# Patient Record
Sex: FEMALE | Race: WHITE | NOT HISPANIC OR LATINO | Employment: FULL TIME | URBAN - METROPOLITAN AREA
[De-identification: names, ages, dates, MRNs, and addresses within clinical notes are randomized per-mention and may not be internally consistent; named-entity substitution may affect disease eponyms.]

---

## 2017-04-25 ENCOUNTER — ALLSCRIPTS OFFICE VISIT (OUTPATIENT)
Dept: OTHER | Facility: OTHER | Age: 52
End: 2017-04-25

## 2017-04-25 DIAGNOSIS — D64.9 ANEMIA: ICD-10-CM

## 2017-04-25 DIAGNOSIS — E53.8 DEFICIENCY OF OTHER SPECIFIED B GROUP VITAMINS: ICD-10-CM

## 2017-04-25 DIAGNOSIS — E55.9 VITAMIN D DEFICIENCY: ICD-10-CM

## 2017-06-20 ENCOUNTER — LAB CONVERSION - ENCOUNTER (OUTPATIENT)
Dept: OTHER | Facility: OTHER | Age: 52
End: 2017-06-20

## 2017-06-20 LAB
25(OH)D3 SERPL-MCNC: 74 NG/ML (ref 30–100)
BASOPHILS # BLD AUTO: 0.2 %
BASOPHILS # BLD AUTO: 15 CELLS/UL (ref 0–200)
DEPRECATED RDW RBC AUTO: 13.7 % (ref 11–15)
EOSINOPHIL # BLD AUTO: 1.4 %
EOSINOPHIL # BLD AUTO: 102 CELLS/UL (ref 15–500)
HCT VFR BLD AUTO: 36.9 % (ref 35–45)
HGB BLD-MCNC: 12.2 G/DL (ref 11.7–15.5)
LYMPHOCYTES # BLD AUTO: 2117 CELLS/UL (ref 850–3900)
LYMPHOCYTES # BLD AUTO: 29 %
MCH RBC QN AUTO: 32.2 PG (ref 27–33)
MCHC RBC AUTO-ENTMCNC: 33.1 G/DL (ref 32–36)
MCV RBC AUTO: 97.3 FL (ref 80–100)
MONOCYTES # BLD AUTO: 321 CELLS/UL (ref 200–950)
MONOCYTES (HISTORICAL): 4.4 %
NEUTROPHILS # BLD AUTO: 4745 CELLS/UL (ref 1500–7800)
NEUTROPHILS # BLD AUTO: 65 %
PLATELET # BLD AUTO: 228 THOUSAND/UL (ref 140–400)
PMV BLD AUTO: 9.7 FL (ref 7.5–12.5)
RBC # BLD AUTO: 3.8 MILLION/UL (ref 3.8–5.1)
TSH SERPL DL<=0.05 MIU/L-ACNC: 2.83 MIU/L
VIT B12 SERPL-MCNC: 387 PG/ML (ref 200–1100)
WBC # BLD AUTO: 7.3 THOUSAND/UL (ref 3.8–10.8)

## 2017-10-23 ENCOUNTER — ALLSCRIPTS OFFICE VISIT (OUTPATIENT)
Dept: OTHER | Facility: OTHER | Age: 52
End: 2017-10-23

## 2017-10-24 NOTE — PROGRESS NOTES
Assessment  1  Birth control counseling (V25 09) (Z30 09)   2  Encounter for gynecological examination without abnormal finding (V72 31) (Z01 419)    Plan  Encounter for screening mammogram for malignant neoplasm of breast    · * MAMMO SCREENING BILATERAL W CAD; Status:Hold For - Scheduling,Retrospective  By Protocol Authorization; Requested for:[id number];    Perform:[facility]; [phone]; Last Updated Terri Macias; 10/23/2017 8:00:10 AM;Ordered;For:Encounter for screening mammogram for malignant neoplasm of breast; Ordered By:Danuta León;  PMH: Encounter for birth control pills maintenance    · Alyacen 7/7/7 0 5/0 75/1-35 MG-MCG Oral Tablet; TAKE 1 TABLET DAILY AS  DIRECTED   Rx By: Nurys Tarango; Dispense: 84 Days ; #:1 X 28 Tablet Disp Pack (3 Disp Packs); Refill: 3;For: PMH: Encounter for birth control pills maintenance; PORTIA = N; Sent To: Miguel Collado    Discussion/Summary  healthy adult female cervical cancer screening is current next cervical cancer screening is due 2018 Breast cancer screening: mammogram has been ordered  Colorectal cancer screening: colorectal cancer screening is current  Annual exam performedrx givenrisks of OCP, pt wants to continuesent via EMR1 year  The patient has the current Goals: RTO 1 year  The patent has the current Barriers: None  Patient is able to Self-Care  Self Referrals: Yes      Chief Complaint  Pt is here for yearly exam       History of Present Illness  HPI: 46 y/o female here for annual GYN exam  Pt denies any changes in medical, surgical, or family histories  Menses regular, pt on OCP for contraception, no problems, (+) mood swings before menses which are worsening  (+) sexually active with , denies any dryness or pain with intercourse  last pap 2015 WNL  last mammo 2016 WNL  last colonoscopy 2 yrs ago per pt WNL  GYN HM, Adult Female [facility]:   General Health:   Lifestyle:   She does not use tobacco -- She consumes alcohol -- She denies drug use  Reproductive health: the patient is perimenopausal--   she reports no menstrual problems  Menstrual history: LMP: the last menstrual period was 9/25/2017 -- she uses contraception  For contraception, she uses oral contraception pills  -- she is sexually active  -- she denies prior pregnancies  Screening: Cervical cancer screening includes a pap smear performed 10/12/2015, negative-- and-- human papilloma virus screening performed 10/12/2015, negative  Breast cancer screening includes a mammogram performed 12/5/2016, benign  Colorectal cancer screening includes a colonoscopy performed within the past ten years  Review of Systems    Constitutional: no fever-- and-- no chills  Cardiovascular: no chest pain  Respiratory: no shortness of breath  Breasts: no breast pain-- and-- no breast lump  Gastrointestinal: no abdominal pain  Genitourinary: incontinence-- (mild stress incontinence), but-- no dysuria,-- no pelvic pain,-- no vaginal discharge,-- no dysmenorrhea-- and-- no unexplained vaginal bleeding  Neurological: no headache  Active Problems  1  Anemia, unspecified type (285 9) (D64 9)   2  Benign paroxysmal vertigo, unspecified laterality (386 11) (H81 10)   3  Birth control counseling (V25 09) (Z30 09)   4  Encounter for gynecological examination without abnormal finding (V72 31) (Z01 419)   5  Encounter for screening mammogram for malignant neoplasm of breast (V76 12)   (Z12 31)   6  Hair loss (704 00) (L65 9)   7  Menopausal disorder (627 9) (N95 9)   8  Mild vitamin D deficiency (268 9) (E55 9)   9  Seasonal allergies (477 9) (J30 2)   10   Vitamin B12 deficiency (266 2) (E53 8)    Past Medical History   · History of Acute maxillary sinusitis (461 0) (J01 00)   · History of Acute maxillary sinusitis (461 0) (J01 00)   · History of Acute serous otitis media (381 01) (H65 00)   · History of Age At First Period 17 Years Old (Menarche)   · History of acute sinusitis (V12 69) (Z87 09)   · History of allergic rhinitis (V12 69) (Z87 09)   · History of gastroesophageal reflux (GERD) (V12 79) (Z87 19)    The active problems and past medical history were reviewed and updated today  Surgical History   · History of Denial Of Any Significant Medical History   · History of Denial Of Any Significant Medical History    The surgical history was reviewed and updated today  Family History  Mother    · No pertinent family history  Father    · No pertinent family history  Family History    · Family history of Arthritis (V17 7)   · Family history of Hiatal Hernia   · Family history of Skin Cancer (V16 8)   · Family history of Thyroid Disorder (V18 19)   · Family history of Varicose Veins Of Lower Extremities    The family history was reviewed and updated today  Social History   · Being A Social Drinker   · Birth Control Method - Oral Contraceptives   · Daily Coffee Consumption (1  Cups/Day)   · Exercising Regularly   · Never A Smoker   · Never Used Drugs  The social history was reviewed and updated today  The social history was reviewed and is unchanged  Current Meds   1  Alyacen 7/7/7 0 5/0 75/1-35 MG-MCG Oral Tablet; TAKE 1 TABLET DAILY AS DIRECTED; Therapy: 47ZGS3158 to ([facility])  Requested for: 99SRS8961; Last   Rx:88Cel6957 Ordered   2  Fluticasone Propionate 50 MCG/ACT Nasal Suspension; USE 2 SPRAYS IN EACH   NOSTRIL ONCE DAILY; Therapy: 08EKP8422 to (Evaluate:37Lso1551)  Requested for: 60Ajd1836; Last   Rx:76Wlm1940 Ordered   3  Meclizine HCl - 25 MG Oral Tablet; TAKE 1 TABLET 3 TIMES DAILY AS NEEDED; Therapy: 50SOD4641 to (Evaluate:37Xvm1129)  Requested for: 72Rfa0435; Last   Rx:48Xlx5960 Ordered   4  Multi-Vitamin Daily Oral Tablet Recorded   5  Pantoprazole Sodium 40 MG Oral Tablet Delayed Release; TAKE 1 TABLET DAILY; Therapy: 83TXN8375 to ([facility])  Requested for: 08Fko6105 Recorded   6   Saline Nasal Spray 0 65 % Nasal Solution; Inhale 1-2 sprays as needed; Therapy: 04Rca5594 to (Last Rx:46Dhv4218)  Requested for: 21Jiv3506 Ordered   7  Vitamin B-12 500 MCG Oral Tablet; TAKE 1 TABLET DAILY; Therapy: 99GZN1275 to [phone])  Requested for: 42Uqy2810; Last   Rx:93Tun3832 Ordered   8  Vitamin C CAPS; 1000 mg BID; Therapy: (Dave Polanco) to Recorded   9  Vitamin D 1000 UNIT Oral Tablet Recorded    Allergies  1  No Known Drug Allergies    Vitals   Recorded: 29LBD0058 19:28HY   Systolic 388, LUE, Sitting   Diastolic 82, LUE, Sitting   Height 5 ft 7 in   Weight 169 lb    BMI Calculated 26 47   BSA Calculated 1 88   LMP 61Czv3042     Physical Exam    Constitutional   General appearance: No acute distress, well appearing and well nourished  Neck   Thyroid: Normal, no thyromegaly  Pulmonary   Respiratory effort: No increased work of breathing or signs of respiratory distress  Auscultation of lungs: Clear to auscultation  Cardiovascular   Auscultation of heart: Normal rate and rhythm, normal S1 and S2, no murmurs  Genitourinary   External genitalia: Normal and no lesions appreciated  Vagina: Normal, no lesions or dryness appreciated  Urethra: Normal     Urethral meatus: Normal     Bladder: Normal, soft, non-tender and no prolapse or masses appreciated  Cervix: Normal, no palpable masses  Uterus: Normal, non-tender, not enlarged, and no palpable masses  Adnexa/parametria: Normal, non-tender and no fullness or masses appreciated  Chest   Breasts: Normal and no dimpling or skin changes noted  Abdomen   Abdomen: Normal, non-tender, and no organomegaly noted      Psychiatric   Orientation to person, place, and time: Normal     Mood and affect: Normal        Future Appointments    Date/Time Provider Specialty Site   10/25/2017 04:45 PM [facility]y Boas, MD [facility]     Signatures   Electronically signed by : Capri Garza [facility]; Oct 23 2017  8:34AM EST                       (Author)    Electronically signed by : SHAUNNA Valdovinos ; Oct 23 2017  5:40PM EST

## 2017-10-25 ENCOUNTER — ALLSCRIPTS OFFICE VISIT (OUTPATIENT)
Dept: OTHER | Facility: OTHER | Age: 52
End: 2017-10-25

## 2017-10-26 NOTE — PROGRESS NOTES
Assessment  1  Vitamin B12 deficiency (266 2) (E53 8)   2  Influenza vaccine needed (V04 81) (Z23)   3  Screening for depression (V79 0) (Z13 89)   4  Mild vitamin D deficiency (268 9) (E55 9)    Plan  Influenza vaccine needed    · Fluzone Quadrivalent 0 5 ML Intramuscular Suspension Prefilled Syringe  PMH: Depression screen    · *VB-Depression Screening ; every 1 year; Last 60Crm9427; Next 79XXH8662; Status:Active  PMH: History of screening mammography    · * MAMMO SCREENING BILATERAL W CAD; Status:Active; Requested for:52Jfb6045;   Screening for depression    · PHQ-9 Adult Depression Screening ; every 1 year; Last 25Oct2017; Next 16GCI4481; Status:Active    Discussion/Summary  Counseling Documentation With Imm: The patient was counseled regarding diagnostic results,-- prognosis,-- impressions  Chief Complaint  Chief Complaint Free Text Note Form: Pt is here for F/U for Vitamin D deficiency  lab work 06/19/17      History of Present Illness  Anemia:   Valentin Hsu presents with complaints of mild anemia (Anemia is resolved CBC noted)   Associated symptoms include no abdominal swelling,-- no melena,-- no hematuria,-- no rectal bleeding,-- no hemoptysis,-- no diarrhea,-- no dyspnea,-- no tendency for easy bleeding,-- no tendency for easy bruising,-- no edema,-- no fatigue,-- no feelings of weakness,-- no fever,-- no lower back pain,-- no mental status change,-- no palpitations,-- no recent weight loss,-- no night sweats,-- no tingling-- and-- no hematemesis  Fatigue: The patient is being seen for follow-up from a previous urgent care visit for fatigue  The patient presents with complaints of gradual onset of moderate fatigue  Symptoms are improved by rest, adequate sleep, regular exercise and allergy medication, but not by antidepressants and asthma medication  Symptoms are unchanged   Risk Factors: no polypharmacy, no obesity and no poor conditioning Pertinent Medical History: anemia, but not cardiovascular disease, chronic respiratory disease, chronic GI disease, chronic liver disease, chronic neuropsychiatric disorder, malignancy, obstructive sleep apnea and seasonal affective disorder (better off and on, the fatigue is often on but better than before)  The patient is currently experiencing symptoms  Vitamin D Deficiency: The patient is being seen for follow-up of vitamin D deficiency  Disease type: vitamin D deficiency  (Vitamin-D deficiency is resolved ) Recent laboratory results: date, 25-hydroxyvitamin D ng/mL  Review of Systems  Complete-Female:   Constitutional: feeling tired  Eyes: No complaints of eye pain, no red eyes, no eyesight problems, no discharge, no dry eyes, no itching of eyes  ENT: no complaints of earache, no loss of hearing, no nose bleeds, no nasal discharge, no sore throat, no hoarseness  Cardiovascular: No complaints of slow heart rate, no fast heart rate, no chest pain, no palpitations, no leg claudication, no lower extremity edema  Respiratory: No complaints of shortness of breath, no wheezing, no cough, no SOB on exertion, no orthopnea, no PND  Gastrointestinal: No complaints of abdominal pain, no constipation, no nausea or vomiting, no diarrhea, no bloody stools  Genitourinary: No complaints of dysuria, no incontinence, no pelvic pain, no dysmenorrhea, no vaginal discharge or bleeding  Musculoskeletal: No complaints of arthralgias, no myalgias, no joint swelling or stiffness, no limb pain or swelling  Integumentary: Hair loss, indentation in leg, but-- No complaints of skin rash or lesions, no itching, no skin wounds, no breast pain or lump  Neurological: dizziness  Psychiatric: sleep disturbances (poor sleep )  Endocrine: No complaints of proptosis, no hot flashes, no muscle weakness, no deepening of the voice, no feelings of weakness     Hematologic/Lymphatic: No complaints of swollen glands, no swollen glands in the neck, does not bleed easily, does not bruise easily  Other Symptoms: Vertigo MRI was done, its 1-2 x a year  Active Problems  1  Anemia, unspecified type (285 9) (D64 9)   2  Benign paroxysmal vertigo, unspecified laterality (386 11) (H81 10)   3  Birth control counseling (V25 09) (Z30 09)   4  Hair loss (704 00) (L65 9)   5  Menopausal disorder (627 9) (N95 9)   6  Mild vitamin D deficiency (268 9) (E55 9)   7  Seasonal allergies (477 9) (J30 2)   8  Vitamin B12 deficiency (266 2) (E53 8)    Past Medical History  1  History of Acute maxillary sinusitis (461 0) (J01 00)   2  History of Acute maxillary sinusitis (461 0) (J01 00)   3  History of Acute serous otitis media (381 01) (H65 00)   4  History of Age At First Period 17 Years Old (Menarche)   11  History of acute sinusitis (V12 69) (Z87 09)   6  History of allergic rhinitis (V12 69) (Z87 09)   7  History of gastroesophageal reflux (GERD) (V12 79) (Z87 19)    Surgical History  1  History of Denial Of Any Significant Medical History   2  History of Denial Of Any Significant Medical History    Family History  Mother    1  No pertinent family history  Father    2  No pertinent family history  Family History    3  Family history of Arthritis (V17 7)   4  Family history of Hiatal Hernia   5  Family history of Skin Cancer (V16 8)   6  Family history of Thyroid Disorder (V18 19)   7  Family history of Varicose Veins Of Lower Extremities    Social History   · Being A Social Drinker   · Birth Control Method - Oral Contraceptives   · Daily Coffee Consumption (1  Cups/Day)   · Exercising Regularly   · Never A Smoker   · Never Used Drugs    Current Meds   1  Alyacen 7/7/7 0 5/0 75/1-35 MG-MCG Oral Tablet; TAKE 1 TABLET DAILY AS DIRECTED; Therapy: 11JWL7183 to (Evaluate:53Snt1126)  Requested for: 23Oct2017; Last ZK:10WXW5985   Ordered   2  Caltrate 600+D TABS; Take 1 tablet daily; Therapy: (Recorded:25Oct2017) to Recorded   3   Fluticasone Propionate 50 MCG/ACT Nasal Suspension; USE 2 SPRAYS IN [facility] NOSTRIL ONCE   DAILY; Therapy: 88GPD5575 to (Evaluate:49Ivz8615)  Requested for: 25Apr2017; Last Rx:25Apr2017   Ordered   4  Meclizine HCl - 25 MG Oral Tablet; TAKE 1 TABLET 3 TIMES DAILY AS NEEDED; Therapy: 93XQN8027 to (Evaluate:04Jun2017)  Requested for: 25Apr2017; Last Rx:25Apr2017   Ordered   5  Multi-Vitamin Daily Oral Tablet; TAKE 1 TABLET DAILY; Therapy: (VUJGNPJD:66CMD9099) to Recorded   6  Pantoprazole Sodium 40 MG Oral Tablet Delayed Release; TAKE 1 TABLET DAILY; Therapy: 61GFQ2096 to (Demaris Erin)  Requested for: 36AYU9617 Recorded   7  Saline Nasal Spray 0 65 % Nasal Solution; Inhale 1-2 sprays as needed; Therapy: 08Nmu6848 to (Last Rx:46Tmz4262)  Requested for: 08Wqg5214 Ordered   8  Vitamin B-12 500 MCG Oral Tablet; TAKE 1 TABLET DAILY; Therapy: 33ZOI1563 to (03 17 74 30 53)  Requested for: 25Apr2017; Last Rx:25Apr2017   Ordered   9  Vitamin C CAPS; 1000 mg BID; Therapy: (Recorded:14Ofd7411) to Recorded  Medication List Reviewed: The medication list was reviewed and updated today  Allergies  1  No Known Drug Allergies  2  Seasonal    Vitals  Vital Signs    Recorded: 38CZZ8553 04:27PM   Temperature 99 2 F, Tympanic   Heart Rate 72   Systolic 232, LUE, Standing   Diastolic 82, LUE, Standing   BP CUFF SIZE Large   Height 5 ft 7 in   Weight 169 lb 6 4 oz   BMI Calculated 26 53   BSA Calculated 1 88   O2 Saturation 95, RA     Physical Exam    Constitutional   General appearance: No acute distress, well appearing and well nourished  Head and Face   Head and face: Normal     Eyes   Conjunctiva and lids: No swelling, erythema or discharge  Ears, Nose, Mouth, and Throat   External inspection of ears and nose: Normal     Neck   Thyroid: Normal, no thyromegaly  Pulmonary   Percussion of chest: Normal     Palpation of chest: Normal     Auscultation of lungs: Clear to auscultation      Cardiovascular   Auscultation of heart: Normal rate and rhythm, normal S1 and S2, no murmurs  Carotid pulses: 2+ bilaterally  Abdominal aorta: Normal     Femoral pulses: 2+ bilaterally  Pedal pulses: 2+ bilaterally  Peripheral vascular exam: Normal     Examination of extremities for edema and/or varicosities: Normal     Abdomen   Liver and spleen: No hepatomegaly or splenomegaly  Examination for hernias: No hernia appreciated  Lymphatic   Palpation of lymph nodes in neck: No lymphadenopathy  Palpation of lymph nodes in axillae: No lymphadenopathy  Palpation of lymph nodes in groin: No lymphadenopathy  Musculoskeletal   Gait and station: Normal     Digits and nails: Normal without clubbing or cyanosis  Joints, bones, and muscles: Normal     Range of motion: Normal     Stability: Normal     Muscle strength/tone: Normal     Neurologic   Cranial nerves: Cranial nerves II-XII intact  Cortical function: Normal mental status  Reflexes: 2+ and symmetric  Sensation: No sensory loss  Coordination: Normal finger to nose and heel to shin  Psychiatric   Judgment and insight: Normal     Orientation to person, place, and time: Normal     Recent and remote memory: Intact  Mood and affect: Normal        Results/Data  PHQ-9 Adult Depression Screening 01GQM9637 04:47PM User, [facility]     Test Name Result Flag Reference   PHQ-9 Adult Depression Score 8     Over the last two weeks, how often have you been bothered by any of the following problems?   Little interest or pleasure in doing things: Several days - 1  Feeling down, depressed, or hopeless: Several days - 1  Trouble falling or staying asleep, or sleeping too much: Nearly every day - 3  Feeling tired or having little energy: More than half the days - 2  Poor appetite or over eating: Not at all - 0  Feeling bad about yourself - or that you are a failure or have let yourself or your family down: Not at all - 0  Trouble concentrating on things, such as reading the newspaper or watching television: Several days - 1  Moving or speaking so slowly that other people could have noticed  Or the opposite -  being so fidgety or restless that you have been moving around a lot more than usual: Not at all - 0  Thoughts that you would be better off dead, or of hurting yourself in some way: Not at all - 0   PHQ-9 Adult Depression Screening Negative     PHQ-9 Difficulty Level Somewhat difficult     PHQ-9 Severity Mild Depression         Health Management  History of Depression screen   *VB-Depression Screening; every 1 year; Last 95Znp2710; Next Due: 17DYB0337; Active  Need for prophylactic vaccination and inoculation against influenza   Fluzone Intramuscular Injectable; every 1 year; Last Approx WGR4203; Next Due: 60XVW6181;   Overdue    Future Appointments    Date/Time Provider Specialty Site   10/29/2018 08:00 AM Felicity Luong [facility] Obstetrics/Gynecology [facility] AND SURGICAL [facility]     Signatures   Electronically signed by : Aleksander Gar MD; Oct 25 2017  5:03PM EST                       (Author)

## 2017-12-05 DIAGNOSIS — Z12.31 ENCOUNTER FOR SCREENING MAMMOGRAM FOR MALIGNANT NEOPLASM OF BREAST: ICD-10-CM

## 2017-12-18 ENCOUNTER — ALLSCRIPTS OFFICE VISIT (OUTPATIENT)
Dept: OTHER | Facility: OTHER | Age: 52
End: 2017-12-18

## 2017-12-19 NOTE — PROGRESS NOTES
Assessment  1  Tracheobronchitis (490) (J40)    Plan  Tracheobronchitis    · Azithromycin 250 MG Oral Tablet; Take 2 tablets today, then 1 tablet daily for 4 days   · Promethazine-Codeine 6 25-10 MG/5ML Oral Syrup; TAKE 1 TSP Every 8 hours    Discussion/Summary  The patient was counseled regarding diagnostic results,-- prognosis,-- impressions  Chief Complaint  Patient is here today with complaints of a cough since 12/6/17  The cough is not constant but it is worse at night  No shortness of breath  No fever documented  She is treating the symptoms with an OTC cough and cold formula with a decongestant  History of Present Illness  Cold Symptoms:   Harvey Cedeno presents with complaints of gradual onset of moderate cold symptoms starting about 6 days ago  Her symptoms are reportedly caused by no known event  Symptoms are worsening  Risk Factors: no smoking Pertinent Medical History: no asthma (Start with cold symptoms now in chest )  Associated symptoms include nasal congestion,-- post nasal drainage,-- hoarseness,-- headache,-- plugged ear(s)-- and-- ear pain, but-- no sore throat,-- no dry cough,-- no facial pressure,-- no facial pain,-- no nausea,-- no vomiting,-- no diarrhea,-- no fever-- and-- no chills  The patient presents with complaints of intermittent episodes of moderate no productive cough, described as hacking  Symptoms are worsening  Has been taking generic antihistamine and Tylenol cold and congestion with minimal sx relief  Review of Systems   Constitutional: fever-- and-- feeling poorly, but-- as noted in HPI   ENT: earache-- and-- nasal discharge, but-- as noted in HPI  Cardiovascular: no complaints of slow or fast heart rate, no chest pain, no palpitations, no leg claudication or lower extremity edema  Gastrointestinal: no complaints of abdominal pain, no constipation, no nausea or diarrhea, no vomiting, no bloody stools    Integumentary: no complaints of skin rash or lesion, no itching or dry skin, no skin wounds  Active Problems  1  Anemia, unspecified type (285 9) (D64 9)   2  Benign paroxysmal vertigo, unspecified laterality (386 11) (H81 10)   3  Birth control counseling (V25 09) (Z30 09)   4  Hair loss (704 00) (L65 9)   5  Influenza vaccine needed (V04 81) (Z23)   6  Menopausal disorder (627 9) (N95 9)   7  Mild vitamin D deficiency (268 9) (E55 9)   8  Screening for depression (V79 0) (Z13 89)   9  Seasonal allergies (477 9) (J30 2)   10  Vitamin B12 deficiency (266 2) (E53 8)    Social History   · Being A Social Drinker   · Birth Control Method - Oral Contraceptives   · Daily Coffee Consumption (1  Cups/Day)   · Exercising Regularly   · Never A Smoker   · Never Used Drugs    Current Meds   1  Caltrate 600+D TABS; Take 1 tablet daily; Therapy: (Recorded:25Oct2017) to Recorded   2  Fluticasone Propionate 50 MCG/ACT Nasal Suspension; USE 2 SPRAYS IN EACH NOSTRIL ONCE DAILY; Therapy: 24XBH5823 to (Evaluate:95Hfe3210)  Requested for: 25Apr2017; Last Rx:25Apr2017 Ordered   3  Meclizine HCl - 25 MG Oral Tablet; TAKE 1 TABLET 3 TIMES DAILY AS NEEDED; Therapy: 21KMV8013 to (Evaluate:04Jun2017)  Requested for: 25Apr2017; Last Rx:25Apr2017 Ordered   4  Multi-Vitamin Daily Oral Tablet; TAKE 1 TABLET DAILY; Therapy: (WDYHADOD:39KWB6832) to Recorded   5  Pantoprazole Sodium 40 MG Oral Tablet Delayed Release; TAKE 1 TABLET DAILY; Therapy: 78TZI7336 to (Amanda Perry)  Requested for: 32Qhd0290 Recorded   6  Saline Nasal Spray 0 65 % Nasal Solution; Inhale 1-2 sprays as needed; Therapy: 25Lmh2144 to (Last Rx:34Hmx9196)  Requested for: 35Akr1811 Ordered   7  Vitamin B-12 500 MCG Oral Tablet; TAKE 1 TABLET DAILY; Therapy: 45VFB7647 to (77 [id number] 135)  Requested for: 25Apr2017; Last Rx:25Apr2017 Ordered   8  Vitamin C CAPS; 1000 mg BID; Therapy: (Recorded:81Ytn3156) to Recorded    Allergies  1  No Known Drug Allergies  2   Seasonal    Vitals   Recorded: 57AYM0343 03:42PM   Temperature 97 9 F, Oral   Heart Rate 72, L Radial   Pulse Quality Regular, L Radial   Respiration 16   Systolic 349, LUE, Sitting   Diastolic 90, LUE, Sitting   Height 5 ft 7 in   Weight 172 lb 6 4 oz   BMI Calculated 27   BSA Calculated 1 9   O2 Saturation 98, RA     Physical Exam   Constitutional  General appearance: Abnormal   well developed,-- well nourished-- and-- acutely exhausted  Eyes  Conjunctiva and lids: No swelling, erythema or discharge  Pupils and irises: Equal, round and reactive to light  Ears, Nose, Mouth, and Throat  External inspection of ears and nose: Normal    Otoscopic examination: Abnormal   The right tympanic membrane was normal  The left tympanic membrane was bulging, but-- was not red  Nasal mucosa, septum, and turbinates: Abnormal   There was a mucoid discharge from both nares  The bilateral nasal mucosa was edematous  -- moderate frontal and maxillary tenderness  Oropharynx: Abnormal   The posterior pharynx was not erythematous-- and-- did not have an exudate  Oral mucosa was dry  The tongue was dry  Pulmonary  Respiratory effort: No increased work of breathing or signs of respiratory distress  Auscultation of lungs: Clear to auscultation  Cardiovascular  Palpation of heart: Normal PMI, no thrills  Auscultation of heart: Abnormal   The heart rate was tachycardic  The rhythm was regular  Heart sounds: normal S1-- and-- normal S2  Lymphatic  Palpation of lymph nodes in neck: Abnormal   bilateral anterior cervical node enlargement  Musculoskeletal  Gait and station: Normal    Skin  Skin and subcutaneous tissue: Normal without rashes or lesions  Neurologic  Cranial nerves: Cranial nerves 2-12 intact           Future Appointments    Date/Time Provider Specialty Site   10/29/2018 08:00 AM [facility] Obstetrics/Gynecology [facility]     Signatures   Electronically signed by : Soham Irwin MD; Dec 18 2017  4:23PM EST                       (Author)

## 2018-01-11 NOTE — PROGRESS NOTES
Assessment    1  Encounter for preventive health examination (V70 0) (Z00 00)   2  Benign paroxysmal vertigo, unspecified laterality (386 11) (H81 10)   3  Fatigue (780 79) (R53 83)   4  Hair loss (704 00) (L65 9)    Plan  Benign paroxysmal vertigo, unspecified laterality    · Meclizine HCl - 25 MG Oral Tablet; TAKE 1 TABLET 3 TIMES DAILY AS NEEDED  Benign paroxysmal vertigo, unspecified laterality, Health Maintenance    · (1) CBC/PLT/DIFF; Status:Active; Requested for:21Apr2016;    · (1) COMPREHENSIVE METABOLIC PANEL; Status:Active; Requested for:21Apr2016;    · (1) TSH; Status:Active; Requested for:21Apr2016;   Depression screen    · *VB-Depression Screening; Status:Complete - Retrospective By Protocol Authorization;    Done: 21Apr2016 03:43PM   · *VB-Depression Screening ; every 1 year; Last 21Apr2016; Next 21Apr2017;  Status:Active  Fatigue    · (1) LYME ANTIBODY PROFILE W/REFLEX TO WESTERN BLOT; Status:Active; Requested for:21Apr2016;    · (1) VITAMIN B12; Status:Active; Requested for:21Apr2016;   Fatigue, Hair loss    · (1) TSH; Status:Active; Requested for:21Apr2016;    · (1) VITAMIN D 25-HYDROXY; Status:Active; Requested for:21Apr2016; Health Maintenance    · (1) HEP C ANTIBODY; Status:Active; Requested for:21Apr2016;    · (1) LIPID PANEL, FASTING; Status:Active; Requested for:21Apr2016;   Need for prophylactic vaccination and inoculation against influenza    · Fluzone Intramuscular Injectable ; every 1 year; Last Approx UXP9884; Next 15LLZ8379;  Status:Active  Need for vaccination for DTP    · Adacel 5-2-15 5 LF-MCG/0 5 Intramuscular Suspension    Discussion/Summary  health maintenance visit Currently, she eats a healthy diet  the risks and benefits of cervical cancer screening were discussed Breast cancer screening: the risks and benefits of breast cancer screening were discussed, monthly self breast exam was advised and mammogram is current   Colorectal cancer screening: the risks and benefits of colorectal cancer screening were discussed and colorectal cancer screening is current  The risks and benefits of immunizations were discussed and immunizations are up to date  Chief Complaint  Pt is here for a yearly physical      History of Present Illness  HM, Adult Female: The patient is being seen for a health maintenance evaluation  Social History: Household members include spouse, 0 daughter(s) and 0 son(s)  She is   Work status: working full time and occupation: Software   The patient has never smoked cigarettes  She reports occasional alcohol use  She has never used illicit drugs  General Health: The patient's health since the last visit is described as fair  She complains of vision problems  She has hearing loss  Lifestyle:  She consumes a diverse and healthy diet  She has weight concerns  She does not use tobacco  She denies alcohol use  She denies drug use  Reproductive health: the patient is perimenopausal   she reports normal menses  she uses contraception  she is sexually active  she denies prior pregnancies  Screening: Cervical cancer screening includes a pap smear performed last year  Breast cancer screening includes a mammogram performed last year  Colorectal cancer screening includes a colonoscopy performed within the past ten years  Metabolic screening includes lipid profile performed within the past five years, glucose screening performed last year, thyroid function test performed last year and no previous DEXA  Review of Systems    Constitutional: feeling tired  Eyes: No complaints of eye pain, no red eyes, no eyesight problems, no discharge, no dry eyes, no itching of eyes  ENT: no complaints of earache, no loss of hearing, no nose bleeds, no nasal discharge, no sore throat, no hoarseness  Cardiovascular: No complaints of slow heart rate, no fast heart rate, no chest pain, no palpitations, no leg claudication, no lower extremity edema     Respiratory: No complaints of shortness of breath, no wheezing, no cough, no SOB on exertion, no orthopnea, no PND  Gastrointestinal: No complaints of abdominal pain, no constipation, no nausea or vomiting, no diarrhea, no bloody stools  Genitourinary: No complaints of dysuria, no incontinence, no pelvic pain, no dysmenorrhea, no vaginal discharge or bleeding  Musculoskeletal: No complaints of arthralgias, no myalgias, no joint swelling or stiffness, no limb pain or swelling  Integumentary: Hair loss, indentation in leg, but No complaints of skin rash or lesions, no itching, no skin wounds, no breast pain or lump  Neurological: dizziness  Psychiatric: sleep disturbances  Endocrine: No complaints of proptosis, no hot flashes, no muscle weakness, no deepening of the voice, no feelings of weakness  Hematologic/Lymphatic: No complaints of swollen glands, no swollen glands in the neck, does not bleed easily, does not bruise easily  Other Symptoms: Vertigo MRI was done, its 1-2 x a year  Over the past 2 weeks, how often have you been bothered by the following problems? 1 ) Little interest or pleasure in doing things? Several days  2 ) Feeling down, depressed or hopeless? Several days  3 ) Trouble falling asleep or sleeping too much? Several days  4 ) Feeling tired or having little energy? Nearly every day  5 ) Poor appetite or overeating? Nearly every day  6 ) Feeling bad about yourself, or that you are a failure, or have let yourself or your family down? Half the days or more  7 ) Trouble concentrating on things, such as reading a newspaper or watching television? Half the days or more  8 ) Moving or speaking so slowly that other people could have noticed, or the opposite, moving or speaking faster than usual? Several days  severity of depression is moderate   How difficult have these problems made it for you to do your work, take care of things at home, or get along with people? Somewhat difficult  Score 14      Active Problems    1  Benign paroxysmal vertigo, unspecified laterality (386 11) (H81 10)   2  Birth control counseling (V25 09) (Z30 9)   3  Contraceptives (V25 02)   4  Menopausal disorder (627 9) (N95 9)    Past Medical History    · History of Acute maxillary sinusitis (461 0) (J01 00)   · History of Acute serous otitis media (381 01) (H65 00)   · History of Age At First Period 17 Years Old (Menarche)   · History of acute sinusitis (V12 69) (Z87 09)   · History of allergic rhinitis (V12 69) (Z87 09)   · History of gastroesophageal reflux (GERD) (V12 79) (Z87 19)    Surgical History    · Contraceptives (V25 02)   · History of Denial Of Any Significant Medical History   · History of Denial Of Any Significant Medical History    Family History  Mother    · No pertinent family history  Father    · No pertinent family history  Family History    · Family history of Arthritis (V17 7)   · Family history of Hiatal Hernia   · Family history of Skin Cancer (V16 8)   · Family history of Thyroid Disorder (V18 19)   · Family history of Varicose Veins Of Lower Extremities    Social History    · Being A Social Drinker   · Birth Control Method - Oral Contraceptives   · Daily Coffee Consumption (1  Cups/Day)   · Exercising Regularly   · Never A Smoker   · Never Used Drugs    Current Meds   1  Alyacen 7/7/7 0 5/0 75/1-35 MG-MCG Oral Tablet; TAKE 1 TABLET DAILY; Therapy: 47LIV0427 to (David Hogan)  Requested for: 12Oct2015; Last   Rx:12Oct2015 Ordered   2  Fluticasone Propionate 50 MCG/ACT Nasal Suspension; USE 2 SPRAYS IN EACH   NOSTRIL ONCE DAILY; Therapy: 19HIJ2315 to (Margarita Montano)  Requested for: 40VFG3240; Last   Rx:28Jan2015 Ordered   3  Meclizine HCl - 25 MG Oral Tablet; TAKE 1 TABLET 3 TIMES DAILY AS NEEDED; Therapy: 68HJI5691 to ([facility])  Requested for: 24Jun2015; Last   AH:02ZFV8020 Ordered   4   Pantoprazole Sodium 40 MG Oral Tablet Delayed Release; TAKE 1 TABLET DAILY; Therapy: 39UUM2606 to (Laly Boudreaux)  Requested for: 65Lqu8580 Recorded   5  ZyrTEC Allergy 10 MG Oral Tablet; TAKE 1 TABLET DAILY AS NEEDED; Therapy: 21Apr2016 to Recorded    Allergies    1  No Known Drug Allergies    Vitals   Recorded: 21Apr2016 03:54PM   Temperature 99 9 F   Heart Rate 99   Systolic 866   Diastolic 92   Height 5 ft 7 in   Weight 179 lb 2 oz   BMI Calculated 28 06   BSA Calculated 1 93     Physical Exam    Constitutional   General appearance: No acute distress, well appearing and well nourished  Head and Face   Head and face: Normal     Eyes   Conjunctiva and lids: No swelling, erythema or discharge  Ears, Nose, Mouth, and Throat   External inspection of ears and nose: Normal     Neck   Thyroid: Normal, no thyromegaly  Pulmonary   Percussion of chest: Normal     Palpation of chest: Normal     Auscultation of lungs: Clear to auscultation  Cardiovascular   Auscultation of heart: Normal rate and rhythm, normal S1 and S2, no murmurs  Carotid pulses: 2+ bilaterally  Abdominal aorta: Normal     Femoral pulses: 2+ bilaterally  Pedal pulses: 2+ bilaterally  Peripheral vascular exam: Normal     Examination of extremities for edema and/or varicosities: Normal     Abdomen   Liver and spleen: No hepatomegaly or splenomegaly  Examination for hernias: No hernia appreciated  Lymphatic   Palpation of lymph nodes in neck: No lymphadenopathy  Palpation of lymph nodes in axillae: No lymphadenopathy  Palpation of lymph nodes in groin: No lymphadenopathy  Musculoskeletal   Gait and station: Normal     Digits and nails: Normal without clubbing or cyanosis  Joints, bones, and muscles: Normal     Range of motion: Normal     Stability: Normal     Muscle strength/tone: Normal     Neurologic   Cranial nerves: Cranial nerves II-XII intact  Cortical function: Normal mental status  Reflexes: 2+ and symmetric  Sensation: No sensory loss      Coordination: Normal finger to nose and heel to shin  Psychiatric   Judgment and insight: Normal     Orientation to person, place, and time: Normal     Recent and remote memory: Intact  Mood and affect: Normal        Results/Data  *VB-Depression Screening 21Apr2016 03:43PM Kai Gain     Test Name Result Flag Reference   Depression Scale Result      Depression Screen - Positive Findings       Health Management  Depression screen   *VB-Depression Screening; every 1 year; Last 21Apr2016; Next Due: 21Apr2017; Active  Need for prophylactic vaccination and inoculation against influenza   Fluzone Intramuscular Injectable; every 1 year; Last Approx [id number]; Next Due:  45MUZ6846;  Active    Future Appointments    Date/Time Provider Specialty Site   10/24/2016 08:20 AM [facility] Obstetrics/Gynecology [facility]     Signatures   Electronically signed by : Yfn Britt MD; Apr 21 2016  4:29PM EST                       (Author)

## 2018-01-11 NOTE — MISCELLANEOUS
Message  Return to work or school:   Natanael Christian is under my professional care  She was seen in my office on 04/21/2016   She is able to return to work on  04/25/2016       please excuse the PT on 04/21/20016 and 04/22/2016        Signatures   Electronically signed by : Konstantin Mendez MD; Apr 21 2016  5:55PM EST

## 2018-01-12 VITALS
WEIGHT: 169.4 LBS | HEART RATE: 72 BPM | BODY MASS INDEX: 26.59 KG/M2 | SYSTOLIC BLOOD PRESSURE: 120 MMHG | TEMPERATURE: 99.2 F | DIASTOLIC BLOOD PRESSURE: 82 MMHG | OXYGEN SATURATION: 95 % | HEIGHT: 67 IN

## 2018-01-13 VITALS
SYSTOLIC BLOOD PRESSURE: 120 MMHG | DIASTOLIC BLOOD PRESSURE: 82 MMHG | BODY MASS INDEX: 26.53 KG/M2 | HEIGHT: 67 IN | WEIGHT: 169 LBS

## 2018-01-13 VITALS
OXYGEN SATURATION: 95 % | BODY MASS INDEX: 25.87 KG/M2 | HEART RATE: 67 BPM | TEMPERATURE: 99.9 F | WEIGHT: 164.8 LBS | DIASTOLIC BLOOD PRESSURE: 70 MMHG | SYSTOLIC BLOOD PRESSURE: 118 MMHG | HEIGHT: 67 IN

## 2018-01-15 NOTE — RESULT NOTES
Message   Recorded as Task   Date: 01/20/2016 08:56 AM, Created By: Celina Schulz   Task Name: Verify Patient Results   Assigned To: Blanca Wallace   Regarding Patient: Gianfranco Ambrocio, Status: Active   CommentPriscille Gil - 20 Jan 2016 8:56 AM        Blanca Wallace - 20 Jan 2016 1:41 PM     TASK EDITED  Demario Chicas stated one hormone level indicates she is eager going through menopause or is postmenopausal the other 2 do not  She will try stopping the birth control pill and see if she gets a period          Signatures   Electronically signed by : Nacho Squires CNM; Jan 20 2016  1:41PM EST                       (Author)

## 2018-01-23 VITALS
SYSTOLIC BLOOD PRESSURE: 140 MMHG | HEIGHT: 67 IN | TEMPERATURE: 97.9 F | WEIGHT: 172.4 LBS | OXYGEN SATURATION: 98 % | RESPIRATION RATE: 16 BRPM | HEART RATE: 72 BPM | BODY MASS INDEX: 27.06 KG/M2 | DIASTOLIC BLOOD PRESSURE: 90 MMHG

## 2018-06-05 ENCOUNTER — TELEPHONE (OUTPATIENT)
Dept: INTERNAL MEDICINE CLINIC | Age: 53
End: 2018-06-05

## 2018-06-05 DIAGNOSIS — D64.9 ANEMIA, UNSPECIFIED TYPE: Primary | ICD-10-CM

## 2018-06-05 DIAGNOSIS — E53.8 VITAMIN B12 DEFICIENCY: ICD-10-CM

## 2018-06-05 DIAGNOSIS — E78.5 HYPERLIPIDEMIA, UNSPECIFIED HYPERLIPIDEMIA TYPE: ICD-10-CM

## 2018-06-05 DIAGNOSIS — E55.9 MILD VITAMIN D DEFICIENCY: ICD-10-CM

## 2018-06-14 LAB
25(OH)D3 SERPL-MCNC: 61 NG/ML (ref 30–100)
ALBUMIN SERPL-MCNC: 3.8 G/DL (ref 3.6–5.1)
ALBUMIN/GLOB SERPL: 1.2 (CALC) (ref 1–2.5)
ALP SERPL-CCNC: 92 U/L (ref 33–130)
ALT SERPL-CCNC: 10 U/L (ref 6–29)
AST SERPL-CCNC: 14 U/L (ref 10–35)
BASOPHILS # BLD AUTO: 29 CELLS/UL (ref 0–200)
BASOPHILS NFR BLD AUTO: 0.5 %
BILIRUB SERPL-MCNC: 0.4 MG/DL (ref 0.2–1.2)
BUN SERPL-MCNC: 16 MG/DL (ref 7–25)
BUN/CREAT SERPL: NORMAL (CALC) (ref 6–22)
CALCIUM SERPL-MCNC: 9.1 MG/DL (ref 8.6–10.4)
CHLORIDE SERPL-SCNC: 105 MMOL/L (ref 98–110)
CHOLEST SERPL-MCNC: 157 MG/DL
CHOLEST/HDLC SERPL: 3.3 (CALC)
CO2 SERPL-SCNC: 28 MMOL/L (ref 20–31)
CREAT SERPL-MCNC: 0.9 MG/DL (ref 0.5–1.05)
EOSINOPHIL # BLD AUTO: 68 CELLS/UL (ref 15–500)
EOSINOPHIL NFR BLD AUTO: 1.2 %
ERYTHROCYTE [DISTWIDTH] IN BLOOD BY AUTOMATED COUNT: 11.8 % (ref 11–15)
GLOBULIN SER CALC-MCNC: 3.3 G/DL (CALC) (ref 1.9–3.7)
GLUCOSE SERPL-MCNC: 91 MG/DL (ref 65–99)
HCT VFR BLD AUTO: 36.2 % (ref 35–45)
HDLC SERPL-MCNC: 48 MG/DL
HGB BLD-MCNC: 12.3 G/DL (ref 11.7–15.5)
LDLC SERPL CALC-MCNC: 96 MG/DL (CALC)
LYMPHOCYTES # BLD AUTO: 1072 CELLS/UL (ref 850–3900)
LYMPHOCYTES NFR BLD AUTO: 18.8 %
MCH RBC QN AUTO: 33 PG (ref 27–33)
MCHC RBC AUTO-ENTMCNC: 34 G/DL (ref 32–36)
MCV RBC AUTO: 97.1 FL (ref 80–100)
MONOCYTES # BLD AUTO: 410 CELLS/UL (ref 200–950)
MONOCYTES NFR BLD AUTO: 7.2 %
NEUTROPHILS # BLD AUTO: 4121 CELLS/UL (ref 1500–7800)
NEUTROPHILS NFR BLD AUTO: 72.3 %
NONHDLC SERPL-MCNC: 109 MG/DL (CALC)
PLATELET # BLD AUTO: 345 THOUSAND/UL (ref 140–400)
PMV BLD REES-ECKER: 10.8 FL (ref 7.5–12.5)
POTASSIUM SERPL-SCNC: 4.7 MMOL/L (ref 3.5–5.3)
PROT SERPL-MCNC: 7.1 G/DL (ref 6.1–8.1)
RBC # BLD AUTO: 3.73 MILLION/UL (ref 3.8–5.1)
SL AMB EGFR AFRICAN AMERICAN: 86 ML/MIN/1.73M2
SL AMB EGFR NON AFRICAN AMERICAN: 74 ML/MIN/1.73M2
SODIUM SERPL-SCNC: 140 MMOL/L (ref 135–146)
TRIGL SERPL-MCNC: 52 MG/DL
VIT B12 SERPL-MCNC: 595 PG/ML (ref 200–1100)
WBC # BLD AUTO: 5.7 THOUSAND/UL (ref 3.8–10.8)

## 2018-06-28 DIAGNOSIS — H81.10 BENIGN PAROXYSMAL POSITIONAL VERTIGO, UNSPECIFIED LATERALITY: Primary | ICD-10-CM

## 2018-06-29 ENCOUNTER — OFFICE VISIT (OUTPATIENT)
Dept: INTERNAL MEDICINE CLINIC | Age: 53
End: 2018-06-29
Payer: COMMERCIAL

## 2018-06-29 VITALS
DIASTOLIC BLOOD PRESSURE: 72 MMHG | OXYGEN SATURATION: 100 % | WEIGHT: 171 LBS | HEART RATE: 62 BPM | SYSTOLIC BLOOD PRESSURE: 122 MMHG | TEMPERATURE: 96.6 F | HEIGHT: 67 IN | BODY MASS INDEX: 26.84 KG/M2

## 2018-06-29 DIAGNOSIS — E53.8 VITAMIN B12 DEFICIENCY: ICD-10-CM

## 2018-06-29 DIAGNOSIS — M54.5 LOW BACK PAIN, UNSPECIFIED BACK PAIN LATERALITY, UNSPECIFIED CHRONICITY, WITH SCIATICA PRESENCE UNSPECIFIED: Primary | ICD-10-CM

## 2018-06-29 PROBLEM — M54.50 LOW BACK PAIN: Status: ACTIVE | Noted: 2018-06-29

## 2018-06-29 PROCEDURE — 99213 OFFICE O/P EST LOW 20 MIN: CPT | Performed by: NURSE PRACTITIONER

## 2018-06-29 PROCEDURE — 3008F BODY MASS INDEX DOCD: CPT | Performed by: NURSE PRACTITIONER

## 2018-06-29 RX ORDER — METHOCARBAMOL 500 MG/1
500 TABLET, FILM COATED ORAL
Qty: 30 TABLET | Refills: 0 | Status: SHIPPED | OUTPATIENT
Start: 2018-06-29 | End: 2018-10-29

## 2018-06-29 RX ORDER — MECLIZINE HYDROCHLORIDE 25 MG/1
25 TABLET ORAL 3 TIMES DAILY PRN
Qty: 30 TABLET | Refills: 0 | Status: SHIPPED | OUTPATIENT
Start: 2018-06-29 | End: 2021-01-07 | Stop reason: SDUPTHER

## 2018-06-29 RX ORDER — MELOXICAM 15 MG/1
15 TABLET ORAL DAILY
Qty: 30 TABLET | Refills: 0 | Status: SHIPPED | OUTPATIENT
Start: 2018-06-29 | End: 2018-07-18 | Stop reason: ALTCHOICE

## 2018-06-29 NOTE — PROGRESS NOTES
Assessment/Plan:    Low back pain    Feel that patient's low back pain is more muscle related, will give patient Robaxin 500 milligrams to take daily at bedtime advised patient not to take this medication during the day as it may make her  Sleepy  Patient is to take Mobic once daily, with food to help with inflammation  Patient is take this medication with food  Patient is not to take this medication with any other NSAIDs  Will also give patient referral to physical therapy  Advised gentle stretches for her low back, and she may use ice or heat for pain as needed  Patient has a follow-up in July  Diagnoses and all orders for this visit:    Low back pain, unspecified back pain laterality, unspecified chronicity, with sciatica presence unspecified  -     meloxicam (MOBIC) 15 mg tablet; Take 1 tablet (15 mg total) by mouth daily  -     Ambulatory referral to Physical Therapy; Future  -     methocarbamol (ROBAXIN) 500 mg tablet; Take 1 tablet (500 mg total) by mouth daily at bedtime    Vitamin B12 deficiency          Subjective:      Patient ID: Dat Sanchez is a 47 y o  female  Patient presents to office today with low back pain, that she states started about 3 weeks ago  She has been using a heating pad and ice with mild relief, she has also been using ibuprofen with mild relief  She denies any injury to her back, she does state that she has a very sedentary job at work and she has a lot during the day  Back Pain   This is a new problem  The current episode started 1 to 4 weeks ago  The problem occurs daily  The problem has been waxing and waning since onset  The pain is present in the lumbar spine  The quality of the pain is described as aching  The pain radiates to the left thigh and right thigh  The pain is at a severity of 7/10 (at its worse)  The pain is mild  The pain is worse during the day  The symptoms are aggravated by bending   Pertinent negatives include no abdominal pain, bladder incontinence, bowel incontinence, chest pain, dysuria, fever, headaches, leg pain, numbness, pelvic pain, perianal numbness, tingling or weakness  She has tried heat and ice (ibruprofen) for the symptoms  The treatment provided mild relief  The following portions of the patient's history were reviewed and updated as appropriate: allergies, current medications, past family history, past medical history, past social history, past surgical history and problem list     Review of Systems   Constitutional: Negative for activity change, appetite change, chills, diaphoresis and fever  HENT: Negative for congestion, ear discharge, ear pain, postnasal drip, rhinorrhea, sinus pain, sinus pressure and sore throat  Eyes: Negative for pain, discharge, itching and visual disturbance  Respiratory: Negative for cough, chest tightness, shortness of breath and wheezing  Cardiovascular: Negative for chest pain, palpitations and leg swelling  Gastrointestinal: Negative for abdominal pain, bowel incontinence, constipation, diarrhea, nausea and vomiting  Endocrine: Negative for polydipsia, polyphagia and polyuria  Genitourinary: Negative for bladder incontinence, difficulty urinating, dysuria, pelvic pain and urgency  Musculoskeletal: Positive for back pain  Negative for arthralgias, neck pain and neck stiffness  Skin: Negative for rash and wound  Neurological: Negative for dizziness, tingling, weakness, numbness and headaches           Past Medical History:   Diagnosis Date    GERD (gastroesophageal reflux disease)          Current Outpatient Prescriptions:     Ascorbic Acid (VITAMIN C) 500 MG CAPS, Take by mouth, Disp: , Rfl:     Calcium Carb-Cholecalciferol (CALTRATE 600+D) 600-800 MG-UNIT TABS, Take 1 tablet by mouth daily, Disp: , Rfl:     cyanocobalamin (VITAMIN B-12) 500 mcg tablet, Take 1 tablet by mouth daily, Disp: , Rfl:     fluticasone (FLONASE) 50 mcg/act nasal spray, 2 sprays into each nostril daily, Disp: , Rfl:     meclizine (ANTIVERT) 25 mg tablet, Take 1 tablet (25 mg total) by mouth 3 (three) times a day as needed for dizziness, Disp: 30 tablet, Rfl: 0    Multiple Vitamin (MULTI-VITAMIN) tablet, Take 1 tablet by mouth daily, Disp: , Rfl:     meloxicam (MOBIC) 15 mg tablet, Take 1 tablet (15 mg total) by mouth daily, Disp: 30 tablet, Rfl: 0    methocarbamol (ROBAXIN) 500 mg tablet, Take 1 tablet (500 mg total) by mouth daily at bedtime, Disp: 30 tablet, Rfl: 0    Allergies   Allergen Reactions    Lactose Other (See Comments)     intolerance    Other      SEASONAL ALLERGIES       Social History   Past Surgical History:   Procedure Laterality Date    NO PAST SURGERIES       Family History   Problem Relation Age of Onset    Arthritis Mother     Skin cancer Mother     Skin cancer Father     Arthritis Maternal Grandmother     Hiatal hernia Maternal Grandfather     Arthritis Family     Hiatal hernia Family     Skin cancer Family     Varicose Veins Family         Of lower extremities       Objective:  /72 (BP Location: Left arm, Patient Position: Sitting, Cuff Size: Standard)   Pulse 62   Temp (!) 96 6 °F (35 9 °C) (Oral)   Ht 5' 7 25" (1 708 m)   Wt 77 6 kg (171 lb)   SpO2 100%   BMI 26 58 kg/m²     Recent Results (from the past 1344 hour(s))   Lipid panel    Collection Time: 06/13/18  9:23 AM   Result Value Ref Range    Total Cholesterol 157 <200 mg/dL    HDL 48 (L) >50 mg/dL    Triglycerides 52 <150 mg/dL    LDL Direct 96 mg/dL (calc)    Chol HDLC Ratio 3 3 <5 0 (calc)    Non-HDL Cholesterol 109 <130 mg/dL (calc)   Comprehensive metabolic panel    Collection Time: 06/13/18  9:23 AM   Result Value Ref Range    SL AMB GLUCOSE 91 65 - 99 mg/dL    BUN 16 7 - 25 mg/dL    Creatinine, Serum 0 90 0 50 - 1 05 mg/dL    eGFR Non  74 > OR = 60 mL/min/1 73m2    SL AMB EGFR  86 > OR = 60 mL/min/1 73m2    SL AMB BUN/CREATININE RATIO NOT APPLICABLE 6 - 22 (calc)    SL AMB SODIUM 140 135 - 146 mmol/L    SL AMB POTASSIUM 4 7 3 5 - 5 3 mmol/L    SL AMB CHLORIDE 105 98 - 110 mmol/L    SL AMB CARBON DIOXIDE 28 20 - 31 mmol/L    SL AMB CALCIUM 9 1 8 6 - 10 4 mg/dL    SL AMB PROTEIN, TOTAL 7 1 6 1 - 8 1 g/dL    Serum Albumin 3 8 3 6 - 5 1 g/dL    SL AMB GLOBULIN 3 3 1 9 - 3 7 g/dL (calc)    SL AMB ALBUMIN/GLOBULIN RATIO 1 2 1 0 - 2 5 (calc)    SL AMB BILIRUBIN, TOTAL 0 4 0 2 - 1 2 mg/dL    SL AMB ALKALINE PHOSPHATASE 92 33 - 130 U/L    SL AMB AST 14 10 - 35 U/L    SL AMB ALT 10 6 - 29 U/L   CBC and differential    Collection Time: 06/13/18  9:23 AM   Result Value Ref Range    SL AMB LAB WHITE BLOOD CELL COUNT 5 7 3 8 - 10 8 Thousand/uL    SL AMB LAB RED BLOOD CELLS 3 73 (L) 3 80 - 5 10 Million/uL    Hemoglobin 12 3 11 7 - 15 5 g/dL    Hematocrit 36 2 35 0 - 45 0 %    MCV 97 1 80 0 - 100 0 fL    MCH 33 0 27 0 - 33 0 pg    MCHC 34 0 32 0 - 36 0 g/dL    RDW 11 8 11 0 - 15 0 %    Platelet Count 430 596 - 400 Thousand/uL    SL AMB MPV 10 8 7 5 - 12 5 fL    Neutrophils (Absolute) 4,121 1,500 - 7,800 cells/uL    Lymphocytes (Absolute) 1,072 850 - 3,900 cells/uL    Monocytes (Absolute) 410 200 - 950 cells/uL    Eosinophils (Absolute) 68 15 - 500 cells/uL    Basophils (Absolute) 29 0 - 200 cells/uL    Neutrophils 72 3 %    Lymphocytes 18 8 %    Monocytes 7 2 %    Eosinophils 1 2 %    Basophils 0 5 %   Vitamin B12    Collection Time: 06/13/18  9:23 AM   Result Value Ref Range    Vitamin B-12 595 200 - 1,100 pg/mL   Vitamin D 25 hydroxy    Collection Time: 06/13/18  9:23 AM   Result Value Ref Range    Vitamin D, 25-Hydroxy, Serum 61 30 - 100 ng/mL            Physical Exam   Constitutional: She is oriented to person, place, and time  She appears well-developed and well-nourished  No distress  HENT:   Head: Normocephalic and atraumatic     Right Ear: External ear normal    Left Ear: External ear normal    Nose: Nose normal    Mouth/Throat: Oropharynx is clear and moist  No oropharyngeal exudate  Eyes: Conjunctivae and EOM are normal  Pupils are equal, round, and reactive to light  Right eye exhibits no discharge  Left eye exhibits no discharge  Neck: Normal range of motion  Neck supple  No thyromegaly present  Cardiovascular: Normal rate, regular rhythm, normal heart sounds and intact distal pulses  Exam reveals no gallop and no friction rub  No murmur heard  Pulmonary/Chest: Effort normal and breath sounds normal  No stridor  No respiratory distress  She has no wheezes  She has no rales  Abdominal: Soft  Bowel sounds are normal  She exhibits no distension  There is no tenderness  Musculoskeletal:        Lumbar back: She exhibits tenderness, pain and spasm  She exhibits normal range of motion, no bony tenderness, no swelling, no edema and normal pulse  Back:    Lymphadenopathy:     She has no cervical adenopathy  Neurological: She is alert and oriented to person, place, and time  Skin: Skin is warm and dry  No rash noted  She is not diaphoretic  No erythema  Psychiatric: She has a normal mood and affect   Her behavior is normal  Judgment and thought content normal

## 2018-06-29 NOTE — ASSESSMENT & PLAN NOTE
Feel that patient's low back pain is more muscle related, will give patient Robaxin 500 milligrams to take daily at bedtime advised patient not to take this medication during the day as it may make her  Sleepy  Patient is to take Mobic once daily, with food to help with inflammation  Patient is take this medication with food  Patient is not to take this medication with any other NSAIDs  Will also give patient referral to physical therapy  Advised gentle stretches for her low back, and she may use ice or heat for pain as needed  Patient has a follow-up in July

## 2018-07-18 ENCOUNTER — OFFICE VISIT (OUTPATIENT)
Dept: INTERNAL MEDICINE CLINIC | Age: 53
End: 2018-07-18
Payer: COMMERCIAL

## 2018-07-18 VITALS
DIASTOLIC BLOOD PRESSURE: 88 MMHG | WEIGHT: 171 LBS | BODY MASS INDEX: 25.91 KG/M2 | HEIGHT: 68 IN | HEART RATE: 73 BPM | TEMPERATURE: 99.4 F | OXYGEN SATURATION: 98 % | SYSTOLIC BLOOD PRESSURE: 138 MMHG

## 2018-07-18 DIAGNOSIS — Z12.31 ENCOUNTER FOR SCREENING MAMMOGRAM FOR MALIGNANT NEOPLASM OF BREAST: ICD-10-CM

## 2018-07-18 DIAGNOSIS — M25.562 ACUTE PAIN OF LEFT KNEE: ICD-10-CM

## 2018-07-18 DIAGNOSIS — E55.9 MILD VITAMIN D DEFICIENCY: Primary | ICD-10-CM

## 2018-07-18 DIAGNOSIS — M54.5 LOW BACK PAIN, UNSPECIFIED BACK PAIN LATERALITY, UNSPECIFIED CHRONICITY, WITH SCIATICA PRESENCE UNSPECIFIED: ICD-10-CM

## 2018-07-18 DIAGNOSIS — Z12.11 SCREENING FOR COLON CANCER: ICD-10-CM

## 2018-07-18 PROCEDURE — 99214 OFFICE O/P EST MOD 30 MIN: CPT | Performed by: INTERNAL MEDICINE

## 2018-07-18 NOTE — PROGRESS NOTES
Assessment/Plan:    No problem-specific Assessment & Plan notes found for this encounter  Diagnoses and all orders for this visit:    Mild vitamin D deficiency   her vitamin-D deficiency is resolved vitamin B12 levels are better  Screening for colon cancer  -     Ambulatory referral to Gastroenterology; Future    Encounter for screening mammogram for malignant neoplasm of breast  -     Mammo screening bilateral w cad; Future    Low back pain, unspecified back pain laterality, unspecified chronicity, with sciatica presence unspecified   lower back pain is better than before        Subjective:   Patient is complaining of the knee pain in the left knee when  When she go up stairs,  But coming downstairs it does not hurt more she was also recently seen in the office for the lower back pain, she denying any fall or any injury to the knee she had a family history of osteoarthritis  Patient ID: Ashley Vasquez is a 47 y o  female  HPI   as given above her today's important complains  He is left knee pain  The following portions of the patient's history were reviewed and updated as appropriate: allergies, current medications, past family history, past medical history, past social history, past surgical history and problem list     Review of Systems   Constitutional: Negative for activity change, appetite change, chills, diaphoresis and fever  HENT: Negative for congestion, ear discharge, ear pain, postnasal drip, rhinorrhea, sinus pain, sinus pressure and sore throat  Eyes: Negative for pain, discharge, itching and visual disturbance  Respiratory: Negative for cough, chest tightness, shortness of breath and wheezing  Cardiovascular: Negative for chest pain, palpitations and leg swelling  Gastrointestinal: Negative for abdominal pain, constipation, diarrhea, nausea and vomiting  Endocrine: Negative for polydipsia, polyphagia and polyuria     Genitourinary: Negative for difficulty urinating, dysuria, pelvic pain and urgency  Musculoskeletal: Positive for back pain  Negative for arthralgias, neck pain and neck stiffness  Left knee pain is given a   Skin: Negative for rash and wound  Neurological: Negative for dizziness, weakness, numbness and headaches          Complaining of a symptoms of vertigo off and on associated with the decrease hearing in both ear and also some ringing in the ear she had a workup done before with the MRI of the brain to rule out plastic neuroma and MRI was negative this was for 5 years ago symptoms are not any was as compared to before         Past Medical History:   Diagnosis Date    GERD (gastroesophageal reflux disease)          Current Outpatient Prescriptions:     Ascorbic Acid (VITAMIN C) 500 MG CAPS, Take by mouth, Disp: , Rfl:     Calcium Carb-Cholecalciferol (CALTRATE 600+D) 600-800 MG-UNIT TABS, Take 1 tablet by mouth daily, Disp: , Rfl:     cyanocobalamin (VITAMIN B-12) 500 mcg tablet, Take 1 tablet by mouth daily, Disp: , Rfl:     fluticasone (FLONASE) 50 mcg/act nasal spray, 2 sprays into each nostril daily, Disp: , Rfl:     meclizine (ANTIVERT) 25 mg tablet, Take 1 tablet (25 mg total) by mouth 3 (three) times a day as needed for dizziness, Disp: 30 tablet, Rfl: 0    methocarbamol (ROBAXIN) 500 mg tablet, Take 1 tablet (500 mg total) by mouth daily at bedtime, Disp: 30 tablet, Rfl: 0    Multiple Vitamin (MULTI-VITAMIN) tablet, Take 1 tablet by mouth daily, Disp: , Rfl:     Allergies   Allergen Reactions    Lactose Other (See Comments)     intolerance    Other      SEASONAL ALLERGIES       Social History   Past Surgical History:   Procedure Laterality Date    NO PAST SURGERIES       Family History   Problem Relation Age of Onset    Arthritis Mother     Skin cancer Mother     Skin cancer Father     Arthritis Maternal Grandmother     Hiatal hernia Maternal Grandfather     Arthritis Family     Hiatal hernia Family     Skin cancer Family     Varicose Veins Family         Of lower extremities       Objective:  /88 (BP Location: Left arm, Patient Position: Sitting, Cuff Size: Standard)   Pulse 73   Temp 99 4 °F (37 4 °C) (Tympanic)   Ht 5' 7 99" (1 727 m)   Wt 77 6 kg (171 lb)   SpO2 98%   BMI 26 01 kg/m²        Physical Exam   Constitutional: She is oriented to person, place, and time  She appears well-developed and well-nourished  No distress  HENT:   Head: Normocephalic and atraumatic  Right Ear: External ear normal    Left Ear: External ear normal    Nose: Nose normal    Mouth/Throat: Oropharynx is clear and moist  No oropharyngeal exudate  Eyes: Conjunctivae and EOM are normal  Pupils are equal, round, and reactive to light  Right eye exhibits no discharge  Left eye exhibits no discharge  Neck: Normal range of motion  Neck supple  No thyromegaly present  Cardiovascular: Normal rate, regular rhythm, normal heart sounds and intact distal pulses  Exam reveals no gallop and no friction rub  No murmur heard  Pulmonary/Chest: Effort normal and breath sounds normal  No stridor  No respiratory distress  She has no wheezes  She has no rales  Abdominal: Soft  Bowel sounds are normal  She exhibits no distension  There is no tenderness  Musculoskeletal:        Lumbar back: She exhibits tenderness, pain and spasm  She exhibits normal range of motion, no bony tenderness, no swelling, no edema and normal pulse  Back:    Examination of the left knee shows slightly tenderness on the supra patellar area flexion and extension were normal extreme flexion was painful both the inversion and eversion of the knee and rotation of the knee were not much painful slight crepitations were felt   Lymphadenopathy:     She has no cervical adenopathy  Neurological: She is alert and oriented to person, place, and time  Skin: Skin is warm and dry  No rash noted  She is not diaphoretic  No erythema     Psychiatric: She has a normal mood and affect  Her behavior is normal  Judgment and thought content normal    Nursing note and vitals reviewed

## 2018-07-26 ENCOUNTER — APPOINTMENT (OUTPATIENT)
Dept: RADIOLOGY | Age: 53
End: 2018-07-26
Payer: COMMERCIAL

## 2018-07-26 DIAGNOSIS — M25.562 ACUTE PAIN OF LEFT KNEE: ICD-10-CM

## 2018-07-26 PROCEDURE — 73562 X-RAY EXAM OF KNEE 3: CPT

## 2018-10-29 ENCOUNTER — ANNUAL EXAM (OUTPATIENT)
Dept: OBGYN CLINIC | Facility: MEDICAL CENTER | Age: 53
End: 2018-10-29
Payer: COMMERCIAL

## 2018-10-29 VITALS — DIASTOLIC BLOOD PRESSURE: 80 MMHG | WEIGHT: 183.8 LBS | BODY MASS INDEX: 27.95 KG/M2 | SYSTOLIC BLOOD PRESSURE: 140 MMHG

## 2018-10-29 DIAGNOSIS — Z01.419 ENCOUNTER FOR GYNECOLOGICAL EXAMINATION (GENERAL) (ROUTINE) WITHOUT ABNORMAL FINDINGS: Primary | ICD-10-CM

## 2018-10-29 DIAGNOSIS — Z12.39 SCREENING FOR MALIGNANT NEOPLASM OF BREAST: ICD-10-CM

## 2018-10-29 PROCEDURE — 99396 PREV VISIT EST AGE 40-64: CPT | Performed by: PHYSICIAN ASSISTANT

## 2018-10-29 NOTE — PROGRESS NOTES
Assessment/Plan  Problem List Items Addressed This Visit     Encounter for gynecological examination (general) (routine) without abnormal findings - Primary     Annual exam performed  mammo rx given  RTO 1 year           Other Visit Diagnoses     Screening for malignant neoplasm of breast        Relevant Orders    Mammo screening bilateral w cad        Subjective   Jose Palmer is a 47 y o  female who presents for annual GYN exam  She denies any changes in Medical, Surgical or Family histories  Periods are irregular, menses every 1-4 months  Dysmenorrhea:none  She denies intermenstrual bleeding, spotting, or discharge  She reports that she is sexually active with 1 partner and she denies any pain or dryness with intercourse  Last Pap smear: 10/2015  Regular self breast exam: yes  Last mammogram: 2016  Family history of uterine or ovarian cancer: no  Family history of breast cancer: no  Family history of colon cancer: no    Menstrual History:  OB History      Para Term  AB Living    0 0 0 0 0 0    SAB TAB Ectopic Multiple Live Births    0 0 0 0 0         No LMP recorded  Patient is postmenopausal   Period Pattern: (!) Irregular  Menstrual Flow: Light    The following portions of the patient's history were reviewed and updated as appropriate: allergies, current medications, past family history, past medical history, past social history, past surgical history and problem list     Review of Systems  Review of Systems   Constitutional: Negative for chills and fever  Respiratory: Negative for shortness of breath  Cardiovascular: Negative for chest pain  Gastrointestinal: Negative for abdominal pain  Genitourinary: Negative for dysuria, pelvic pain, vaginal bleeding, vaginal discharge and vaginal pain  Negative for breast pain or lumps  (+) mild stress urinary incontinence  Neurological: Negative for headaches        Objective   /80 (BP Location: Left arm)   Wt 83 4 kg (183 lb 12 8 oz)   BMI 27 95 kg/m²     Physical Exam   Constitutional: She is oriented to person, place, and time  She appears well-developed and well-nourished  Neck: No thyromegaly present  Cardiovascular: Normal rate and regular rhythm  Pulmonary/Chest: Effort normal and breath sounds normal  Right breast exhibits no mass, no nipple discharge, no skin change and no tenderness  Left breast exhibits no mass, no nipple discharge, no skin change and no tenderness  Abdominal: Soft  There is no tenderness  There is no rebound and no guarding  Genitourinary: There is no rash or lesion on the right labia  There is no rash or lesion on the left labia  Uterus is not enlarged and not tender  Cervix exhibits no motion tenderness and no discharge  Right adnexum displays no mass and no tenderness  Left adnexum displays no mass and no tenderness  No bleeding in the vagina  No vaginal discharge found  Neurological: She is alert and oriented to person, place, and time  Psychiatric: She has a normal mood and affect  Nursing note and vitals reviewed

## 2018-11-29 ENCOUNTER — HOSPITAL ENCOUNTER (OUTPATIENT)
Dept: RADIOLOGY | Age: 53
Discharge: HOME | End: 2018-11-29
Payer: COMMERCIAL

## 2018-11-29 VITALS — WEIGHT: 180 LBS | BODY MASS INDEX: 28.25 KG/M2 | HEIGHT: 67 IN

## 2018-11-29 DIAGNOSIS — Z12.39 SCREENING FOR MALIGNANT NEOPLASM OF BREAST: ICD-10-CM

## 2018-11-29 PROCEDURE — 77067 SCR MAMMO BI INCL CAD: CPT

## 2019-01-04 ENCOUNTER — TELEPHONE (OUTPATIENT)
Dept: OBGYN CLINIC | Facility: MEDICAL CENTER | Age: 54
End: 2019-01-04

## 2019-01-04 DIAGNOSIS — N89.8 VAGINAL IRRITATION: Primary | ICD-10-CM

## 2019-01-04 RX ORDER — FLUCONAZOLE 150 MG/1
TABLET ORAL
Qty: 2 TABLET | Refills: 0 | OUTPATIENT
Start: 2019-01-04 | End: 2019-01-07

## 2019-01-04 NOTE — TELEPHONE ENCOUNTER
Spoke with pt - symptoms started Tuesday  She has external itching and slight irritation when wiping  No odor or discharge  No OTC product used  Stated she gets this every once in awhile  She would like an RX  Per Danuta's protocol - Rx for Diflucan sent to pt pharmacy  Rx in [facility] - please sign  Thanks!      Leata Home out of office

## 2019-01-24 ENCOUNTER — OFFICE VISIT (OUTPATIENT)
Dept: INTERNAL MEDICINE CLINIC | Age: 54
End: 2019-01-24
Payer: COMMERCIAL

## 2019-01-24 VITALS
DIASTOLIC BLOOD PRESSURE: 82 MMHG | TEMPERATURE: 99 F | SYSTOLIC BLOOD PRESSURE: 128 MMHG | HEART RATE: 83 BPM | HEIGHT: 69 IN | WEIGHT: 180.2 LBS | BODY MASS INDEX: 26.69 KG/M2 | OXYGEN SATURATION: 100 %

## 2019-01-24 DIAGNOSIS — Z00.00 WELL ADULT EXAM: Primary | ICD-10-CM

## 2019-01-24 PROBLEM — M54.50 LOW BACK PAIN: Status: RESOLVED | Noted: 2018-06-29 | Resolved: 2019-01-24

## 2019-01-24 PROCEDURE — 99396 PREV VISIT EST AGE 40-64: CPT | Performed by: INTERNAL MEDICINE

## 2019-03-04 DIAGNOSIS — Z12.11 SCREENING FOR MALIGNANT NEOPLASM OF COLON: Primary | ICD-10-CM

## 2019-10-28 ENCOUNTER — OFFICE VISIT (OUTPATIENT)
Dept: INTERNAL MEDICINE CLINIC | Facility: CLINIC | Age: 54
End: 2019-10-28
Payer: COMMERCIAL

## 2019-10-28 VITALS
HEIGHT: 68 IN | BODY MASS INDEX: 27.28 KG/M2 | OXYGEN SATURATION: 98 % | HEART RATE: 73 BPM | SYSTOLIC BLOOD PRESSURE: 138 MMHG | WEIGHT: 180 LBS | DIASTOLIC BLOOD PRESSURE: 84 MMHG | TEMPERATURE: 98.4 F

## 2019-10-28 DIAGNOSIS — J06.9 URI, ACUTE: Primary | ICD-10-CM

## 2019-10-28 PROCEDURE — 3008F BODY MASS INDEX DOCD: CPT | Performed by: INTERNAL MEDICINE

## 2019-10-28 PROCEDURE — 99213 OFFICE O/P EST LOW 20 MIN: CPT | Performed by: INTERNAL MEDICINE

## 2019-10-28 RX ORDER — BENZONATATE 100 MG/1
100 CAPSULE ORAL 3 TIMES DAILY PRN
Qty: 20 CAPSULE | Refills: 0 | Status: SHIPPED | OUTPATIENT
Start: 2019-10-28 | End: 2020-06-30 | Stop reason: ALTCHOICE

## 2019-10-28 RX ORDER — AZITHROMYCIN 250 MG/1
TABLET, FILM COATED ORAL
Qty: 6 TABLET | Refills: 0 | Status: SHIPPED | OUTPATIENT
Start: 2019-10-28 | End: 2019-11-01

## 2019-10-28 RX ORDER — FLUTICASONE PROPIONATE 50 MCG
2 SPRAY, SUSPENSION (ML) NASAL DAILY
Qty: 18.2 ML | Refills: 2 | Status: SHIPPED | OUTPATIENT
Start: 2019-10-28 | End: 2020-06-30

## 2019-10-28 NOTE — PROGRESS NOTES
Assessment/Plan:    URI, acute  Will defer on antibiotics at this time however will provided a script for azithromycin to take if symptoms not improved by the weekend  Will prescribe benzonatate for cough and Flonase for postnasal drip  She is to contact office for worsening symptoms  Diagnoses and all orders for this visit:    URI, acute  -     fluticasone (FLONASE) 50 mcg/act nasal spray; 2 sprays into each nostril daily  -     benzonatate (TESSALON PERLES) 100 mg capsule; Take 1 capsule (100 mg total) by mouth 3 (three) times a day as needed for cough  -     azithromycin (ZITHROMAX) 250 mg tablet; Take 2 tablets today then 1 tablet daily x 4 days          BMI Counseling: Body mass index is 27 37 kg/m²  The BMI is above normal  Nutrition recommendations include decreasing portion sizes, encouraging healthy choices of fruits and vegetables, decreasing fast food intake, consuming healthier snacks, limiting drinks that contain sugar, moderation in carbohydrate intake, increasing intake of lean protein, reducing intake of saturated and trans fat and reducing intake of cholesterol  Exercise recommendations include moderate physical activity 150 minutes/week and exercising 3-5 times per week  No pharmacotherapy was ordered  Patient referred to PCP due to patient being overweight  Subjective:      Patient ID: Jeancarlos Tierney is a 49 y o  female  79-year-old female is seen today with acute symptoms of URI since 11 days  URI    This is a new problem  The current episode started 1 to 4 weeks ago  The problem has been unchanged  There has been no fever  Associated symptoms include congestion, coughing, headaches and rhinorrhea  Pertinent negatives include no abdominal pain, chest pain, diarrhea, dysuria, ear pain, joint pain, joint swelling, nausea, neck pain, plugged ear sensation, rash, sinus pain, sneezing, sore throat, swollen glands, vomiting or wheezing   She has tried decongestant for the symptoms  The treatment provided mild relief  The following portions of the patient's history were reviewed and updated as appropriate: allergies, current medications, past family history, past medical history, past social history, past surgical history and problem list     Review of Systems   Constitutional: Negative for activity change, appetite change, chills, diaphoresis, fatigue and fever  HENT: Positive for congestion, postnasal drip and rhinorrhea  Negative for ear pain, sinus pressure, sinus pain, sneezing and sore throat  Eyes: Negative for visual disturbance  Respiratory: Positive for cough  Negative for apnea, choking, chest tightness, shortness of breath and wheezing  Cardiovascular: Negative for chest pain, palpitations and leg swelling  Gastrointestinal: Negative for abdominal distention, abdominal pain, anal bleeding, blood in stool, constipation, diarrhea, nausea and vomiting  Endocrine: Negative for cold intolerance and heat intolerance  Genitourinary: Negative for difficulty urinating, dysuria and hematuria  Musculoskeletal: Negative  Negative for joint pain and neck pain  Skin: Negative  Negative for rash  Neurological: Positive for headaches  Negative for dizziness, weakness, light-headedness and numbness  Hematological: Negative for adenopathy  Psychiatric/Behavioral: Negative for agitation, sleep disturbance and suicidal ideas  All other systems reviewed and are negative          Past Medical History:   Diagnosis Date    GERD (gastroesophageal reflux disease)          Current Outpatient Medications:     Ascorbic Acid (VITAMIN C) 500 MG CAPS, Take by mouth, Disp: , Rfl:     Calcium Carb-Cholecalciferol (CALTRATE 600+D) 600-800 MG-UNIT TABS, Take 1 tablet by mouth daily, Disp: , Rfl:     cyanocobalamin (VITAMIN B-12) 500 mcg tablet, Take 1 tablet by mouth daily, Disp: , Rfl:     fluticasone (FLONASE) 50 mcg/act nasal spray, 2 sprays into each nostril daily, Disp: 18 2 mL, Rfl: 2    meclizine (ANTIVERT) 25 mg tablet, Take 1 tablet (25 mg total) by mouth 3 (three) times a day as needed for dizziness, Disp: 30 tablet, Rfl: 0    Multiple Vitamin (MULTI-VITAMIN) tablet, Take 1 tablet by mouth daily, Disp: , Rfl:     azithromycin (ZITHROMAX) 250 mg tablet, Take 2 tablets today then 1 tablet daily x 4 days, Disp: 6 tablet, Rfl: 0    benzonatate (TESSALON PERLES) 100 mg capsule, Take 1 capsule (100 mg total) by mouth 3 (three) times a day as needed for cough, Disp: 20 capsule, Rfl: 0    Allergies   Allergen Reactions    Lactose Other (See Comments)     intolerance    Other      SEASONAL ALLERGIES       Social History   Past Surgical History:   Procedure Laterality Date    BREAST BIOPSY Left 2006    core     Family History   Problem Relation Age of Onset    Arthritis Mother     Skin cancer Mother     Skin cancer Father     Arthritis Maternal Grandmother     Hiatal hernia Maternal Grandfather     Arthritis Family     Hiatal hernia Family     Skin cancer Family     Varicose Veins Family         Of lower extremities       Objective:  /84 (BP Location: Left arm, Patient Position: Sitting, Cuff Size: Adult)   Pulse 73   Temp 98 4 °F (36 9 °C) (Oral)   Ht 5' 8" (1 727 m)   Wt 81 6 kg (180 lb)   SpO2 98%   BMI 27 37 kg/m²     No results found for this or any previous visit (from the past 1344 hour(s))  Physical Exam   Constitutional: She is oriented to person, place, and time  She appears well-developed and well-nourished  No distress  HENT:   Head: Normocephalic and atraumatic  Eyes: Pupils are equal, round, and reactive to light  Conjunctivae and EOM are normal  Right eye exhibits no discharge  Left eye exhibits no discharge  Neck: Normal range of motion  Neck supple  No JVD present  No thyromegaly present  Cardiovascular: Normal rate, regular rhythm, normal heart sounds and intact distal pulses  Exam reveals no gallop and no friction rub  No murmur heard  Pulmonary/Chest: Effort normal and breath sounds normal  No respiratory distress  She has no wheezes  She has no rales  She exhibits no tenderness  Abdominal: Soft  She exhibits no distension  There is no tenderness  Musculoskeletal: Normal range of motion  She exhibits no edema, tenderness or deformity  Lymphadenopathy:     She has no cervical adenopathy  Neurological: She is alert and oriented to person, place, and time  No cranial nerve deficit  Coordination normal    Skin: Skin is warm and dry  No rash noted  She is not diaphoretic  No erythema  No pallor  Psychiatric: She has a normal mood and affect  Her behavior is normal  Judgment and thought content normal    Nursing note and vitals reviewed

## 2019-10-28 NOTE — ASSESSMENT & PLAN NOTE
Will defer on antibiotics at this time however will provided a script for azithromycin to take if symptoms not improved by the weekend  Will prescribe benzonatate for cough and Flonase for postnasal drip  She is to contact office for worsening symptoms

## 2019-11-18 ENCOUNTER — ANNUAL EXAM (OUTPATIENT)
Dept: OBGYN CLINIC | Facility: MEDICAL CENTER | Age: 54
End: 2019-11-18
Payer: COMMERCIAL

## 2019-11-18 VITALS
WEIGHT: 182.6 LBS | SYSTOLIC BLOOD PRESSURE: 124 MMHG | DIASTOLIC BLOOD PRESSURE: 88 MMHG | HEIGHT: 67 IN | BODY MASS INDEX: 28.66 KG/M2

## 2019-11-18 DIAGNOSIS — Z01.419 ENCNTR FOR GYN EXAM (GENERAL) (ROUTINE) W/O ABN FINDINGS: ICD-10-CM

## 2019-11-18 DIAGNOSIS — Z12.31 ENCOUNTER FOR SCREENING MAMMOGRAM FOR MALIGNANT NEOPLASM OF BREAST: ICD-10-CM

## 2019-11-18 PROBLEM — Z00.00 WELL ADULT EXAM: Status: RESOLVED | Noted: 2018-10-29 | Resolved: 2019-11-18

## 2019-11-18 PROBLEM — J06.9 URI, ACUTE: Status: RESOLVED | Noted: 2019-10-28 | Resolved: 2019-11-18

## 2019-11-18 PROCEDURE — G0145 SCR C/V CYTO,THINLAYER,RESCR: HCPCS | Performed by: PHYSICIAN ASSISTANT

## 2019-11-18 PROCEDURE — 99396 PREV VISIT EST AGE 40-64: CPT | Performed by: PHYSICIAN ASSISTANT

## 2019-11-18 PROCEDURE — 87624 HPV HI-RISK TYP POOLED RSLT: CPT | Performed by: PHYSICIAN ASSISTANT

## 2019-11-18 RX ORDER — PANTOPRAZOLE SODIUM 40 MG/1
TABLET, DELAYED RELEASE ORAL
COMMUNITY
End: 2020-06-30 | Stop reason: ALTCHOICE

## 2019-11-18 RX ORDER — FEXOFENADINE HCL AND PSEUDOEPHEDRINE HCI 60; 120 MG/1; MG/1
TABLET, EXTENDED RELEASE ORAL AS NEEDED
COMMUNITY

## 2019-11-18 NOTE — PROGRESS NOTES
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  Sexual activity: She is sexually active with her  without pain, bleeding or dryness  We discussed the option to use coconut oil if she needs it for lubrication  Contraception: She uses nothing for contraception  Last Pap 10/12/15 neg/neg  Last Mammo 11/29/18 neg    We reviewed [facility] guidelines for Pap testing today       Family hx of breast cancer: no  Family hx of ovarian cancer:no  Family hx of colon cancer: no    Current Outpatient Medications:     Ascorbic Acid (VITAMIN C) 500 MG CAPS, Take by mouth, Disp: , Rfl:     benzonatate (TESSALON PERLES) 100 mg capsule, Take 1 capsule (100 mg total) by mouth 3 (three) times a day as needed for cough, Disp: 20 capsule, Rfl: 0    Calcium Carb-Cholecalciferol (CALTRATE 600+D) 600-800 MG-UNIT TABS, Take 1 tablet by mouth daily, Disp: , Rfl:     cyanocobalamin (VITAMIN B-12) 500 mcg tablet, Take 1 tablet by mouth daily, Disp: , Rfl:     fluticasone (FLONASE) 50 mcg/act nasal spray, 2 sprays into each nostril daily, Disp: 18 2 mL, Rfl: 2    meclizine (ANTIVERT) 25 mg tablet, Take 1 tablet (25 mg total) by mouth 3 (three) times a day as needed for dizziness, Disp: 30 tablet, Rfl: 0    Multiple Vitamin (MULTI-VITAMIN) tablet, Take 1 tablet by mouth daily, Disp: , Rfl:   Social History     Socioeconomic History    Marital status: /Civil Union     Spouse name: Not on file    Number of children: Not on file    Years of education: Not on file    Highest education level: Not on file   Occupational History    Not on file   Social Needs    Financial resource strain: Not on file    Food insecurity:     Worry: Not on file     Inability: Not on file    Transportation needs:     Medical: Not on file     Non-medical: Not on file   Tobacco Use    Smoking status: Never Smoker    Smokeless tobacco: Never Used   Substance and Sexual Activity    Alcohol use: Yes     Frequency: Monthly or less     Drinks per session: 1 or 2     Binge frequency: Never     Comment: Social drinker    Drug use: No    Sexual activity: Yes     Partners: Male     Birth control/protection: None     Comment: with    Lifestyle    Physical activity:     Days per week: Not on file     Minutes per session: Not on file    Stress: Not on file   Relationships    Social connections:     Talks on phone: Not on file     Gets together: Not on file     Attends Druze service: Not on file     Active member of club or organization: Not on file     Attends meetings of clubs or organizations: Not on file     Relationship status: Not on file    Intimate partner violence:     Fear of current or ex partner: Not on file     Emotionally abused: Not on file     Physically abused: Not on file     Forced sexual activity: Not on file   Other Topics Concern    Not on file   Social History Narrative    Daily coffee consumption (1 cups/day)    Exercising regularly     Family History   Problem Relation Age of Onset    Arthritis Mother     Skin cancer Mother     Skin cancer Father     Arthritis Maternal Grandmother     Hiatal hernia Maternal Grandfather     Arthritis Family     Hiatal hernia Family     Skin cancer Family     Varicose Veins Family         Of lower extremities    Lung cancer Brother       Past Medical History:   Diagnosis Date    GERD (gastroesophageal reflux disease)         Review of Systems   Respiratory: Negative  Cardiovascular: Negative  Gastrointestinal: Negative for constipation and diarrhea     Genitourinary: Negative for difficulty urinating, pelvic pain, vaginal bleeding, vaginal discharge, itching or odor  O:  Blood pressure 124/88, height 5' 7" (1 702 m), weight 82 8 kg (182 lb 9 6 oz), last menstrual period 02/22/2019  Patient appears well and is not in distress  Neck is supple without masses  Breasts are symmetrical without mass, tenderness, nipple discharge, skin changes or adenopathy  Abdomen is soft and nontender without masses  External genitals are normal without lesions or rashes  Urethral meatus and urethra are normal  Bladder is normal to palpation  Vagina is normal without discharge or bleeding  Cervix is normal without discharge or lesion  Uterus is normal, mobile, nontender without palpable mass  Adnexa are normal, nontender, without palpable mass  A:  Yearly exam      P:   Pap and HPV today    Mammo slip provided     RTO one year for yearly exam or sooner as needed

## 2019-11-19 LAB
HPV HR 12 DNA CVX QL NAA+PROBE: NEGATIVE
HPV16 DNA CVX QL NAA+PROBE: NEGATIVE
HPV18 DNA CVX QL NAA+PROBE: NEGATIVE

## 2019-11-21 LAB
LAB AP GYN PRIMARY INTERPRETATION: NORMAL
Lab: NORMAL

## 2020-04-10 ENCOUNTER — TELEPHONE (OUTPATIENT)
Dept: INTERNAL MEDICINE CLINIC | Age: 55
End: 2020-04-10

## 2020-06-30 ENCOUNTER — OFFICE VISIT (OUTPATIENT)
Dept: INTERNAL MEDICINE CLINIC | Age: 55
End: 2020-06-30
Payer: COMMERCIAL

## 2020-06-30 VITALS
BODY MASS INDEX: 28.75 KG/M2 | DIASTOLIC BLOOD PRESSURE: 88 MMHG | OXYGEN SATURATION: 100 % | WEIGHT: 183.2 LBS | SYSTOLIC BLOOD PRESSURE: 132 MMHG | TEMPERATURE: 98.1 F | HEIGHT: 67 IN | HEART RATE: 74 BPM

## 2020-06-30 DIAGNOSIS — L72.3 INFECTED SEBACEOUS CYST: Primary | ICD-10-CM

## 2020-06-30 DIAGNOSIS — L08.9 INFECTED SEBACEOUS CYST: Primary | ICD-10-CM

## 2020-06-30 DIAGNOSIS — J30.2 SEASONAL ALLERGIC RHINITIS, UNSPECIFIED TRIGGER: ICD-10-CM

## 2020-06-30 PROCEDURE — 99213 OFFICE O/P EST LOW 20 MIN: CPT | Performed by: INTERNAL MEDICINE

## 2020-06-30 RX ORDER — FLUTICASONE PROPIONATE 50 MCG
1 SPRAY, SUSPENSION (ML) NASAL DAILY
Qty: 1 BOTTLE | Refills: 3 | Status: SHIPPED | OUTPATIENT
Start: 2020-06-30 | End: 2021-07-06 | Stop reason: SDUPTHER

## 2020-06-30 RX ORDER — FLUTICASONE PROPIONATE 50 MCG
2 SPRAY, SUSPENSION (ML) NASAL DAILY
Qty: 18.2 ML | Refills: 2 | Status: CANCELLED | OUTPATIENT
Start: 2020-06-30

## 2020-06-30 RX ORDER — CEPHALEXIN 500 MG/1
500 CAPSULE ORAL EVERY 8 HOURS SCHEDULED
Qty: 21 CAPSULE | Refills: 0 | Status: SHIPPED | OUTPATIENT
Start: 2020-06-30 | End: 2020-07-07

## 2020-06-30 RX ORDER — CEPHALEXIN 500 MG/1
500 CAPSULE ORAL EVERY 8 HOURS SCHEDULED
Qty: 21 CAPSULE | Refills: 0 | Status: SHIPPED | OUTPATIENT
Start: 2020-06-30 | End: 2020-06-30

## 2020-06-30 RX ORDER — OMEPRAZOLE 20 MG/1
CAPSULE, DELAYED RELEASE ORAL
COMMUNITY
Start: 2020-06-26 | End: 2020-11-29

## 2020-07-15 ENCOUNTER — CONSULT (OUTPATIENT)
Dept: SURGERY | Facility: CLINIC | Age: 55
End: 2020-07-15
Payer: COMMERCIAL

## 2020-07-15 VITALS
TEMPERATURE: 99.3 F | SYSTOLIC BLOOD PRESSURE: 142 MMHG | DIASTOLIC BLOOD PRESSURE: 88 MMHG | WEIGHT: 180 LBS | HEIGHT: 67 IN | BODY MASS INDEX: 28.25 KG/M2 | HEART RATE: 81 BPM

## 2020-07-15 DIAGNOSIS — L08.9 INFECTED SEBACEOUS CYST: Primary | ICD-10-CM

## 2020-07-15 DIAGNOSIS — L72.3 INFECTED SEBACEOUS CYST: Primary | ICD-10-CM

## 2020-07-15 PROCEDURE — 10060 I&D ABSCESS SIMPLE/SINGLE: CPT | Performed by: SURGERY

## 2020-07-15 NOTE — PROGRESS NOTES
Office Visit - General Surgery  Jose Arriaza MRN: [id number]  Encounter: [id number]    Assessment and Plan    Problem List Items Addressed This Visit        Musculoskeletal and Integument    Infected sebaceous cyst - Primary     On the right anterior neck was an infected sebaceous cyst   This was drained at this time  I told her once the inflammation resolves, then we could excise it  I have asked her to come back in a month's time  Chief Complaint:  Jose Arriaza is a 49 y o  female who presents for Abscess (Consult abscess on right side of neck)    Subjective  43-year-old female who has a cyst on her right neck for some time  Was more inflamed before    Bothers her and she would like to have it removed    Past Medical History  Past Medical History:   Diagnosis Date    GERD (gastroesophageal reflux disease)        Past Surgical History  Past Surgical History:   Procedure Laterality Date    BREAST BIOPSY Left 2006    core       Family History  Family History   Problem Relation Age of Onset    Arthritis Mother     Skin cancer Mother     Skin cancer Father     Arthritis Maternal Grandmother     Hiatal hernia Maternal Grandfather     Arthritis Family     Hiatal hernia Family     Skin cancer Family     Varicose Veins Family         Of lower extremities    Lung cancer Brother        Social History  Social History     Socioeconomic History    Marital status: /Civil Union     Spouse name: None    Number of children: None    Years of education: None    Highest education level: None   Occupational History    None   Social Needs    Financial resource strain: None    Food insecurity:     Worry: None     Inability: None    Transportation needs:     Medical: None     Non-medical: None   Tobacco Use    Smoking status: Never Smoker    Smokeless tobacco: Never Used   Substance and Sexual Activity    Alcohol use: Yes     Frequency: Monthly or less     Drinks per session: 1 or 2 Binge frequency: Never     Comment: Social drinker    Drug use: No    Sexual activity: Yes     Partners: Male     Birth control/protection: None     Comment: with    Lifestyle    Physical activity:     Days per week: None     Minutes per session: None    Stress: None   Relationships    Social connections:     Talks on phone: None     Gets together: None     Attends Congregation service: None     Active member of club or organization: None     Attends meetings of clubs or organizations: None     Relationship status: None    Intimate partner violence:     Fear of current or ex partner: None     Emotionally abused: None     Physically abused: None     Forced sexual activity: None   Other Topics Concern    None   Social History Narrative    Daily coffee consumption (1 cups/day)    Exercising regularly        Medications  Current Outpatient Medications on File Prior to Visit   Medication Sig Dispense Refill    Ascorbic Acid (VITAMIN C) 500 MG CAPS Take by mouth daily       Calcium Carb-Cholecalciferol (CALTRATE 600+D) 600-800 MG-UNIT TABS Take 1 tablet by mouth daily      cyanocobalamin (VITAMIN B-12) 500 mcg tablet Take 1 tablet by mouth daily      fexofenadine-pseudoephedrine (ALLEGRA-D)  MG per tablet       fluticasone (FLONASE) 50 mcg/act nasal spray 1 spray into each nostril daily 1 Bottle 3    meclizine (ANTIVERT) 25 mg tablet Take 1 tablet (25 mg total) by mouth 3 (three) times a day as needed for dizziness 30 tablet 0    Multiple Vitamin (MULTI-VITAMIN) tablet Take 1 tablet by mouth daily      Multiple Vitamins-Minerals (ONE DAILY FOR WOMEN PO)       omeprazole (PriLOSEC) 20 mg delayed release capsule        No current facility-administered medications on file prior to visit          Allergies  Allergies   Allergen Reactions    Lactose Other (See Comments)     intolerance    Other      SEASONAL ALLERGIES       Review of Systems    Objective  Vitals:    07/15/20 0806   BP: 142/88 Pulse: 81   Temp: 99 3 °F (37 4 °C)       Physical Exam   Neck:  Right anterior lateral neck is a superficial raised area about 4-5 mm across with thick material present in it  There is some slight surrounding erythema    Procedures  Here in the right neck was prepped with alcohol  Local anesthesia 1% lidocaine with epi was infiltrated a total of 0 5 mL  An 11 blade knife was used incision made  Cheesy sebaceous type material was removed  Hemostasis was assured a Band-Aid dressing applied    Tolerated procedure well

## 2020-07-15 NOTE — ASSESSMENT & PLAN NOTE
On the right anterior neck was an infected sebaceous cyst   This was drained at this time  I told her once the inflammation resolves, then we could excise it  I have asked her to come back in a month's time

## 2020-08-19 ENCOUNTER — PROCEDURE VISIT (OUTPATIENT)
Dept: SURGERY | Facility: CLINIC | Age: 55
End: 2020-08-19
Payer: COMMERCIAL

## 2020-08-19 VITALS — HEIGHT: 67 IN | HEART RATE: 84 BPM | WEIGHT: 181.8 LBS | TEMPERATURE: 97.4 F | BODY MASS INDEX: 28.53 KG/M2

## 2020-08-19 DIAGNOSIS — L08.9 INFECTED SEBACEOUS CYST: Primary | ICD-10-CM

## 2020-08-19 DIAGNOSIS — L72.3 INFECTED SEBACEOUS CYST: Primary | ICD-10-CM

## 2020-08-19 PROCEDURE — 88304 TISSUE EXAM BY PATHOLOGIST: CPT | Performed by: PATHOLOGY

## 2020-08-19 PROCEDURE — 11421 EXC H-F-NK-SP B9+MARG 0.6-1: CPT | Performed by: SURGERY

## 2020-08-19 NOTE — ASSESSMENT & PLAN NOTE
Sebaceous cyst of the right neck was excised in the office without difficulty  Will give her call with the path report  Instructions on care and activity given

## 2020-08-19 NOTE — PROGRESS NOTES
Office Visit - General Surgery  Dayday Davis MRN: [id number]  Encounter: [id number]    Assessment and Plan    Problem List Items Addressed This Visit        Musculoskeletal and Integument    Infected sebaceous cyst - Primary     Sebaceous cyst of the right neck was excised in the office without difficulty  Will give her call with the path report  Instructions on care and activity given  Relevant Orders    Tissue Exam          Chief Complaint:  Dayday Davis is a 49 y o  female who presents for Abscess (Neck cyst excision)    Subjective  60-year-old female cyst of the right neck  Here now for excision      Past Medical History  Past Medical History:   Diagnosis Date    GERD (gastroesophageal reflux disease)        Past Surgical History  Past Surgical History:   Procedure Laterality Date    BREAST BIOPSY Left 2006    core       Family History  Family History   Problem Relation Age of Onset    Arthritis Mother     Skin cancer Mother     Skin cancer Father     Arthritis Maternal Grandmother     Hiatal hernia Maternal Grandfather     Arthritis Family     Hiatal hernia Family     Skin cancer Family     Varicose Veins Family         Of lower extremities    Lung cancer Brother        Social History  Social History     Socioeconomic History    Marital status: /Civil Union     Spouse name: None    Number of children: None    Years of education: None    Highest education level: None   Occupational History    None   Social Needs    Financial resource strain: None    Food insecurity     Worry: None     Inability: None    Transportation needs     Medical: None     Non-medical: None   Tobacco Use    Smoking status: Never Smoker    Smokeless tobacco: Never Used   Substance and Sexual Activity    Alcohol use: Yes     Frequency: Monthly or less     Drinks per session: 1 or 2     Binge frequency: Never     Comment: Social drinker    Drug use: No    Sexual activity: Yes     Partners: Male Birth control/protection: None     Comment: with    Lifestyle    Physical activity     Days per week: None     Minutes per session: None    Stress: None   Relationships    Social connections     Talks on phone: None     Gets together: None     Attends Anabaptism service: None     Active member of club or organization: None     Attends meetings of clubs or organizations: None     Relationship status: None    Intimate partner violence     Fear of current or ex partner: None     Emotionally abused: None     Physically abused: None     Forced sexual activity: None   Other Topics Concern    None   Social History Narrative    Daily coffee consumption (1 cups/day)    Exercising regularly        Medications  Current Outpatient Medications on File Prior to Visit   Medication Sig Dispense Refill    Ascorbic Acid (VITAMIN C) 500 MG CAPS Take by mouth daily       Calcium Carb-Cholecalciferol (CALTRATE 600+D) 600-800 MG-UNIT TABS Take 1 tablet by mouth daily      cyanocobalamin (VITAMIN B-12) 500 mcg tablet Take 1 tablet by mouth daily      fexofenadine-pseudoephedrine (ALLEGRA-D)  MG per tablet       fluticasone (FLONASE) 50 mcg/act nasal spray 1 spray into each nostril daily 1 Bottle 3    meclizine (ANTIVERT) 25 mg tablet Take 1 tablet (25 mg total) by mouth 3 (three) times a day as needed for dizziness 30 tablet 0    Multiple Vitamin (MULTI-VITAMIN) tablet Take 1 tablet by mouth daily      Multiple Vitamins-Minerals (ONE DAILY FOR WOMEN PO)       omeprazole (PriLOSEC) 20 mg delayed release capsule        No current facility-administered medications on file prior to visit          Allergies  Allergies   Allergen Reactions    Lactose Other (See Comments)     intolerance    Other      SEASONAL ALLERGIES       Review of Systems    Objective  Vitals:    08/19/20 0807   Pulse: 84   Temp: (!) 97 4 °F (36 3 °C)       Physical Exam   Right neck small raised area about 0 2 x 0 2 cm    Procedures  After permission, the areas prepped and draped in usual fashion  Time-out taken  Local anesthesia 1% lidocaine with epinephrine was infiltrated into the skin and subcutaneous tissue  A total of for ml was used  Knife was used and elliptical incision made around the lesion in question caring it into the subcutaneous tissue  The size of the ellipse was 1 x 0 5  The subcutaneous tissue was then approximated with 4 0 Monocryl  The skin closed with subcuticular 4 O Monocryl  Dressing applied    Patient tolerated procedure well

## 2020-08-27 ENCOUNTER — TELEPHONE (OUTPATIENT)
Dept: SURGERY | Facility: CLINIC | Age: 55
End: 2020-08-27

## 2020-08-27 NOTE — TELEPHONE ENCOUNTER
----- Message from Mariza Slaughter MD sent at 8/27/2020  9:06 AM EDT -----  Let her know this was a benign cyst

## 2020-11-17 ENCOUNTER — IMMUNIZATIONS (OUTPATIENT)
Dept: INTERNAL MEDICINE CLINIC | Facility: CLINIC | Age: 55
End: 2020-11-17

## 2020-11-17 DIAGNOSIS — Z23 NEED FOR INFLUENZA VACCINATION: Primary | ICD-10-CM

## 2020-11-29 ENCOUNTER — HOSPITAL ENCOUNTER (INPATIENT)
Facility: HOSPITAL | Age: 55
LOS: 6 days | Discharge: HOME | DRG: 417 | End: 2020-12-07
Attending: EMERGENCY MEDICINE | Admitting: FAMILY MEDICINE
Payer: COMMERCIAL

## 2020-11-29 ENCOUNTER — APPOINTMENT (EMERGENCY)
Dept: RADIOLOGY | Facility: HOSPITAL | Age: 55
DRG: 417 | End: 2020-11-29
Payer: COMMERCIAL

## 2020-11-29 ENCOUNTER — APPOINTMENT (EMERGENCY)
Dept: CT IMAGING | Facility: HOSPITAL | Age: 55
DRG: 417 | End: 2020-11-29
Payer: COMMERCIAL

## 2020-11-29 DIAGNOSIS — I10 ESSENTIAL HYPERTENSION: ICD-10-CM

## 2020-11-29 DIAGNOSIS — R10.13 EPIGASTRIC PAIN: ICD-10-CM

## 2020-11-29 DIAGNOSIS — K80.51 CHOLEDOCHOLITHIASIS WITH OBSTRUCTION: Primary | ICD-10-CM

## 2020-11-29 DIAGNOSIS — K80.70 CHOLELITHIASIS WITH CHOLEDOCHOLITHIASIS: ICD-10-CM

## 2020-11-29 PROBLEM — R74.01 TRANSAMINITIS: Status: ACTIVE | Noted: 2020-11-29

## 2020-11-29 LAB
ALBUMIN SERPL BCP-MCNC: 3.7 G/DL (ref 3.5–5)
ALP SERPL-CCNC: 151 U/L (ref 46–116)
ALT SERPL W P-5'-P-CCNC: 122 U/L (ref 12–78)
ANION GAP SERPL CALCULATED.3IONS-SCNC: 10 MMOL/L (ref 4–13)
AST SERPL W P-5'-P-CCNC: 138 U/L (ref 5–45)
ATRIAL RATE: 67 BPM
BASOPHILS # BLD AUTO: 0.02 THOUSANDS/ΜL (ref 0–0.1)
BASOPHILS NFR BLD AUTO: 0 % (ref 0–1)
BILIRUB SERPL-MCNC: 1.1 MG/DL (ref 0.2–1)
BUN SERPL-MCNC: 11 MG/DL (ref 5–25)
CALCIUM SERPL-MCNC: 9.1 MG/DL (ref 8.3–10.1)
CHLORIDE SERPL-SCNC: 103 MMOL/L (ref 100–108)
CO2 SERPL-SCNC: 28 MMOL/L (ref 21–32)
CREAT SERPL-MCNC: 0.94 MG/DL (ref 0.6–1.3)
EOSINOPHIL # BLD AUTO: 0.03 THOUSAND/ΜL (ref 0–0.61)
EOSINOPHIL NFR BLD AUTO: 0 % (ref 0–6)
ERYTHROCYTE [DISTWIDTH] IN BLOOD BY AUTOMATED COUNT: 11.9 % (ref 11.6–15.1)
FLUAV RNA RESP QL NAA+PROBE: NEGATIVE
FLUBV RNA RESP QL NAA+PROBE: NEGATIVE
GFR SERPL CREATININE-BSD FRML MDRD: 69 ML/MIN/1.73SQ M
GLUCOSE SERPL-MCNC: 113 MG/DL (ref 65–140)
HCT VFR BLD AUTO: 37.7 % (ref 34.8–46.1)
HGB BLD-MCNC: 12.6 G/DL (ref 11.5–15.4)
IMM GRANULOCYTES # BLD AUTO: 0.03 THOUSAND/UL (ref 0–0.2)
IMM GRANULOCYTES NFR BLD AUTO: 0 % (ref 0–2)
LIPASE SERPL-CCNC: 137 U/L (ref 73–393)
LYMPHOCYTES # BLD AUTO: 0.78 THOUSANDS/ΜL (ref 0.6–4.47)
LYMPHOCYTES NFR BLD AUTO: 8 % (ref 14–44)
MCH RBC QN AUTO: 31.8 PG (ref 26.8–34.3)
MCHC RBC AUTO-ENTMCNC: 33.4 G/DL (ref 31.4–37.4)
MCV RBC AUTO: 95 FL (ref 82–98)
MONOCYTES # BLD AUTO: 0.53 THOUSAND/ΜL (ref 0.17–1.22)
MONOCYTES NFR BLD AUTO: 5 % (ref 4–12)
NEUTROPHILS # BLD AUTO: 8.4 THOUSANDS/ΜL (ref 1.85–7.62)
NEUTS SEG NFR BLD AUTO: 87 % (ref 43–75)
NRBC BLD AUTO-RTO: 0 /100 WBCS
P AXIS: 69 DEGREES
PLATELET # BLD AUTO: 274 THOUSANDS/UL (ref 149–390)
PMV BLD AUTO: 10.8 FL (ref 8.9–12.7)
POTASSIUM SERPL-SCNC: 3.5 MMOL/L (ref 3.5–5.3)
PR INTERVAL: 160 MS
PROT SERPL-MCNC: 8.1 G/DL (ref 6.4–8.2)
QRS AXIS: 84 DEGREES
QRSD INTERVAL: 64 MS
QT INTERVAL: 406 MS
QTC INTERVAL: 429 MS
RBC # BLD AUTO: 3.96 MILLION/UL (ref 3.81–5.12)
RSV RNA RESP QL NAA+PROBE: NEGATIVE
SARS-COV-2 RNA RESP QL NAA+PROBE: NEGATIVE
SODIUM SERPL-SCNC: 141 MMOL/L (ref 136–145)
T WAVE AXIS: 59 DEGREES
TROPONIN I SERPL-MCNC: <0.02 NG/ML
VENTRICULAR RATE: 67 BPM
WBC # BLD AUTO: 9.79 THOUSAND/UL (ref 4.31–10.16)

## 2020-11-29 PROCEDURE — 99285 EMERGENCY DEPT VISIT HI MDM: CPT

## 2020-11-29 PROCEDURE — 93005 ELECTROCARDIOGRAM TRACING: CPT

## 2020-11-29 PROCEDURE — 99219 PR INITIAL OBSERVATION CARE/DAY 50 MINUTES: CPT | Performed by: PHYSICIAN ASSISTANT

## 2020-11-29 PROCEDURE — 84484 ASSAY OF TROPONIN QUANT: CPT | Performed by: EMERGENCY MEDICINE

## 2020-11-29 PROCEDURE — 74177 CT ABD & PELVIS W/CONTRAST: CPT

## 2020-11-29 PROCEDURE — 80053 COMPREHEN METABOLIC PANEL: CPT | Performed by: EMERGENCY MEDICINE

## 2020-11-29 PROCEDURE — 71045 X-RAY EXAM CHEST 1 VIEW: CPT

## 2020-11-29 PROCEDURE — 36415 COLL VENOUS BLD VENIPUNCTURE: CPT | Performed by: EMERGENCY MEDICINE

## 2020-11-29 PROCEDURE — 96374 THER/PROPH/DIAG INJ IV PUSH: CPT

## 2020-11-29 PROCEDURE — G1004 CDSM NDSC: HCPCS

## 2020-11-29 PROCEDURE — 93010 ELECTROCARDIOGRAM REPORT: CPT | Performed by: INTERNAL MEDICINE

## 2020-11-29 PROCEDURE — 85025 COMPLETE CBC W/AUTO DIFF WBC: CPT | Performed by: EMERGENCY MEDICINE

## 2020-11-29 PROCEDURE — 0241U HB NFCT DS VIR RESP RNA 4 TRGT: CPT | Performed by: PHYSICIAN ASSISTANT

## 2020-11-29 PROCEDURE — 83690 ASSAY OF LIPASE: CPT | Performed by: EMERGENCY MEDICINE

## 2020-11-29 RX ORDER — PANTOPRAZOLE SODIUM 20 MG/1
20 TABLET, DELAYED RELEASE ORAL DAILY
Status: DISCONTINUED | OUTPATIENT
Start: 2020-11-30 | End: 2020-12-01

## 2020-11-29 RX ORDER — MORPHINE SULFATE 15 MG/1
15 TABLET ORAL EVERY 6 HOURS PRN
Status: DISCONTINUED | OUTPATIENT
Start: 2020-11-30 | End: 2020-12-01

## 2020-11-29 RX ORDER — MORPHINE SULFATE 15 MG/1
30 TABLET ORAL EVERY 6 HOURS PRN
Status: DISCONTINUED | OUTPATIENT
Start: 2020-11-30 | End: 2020-12-01

## 2020-11-29 RX ORDER — ONDANSETRON 2 MG/ML
4 INJECTION INTRAMUSCULAR; INTRAVENOUS EVERY 6 HOURS PRN
Status: DISCONTINUED | OUTPATIENT
Start: 2020-11-29 | End: 2020-12-07 | Stop reason: HOSPADM

## 2020-11-29 RX ORDER — MORPHINE SULFATE 4 MG/ML
4 INJECTION, SOLUTION INTRAMUSCULAR; INTRAVENOUS ONCE
Status: COMPLETED | OUTPATIENT
Start: 2020-11-29 | End: 2020-11-29

## 2020-11-29 RX ORDER — PANTOPRAZOLE SODIUM 20 MG/1
20 TABLET, DELAYED RELEASE ORAL DAILY
COMMUNITY
End: 2020-12-07 | Stop reason: HOSPADM

## 2020-11-29 RX ORDER — B-COMPLEX WITH VITAMIN C
1 TABLET ORAL
Status: DISCONTINUED | OUTPATIENT
Start: 2020-11-30 | End: 2020-12-07 | Stop reason: HOSPADM

## 2020-11-29 RX ORDER — MECLIZINE HYDROCHLORIDE 25 MG/1
25 TABLET ORAL 3 TIMES DAILY PRN
Status: DISCONTINUED | OUTPATIENT
Start: 2020-11-29 | End: 2020-12-07 | Stop reason: HOSPADM

## 2020-11-29 RX ORDER — SODIUM CHLORIDE, SODIUM LACTATE, POTASSIUM CHLORIDE, CALCIUM CHLORIDE 600; 310; 30; 20 MG/100ML; MG/100ML; MG/100ML; MG/100ML
50 INJECTION, SOLUTION INTRAVENOUS CONTINUOUS
Status: DISCONTINUED | OUTPATIENT
Start: 2020-11-30 | End: 2020-11-30

## 2020-11-29 RX ORDER — DICYCLOMINE HYDROCHLORIDE 10 MG/1
10 CAPSULE ORAL 3 TIMES DAILY PRN
Status: DISCONTINUED | OUTPATIENT
Start: 2020-11-29 | End: 2020-12-02

## 2020-11-29 RX ORDER — FLUTICASONE PROPIONATE 50 MCG
1 SPRAY, SUSPENSION (ML) NASAL DAILY
Status: DISCONTINUED | OUTPATIENT
Start: 2020-11-30 | End: 2020-12-07 | Stop reason: HOSPADM

## 2020-11-29 RX ADMIN — MORPHINE SULFATE 4 MG: 4 INJECTION INTRAVENOUS at 22:03

## 2020-11-29 RX ADMIN — IOHEXOL 100 ML: 350 INJECTION, SOLUTION INTRAVENOUS at 19:30

## 2020-11-30 ENCOUNTER — ANESTHESIA (OUTPATIENT)
Dept: PERIOP | Facility: HOSPITAL | Age: 55
DRG: 417 | End: 2020-11-30
Payer: COMMERCIAL

## 2020-11-30 ENCOUNTER — APPOINTMENT (OUTPATIENT)
Dept: RADIOLOGY | Facility: HOSPITAL | Age: 55
DRG: 417 | End: 2020-11-30
Payer: COMMERCIAL

## 2020-11-30 ENCOUNTER — ANESTHESIA EVENT (OUTPATIENT)
Dept: PERIOP | Facility: HOSPITAL | Age: 55
DRG: 417 | End: 2020-11-30
Payer: COMMERCIAL

## 2020-11-30 ENCOUNTER — APPOINTMENT (OUTPATIENT)
Dept: PERIOP | Facility: HOSPITAL | Age: 55
DRG: 417 | End: 2020-11-30
Payer: COMMERCIAL

## 2020-11-30 VITALS — HEART RATE: 92 BPM

## 2020-11-30 PROCEDURE — 0FC98ZZ EXTIRPATION OF MATTER FROM COMMON BILE DUCT, VIA NATURAL OR ARTIFICIAL OPENING ENDOSCOPIC: ICD-10-PCS | Performed by: INTERNAL MEDICINE

## 2020-11-30 PROCEDURE — 74328 X-RAY BILE DUCT ENDOSCOPY: CPT

## 2020-11-30 PROCEDURE — 99244 OFF/OP CNSLTJ NEW/EST MOD 40: CPT | Performed by: INTERNAL MEDICINE

## 2020-11-30 PROCEDURE — 43264 ERCP REMOVE DUCT CALCULI: CPT | Performed by: INTERNAL MEDICINE

## 2020-11-30 PROCEDURE — 99285 EMERGENCY DEPT VISIT HI MDM: CPT | Performed by: EMERGENCY MEDICINE

## 2020-11-30 PROCEDURE — 0F798DZ DILATION OF COMMON BILE DUCT WITH INTRALUMINAL DEVICE, VIA NATURAL OR ARTIFICIAL OPENING ENDOSCOPIC: ICD-10-PCS | Performed by: INTERNAL MEDICINE

## 2020-11-30 PROCEDURE — C2617 STENT, NON-COR, TEM W/O DEL: HCPCS

## 2020-11-30 PROCEDURE — 43274 ERCP DUCT STENT PLACEMENT: CPT | Performed by: INTERNAL MEDICINE

## 2020-11-30 PROCEDURE — C1769 GUIDE WIRE: HCPCS

## 2020-11-30 PROCEDURE — C1726 CATH, BAL DIL, NON-VASCULAR: HCPCS

## 2020-11-30 PROCEDURE — 43277 ERCP EA DUCT/AMPULLA DILATE: CPT | Performed by: INTERNAL MEDICINE

## 2020-11-30 PROCEDURE — 99225 PR SBSQ OBSERVATION CARE/DAY 25 MINUTES: CPT | Performed by: STUDENT IN AN ORGANIZED HEALTH CARE EDUCATION/TRAINING PROGRAM

## 2020-11-30 PROCEDURE — 99220 PR INITIAL OBSERVATION CARE/DAY 70 MINUTES: CPT | Performed by: SURGERY

## 2020-11-30 RX ORDER — ONDANSETRON 2 MG/ML
INJECTION INTRAMUSCULAR; INTRAVENOUS AS NEEDED
Status: DISCONTINUED | OUTPATIENT
Start: 2020-11-30 | End: 2020-11-30

## 2020-11-30 RX ORDER — FENTANYL CITRATE/PF 50 MCG/ML
50 SYRINGE (ML) INJECTION
Status: DISCONTINUED | OUTPATIENT
Start: 2020-11-30 | End: 2020-11-30 | Stop reason: HOSPADM

## 2020-11-30 RX ORDER — PROPOFOL 10 MG/ML
INJECTION, EMULSION INTRAVENOUS AS NEEDED
Status: DISCONTINUED | OUTPATIENT
Start: 2020-11-30 | End: 2020-11-30

## 2020-11-30 RX ORDER — SODIUM CHLORIDE, SODIUM LACTATE, POTASSIUM CHLORIDE, CALCIUM CHLORIDE 600; 310; 30; 20 MG/100ML; MG/100ML; MG/100ML; MG/100ML
150 INJECTION, SOLUTION INTRAVENOUS CONTINUOUS
Status: DISCONTINUED | OUTPATIENT
Start: 2020-11-30 | End: 2020-12-01

## 2020-11-30 RX ORDER — FENTANYL CITRATE 50 UG/ML
INJECTION, SOLUTION INTRAMUSCULAR; INTRAVENOUS AS NEEDED
Status: DISCONTINUED | OUTPATIENT
Start: 2020-11-30 | End: 2020-11-30

## 2020-11-30 RX ORDER — HYDROMORPHONE HCL/PF 1 MG/ML
0.5 SYRINGE (ML) INJECTION ONCE AS NEEDED
Status: COMPLETED | OUTPATIENT
Start: 2020-11-30 | End: 2020-11-30

## 2020-11-30 RX ORDER — DEXAMETHASONE SODIUM PHOSPHATE 4 MG/ML
INJECTION, SOLUTION INTRA-ARTICULAR; INTRALESIONAL; INTRAMUSCULAR; INTRAVENOUS; SOFT TISSUE AS NEEDED
Status: DISCONTINUED | OUTPATIENT
Start: 2020-11-30 | End: 2020-11-30

## 2020-11-30 RX ORDER — ONDANSETRON 2 MG/ML
4 INJECTION INTRAMUSCULAR; INTRAVENOUS ONCE AS NEEDED
Status: COMPLETED | OUTPATIENT
Start: 2020-11-30 | End: 2020-11-30

## 2020-11-30 RX ORDER — METOCLOPRAMIDE HYDROCHLORIDE 5 MG/ML
10 INJECTION INTRAMUSCULAR; INTRAVENOUS ONCE AS NEEDED
Status: COMPLETED | OUTPATIENT
Start: 2020-11-30 | End: 2020-11-30

## 2020-11-30 RX ORDER — CEFAZOLIN SODIUM 2 G/50ML
2000 SOLUTION INTRAVENOUS
Status: ACTIVE | OUTPATIENT
Start: 2020-12-01 | End: 2020-12-02

## 2020-11-30 RX ORDER — SUCCINYLCHOLINE/SOD CL,ISO/PF 100 MG/5ML
SYRINGE (ML) INTRAVENOUS AS NEEDED
Status: DISCONTINUED | OUTPATIENT
Start: 2020-11-30 | End: 2020-11-30

## 2020-11-30 RX ADMIN — INDOMETHACIN 100 MG: 50 SUPPOSITORY RECTAL at 13:05

## 2020-11-30 RX ADMIN — METOCLOPRAMIDE HYDROCHLORIDE 10 MG: 5 INJECTION INTRAMUSCULAR; INTRAVENOUS at 14:38

## 2020-11-30 RX ADMIN — FENTANYL CITRATE 25 MCG: 50 INJECTION, SOLUTION INTRAMUSCULAR; INTRAVENOUS at 14:04

## 2020-11-30 RX ADMIN — ONDANSETRON 4 MG: 2 INJECTION INTRAMUSCULAR; INTRAVENOUS at 13:05

## 2020-11-30 RX ADMIN — PROPOFOL 150 MG: 10 INJECTION, EMULSION INTRAVENOUS at 13:03

## 2020-11-30 RX ADMIN — PANTOPRAZOLE SODIUM 20 MG: 20 TABLET, DELAYED RELEASE ORAL at 09:02

## 2020-11-30 RX ADMIN — ONDANSETRON 4 MG: 2 INJECTION INTRAMUSCULAR; INTRAVENOUS at 14:27

## 2020-11-30 RX ADMIN — FENTANYL CITRATE 50 MCG: 50 INJECTION, SOLUTION INTRAMUSCULAR; INTRAVENOUS at 13:02

## 2020-11-30 RX ADMIN — Medication 100 MG: at 13:03

## 2020-11-30 RX ADMIN — LIDOCAINE HYDROCHLORIDE 100 MG: 20 INJECTION, SOLUTION INTRAVENOUS at 13:02

## 2020-11-30 RX ADMIN — SODIUM CHLORIDE, SODIUM LACTATE, POTASSIUM CHLORIDE, AND CALCIUM CHLORIDE 50 ML/HR: .6; .31; .03; .02 INJECTION, SOLUTION INTRAVENOUS at 16:19

## 2020-11-30 RX ADMIN — DEXAMETHASONE SODIUM PHOSPHATE 4 MG: 4 INJECTION, SOLUTION INTRAMUSCULAR; INTRAVENOUS at 13:05

## 2020-11-30 RX ADMIN — HYDROMORPHONE HYDROCHLORIDE 0.5 MG: 1 INJECTION, SOLUTION INTRAMUSCULAR; INTRAVENOUS; SUBCUTANEOUS at 20:57

## 2020-11-30 RX ADMIN — MORPHINE SULFATE 15 MG: 15 TABLET ORAL at 16:01

## 2020-11-30 RX ADMIN — SODIUM CHLORIDE, SODIUM LACTATE, POTASSIUM CHLORIDE, AND CALCIUM CHLORIDE 50 ML/HR: .6; .31; .03; .02 INJECTION, SOLUTION INTRAVENOUS at 00:21

## 2020-11-30 RX ADMIN — IOHEXOL 50 ML: 240 INJECTION, SOLUTION INTRATHECAL; INTRAVASCULAR; INTRAVENOUS; ORAL at 14:12

## 2020-11-30 RX ADMIN — FENTANYL CITRATE 25 MCG: 50 INJECTION, SOLUTION INTRAMUSCULAR; INTRAVENOUS at 14:15

## 2020-12-01 ENCOUNTER — TELEPHONE (OUTPATIENT)
Dept: GASTROENTEROLOGY | Facility: CLINIC | Age: 55
End: 2020-12-01

## 2020-12-01 PROBLEM — R03.0 ELEVATED BP WITHOUT DIAGNOSIS OF HYPERTENSION: Status: ACTIVE | Noted: 2020-12-01

## 2020-12-01 LAB
ALBUMIN SERPL BCP-MCNC: 3.4 G/DL (ref 3.5–5)
ALP SERPL-CCNC: 253 U/L (ref 46–116)
ALT SERPL W P-5'-P-CCNC: 391 U/L (ref 12–78)
ANION GAP SERPL CALCULATED.3IONS-SCNC: 13 MMOL/L (ref 4–13)
AST SERPL W P-5'-P-CCNC: 219 U/L (ref 5–45)
BASOPHILS # BLD AUTO: 0.01 THOUSANDS/ΜL (ref 0–0.1)
BASOPHILS NFR BLD AUTO: 0 % (ref 0–1)
BILIRUB SERPL-MCNC: 0.9 MG/DL (ref 0.2–1)
BUN SERPL-MCNC: 13 MG/DL (ref 5–25)
CALCIUM ALBUM COR SERPL-MCNC: 9.2 MG/DL (ref 8.3–10.1)
CALCIUM SERPL-MCNC: 8.7 MG/DL (ref 8.3–10.1)
CHLORIDE SERPL-SCNC: 104 MMOL/L (ref 100–108)
CO2 SERPL-SCNC: 26 MMOL/L (ref 21–32)
CREAT SERPL-MCNC: 1.02 MG/DL (ref 0.6–1.3)
EOSINOPHIL # BLD AUTO: 0 THOUSAND/ΜL (ref 0–0.61)
EOSINOPHIL NFR BLD AUTO: 0 % (ref 0–6)
ERYTHROCYTE [DISTWIDTH] IN BLOOD BY AUTOMATED COUNT: 12.2 % (ref 11.6–15.1)
GFR SERPL CREATININE-BSD FRML MDRD: 63 ML/MIN/1.73SQ M
GLUCOSE P FAST SERPL-MCNC: 140 MG/DL (ref 65–99)
GLUCOSE SERPL-MCNC: 140 MG/DL (ref 65–140)
HCT VFR BLD AUTO: 40.1 % (ref 34.8–46.1)
HGB BLD-MCNC: 13.1 G/DL (ref 11.5–15.4)
IMM GRANULOCYTES # BLD AUTO: 0.12 THOUSAND/UL (ref 0–0.2)
IMM GRANULOCYTES NFR BLD AUTO: 1 % (ref 0–2)
LIPASE SERPL-CCNC: 5671 U/L (ref 73–393)
LYMPHOCYTES # BLD AUTO: 0.49 THOUSANDS/ΜL (ref 0.6–4.47)
LYMPHOCYTES NFR BLD AUTO: 3 % (ref 14–44)
MAGNESIUM SERPL-MCNC: 1.9 MG/DL (ref 1.6–2.6)
MCH RBC QN AUTO: 31.7 PG (ref 26.8–34.3)
MCHC RBC AUTO-ENTMCNC: 32.7 G/DL (ref 31.4–37.4)
MCV RBC AUTO: 97 FL (ref 82–98)
MONOCYTES # BLD AUTO: 1.41 THOUSAND/ΜL (ref 0.17–1.22)
MONOCYTES NFR BLD AUTO: 8 % (ref 4–12)
NEUTROPHILS # BLD AUTO: 15.58 THOUSANDS/ΜL (ref 1.85–7.62)
NEUTS SEG NFR BLD AUTO: 88 % (ref 43–75)
NRBC BLD AUTO-RTO: 0 /100 WBCS
PLATELET # BLD AUTO: 280 THOUSANDS/UL (ref 149–390)
PMV BLD AUTO: 11 FL (ref 8.9–12.7)
POTASSIUM SERPL-SCNC: 3.6 MMOL/L (ref 3.5–5.3)
PROT SERPL-MCNC: 7.5 G/DL (ref 6.4–8.2)
RBC # BLD AUTO: 4.13 MILLION/UL (ref 3.81–5.12)
SODIUM SERPL-SCNC: 143 MMOL/L (ref 136–145)
WBC # BLD AUTO: 17.61 THOUSAND/UL (ref 4.31–10.16)

## 2020-12-01 PROCEDURE — 83690 ASSAY OF LIPASE: CPT | Performed by: PHYSICIAN ASSISTANT

## 2020-12-01 PROCEDURE — 99232 SBSQ HOSP IP/OBS MODERATE 35: CPT | Performed by: FAMILY MEDICINE

## 2020-12-01 PROCEDURE — 99233 SBSQ HOSP IP/OBS HIGH 50: CPT | Performed by: SURGERY

## 2020-12-01 PROCEDURE — 85025 COMPLETE CBC W/AUTO DIFF WBC: CPT | Performed by: INTERNAL MEDICINE

## 2020-12-01 PROCEDURE — 83735 ASSAY OF MAGNESIUM: CPT | Performed by: INTERNAL MEDICINE

## 2020-12-01 PROCEDURE — 99232 SBSQ HOSP IP/OBS MODERATE 35: CPT | Performed by: INTERNAL MEDICINE

## 2020-12-01 PROCEDURE — 80053 COMPREHEN METABOLIC PANEL: CPT | Performed by: INTERNAL MEDICINE

## 2020-12-01 PROCEDURE — C9113 INJ PANTOPRAZOLE SODIUM, VIA: HCPCS | Performed by: PHYSICIAN ASSISTANT

## 2020-12-01 RX ORDER — PANTOPRAZOLE SODIUM 40 MG/1
40 INJECTION, POWDER, FOR SOLUTION INTRAVENOUS EVERY 12 HOURS SCHEDULED
Status: DISCONTINUED | OUTPATIENT
Start: 2020-12-01 | End: 2020-12-07 | Stop reason: HOSPADM

## 2020-12-01 RX ORDER — SODIUM CHLORIDE, SODIUM LACTATE, POTASSIUM CHLORIDE, CALCIUM CHLORIDE 600; 310; 30; 20 MG/100ML; MG/100ML; MG/100ML; MG/100ML
100 INJECTION, SOLUTION INTRAVENOUS CONTINUOUS
Status: DISCONTINUED | OUTPATIENT
Start: 2020-12-01 | End: 2020-12-06

## 2020-12-01 RX ORDER — DEXTROSE AND SODIUM CHLORIDE 5; .45 G/100ML; G/100ML
250 INJECTION, SOLUTION INTRAVENOUS CONTINUOUS
Status: DISCONTINUED | OUTPATIENT
Start: 2020-12-01 | End: 2020-12-01

## 2020-12-01 RX ORDER — OXYCODONE HYDROCHLORIDE AND ACETAMINOPHEN 5; 325 MG/1; MG/1
1 TABLET ORAL EVERY 4 HOURS PRN
Status: DISCONTINUED | OUTPATIENT
Start: 2020-12-01 | End: 2020-12-01

## 2020-12-01 RX ORDER — PANTOPRAZOLE SODIUM 40 MG/1
40 INJECTION, POWDER, FOR SOLUTION INTRAVENOUS
Status: DISCONTINUED | OUTPATIENT
Start: 2020-12-01 | End: 2020-12-01

## 2020-12-01 RX ORDER — KETOROLAC TROMETHAMINE 30 MG/ML
15 INJECTION, SOLUTION INTRAMUSCULAR; INTRAVENOUS ONCE AS NEEDED
Status: COMPLETED | OUTPATIENT
Start: 2020-12-01 | End: 2020-12-01

## 2020-12-01 RX ORDER — HYDROMORPHONE HCL/PF 1 MG/ML
0.5 SYRINGE (ML) INJECTION EVERY 2 HOUR PRN
Status: DISCONTINUED | OUTPATIENT
Start: 2020-12-01 | End: 2020-12-02

## 2020-12-01 RX ADMIN — KETOROLAC TROMETHAMINE 15 MG: 30 INJECTION, SOLUTION INTRAMUSCULAR at 20:23

## 2020-12-01 RX ADMIN — HYDROMORPHONE HYDROCHLORIDE 0.5 MG: 1 INJECTION, SOLUTION INTRAMUSCULAR; INTRAVENOUS; SUBCUTANEOUS at 22:44

## 2020-12-01 RX ADMIN — PANTOPRAZOLE SODIUM 40 MG: 40 INJECTION, POWDER, FOR SOLUTION INTRAVENOUS at 20:08

## 2020-12-01 RX ADMIN — HYDROMORPHONE HYDROCHLORIDE 0.5 MG: 1 INJECTION, SOLUTION INTRAMUSCULAR; INTRAVENOUS; SUBCUTANEOUS at 15:44

## 2020-12-01 RX ADMIN — SODIUM CHLORIDE, SODIUM LACTATE, POTASSIUM CHLORIDE, AND CALCIUM CHLORIDE 1000 ML: .6; .31; .03; .02 INJECTION, SOLUTION INTRAVENOUS at 10:18

## 2020-12-01 RX ADMIN — OYSTER SHELL CALCIUM WITH VITAMIN D 1 TABLET: 500; 200 TABLET, FILM COATED ORAL at 10:13

## 2020-12-01 RX ADMIN — FLUTICASONE PROPIONATE 1 SPRAY: 50 SPRAY, METERED NASAL at 10:00

## 2020-12-01 RX ADMIN — HYDROMORPHONE HYDROCHLORIDE 0.5 MG: 1 INJECTION, SOLUTION INTRAMUSCULAR; INTRAVENOUS; SUBCUTANEOUS at 10:11

## 2020-12-01 RX ADMIN — DEXTROSE AND SODIUM CHLORIDE 125 ML/HR: 5; .45 INJECTION, SOLUTION INTRAVENOUS at 11:25

## 2020-12-01 RX ADMIN — SODIUM CHLORIDE, SODIUM LACTATE, POTASSIUM CHLORIDE, AND CALCIUM CHLORIDE 250 ML/HR: .6; .31; .03; .02 INJECTION, SOLUTION INTRAVENOUS at 20:31

## 2020-12-01 RX ADMIN — MORPHINE SULFATE 30 MG: 15 TABLET ORAL at 02:23

## 2020-12-01 RX ADMIN — ONDANSETRON 4 MG: 2 INJECTION INTRAMUSCULAR; INTRAVENOUS at 11:45

## 2020-12-01 RX ADMIN — OXYCODONE HYDROCHLORIDE AND ACETAMINOPHEN 1 TABLET: 5; 325 TABLET ORAL at 05:51

## 2020-12-01 RX ADMIN — PANTOPRAZOLE SODIUM 40 MG: 40 INJECTION, POWDER, FOR SOLUTION INTRAVENOUS at 10:12

## 2020-12-01 RX ADMIN — SODIUM CHLORIDE, SODIUM LACTATE, POTASSIUM CHLORIDE, AND CALCIUM CHLORIDE 250 ML/HR: .6; .31; .03; .02 INJECTION, SOLUTION INTRAVENOUS at 15:49

## 2020-12-02 ENCOUNTER — APPOINTMENT (INPATIENT)
Dept: CT IMAGING | Facility: HOSPITAL | Age: 55
DRG: 417 | End: 2020-12-02
Payer: COMMERCIAL

## 2020-12-02 PROBLEM — D72.829 LEUKOCYTOSIS: Status: ACTIVE | Noted: 2020-12-02

## 2020-12-02 PROBLEM — K85.90 POST-ERCP ACUTE PANCREATITIS: Status: ACTIVE | Noted: 2020-12-02

## 2020-12-02 PROBLEM — K91.89 POST-ERCP ACUTE PANCREATITIS: Status: ACTIVE | Noted: 2020-12-02

## 2020-12-02 LAB
ALBUMIN SERPL BCP-MCNC: 2.7 G/DL (ref 3.5–5)
ALP SERPL-CCNC: 158 U/L (ref 46–116)
ALT SERPL W P-5'-P-CCNC: 196 U/L (ref 12–78)
ANION GAP SERPL CALCULATED.3IONS-SCNC: 9 MMOL/L (ref 4–13)
AST SERPL W P-5'-P-CCNC: 57 U/L (ref 5–45)
BASOPHILS # BLD AUTO: 0.03 THOUSANDS/ΜL (ref 0–0.1)
BASOPHILS NFR BLD AUTO: 0 % (ref 0–1)
BILIRUB SERPL-MCNC: 0.8 MG/DL (ref 0.2–1)
BUN SERPL-MCNC: 11 MG/DL (ref 5–25)
CALCIUM ALBUM COR SERPL-MCNC: 9.2 MG/DL (ref 8.3–10.1)
CALCIUM SERPL-MCNC: 8.2 MG/DL (ref 8.3–10.1)
CHLORIDE SERPL-SCNC: 104 MMOL/L (ref 100–108)
CO2 SERPL-SCNC: 28 MMOL/L (ref 21–32)
CREAT SERPL-MCNC: 0.78 MG/DL (ref 0.6–1.3)
EOSINOPHIL # BLD AUTO: 0 THOUSAND/ΜL (ref 0–0.61)
EOSINOPHIL NFR BLD AUTO: 0 % (ref 0–6)
ERYTHROCYTE [DISTWIDTH] IN BLOOD BY AUTOMATED COUNT: 12.6 % (ref 11.6–15.1)
GFR SERPL CREATININE-BSD FRML MDRD: 86 ML/MIN/1.73SQ M
GLUCOSE SERPL-MCNC: 126 MG/DL (ref 65–140)
HCT VFR BLD AUTO: 36 % (ref 34.8–46.1)
HGB BLD-MCNC: 12 G/DL (ref 11.5–15.4)
IMM GRANULOCYTES # BLD AUTO: 0.21 THOUSAND/UL (ref 0–0.2)
IMM GRANULOCYTES NFR BLD AUTO: 1 % (ref 0–2)
LYMPHOCYTES # BLD AUTO: 0.56 THOUSANDS/ΜL (ref 0.6–4.47)
LYMPHOCYTES NFR BLD AUTO: 3 % (ref 14–44)
MAGNESIUM SERPL-MCNC: 1.6 MG/DL (ref 1.6–2.6)
MCH RBC QN AUTO: 32.3 PG (ref 26.8–34.3)
MCHC RBC AUTO-ENTMCNC: 33.3 G/DL (ref 31.4–37.4)
MCV RBC AUTO: 97 FL (ref 82–98)
MONOCYTES # BLD AUTO: 2.07 THOUSAND/ΜL (ref 0.17–1.22)
MONOCYTES NFR BLD AUTO: 9 % (ref 4–12)
NEUTROPHILS # BLD AUTO: 19.68 THOUSANDS/ΜL (ref 1.85–7.62)
NEUTS SEG NFR BLD AUTO: 87 % (ref 43–75)
NRBC BLD AUTO-RTO: 0 /100 WBCS
PLATELET # BLD AUTO: 231 THOUSANDS/UL (ref 149–390)
PMV BLD AUTO: 10.5 FL (ref 8.9–12.7)
POTASSIUM SERPL-SCNC: 3.5 MMOL/L (ref 3.5–5.3)
PROT SERPL-MCNC: 6.5 G/DL (ref 6.4–8.2)
RBC # BLD AUTO: 3.72 MILLION/UL (ref 3.81–5.12)
SODIUM SERPL-SCNC: 141 MMOL/L (ref 136–145)
WBC # BLD AUTO: 22.55 THOUSAND/UL (ref 4.31–10.16)

## 2020-12-02 PROCEDURE — 99233 SBSQ HOSP IP/OBS HIGH 50: CPT | Performed by: FAMILY MEDICINE

## 2020-12-02 PROCEDURE — C9113 INJ PANTOPRAZOLE SODIUM, VIA: HCPCS | Performed by: PHYSICIAN ASSISTANT

## 2020-12-02 PROCEDURE — 87040 BLOOD CULTURE FOR BACTERIA: CPT | Performed by: FAMILY MEDICINE

## 2020-12-02 PROCEDURE — 99233 SBSQ HOSP IP/OBS HIGH 50: CPT | Performed by: INTERNAL MEDICINE

## 2020-12-02 PROCEDURE — 83735 ASSAY OF MAGNESIUM: CPT | Performed by: INTERNAL MEDICINE

## 2020-12-02 PROCEDURE — 74177 CT ABD & PELVIS W/CONTRAST: CPT

## 2020-12-02 PROCEDURE — 80053 COMPREHEN METABOLIC PANEL: CPT | Performed by: INTERNAL MEDICINE

## 2020-12-02 PROCEDURE — G1004 CDSM NDSC: HCPCS

## 2020-12-02 PROCEDURE — 85025 COMPLETE CBC W/AUTO DIFF WBC: CPT | Performed by: INTERNAL MEDICINE

## 2020-12-02 PROCEDURE — 99233 SBSQ HOSP IP/OBS HIGH 50: CPT | Performed by: SURGERY

## 2020-12-02 RX ORDER — LIDOCAINE 50 MG/G
2 PATCH TOPICAL DAILY
Status: DISCONTINUED | OUTPATIENT
Start: 2020-12-02 | End: 2020-12-07 | Stop reason: HOSPADM

## 2020-12-02 RX ORDER — HYDROMORPHONE HCL/PF 1 MG/ML
1 SYRINGE (ML) INJECTION EVERY 2 HOUR PRN
Status: DISCONTINUED | OUTPATIENT
Start: 2020-12-02 | End: 2020-12-06

## 2020-12-02 RX ORDER — ACETAMINOPHEN 325 MG/1
650 TABLET ORAL EVERY 6 HOURS PRN
Status: DISCONTINUED | OUTPATIENT
Start: 2020-12-02 | End: 2020-12-07 | Stop reason: HOSPADM

## 2020-12-02 RX ORDER — OXYCODONE HYDROCHLORIDE 10 MG/1
10 TABLET ORAL EVERY 4 HOURS PRN
Status: DISCONTINUED | OUTPATIENT
Start: 2020-12-02 | End: 2020-12-07 | Stop reason: HOSPADM

## 2020-12-02 RX ORDER — HYDRALAZINE HYDROCHLORIDE 20 MG/ML
10 INJECTION INTRAMUSCULAR; INTRAVENOUS ONCE
Status: COMPLETED | OUTPATIENT
Start: 2020-12-02 | End: 2020-12-02

## 2020-12-02 RX ADMIN — HYDROMORPHONE HYDROCHLORIDE 1 MG: 1 INJECTION, SOLUTION INTRAMUSCULAR; INTRAVENOUS; SUBCUTANEOUS at 22:12

## 2020-12-02 RX ADMIN — SODIUM CHLORIDE, SODIUM LACTATE, POTASSIUM CHLORIDE, AND CALCIUM CHLORIDE 250 ML/HR: .6; .31; .03; .02 INJECTION, SOLUTION INTRAVENOUS at 15:17

## 2020-12-02 RX ADMIN — HYDROMORPHONE HYDROCHLORIDE 0.5 MG: 1 INJECTION, SOLUTION INTRAMUSCULAR; INTRAVENOUS; SUBCUTANEOUS at 04:12

## 2020-12-02 RX ADMIN — METRONIDAZOLE 500 MG: 500 INJECTION, SOLUTION INTRAVENOUS at 17:22

## 2020-12-02 RX ADMIN — PANTOPRAZOLE SODIUM 40 MG: 40 INJECTION, POWDER, FOR SOLUTION INTRAVENOUS at 20:17

## 2020-12-02 RX ADMIN — SODIUM CHLORIDE, SODIUM LACTATE, POTASSIUM CHLORIDE, AND CALCIUM CHLORIDE 250 ML/HR: .6; .31; .03; .02 INJECTION, SOLUTION INTRAVENOUS at 00:13

## 2020-12-02 RX ADMIN — HYDRALAZINE HYDROCHLORIDE 10 MG: 20 INJECTION INTRAMUSCULAR; INTRAVENOUS at 15:59

## 2020-12-02 RX ADMIN — HYDROMORPHONE HYDROCHLORIDE 0.5 MG: 1 INJECTION, SOLUTION INTRAMUSCULAR; INTRAVENOUS; SUBCUTANEOUS at 13:54

## 2020-12-02 RX ADMIN — ENOXAPARIN SODIUM 40 MG: 40 INJECTION SUBCUTANEOUS at 08:02

## 2020-12-02 RX ADMIN — HYDROMORPHONE HYDROCHLORIDE 1 MG: 1 INJECTION, SOLUTION INTRAMUSCULAR; INTRAVENOUS; SUBCUTANEOUS at 18:52

## 2020-12-02 RX ADMIN — METRONIDAZOLE 500 MG: 500 INJECTION, SOLUTION INTRAVENOUS at 10:12

## 2020-12-02 RX ADMIN — HYDROMORPHONE HYDROCHLORIDE 0.5 MG: 1 INJECTION, SOLUTION INTRAMUSCULAR; INTRAVENOUS; SUBCUTANEOUS at 01:56

## 2020-12-02 RX ADMIN — HYDROMORPHONE HYDROCHLORIDE 0.5 MG: 1 INJECTION, SOLUTION INTRAMUSCULAR; INTRAVENOUS; SUBCUTANEOUS at 11:49

## 2020-12-02 RX ADMIN — FLUTICASONE PROPIONATE 1 SPRAY: 50 SPRAY, METERED NASAL at 08:05

## 2020-12-02 RX ADMIN — OXYCODONE HYDROCHLORIDE 10 MG: 10 TABLET ORAL at 15:17

## 2020-12-02 RX ADMIN — SODIUM CHLORIDE, SODIUM LACTATE, POTASSIUM CHLORIDE, AND CALCIUM CHLORIDE 250 ML/HR: .6; .31; .03; .02 INJECTION, SOLUTION INTRAVENOUS at 08:00

## 2020-12-02 RX ADMIN — OXYCODONE HYDROCHLORIDE 10 MG: 10 TABLET ORAL at 20:17

## 2020-12-02 RX ADMIN — OXYCODONE HYDROCHLORIDE 10 MG: 10 TABLET ORAL at 10:23

## 2020-12-02 RX ADMIN — LIDOCAINE 5% 2 PATCH: 700 PATCH TOPICAL at 09:52

## 2020-12-02 RX ADMIN — CEFTRIAXONE SODIUM 1000 MG: 10 INJECTION, POWDER, FOR SOLUTION INTRAVENOUS at 11:49

## 2020-12-02 RX ADMIN — SODIUM CHLORIDE, SODIUM LACTATE, POTASSIUM CHLORIDE, AND CALCIUM CHLORIDE 250 ML/HR: .6; .31; .03; .02 INJECTION, SOLUTION INTRAVENOUS at 20:17

## 2020-12-02 RX ADMIN — SODIUM CHLORIDE, SODIUM LACTATE, POTASSIUM CHLORIDE, AND CALCIUM CHLORIDE 250 ML/HR: .6; .31; .03; .02 INJECTION, SOLUTION INTRAVENOUS at 04:12

## 2020-12-02 RX ADMIN — HYDROMORPHONE HYDROCHLORIDE 1 MG: 1 INJECTION, SOLUTION INTRAMUSCULAR; INTRAVENOUS; SUBCUTANEOUS at 15:58

## 2020-12-02 RX ADMIN — HYDROMORPHONE HYDROCHLORIDE 0.5 MG: 1 INJECTION, SOLUTION INTRAMUSCULAR; INTRAVENOUS; SUBCUTANEOUS at 07:38

## 2020-12-02 RX ADMIN — IOHEXOL 100 ML: 350 INJECTION, SOLUTION INTRAVENOUS at 13:51

## 2020-12-02 RX ADMIN — PANTOPRAZOLE SODIUM 40 MG: 40 INJECTION, POWDER, FOR SOLUTION INTRAVENOUS at 08:02

## 2020-12-03 LAB
ALBUMIN SERPL BCP-MCNC: 2.4 G/DL (ref 3.5–5)
ALP SERPL-CCNC: 139 U/L (ref 46–116)
ALT SERPL W P-5'-P-CCNC: 129 U/L (ref 12–78)
ANION GAP SERPL CALCULATED.3IONS-SCNC: 6 MMOL/L (ref 4–13)
AST SERPL W P-5'-P-CCNC: 41 U/L (ref 5–45)
BASOPHILS # BLD AUTO: 0.01 THOUSANDS/ΜL (ref 0–0.1)
BASOPHILS NFR BLD AUTO: 0 % (ref 0–1)
BILIRUB SERPL-MCNC: 0.6 MG/DL (ref 0.2–1)
BUN SERPL-MCNC: 7 MG/DL (ref 5–25)
CALCIUM ALBUM COR SERPL-MCNC: 9.4 MG/DL (ref 8.3–10.1)
CALCIUM SERPL-MCNC: 8.1 MG/DL (ref 8.3–10.1)
CHLORIDE SERPL-SCNC: 99 MMOL/L (ref 100–108)
CO2 SERPL-SCNC: 30 MMOL/L (ref 21–32)
CREAT SERPL-MCNC: 0.73 MG/DL (ref 0.6–1.3)
EOSINOPHIL # BLD AUTO: 0 THOUSAND/ΜL (ref 0–0.61)
EOSINOPHIL NFR BLD AUTO: 0 % (ref 0–6)
ERYTHROCYTE [DISTWIDTH] IN BLOOD BY AUTOMATED COUNT: 12.4 % (ref 11.6–15.1)
GFR SERPL CREATININE-BSD FRML MDRD: 94 ML/MIN/1.73SQ M
GLUCOSE SERPL-MCNC: 116 MG/DL (ref 65–140)
HCT VFR BLD AUTO: 35 % (ref 34.8–46.1)
HGB BLD-MCNC: 11.3 G/DL (ref 11.5–15.4)
IMM GRANULOCYTES # BLD AUTO: 0.12 THOUSAND/UL (ref 0–0.2)
IMM GRANULOCYTES NFR BLD AUTO: 1 % (ref 0–2)
LIPASE SERPL-CCNC: 154 U/L (ref 73–393)
LYMPHOCYTES # BLD AUTO: 0.61 THOUSANDS/ΜL (ref 0.6–4.47)
LYMPHOCYTES NFR BLD AUTO: 3 % (ref 14–44)
MAGNESIUM SERPL-MCNC: 1.6 MG/DL (ref 1.6–2.6)
MCH RBC QN AUTO: 31.1 PG (ref 26.8–34.3)
MCHC RBC AUTO-ENTMCNC: 32.3 G/DL (ref 31.4–37.4)
MCV RBC AUTO: 96 FL (ref 82–98)
MONOCYTES # BLD AUTO: 2.03 THOUSAND/ΜL (ref 0.17–1.22)
MONOCYTES NFR BLD AUTO: 10 % (ref 4–12)
NEUTROPHILS # BLD AUTO: 18.55 THOUSANDS/ΜL (ref 1.85–7.62)
NEUTS SEG NFR BLD AUTO: 86 % (ref 43–75)
NRBC BLD AUTO-RTO: 0 /100 WBCS
PLATELET # BLD AUTO: 246 THOUSANDS/UL (ref 149–390)
PMV BLD AUTO: 10.2 FL (ref 8.9–12.7)
POTASSIUM SERPL-SCNC: 3.2 MMOL/L (ref 3.5–5.3)
PROT SERPL-MCNC: 6.4 G/DL (ref 6.4–8.2)
RBC # BLD AUTO: 3.63 MILLION/UL (ref 3.81–5.12)
SODIUM SERPL-SCNC: 135 MMOL/L (ref 136–145)
WBC # BLD AUTO: 21.32 THOUSAND/UL (ref 4.31–10.16)

## 2020-12-03 PROCEDURE — 99233 SBSQ HOSP IP/OBS HIGH 50: CPT | Performed by: FAMILY MEDICINE

## 2020-12-03 PROCEDURE — 99232 SBSQ HOSP IP/OBS MODERATE 35: CPT | Performed by: INTERNAL MEDICINE

## 2020-12-03 PROCEDURE — 80053 COMPREHEN METABOLIC PANEL: CPT | Performed by: INTERNAL MEDICINE

## 2020-12-03 PROCEDURE — C9113 INJ PANTOPRAZOLE SODIUM, VIA: HCPCS | Performed by: PHYSICIAN ASSISTANT

## 2020-12-03 PROCEDURE — 83735 ASSAY OF MAGNESIUM: CPT | Performed by: INTERNAL MEDICINE

## 2020-12-03 PROCEDURE — 83690 ASSAY OF LIPASE: CPT | Performed by: PHYSICIAN ASSISTANT

## 2020-12-03 PROCEDURE — 99233 SBSQ HOSP IP/OBS HIGH 50: CPT | Performed by: SURGERY

## 2020-12-03 PROCEDURE — 85025 COMPLETE CBC W/AUTO DIFF WBC: CPT | Performed by: INTERNAL MEDICINE

## 2020-12-03 RX ORDER — MAGNESIUM SULFATE HEPTAHYDRATE 40 MG/ML
2 INJECTION, SOLUTION INTRAVENOUS ONCE
Status: COMPLETED | OUTPATIENT
Start: 2020-12-03 | End: 2020-12-03

## 2020-12-03 RX ORDER — POTASSIUM CHLORIDE 20 MEQ/1
40 TABLET, EXTENDED RELEASE ORAL ONCE
Status: COMPLETED | OUTPATIENT
Start: 2020-12-03 | End: 2020-12-03

## 2020-12-03 RX ORDER — AMLODIPINE BESYLATE 5 MG/1
5 TABLET ORAL DAILY
Status: DISCONTINUED | OUTPATIENT
Start: 2020-12-03 | End: 2020-12-05

## 2020-12-03 RX ORDER — KETOROLAC TROMETHAMINE 30 MG/ML
15 INJECTION, SOLUTION INTRAMUSCULAR; INTRAVENOUS EVERY 6 HOURS SCHEDULED
Status: COMPLETED | OUTPATIENT
Start: 2020-12-03 | End: 2020-12-05

## 2020-12-03 RX ADMIN — KETOROLAC TROMETHAMINE 15 MG: 30 INJECTION, SOLUTION INTRAMUSCULAR at 17:46

## 2020-12-03 RX ADMIN — FLUTICASONE PROPIONATE 1 SPRAY: 50 SPRAY, METERED NASAL at 09:28

## 2020-12-03 RX ADMIN — KETOROLAC TROMETHAMINE 15 MG: 30 INJECTION, SOLUTION INTRAMUSCULAR at 12:13

## 2020-12-03 RX ADMIN — METRONIDAZOLE 500 MG: 500 INJECTION, SOLUTION INTRAVENOUS at 09:26

## 2020-12-03 RX ADMIN — METRONIDAZOLE 500 MG: 500 INJECTION, SOLUTION INTRAVENOUS at 17:46

## 2020-12-03 RX ADMIN — ONDANSETRON 4 MG: 2 INJECTION INTRAMUSCULAR; INTRAVENOUS at 01:45

## 2020-12-03 RX ADMIN — ENOXAPARIN SODIUM 40 MG: 40 INJECTION SUBCUTANEOUS at 09:26

## 2020-12-03 RX ADMIN — HYDROMORPHONE HYDROCHLORIDE 1 MG: 1 INJECTION, SOLUTION INTRAMUSCULAR; INTRAVENOUS; SUBCUTANEOUS at 10:37

## 2020-12-03 RX ADMIN — METRONIDAZOLE 500 MG: 500 INJECTION, SOLUTION INTRAVENOUS at 00:41

## 2020-12-03 RX ADMIN — PANTOPRAZOLE SODIUM 40 MG: 40 INJECTION, POWDER, FOR SOLUTION INTRAVENOUS at 20:45

## 2020-12-03 RX ADMIN — OXYCODONE HYDROCHLORIDE 10 MG: 10 TABLET ORAL at 20:44

## 2020-12-03 RX ADMIN — SODIUM CHLORIDE, SODIUM LACTATE, POTASSIUM CHLORIDE, AND CALCIUM CHLORIDE 250 ML/HR: .6; .31; .03; .02 INJECTION, SOLUTION INTRAVENOUS at 00:17

## 2020-12-03 RX ADMIN — AMLODIPINE BESYLATE 5 MG: 5 TABLET ORAL at 13:09

## 2020-12-03 RX ADMIN — HYDROMORPHONE HYDROCHLORIDE 1 MG: 1 INJECTION, SOLUTION INTRAMUSCULAR; INTRAVENOUS; SUBCUTANEOUS at 02:55

## 2020-12-03 RX ADMIN — POTASSIUM CHLORIDE 40 MEQ: 1500 TABLET, EXTENDED RELEASE ORAL at 09:26

## 2020-12-03 RX ADMIN — MAGNESIUM SULFATE HEPTAHYDRATE 2 G: 40 INJECTION, SOLUTION INTRAVENOUS at 13:10

## 2020-12-03 RX ADMIN — SODIUM CHLORIDE, SODIUM LACTATE, POTASSIUM CHLORIDE, AND CALCIUM CHLORIDE 250 ML/HR: .6; .31; .03; .02 INJECTION, SOLUTION INTRAVENOUS at 04:53

## 2020-12-03 RX ADMIN — KETOROLAC TROMETHAMINE 15 MG: 30 INJECTION, SOLUTION INTRAMUSCULAR at 23:41

## 2020-12-03 RX ADMIN — OXYCODONE HYDROCHLORIDE 10 MG: 10 TABLET ORAL at 01:45

## 2020-12-03 RX ADMIN — OXYCODONE HYDROCHLORIDE 10 MG: 10 TABLET ORAL at 06:02

## 2020-12-03 RX ADMIN — OYSTER SHELL CALCIUM WITH VITAMIN D 1 TABLET: 500; 200 TABLET, FILM COATED ORAL at 09:27

## 2020-12-03 RX ADMIN — CEFTRIAXONE SODIUM 1000 MG: 10 INJECTION, POWDER, FOR SOLUTION INTRAVENOUS at 12:13

## 2020-12-03 RX ADMIN — PANTOPRAZOLE SODIUM 40 MG: 40 INJECTION, POWDER, FOR SOLUTION INTRAVENOUS at 09:28

## 2020-12-04 ENCOUNTER — ANESTHESIA (INPATIENT)
Dept: PERIOP | Facility: HOSPITAL | Age: 55
DRG: 417 | End: 2020-12-04
Payer: COMMERCIAL

## 2020-12-04 ENCOUNTER — APPOINTMENT (INPATIENT)
Dept: RADIOLOGY | Facility: HOSPITAL | Age: 55
DRG: 417 | End: 2020-12-04
Payer: COMMERCIAL

## 2020-12-04 ENCOUNTER — ANESTHESIA EVENT (INPATIENT)
Dept: PERIOP | Facility: HOSPITAL | Age: 55
DRG: 417 | End: 2020-12-04
Payer: COMMERCIAL

## 2020-12-04 VITALS — HEART RATE: 111 BPM

## 2020-12-04 PROBLEM — K80.50 CHOLEDOCHOLITHIASIS: Chronic | Status: ACTIVE | Noted: 2020-12-04

## 2020-12-04 PROBLEM — E87.6 HYPOKALEMIA: Status: ACTIVE | Noted: 2020-12-04

## 2020-12-04 LAB
ALBUMIN SERPL BCP-MCNC: 2.2 G/DL (ref 3.5–5)
ALP SERPL-CCNC: 126 U/L (ref 46–116)
ALT SERPL W P-5'-P-CCNC: 91 U/L (ref 12–78)
ANION GAP SERPL CALCULATED.3IONS-SCNC: 10 MMOL/L (ref 4–13)
AST SERPL W P-5'-P-CCNC: 21 U/L (ref 5–45)
BILIRUB SERPL-MCNC: 0.4 MG/DL (ref 0.2–1)
BUN SERPL-MCNC: 7 MG/DL (ref 5–25)
CALCIUM ALBUM COR SERPL-MCNC: 9.6 MG/DL (ref 8.3–10.1)
CALCIUM SERPL-MCNC: 8.2 MG/DL (ref 8.3–10.1)
CHLORIDE SERPL-SCNC: 99 MMOL/L (ref 100–108)
CO2 SERPL-SCNC: 28 MMOL/L (ref 21–32)
CREAT SERPL-MCNC: 0.67 MG/DL (ref 0.6–1.3)
ERYTHROCYTE [DISTWIDTH] IN BLOOD BY AUTOMATED COUNT: 12.3 % (ref 11.6–15.1)
GFR SERPL CREATININE-BSD FRML MDRD: 100 ML/MIN/1.73SQ M
GLUCOSE SERPL-MCNC: 100 MG/DL (ref 65–140)
HCT VFR BLD AUTO: 35.5 % (ref 34.8–46.1)
HGB BLD-MCNC: 11.7 G/DL (ref 11.5–15.4)
MCH RBC QN AUTO: 31.7 PG (ref 26.8–34.3)
MCHC RBC AUTO-ENTMCNC: 33 G/DL (ref 31.4–37.4)
MCV RBC AUTO: 96 FL (ref 82–98)
PLATELET # BLD AUTO: 262 THOUSANDS/UL (ref 149–390)
PMV BLD AUTO: 10.4 FL (ref 8.9–12.7)
POTASSIUM SERPL-SCNC: 3.1 MMOL/L (ref 3.5–5.3)
PROCALCITONIN SERPL-MCNC: 0.2 NG/ML
PROT SERPL-MCNC: 6.4 G/DL (ref 6.4–8.2)
RBC # BLD AUTO: 3.69 MILLION/UL (ref 3.81–5.12)
SODIUM SERPL-SCNC: 137 MMOL/L (ref 136–145)
WBC # BLD AUTO: 17.5 THOUSAND/UL (ref 4.31–10.16)

## 2020-12-04 PROCEDURE — C1729 CATH, DRAINAGE: HCPCS | Performed by: SURGERY

## 2020-12-04 PROCEDURE — 88304 TISSUE EXAM BY PATHOLOGIST: CPT | Performed by: PATHOLOGY

## 2020-12-04 PROCEDURE — 0FT44ZZ RESECTION OF GALLBLADDER, PERCUTANEOUS ENDOSCOPIC APPROACH: ICD-10-PCS | Performed by: SURGERY

## 2020-12-04 PROCEDURE — C9113 INJ PANTOPRAZOLE SODIUM, VIA: HCPCS | Performed by: PHYSICIAN ASSISTANT

## 2020-12-04 PROCEDURE — 71045 X-RAY EXAM CHEST 1 VIEW: CPT

## 2020-12-04 PROCEDURE — 84145 PROCALCITONIN (PCT): CPT | Performed by: FAMILY MEDICINE

## 2020-12-04 PROCEDURE — 85027 COMPLETE CBC AUTOMATED: CPT | Performed by: FAMILY MEDICINE

## 2020-12-04 PROCEDURE — 47562 LAPAROSCOPIC CHOLECYSTECTOMY: CPT | Performed by: PHYSICIAN ASSISTANT

## 2020-12-04 PROCEDURE — 99233 SBSQ HOSP IP/OBS HIGH 50: CPT | Performed by: FAMILY MEDICINE

## 2020-12-04 PROCEDURE — C9113 INJ PANTOPRAZOLE SODIUM, VIA: HCPCS | Performed by: FAMILY MEDICINE

## 2020-12-04 PROCEDURE — 80053 COMPREHEN METABOLIC PANEL: CPT | Performed by: FAMILY MEDICINE

## 2020-12-04 PROCEDURE — 47562 LAPAROSCOPIC CHOLECYSTECTOMY: CPT | Performed by: SURGERY

## 2020-12-04 RX ORDER — SUCCINYLCHOLINE/SOD CL,ISO/PF 100 MG/5ML
SYRINGE (ML) INTRAVENOUS AS NEEDED
Status: DISCONTINUED | OUTPATIENT
Start: 2020-12-04 | End: 2020-12-04

## 2020-12-04 RX ORDER — KETOROLAC TROMETHAMINE 30 MG/ML
INJECTION, SOLUTION INTRAMUSCULAR; INTRAVENOUS AS NEEDED
Status: DISCONTINUED | OUTPATIENT
Start: 2020-12-04 | End: 2020-12-04

## 2020-12-04 RX ORDER — ONDANSETRON 2 MG/ML
INJECTION INTRAMUSCULAR; INTRAVENOUS AS NEEDED
Status: DISCONTINUED | OUTPATIENT
Start: 2020-12-04 | End: 2020-12-04

## 2020-12-04 RX ORDER — POTASSIUM CHLORIDE 29.8 MG/ML
40 INJECTION INTRAVENOUS ONCE
Status: DISCONTINUED | OUTPATIENT
Start: 2020-12-04 | End: 2020-12-04 | Stop reason: SDUPTHER

## 2020-12-04 RX ORDER — ALBUTEROL SULFATE 2.5 MG/3ML
2.5 SOLUTION RESPIRATORY (INHALATION) ONCE AS NEEDED
Status: DISCONTINUED | OUTPATIENT
Start: 2020-12-04 | End: 2020-12-04 | Stop reason: HOSPADM

## 2020-12-04 RX ORDER — ROCURONIUM BROMIDE 10 MG/ML
INJECTION, SOLUTION INTRAVENOUS AS NEEDED
Status: DISCONTINUED | OUTPATIENT
Start: 2020-12-04 | End: 2020-12-04

## 2020-12-04 RX ORDER — BUPIVACAINE HYDROCHLORIDE 2.5 MG/ML
INJECTION, SOLUTION EPIDURAL; INFILTRATION; INTRACAUDAL AS NEEDED
Status: DISCONTINUED | OUTPATIENT
Start: 2020-12-04 | End: 2020-12-04 | Stop reason: HOSPADM

## 2020-12-04 RX ORDER — METOCLOPRAMIDE HYDROCHLORIDE 5 MG/ML
10 INJECTION INTRAMUSCULAR; INTRAVENOUS ONCE AS NEEDED
Status: DISCONTINUED | OUTPATIENT
Start: 2020-12-04 | End: 2020-12-04 | Stop reason: HOSPADM

## 2020-12-04 RX ORDER — DEXAMETHASONE SODIUM PHOSPHATE 10 MG/ML
INJECTION, SOLUTION INTRAMUSCULAR; INTRAVENOUS AS NEEDED
Status: DISCONTINUED | OUTPATIENT
Start: 2020-12-04 | End: 2020-12-04

## 2020-12-04 RX ORDER — POTASSIUM CHLORIDE 14.9 MG/ML
20 INJECTION INTRAVENOUS EVERY 4 HOURS
Status: DISCONTINUED | OUTPATIENT
Start: 2020-12-04 | End: 2020-12-04

## 2020-12-04 RX ORDER — NEOSTIGMINE METHYLSULFATE 1 MG/ML
INJECTION INTRAVENOUS AS NEEDED
Status: DISCONTINUED | OUTPATIENT
Start: 2020-12-04 | End: 2020-12-04

## 2020-12-04 RX ORDER — DEXMEDETOMIDINE HYDROCHLORIDE 100 UG/ML
INJECTION, SOLUTION INTRAVENOUS AS NEEDED
Status: DISCONTINUED | OUTPATIENT
Start: 2020-12-04 | End: 2020-12-04

## 2020-12-04 RX ORDER — MAGNESIUM HYDROXIDE 1200 MG/15ML
LIQUID ORAL AS NEEDED
Status: DISCONTINUED | OUTPATIENT
Start: 2020-12-04 | End: 2020-12-04 | Stop reason: HOSPADM

## 2020-12-04 RX ORDER — POTASSIUM CHLORIDE 14.9 MG/ML
20 INJECTION INTRAVENOUS
Status: COMPLETED | OUTPATIENT
Start: 2020-12-04 | End: 2020-12-04

## 2020-12-04 RX ORDER — POTASSIUM CHLORIDE 14.9 MG/ML
20 INJECTION INTRAVENOUS
Status: ACTIVE | OUTPATIENT
Start: 2020-12-04 | End: 2020-12-04

## 2020-12-04 RX ORDER — FENTANYL CITRATE 50 UG/ML
INJECTION, SOLUTION INTRAMUSCULAR; INTRAVENOUS AS NEEDED
Status: DISCONTINUED | OUTPATIENT
Start: 2020-12-04 | End: 2020-12-04

## 2020-12-04 RX ORDER — FENTANYL CITRATE/PF 50 MCG/ML
25 SYRINGE (ML) INJECTION
Status: DISCONTINUED | OUTPATIENT
Start: 2020-12-04 | End: 2020-12-04 | Stop reason: HOSPADM

## 2020-12-04 RX ORDER — PROPOFOL 10 MG/ML
INJECTION, EMULSION INTRAVENOUS AS NEEDED
Status: DISCONTINUED | OUTPATIENT
Start: 2020-12-04 | End: 2020-12-04

## 2020-12-04 RX ORDER — ONDANSETRON 2 MG/ML
4 INJECTION INTRAMUSCULAR; INTRAVENOUS ONCE AS NEEDED
Status: DISCONTINUED | OUTPATIENT
Start: 2020-12-04 | End: 2020-12-04 | Stop reason: HOSPADM

## 2020-12-04 RX ORDER — HYDROMORPHONE HCL 110MG/55ML
PATIENT CONTROLLED ANALGESIA SYRINGE INTRAVENOUS AS NEEDED
Status: DISCONTINUED | OUTPATIENT
Start: 2020-12-04 | End: 2020-12-04

## 2020-12-04 RX ORDER — LIDOCAINE HYDROCHLORIDE 10 MG/ML
INJECTION, SOLUTION EPIDURAL; INFILTRATION; INTRACAUDAL; PERINEURAL AS NEEDED
Status: DISCONTINUED | OUTPATIENT
Start: 2020-12-04 | End: 2020-12-04

## 2020-12-04 RX ORDER — GLYCOPYRROLATE 0.2 MG/ML
INJECTION INTRAMUSCULAR; INTRAVENOUS AS NEEDED
Status: DISCONTINUED | OUTPATIENT
Start: 2020-12-04 | End: 2020-12-04

## 2020-12-04 RX ORDER — MIDAZOLAM HYDROCHLORIDE 2 MG/2ML
INJECTION, SOLUTION INTRAMUSCULAR; INTRAVENOUS AS NEEDED
Status: DISCONTINUED | OUTPATIENT
Start: 2020-12-04 | End: 2020-12-04

## 2020-12-04 RX ORDER — HYDROMORPHONE HCL/PF 1 MG/ML
0.5 SYRINGE (ML) INJECTION
Status: DISCONTINUED | OUTPATIENT
Start: 2020-12-04 | End: 2020-12-04 | Stop reason: HOSPADM

## 2020-12-04 RX ADMIN — ROCURONIUM BROMIDE 20 MG: 10 SOLUTION INTRAVENOUS at 11:12

## 2020-12-04 RX ADMIN — KETOROLAC TROMETHAMINE 15 MG: 30 INJECTION, SOLUTION INTRAMUSCULAR at 14:53

## 2020-12-04 RX ADMIN — POTASSIUM CHLORIDE 20 MEQ: 14.9 INJECTION, SOLUTION INTRAVENOUS at 17:23

## 2020-12-04 RX ADMIN — KETOROLAC TROMETHAMINE 15 MG: 30 INJECTION, SOLUTION INTRAMUSCULAR at 11:45

## 2020-12-04 RX ADMIN — ONDANSETRON 4 MG: 2 INJECTION INTRAMUSCULAR; INTRAVENOUS at 11:44

## 2020-12-04 RX ADMIN — POTASSIUM CHLORIDE 20 MEQ: 14.9 INJECTION, SOLUTION INTRAVENOUS at 14:52

## 2020-12-04 RX ADMIN — Medication 100 MG: at 11:04

## 2020-12-04 RX ADMIN — PROPOFOL 180 MG: 10 INJECTION, EMULSION INTRAVENOUS at 11:04

## 2020-12-04 RX ADMIN — HYDROMORPHONE HYDROCHLORIDE 0.5 MG: 2 INJECTION, SOLUTION INTRAMUSCULAR; INTRAVENOUS; SUBCUTANEOUS at 11:29

## 2020-12-04 RX ADMIN — METRONIDAZOLE 500 MG: 500 INJECTION, SOLUTION INTRAVENOUS at 18:54

## 2020-12-04 RX ADMIN — OYSTER SHELL CALCIUM WITH VITAMIN D 1 TABLET: 500; 200 TABLET, FILM COATED ORAL at 08:29

## 2020-12-04 RX ADMIN — KETOROLAC TROMETHAMINE 15 MG: 30 INJECTION, SOLUTION INTRAMUSCULAR at 19:37

## 2020-12-04 RX ADMIN — DEXMEDETOMIDINE HCL 8 MCG: 100 INJECTION INTRAVENOUS at 11:24

## 2020-12-04 RX ADMIN — FENTANYL CITRATE 100 MCG: 50 INJECTION, SOLUTION INTRAMUSCULAR; INTRAVENOUS at 11:03

## 2020-12-04 RX ADMIN — PANTOPRAZOLE SODIUM 40 MG: 40 INJECTION, POWDER, FOR SOLUTION INTRAVENOUS at 22:16

## 2020-12-04 RX ADMIN — PANTOPRAZOLE SODIUM 40 MG: 40 INJECTION, POWDER, FOR SOLUTION INTRAVENOUS at 08:29

## 2020-12-04 RX ADMIN — HYDROMORPHONE HYDROCHLORIDE 1 MG: 1 INJECTION, SOLUTION INTRAMUSCULAR; INTRAVENOUS; SUBCUTANEOUS at 09:12

## 2020-12-04 RX ADMIN — LIDOCAINE HYDROCHLORIDE 50 MG: 10 INJECTION, SOLUTION EPIDURAL; INFILTRATION; INTRACAUDAL; PERINEURAL at 11:03

## 2020-12-04 RX ADMIN — DEXMEDETOMIDINE HCL 4 MCG: 100 INJECTION INTRAVENOUS at 11:32

## 2020-12-04 RX ADMIN — DEXAMETHASONE SODIUM PHOSPHATE 4 MG: 10 INJECTION, SOLUTION INTRAMUSCULAR; INTRAVENOUS at 11:06

## 2020-12-04 RX ADMIN — CEFTRIAXONE SODIUM 1000 MG: 10 INJECTION, POWDER, FOR SOLUTION INTRAVENOUS at 08:32

## 2020-12-04 RX ADMIN — KETOROLAC TROMETHAMINE 15 MG: 30 INJECTION, SOLUTION INTRAMUSCULAR at 05:34

## 2020-12-04 RX ADMIN — NEOSTIGMINE METHYLSULFATE 3 MG: 1 INJECTION, SOLUTION INTRAVENOUS at 11:48

## 2020-12-04 RX ADMIN — GLYCOPYRROLATE 0.4 MG: 0.2 INJECTION, SOLUTION INTRAMUSCULAR; INTRAVENOUS at 11:48

## 2020-12-04 RX ADMIN — FLUTICASONE PROPIONATE 1 SPRAY: 50 SPRAY, METERED NASAL at 13:07

## 2020-12-04 RX ADMIN — SODIUM CHLORIDE, SODIUM LACTATE, POTASSIUM CHLORIDE, AND CALCIUM CHLORIDE 100 ML/HR: .6; .31; .03; .02 INJECTION, SOLUTION INTRAVENOUS at 01:51

## 2020-12-04 RX ADMIN — METRONIDAZOLE 500 MG: 500 INJECTION, SOLUTION INTRAVENOUS at 01:52

## 2020-12-04 RX ADMIN — AMLODIPINE BESYLATE 5 MG: 5 TABLET ORAL at 08:29

## 2020-12-04 RX ADMIN — OXYCODONE HYDROCHLORIDE 10 MG: 10 TABLET ORAL at 03:52

## 2020-12-04 RX ADMIN — MIDAZOLAM HYDROCHLORIDE 2 MG: 1 INJECTION, SOLUTION INTRAMUSCULAR; INTRAVENOUS at 11:02

## 2020-12-04 RX ADMIN — SODIUM CHLORIDE, SODIUM LACTATE, POTASSIUM CHLORIDE, AND CALCIUM CHLORIDE 100 ML/HR: .6; .31; .03; .02 INJECTION, SOLUTION INTRAVENOUS at 19:40

## 2020-12-04 RX ADMIN — METRONIDAZOLE 500 MG: 500 INJECTION, SOLUTION INTRAVENOUS at 10:40

## 2020-12-05 PROBLEM — I10 ESSENTIAL HYPERTENSION: Status: ACTIVE | Noted: 2020-12-01

## 2020-12-05 LAB
ALBUMIN SERPL BCP-MCNC: 2.2 G/DL (ref 3.5–5)
ALP SERPL-CCNC: 132 U/L (ref 46–116)
ALT SERPL W P-5'-P-CCNC: 73 U/L (ref 12–78)
ANION GAP SERPL CALCULATED.3IONS-SCNC: 11 MMOL/L (ref 4–13)
AST SERPL W P-5'-P-CCNC: 24 U/L (ref 5–45)
BILIRUB SERPL-MCNC: 0.3 MG/DL (ref 0.2–1)
BUN SERPL-MCNC: 8 MG/DL (ref 5–25)
CALCIUM ALBUM COR SERPL-MCNC: 9.5 MG/DL (ref 8.3–10.1)
CALCIUM SERPL-MCNC: 8.1 MG/DL (ref 8.3–10.1)
CHLORIDE SERPL-SCNC: 101 MMOL/L (ref 100–108)
CO2 SERPL-SCNC: 28 MMOL/L (ref 21–32)
CREAT SERPL-MCNC: 0.68 MG/DL (ref 0.6–1.3)
ERYTHROCYTE [DISTWIDTH] IN BLOOD BY AUTOMATED COUNT: 12.4 % (ref 11.6–15.1)
GFR SERPL CREATININE-BSD FRML MDRD: 99 ML/MIN/1.73SQ M
GLUCOSE SERPL-MCNC: 124 MG/DL (ref 65–140)
HCT VFR BLD AUTO: 37.2 % (ref 34.8–46.1)
HGB BLD-MCNC: 12.1 G/DL (ref 11.5–15.4)
MAGNESIUM SERPL-MCNC: 2.5 MG/DL (ref 1.6–2.6)
MCH RBC QN AUTO: 31.4 PG (ref 26.8–34.3)
MCHC RBC AUTO-ENTMCNC: 32.5 G/DL (ref 31.4–37.4)
MCV RBC AUTO: 97 FL (ref 82–98)
PLATELET # BLD AUTO: 294 THOUSANDS/UL (ref 149–390)
PMV BLD AUTO: 10.3 FL (ref 8.9–12.7)
POTASSIUM SERPL-SCNC: 3 MMOL/L (ref 3.5–5.3)
PROCALCITONIN SERPL-MCNC: 0.27 NG/ML
PROT SERPL-MCNC: 6.7 G/DL (ref 6.4–8.2)
RBC # BLD AUTO: 3.85 MILLION/UL (ref 3.81–5.12)
SODIUM SERPL-SCNC: 140 MMOL/L (ref 136–145)
WBC # BLD AUTO: 15.68 THOUSAND/UL (ref 4.31–10.16)

## 2020-12-05 PROCEDURE — 84145 PROCALCITONIN (PCT): CPT | Performed by: FAMILY MEDICINE

## 2020-12-05 PROCEDURE — 99024 POSTOP FOLLOW-UP VISIT: CPT | Performed by: PHYSICIAN ASSISTANT

## 2020-12-05 PROCEDURE — 80053 COMPREHEN METABOLIC PANEL: CPT | Performed by: FAMILY MEDICINE

## 2020-12-05 PROCEDURE — 83735 ASSAY OF MAGNESIUM: CPT | Performed by: FAMILY MEDICINE

## 2020-12-05 PROCEDURE — 85027 COMPLETE CBC AUTOMATED: CPT | Performed by: FAMILY MEDICINE

## 2020-12-05 PROCEDURE — C9113 INJ PANTOPRAZOLE SODIUM, VIA: HCPCS | Performed by: FAMILY MEDICINE

## 2020-12-05 PROCEDURE — 99233 SBSQ HOSP IP/OBS HIGH 50: CPT | Performed by: FAMILY MEDICINE

## 2020-12-05 RX ORDER — POTASSIUM CHLORIDE 14.9 MG/ML
20 INJECTION INTRAVENOUS EVERY 4 HOURS
Status: COMPLETED | OUTPATIENT
Start: 2020-12-05 | End: 2020-12-05

## 2020-12-05 RX ORDER — AMLODIPINE BESYLATE 5 MG/1
5 TABLET ORAL ONCE
Status: COMPLETED | OUTPATIENT
Start: 2020-12-05 | End: 2020-12-05

## 2020-12-05 RX ORDER — AMLODIPINE BESYLATE 10 MG/1
10 TABLET ORAL DAILY
Status: DISCONTINUED | OUTPATIENT
Start: 2020-12-06 | End: 2020-12-07 | Stop reason: HOSPADM

## 2020-12-05 RX ADMIN — LIDOCAINE 5% 2 PATCH: 700 PATCH TOPICAL at 08:44

## 2020-12-05 RX ADMIN — OYSTER SHELL CALCIUM WITH VITAMIN D 1 TABLET: 500; 200 TABLET, FILM COATED ORAL at 08:44

## 2020-12-05 RX ADMIN — CEFTRIAXONE SODIUM 1000 MG: 10 INJECTION, POWDER, FOR SOLUTION INTRAVENOUS at 08:45

## 2020-12-05 RX ADMIN — SODIUM CHLORIDE, SODIUM LACTATE, POTASSIUM CHLORIDE, AND CALCIUM CHLORIDE 100 ML/HR: .6; .31; .03; .02 INJECTION, SOLUTION INTRAVENOUS at 01:35

## 2020-12-05 RX ADMIN — METRONIDAZOLE 500 MG: 500 INJECTION, SOLUTION INTRAVENOUS at 08:55

## 2020-12-05 RX ADMIN — AMLODIPINE BESYLATE 5 MG: 5 TABLET ORAL at 08:44

## 2020-12-05 RX ADMIN — OXYCODONE HYDROCHLORIDE 10 MG: 10 TABLET ORAL at 12:51

## 2020-12-05 RX ADMIN — FLUTICASONE PROPIONATE 1 SPRAY: 50 SPRAY, METERED NASAL at 08:51

## 2020-12-05 RX ADMIN — HYDROMORPHONE HYDROCHLORIDE 1 MG: 1 INJECTION, SOLUTION INTRAMUSCULAR; INTRAVENOUS; SUBCUTANEOUS at 22:06

## 2020-12-05 RX ADMIN — PANTOPRAZOLE SODIUM 40 MG: 40 INJECTION, POWDER, FOR SOLUTION INTRAVENOUS at 08:44

## 2020-12-05 RX ADMIN — SODIUM CHLORIDE, SODIUM LACTATE, POTASSIUM CHLORIDE, AND CALCIUM CHLORIDE 100 ML/HR: .6; .31; .03; .02 INJECTION, SOLUTION INTRAVENOUS at 19:12

## 2020-12-05 RX ADMIN — METRONIDAZOLE 500 MG: 500 INJECTION, SOLUTION INTRAVENOUS at 00:33

## 2020-12-05 RX ADMIN — ENOXAPARIN SODIUM 40 MG: 40 INJECTION SUBCUTANEOUS at 08:50

## 2020-12-05 RX ADMIN — POTASSIUM CHLORIDE 20 MEQ: 14.9 INJECTION, SOLUTION INTRAVENOUS at 13:31

## 2020-12-05 RX ADMIN — POTASSIUM CHLORIDE 20 MEQ: 14.9 INJECTION, SOLUTION INTRAVENOUS at 11:19

## 2020-12-05 RX ADMIN — KETOROLAC TROMETHAMINE 15 MG: 30 INJECTION, SOLUTION INTRAMUSCULAR at 05:38

## 2020-12-05 RX ADMIN — METRONIDAZOLE 500 MG: 500 INJECTION, SOLUTION INTRAVENOUS at 17:08

## 2020-12-05 RX ADMIN — AMLODIPINE BESYLATE 5 MG: 5 TABLET ORAL at 13:31

## 2020-12-05 RX ADMIN — PANTOPRAZOLE SODIUM 40 MG: 40 INJECTION, POWDER, FOR SOLUTION INTRAVENOUS at 21:59

## 2020-12-05 RX ADMIN — OXYCODONE HYDROCHLORIDE 10 MG: 10 TABLET ORAL at 18:43

## 2020-12-05 RX ADMIN — KETOROLAC TROMETHAMINE 15 MG: 30 INJECTION, SOLUTION INTRAMUSCULAR at 00:26

## 2020-12-06 ENCOUNTER — APPOINTMENT (INPATIENT)
Dept: RADIOLOGY | Facility: HOSPITAL | Age: 55
DRG: 417 | End: 2020-12-06
Payer: COMMERCIAL

## 2020-12-06 LAB
ALBUMIN SERPL BCP-MCNC: 2.2 G/DL (ref 3.5–5)
ALP SERPL-CCNC: 129 U/L (ref 46–116)
ALT SERPL W P-5'-P-CCNC: 56 U/L (ref 12–78)
ANION GAP SERPL CALCULATED.3IONS-SCNC: 10 MMOL/L (ref 4–13)
AST SERPL W P-5'-P-CCNC: 23 U/L (ref 5–45)
BILIRUB SERPL-MCNC: 0.4 MG/DL (ref 0.2–1)
BUN SERPL-MCNC: 8 MG/DL (ref 5–25)
CALCIUM ALBUM COR SERPL-MCNC: 9.3 MG/DL (ref 8.3–10.1)
CALCIUM SERPL-MCNC: 7.9 MG/DL (ref 8.3–10.1)
CHLORIDE SERPL-SCNC: 99 MMOL/L (ref 100–108)
CO2 SERPL-SCNC: 27 MMOL/L (ref 21–32)
CREAT SERPL-MCNC: 0.72 MG/DL (ref 0.6–1.3)
ERYTHROCYTE [DISTWIDTH] IN BLOOD BY AUTOMATED COUNT: 12.6 % (ref 11.6–15.1)
GFR SERPL CREATININE-BSD FRML MDRD: 95 ML/MIN/1.73SQ M
GLUCOSE SERPL-MCNC: 107 MG/DL (ref 65–140)
HCT VFR BLD AUTO: 36.7 % (ref 34.8–46.1)
HGB BLD-MCNC: 12.1 G/DL (ref 11.5–15.4)
MCH RBC QN AUTO: 31.4 PG (ref 26.8–34.3)
MCHC RBC AUTO-ENTMCNC: 33 G/DL (ref 31.4–37.4)
MCV RBC AUTO: 95 FL (ref 82–98)
PLATELET # BLD AUTO: 334 THOUSANDS/UL (ref 149–390)
PMV BLD AUTO: 10 FL (ref 8.9–12.7)
POTASSIUM SERPL-SCNC: 3 MMOL/L (ref 3.5–5.3)
PROT SERPL-MCNC: 6.5 G/DL (ref 6.4–8.2)
RBC # BLD AUTO: 3.85 MILLION/UL (ref 3.81–5.12)
SODIUM SERPL-SCNC: 136 MMOL/L (ref 136–145)
WBC # BLD AUTO: 17.23 THOUSAND/UL (ref 4.31–10.16)

## 2020-12-06 PROCEDURE — 99233 SBSQ HOSP IP/OBS HIGH 50: CPT | Performed by: FAMILY MEDICINE

## 2020-12-06 PROCEDURE — C9113 INJ PANTOPRAZOLE SODIUM, VIA: HCPCS | Performed by: FAMILY MEDICINE

## 2020-12-06 PROCEDURE — 85027 COMPLETE CBC AUTOMATED: CPT | Performed by: FAMILY MEDICINE

## 2020-12-06 PROCEDURE — 71045 X-RAY EXAM CHEST 1 VIEW: CPT

## 2020-12-06 PROCEDURE — 80053 COMPREHEN METABOLIC PANEL: CPT | Performed by: FAMILY MEDICINE

## 2020-12-06 PROCEDURE — 99024 POSTOP FOLLOW-UP VISIT: CPT | Performed by: SURGERY

## 2020-12-06 RX ORDER — LISINOPRIL 5 MG/1
5 TABLET ORAL DAILY
Status: DISCONTINUED | OUTPATIENT
Start: 2020-12-06 | End: 2020-12-07 | Stop reason: HOSPADM

## 2020-12-06 RX ORDER — OXYCODONE HYDROCHLORIDE 5 MG/1
5 TABLET ORAL EVERY 4 HOURS PRN
Status: DISCONTINUED | OUTPATIENT
Start: 2020-12-06 | End: 2020-12-07 | Stop reason: HOSPADM

## 2020-12-06 RX ORDER — SODIUM CHLORIDE AND POTASSIUM CHLORIDE .9; .15 G/100ML; G/100ML
100 SOLUTION INTRAVENOUS CONTINUOUS
Status: DISCONTINUED | OUTPATIENT
Start: 2020-12-06 | End: 2020-12-06

## 2020-12-06 RX ORDER — POTASSIUM CHLORIDE 20 MEQ/1
40 TABLET, EXTENDED RELEASE ORAL EVERY 4 HOURS
Status: COMPLETED | OUTPATIENT
Start: 2020-12-06 | End: 2020-12-06

## 2020-12-06 RX ADMIN — AMLODIPINE BESYLATE 10 MG: 10 TABLET ORAL at 08:11

## 2020-12-06 RX ADMIN — POTASSIUM CHLORIDE 40 MEQ: 1500 TABLET, EXTENDED RELEASE ORAL at 08:57

## 2020-12-06 RX ADMIN — LIDOCAINE 5% 2 PATCH: 700 PATCH TOPICAL at 08:14

## 2020-12-06 RX ADMIN — OYSTER SHELL CALCIUM WITH VITAMIN D 1 TABLET: 500; 200 TABLET, FILM COATED ORAL at 08:13

## 2020-12-06 RX ADMIN — OXYCODONE HYDROCHLORIDE 10 MG: 10 TABLET ORAL at 12:06

## 2020-12-06 RX ADMIN — CEFTRIAXONE SODIUM 1000 MG: 10 INJECTION, POWDER, FOR SOLUTION INTRAVENOUS at 08:52

## 2020-12-06 RX ADMIN — METRONIDAZOLE 500 MG: 500 INJECTION, SOLUTION INTRAVENOUS at 00:52

## 2020-12-06 RX ADMIN — POTASSIUM CHLORIDE 40 MEQ: 1500 TABLET, EXTENDED RELEASE ORAL at 13:27

## 2020-12-06 RX ADMIN — PANTOPRAZOLE SODIUM 40 MG: 40 INJECTION, POWDER, FOR SOLUTION INTRAVENOUS at 21:39

## 2020-12-06 RX ADMIN — FLUTICASONE PROPIONATE 1 SPRAY: 50 SPRAY, METERED NASAL at 08:13

## 2020-12-06 RX ADMIN — OXYCODONE HYDROCHLORIDE 10 MG: 10 TABLET ORAL at 04:04

## 2020-12-06 RX ADMIN — SODIUM CHLORIDE, SODIUM LACTATE, POTASSIUM CHLORIDE, AND CALCIUM CHLORIDE 100 ML/HR: .6; .31; .03; .02 INJECTION, SOLUTION INTRAVENOUS at 05:59

## 2020-12-06 RX ADMIN — ENOXAPARIN SODIUM 40 MG: 40 INJECTION SUBCUTANEOUS at 08:10

## 2020-12-06 RX ADMIN — PANTOPRAZOLE SODIUM 40 MG: 40 INJECTION, POWDER, FOR SOLUTION INTRAVENOUS at 08:13

## 2020-12-06 RX ADMIN — LISINOPRIL 5 MG: 5 TABLET ORAL at 08:57

## 2020-12-06 RX ADMIN — METRONIDAZOLE 500 MG: 500 INJECTION, SOLUTION INTRAVENOUS at 08:55

## 2020-12-07 VITALS
BODY MASS INDEX: 28.24 KG/M2 | HEART RATE: 103 BPM | DIASTOLIC BLOOD PRESSURE: 87 MMHG | SYSTOLIC BLOOD PRESSURE: 138 MMHG | WEIGHT: 179.9 LBS | HEIGHT: 67 IN | RESPIRATION RATE: 18 BRPM | TEMPERATURE: 99.1 F | OXYGEN SATURATION: 94 %

## 2020-12-07 LAB
ANION GAP SERPL CALCULATED.3IONS-SCNC: 7 MMOL/L (ref 4–13)
BACTERIA BLD CULT: NORMAL
BACTERIA BLD CULT: NORMAL
BUN SERPL-MCNC: 9 MG/DL (ref 5–25)
CALCIUM SERPL-MCNC: 8.4 MG/DL (ref 8.3–10.1)
CHLORIDE SERPL-SCNC: 101 MMOL/L (ref 100–108)
CO2 SERPL-SCNC: 26 MMOL/L (ref 21–32)
CREAT SERPL-MCNC: 0.76 MG/DL (ref 0.6–1.3)
ERYTHROCYTE [DISTWIDTH] IN BLOOD BY AUTOMATED COUNT: 12.7 % (ref 11.6–15.1)
GFR SERPL CREATININE-BSD FRML MDRD: 89 ML/MIN/1.73SQ M
GLUCOSE SERPL-MCNC: 113 MG/DL (ref 65–140)
HCT VFR BLD AUTO: 37 % (ref 34.8–46.1)
HGB BLD-MCNC: 12.3 G/DL (ref 11.5–15.4)
MCH RBC QN AUTO: 31.8 PG (ref 26.8–34.3)
MCHC RBC AUTO-ENTMCNC: 33.2 G/DL (ref 31.4–37.4)
MCV RBC AUTO: 96 FL (ref 82–98)
PLATELET # BLD AUTO: 315 THOUSANDS/UL (ref 149–390)
PMV BLD AUTO: 9.9 FL (ref 8.9–12.7)
POTASSIUM SERPL-SCNC: 3.4 MMOL/L (ref 3.5–5.3)
RBC # BLD AUTO: 3.87 MILLION/UL (ref 3.81–5.12)
SODIUM SERPL-SCNC: 134 MMOL/L (ref 136–145)
WBC # BLD AUTO: 15.63 THOUSAND/UL (ref 4.31–10.16)

## 2020-12-07 PROCEDURE — 80048 BASIC METABOLIC PNL TOTAL CA: CPT | Performed by: FAMILY MEDICINE

## 2020-12-07 PROCEDURE — C9113 INJ PANTOPRAZOLE SODIUM, VIA: HCPCS | Performed by: FAMILY MEDICINE

## 2020-12-07 PROCEDURE — 99239 HOSP IP/OBS DSCHRG MGMT >30: CPT | Performed by: FAMILY MEDICINE

## 2020-12-07 PROCEDURE — 99232 SBSQ HOSP IP/OBS MODERATE 35: CPT | Performed by: PHYSICIAN ASSISTANT

## 2020-12-07 PROCEDURE — NC001 PR NO CHARGE: Performed by: PHYSICIAN ASSISTANT

## 2020-12-07 PROCEDURE — 85027 COMPLETE CBC AUTOMATED: CPT | Performed by: FAMILY MEDICINE

## 2020-12-07 RX ORDER — AMLODIPINE BESYLATE 10 MG/1
10 TABLET ORAL DAILY
Qty: 30 TABLET | Refills: 0 | Status: SHIPPED | OUTPATIENT
Start: 2020-12-08 | End: 2021-01-11 | Stop reason: SDUPTHER

## 2020-12-07 RX ORDER — ACETAMINOPHEN 325 MG/1
650 TABLET ORAL EVERY 6 HOURS PRN
Qty: 30 TABLET | Refills: 0 | Status: SHIPPED | OUTPATIENT
Start: 2020-12-07 | End: 2020-12-17

## 2020-12-07 RX ORDER — POTASSIUM CHLORIDE 20 MEQ/1
40 TABLET, EXTENDED RELEASE ORAL ONCE
Status: COMPLETED | OUTPATIENT
Start: 2020-12-07 | End: 2020-12-07

## 2020-12-07 RX ORDER — LISINOPRIL 5 MG/1
5 TABLET ORAL DAILY
Qty: 30 TABLET | Refills: 0 | Status: SHIPPED | OUTPATIENT
Start: 2020-12-08 | End: 2020-12-23

## 2020-12-07 RX ADMIN — OYSTER SHELL CALCIUM WITH VITAMIN D 1 TABLET: 500; 200 TABLET, FILM COATED ORAL at 08:58

## 2020-12-07 RX ADMIN — AMLODIPINE BESYLATE 10 MG: 10 TABLET ORAL at 08:58

## 2020-12-07 RX ADMIN — LISINOPRIL 5 MG: 5 TABLET ORAL at 08:58

## 2020-12-07 RX ADMIN — FLUTICASONE PROPIONATE 1 SPRAY: 50 SPRAY, METERED NASAL at 08:58

## 2020-12-07 RX ADMIN — PANTOPRAZOLE SODIUM 40 MG: 40 INJECTION, POWDER, FOR SOLUTION INTRAVENOUS at 08:58

## 2020-12-07 RX ADMIN — POTASSIUM CHLORIDE 40 MEQ: 1500 TABLET, EXTENDED RELEASE ORAL at 08:58

## 2020-12-08 ENCOUNTER — TELEPHONE (OUTPATIENT)
Dept: GASTROENTEROLOGY | Facility: CLINIC | Age: 55
End: 2020-12-08

## 2020-12-08 ENCOUNTER — TRANSITIONAL CARE MANAGEMENT (OUTPATIENT)
Dept: INTERNAL MEDICINE CLINIC | Age: 55
End: 2020-12-08

## 2020-12-09 ENCOUNTER — OFFICE VISIT (OUTPATIENT)
Dept: INTERNAL MEDICINE CLINIC | Age: 55
End: 2020-12-09
Payer: COMMERCIAL

## 2020-12-09 VITALS
DIASTOLIC BLOOD PRESSURE: 80 MMHG | WEIGHT: 173 LBS | TEMPERATURE: 97.5 F | HEART RATE: 120 BPM | HEIGHT: 67 IN | OXYGEN SATURATION: 95 % | BODY MASS INDEX: 27.15 KG/M2 | SYSTOLIC BLOOD PRESSURE: 102 MMHG

## 2020-12-09 DIAGNOSIS — K80.70 CHOLELITHIASIS WITH CHOLEDOCHOLITHIASIS: ICD-10-CM

## 2020-12-09 DIAGNOSIS — R74.01 TRANSAMINITIS: ICD-10-CM

## 2020-12-09 DIAGNOSIS — K85.90 POST-ERCP ACUTE PANCREATITIS: ICD-10-CM

## 2020-12-09 DIAGNOSIS — K91.89 POST-ERCP ACUTE PANCREATITIS: ICD-10-CM

## 2020-12-09 DIAGNOSIS — Z90.49 STATUS POST CHOLECYSTECTOMY: ICD-10-CM

## 2020-12-09 DIAGNOSIS — K80.50 CHOLEDOCHOLITHIASIS: Chronic | ICD-10-CM

## 2020-12-09 DIAGNOSIS — Z12.31 SCREENING MAMMOGRAM, ENCOUNTER FOR: Primary | ICD-10-CM

## 2020-12-09 PROCEDURE — 99496 TRANSJ CARE MGMT HIGH F2F 7D: CPT | Performed by: INTERNAL MEDICINE

## 2020-12-10 ENCOUNTER — LAB (OUTPATIENT)
Dept: LAB | Facility: MEDICAL CENTER | Age: 55
End: 2020-12-10
Payer: COMMERCIAL

## 2020-12-10 ENCOUNTER — TELEPHONE (OUTPATIENT)
Dept: SURGERY | Facility: CLINIC | Age: 55
End: 2020-12-10

## 2020-12-10 DIAGNOSIS — K85.90 POST-ERCP ACUTE PANCREATITIS: ICD-10-CM

## 2020-12-10 DIAGNOSIS — R74.01 TRANSAMINITIS: ICD-10-CM

## 2020-12-10 DIAGNOSIS — K91.89 POST-ERCP ACUTE PANCREATITIS: ICD-10-CM

## 2020-12-10 LAB
ALBUMIN SERPL BCP-MCNC: 2.8 G/DL (ref 3.5–5)
ALP SERPL-CCNC: 118 U/L (ref 46–116)
ALT SERPL W P-5'-P-CCNC: 37 U/L (ref 12–78)
ANION GAP SERPL CALCULATED.3IONS-SCNC: 5 MMOL/L (ref 4–13)
AST SERPL W P-5'-P-CCNC: 32 U/L (ref 5–45)
BASOPHILS # BLD AUTO: 0.06 THOUSANDS/ΜL (ref 0–0.1)
BASOPHILS NFR BLD AUTO: 0 % (ref 0–1)
BILIRUB SERPL-MCNC: 0.44 MG/DL (ref 0.2–1)
BUN SERPL-MCNC: 9 MG/DL (ref 5–25)
CALCIUM ALBUM COR SERPL-MCNC: 10.5 MG/DL (ref 8.3–10.1)
CALCIUM SERPL-MCNC: 9.5 MG/DL (ref 8.3–10.1)
CHLORIDE SERPL-SCNC: 104 MMOL/L (ref 100–108)
CO2 SERPL-SCNC: 29 MMOL/L (ref 21–32)
CREAT SERPL-MCNC: 0.79 MG/DL (ref 0.6–1.3)
EOSINOPHIL # BLD AUTO: 0.08 THOUSAND/ΜL (ref 0–0.61)
EOSINOPHIL NFR BLD AUTO: 1 % (ref 0–6)
ERYTHROCYTE [DISTWIDTH] IN BLOOD BY AUTOMATED COUNT: 12.7 % (ref 11.6–15.1)
GFR SERPL CREATININE-BSD FRML MDRD: 85 ML/MIN/1.73SQ M
GLUCOSE P FAST SERPL-MCNC: 105 MG/DL (ref 65–99)
HCT VFR BLD AUTO: 40.6 % (ref 34.8–46.1)
HGB BLD-MCNC: 13 G/DL (ref 11.5–15.4)
IMM GRANULOCYTES # BLD AUTO: 0.35 THOUSAND/UL (ref 0–0.2)
IMM GRANULOCYTES NFR BLD AUTO: 2 % (ref 0–2)
LIPASE SERPL-CCNC: 2315 U/L (ref 73–393)
LYMPHOCYTES # BLD AUTO: 1.58 THOUSANDS/ΜL (ref 0.6–4.47)
LYMPHOCYTES NFR BLD AUTO: 9 % (ref 14–44)
MCH RBC QN AUTO: 31.4 PG (ref 26.8–34.3)
MCHC RBC AUTO-ENTMCNC: 32 G/DL (ref 31.4–37.4)
MCV RBC AUTO: 98 FL (ref 82–98)
MONOCYTES # BLD AUTO: 1.21 THOUSAND/ΜL (ref 0.17–1.22)
MONOCYTES NFR BLD AUTO: 7 % (ref 4–12)
NEUTROPHILS # BLD AUTO: 13.72 THOUSANDS/ΜL (ref 1.85–7.62)
NEUTS SEG NFR BLD AUTO: 81 % (ref 43–75)
NRBC BLD AUTO-RTO: 0 /100 WBCS
PLATELET # BLD AUTO: 413 THOUSANDS/UL (ref 149–390)
PMV BLD AUTO: 10.4 FL (ref 8.9–12.7)
POTASSIUM SERPL-SCNC: 4.5 MMOL/L (ref 3.5–5.3)
PROT SERPL-MCNC: 7.8 G/DL (ref 6.4–8.2)
RBC # BLD AUTO: 4.14 MILLION/UL (ref 3.81–5.12)
SODIUM SERPL-SCNC: 138 MMOL/L (ref 136–145)
WBC # BLD AUTO: 17 THOUSAND/UL (ref 4.31–10.16)

## 2020-12-10 PROCEDURE — 85025 COMPLETE CBC W/AUTO DIFF WBC: CPT

## 2020-12-10 PROCEDURE — 80053 COMPREHEN METABOLIC PANEL: CPT

## 2020-12-10 PROCEDURE — 36415 COLL VENOUS BLD VENIPUNCTURE: CPT

## 2020-12-10 PROCEDURE — 83690 ASSAY OF LIPASE: CPT

## 2020-12-11 ENCOUNTER — TELEPHONE (OUTPATIENT)
Dept: INTERNAL MEDICINE CLINIC | Age: 55
End: 2020-12-11

## 2020-12-16 ENCOUNTER — APPOINTMENT (OUTPATIENT)
Dept: LAB | Facility: MEDICAL CENTER | Age: 55
End: 2020-12-16
Payer: COMMERCIAL

## 2020-12-16 ENCOUNTER — OFFICE VISIT (OUTPATIENT)
Dept: INTERNAL MEDICINE CLINIC | Facility: CLINIC | Age: 55
End: 2020-12-16
Payer: COMMERCIAL

## 2020-12-16 VITALS
SYSTOLIC BLOOD PRESSURE: 98 MMHG | DIASTOLIC BLOOD PRESSURE: 78 MMHG | HEART RATE: 110 BPM | OXYGEN SATURATION: 98 % | TEMPERATURE: 99 F

## 2020-12-16 DIAGNOSIS — K85.10 ACUTE BILIARY PANCREATITIS WITHOUT INFECTION OR NECROSIS: ICD-10-CM

## 2020-12-16 DIAGNOSIS — I95.2 HYPOTENSION DUE TO DRUGS: ICD-10-CM

## 2020-12-16 DIAGNOSIS — K85.10 ACUTE BILIARY PANCREATITIS WITHOUT INFECTION OR NECROSIS: Primary | ICD-10-CM

## 2020-12-16 LAB
ALBUMIN SERPL BCP-MCNC: 3.1 G/DL (ref 3.5–5)
ALP SERPL-CCNC: 114 U/L (ref 46–116)
ALT SERPL W P-5'-P-CCNC: 58 U/L (ref 12–78)
AST SERPL W P-5'-P-CCNC: 49 U/L (ref 5–45)
BASOPHILS # BLD AUTO: 0.04 THOUSANDS/ΜL (ref 0–0.1)
BASOPHILS NFR BLD AUTO: 0 % (ref 0–1)
BILIRUB DIRECT SERPL-MCNC: 0.14 MG/DL (ref 0–0.2)
BILIRUB SERPL-MCNC: 0.4 MG/DL (ref 0.2–1)
EOSINOPHIL # BLD AUTO: 0.13 THOUSAND/ΜL (ref 0–0.61)
EOSINOPHIL NFR BLD AUTO: 1 % (ref 0–6)
ERYTHROCYTE [DISTWIDTH] IN BLOOD BY AUTOMATED COUNT: 12.2 % (ref 11.6–15.1)
HCT VFR BLD AUTO: 40.2 % (ref 34.8–46.1)
HGB BLD-MCNC: 12.4 G/DL (ref 11.5–15.4)
IMM GRANULOCYTES # BLD AUTO: 0.06 THOUSAND/UL (ref 0–0.2)
IMM GRANULOCYTES NFR BLD AUTO: 1 % (ref 0–2)
LIPASE SERPL-CCNC: 553 U/L (ref 73–393)
LYMPHOCYTES # BLD AUTO: 1.75 THOUSANDS/ΜL (ref 0.6–4.47)
LYMPHOCYTES NFR BLD AUTO: 15 % (ref 14–44)
MCH RBC QN AUTO: 30.8 PG (ref 26.8–34.3)
MCHC RBC AUTO-ENTMCNC: 30.8 G/DL (ref 31.4–37.4)
MCV RBC AUTO: 100 FL (ref 82–98)
MONOCYTES # BLD AUTO: 0.78 THOUSAND/ΜL (ref 0.17–1.22)
MONOCYTES NFR BLD AUTO: 7 % (ref 4–12)
NEUTROPHILS # BLD AUTO: 8.98 THOUSANDS/ΜL (ref 1.85–7.62)
NEUTS SEG NFR BLD AUTO: 76 % (ref 43–75)
NRBC BLD AUTO-RTO: 0 /100 WBCS
PLATELET # BLD AUTO: 601 THOUSANDS/UL (ref 149–390)
PMV BLD AUTO: 10.6 FL (ref 8.9–12.7)
PROT SERPL-MCNC: 8.1 G/DL (ref 6.4–8.2)
RBC # BLD AUTO: 4.03 MILLION/UL (ref 3.81–5.12)
WBC # BLD AUTO: 11.74 THOUSAND/UL (ref 4.31–10.16)

## 2020-12-16 PROCEDURE — 99213 OFFICE O/P EST LOW 20 MIN: CPT | Performed by: INTERNAL MEDICINE

## 2020-12-16 PROCEDURE — 36415 COLL VENOUS BLD VENIPUNCTURE: CPT

## 2020-12-16 PROCEDURE — 3078F DIAST BP <80 MM HG: CPT | Performed by: INTERNAL MEDICINE

## 2020-12-16 PROCEDURE — 1036F TOBACCO NON-USER: CPT | Performed by: INTERNAL MEDICINE

## 2020-12-16 PROCEDURE — 85025 COMPLETE CBC W/AUTO DIFF WBC: CPT

## 2020-12-16 PROCEDURE — 80076 HEPATIC FUNCTION PANEL: CPT

## 2020-12-16 PROCEDURE — 83690 ASSAY OF LIPASE: CPT

## 2020-12-16 PROCEDURE — 3074F SYST BP LT 130 MM HG: CPT | Performed by: INTERNAL MEDICINE

## 2020-12-18 ENCOUNTER — OFFICE VISIT (OUTPATIENT)
Dept: SURGERY | Facility: CLINIC | Age: 55
End: 2020-12-18

## 2020-12-18 VITALS
WEIGHT: 170 LBS | BODY MASS INDEX: 26.68 KG/M2 | HEIGHT: 67 IN | DIASTOLIC BLOOD PRESSURE: 74 MMHG | SYSTOLIC BLOOD PRESSURE: 118 MMHG | HEART RATE: 100 BPM | TEMPERATURE: 97 F

## 2020-12-18 DIAGNOSIS — K85.10 ACUTE BILIARY PANCREATITIS, UNSPECIFIED COMPLICATION STATUS: Primary | ICD-10-CM

## 2020-12-18 PROCEDURE — 3008F BODY MASS INDEX DOCD: CPT | Performed by: INTERNAL MEDICINE

## 2020-12-18 PROCEDURE — 99024 POSTOP FOLLOW-UP VISIT: CPT | Performed by: SURGERY

## 2020-12-23 ENCOUNTER — TELEPHONE (OUTPATIENT)
Dept: SURGERY | Facility: CLINIC | Age: 55
End: 2020-12-23

## 2020-12-23 ENCOUNTER — HOSPITAL ENCOUNTER (INPATIENT)
Facility: HOSPITAL | Age: 55
LOS: 7 days | Discharge: HOME | DRG: 440 | End: 2020-12-30
Attending: EMERGENCY MEDICINE | Admitting: FAMILY MEDICINE
Payer: COMMERCIAL

## 2020-12-23 ENCOUNTER — HOSPITAL ENCOUNTER (OUTPATIENT)
Dept: RADIOLOGY | Facility: MEDICAL CENTER | Age: 55
Discharge: HOME | End: 2020-12-23
Payer: COMMERCIAL

## 2020-12-23 ENCOUNTER — TELEPHONE (OUTPATIENT)
Dept: GASTROENTEROLOGY | Facility: CLINIC | Age: 55
End: 2020-12-23

## 2020-12-23 DIAGNOSIS — K86.3 PANCREATIC PSEUDOCYST: Primary | ICD-10-CM

## 2020-12-23 DIAGNOSIS — K85.10 ACUTE BILIARY PANCREATITIS, UNSPECIFIED COMPLICATION STATUS: ICD-10-CM

## 2020-12-23 PROBLEM — K80.70 CHOLELITHIASIS WITH CHOLEDOCHOLITHIASIS: Status: RESOLVED | Noted: 2020-11-29 | Resolved: 2020-12-23

## 2020-12-23 LAB
ALBUMIN SERPL BCP-MCNC: 3.3 G/DL (ref 3.5–5)
ALP SERPL-CCNC: 94 U/L (ref 46–116)
ALT SERPL W P-5'-P-CCNC: 42 U/L (ref 12–78)
ANION GAP SERPL CALCULATED.3IONS-SCNC: 6 MMOL/L (ref 4–13)
AST SERPL W P-5'-P-CCNC: 30 U/L (ref 5–45)
BASOPHILS # BLD AUTO: 0.03 THOUSANDS/ΜL (ref 0–0.1)
BASOPHILS NFR BLD AUTO: 0 % (ref 0–1)
BILIRUB SERPL-MCNC: 0.3 MG/DL (ref 0.2–1)
BUN SERPL-MCNC: 10 MG/DL (ref 5–25)
CALCIUM ALBUM COR SERPL-MCNC: 10 MG/DL (ref 8.3–10.1)
CALCIUM SERPL-MCNC: 9.4 MG/DL (ref 8.3–10.1)
CHLORIDE SERPL-SCNC: 105 MMOL/L (ref 100–108)
CO2 SERPL-SCNC: 30 MMOL/L (ref 21–32)
CREAT SERPL-MCNC: 0.96 MG/DL (ref 0.6–1.3)
EOSINOPHIL # BLD AUTO: 0.1 THOUSAND/ΜL (ref 0–0.61)
EOSINOPHIL NFR BLD AUTO: 1 % (ref 0–6)
ERYTHROCYTE [DISTWIDTH] IN BLOOD BY AUTOMATED COUNT: 12 % (ref 11.6–15.1)
GFR SERPL CREATININE-BSD FRML MDRD: 67 ML/MIN/1.73SQ M
GLUCOSE SERPL-MCNC: 116 MG/DL (ref 65–140)
HCT VFR BLD AUTO: 36.1 % (ref 34.8–46.1)
HGB BLD-MCNC: 11.5 G/DL (ref 11.5–15.4)
IMM GRANULOCYTES # BLD AUTO: 0.01 THOUSAND/UL (ref 0–0.2)
IMM GRANULOCYTES NFR BLD AUTO: 0 % (ref 0–2)
LIPASE SERPL-CCNC: 341 U/L (ref 73–393)
LYMPHOCYTES # BLD AUTO: 1.65 THOUSANDS/ΜL (ref 0.6–4.47)
LYMPHOCYTES NFR BLD AUTO: 22 % (ref 14–44)
MCH RBC QN AUTO: 30.4 PG (ref 26.8–34.3)
MCHC RBC AUTO-ENTMCNC: 31.9 G/DL (ref 31.4–37.4)
MCV RBC AUTO: 96 FL (ref 82–98)
MONOCYTES # BLD AUTO: 0.66 THOUSAND/ΜL (ref 0.17–1.22)
MONOCYTES NFR BLD AUTO: 9 % (ref 4–12)
NEUTROPHILS # BLD AUTO: 5.23 THOUSANDS/ΜL (ref 1.85–7.62)
NEUTS SEG NFR BLD AUTO: 68 % (ref 43–75)
NRBC BLD AUTO-RTO: 0 /100 WBCS
PLATELET # BLD AUTO: 482 THOUSANDS/UL (ref 149–390)
PMV BLD AUTO: 9.9 FL (ref 8.9–12.7)
POTASSIUM SERPL-SCNC: 4.2 MMOL/L (ref 3.5–5.3)
PROT SERPL-MCNC: 7.9 G/DL (ref 6.4–8.2)
RBC # BLD AUTO: 3.78 MILLION/UL (ref 3.81–5.12)
SODIUM SERPL-SCNC: 141 MMOL/L (ref 136–145)
WBC # BLD AUTO: 7.68 THOUSAND/UL (ref 4.31–10.16)

## 2020-12-23 PROCEDURE — 74177 CT ABD & PELVIS W/CONTRAST: CPT

## 2020-12-23 PROCEDURE — 99284 EMERGENCY DEPT VISIT MOD MDM: CPT

## 2020-12-23 PROCEDURE — 36415 COLL VENOUS BLD VENIPUNCTURE: CPT | Performed by: PHYSICIAN ASSISTANT

## 2020-12-23 PROCEDURE — 83690 ASSAY OF LIPASE: CPT | Performed by: PHYSICIAN ASSISTANT

## 2020-12-23 PROCEDURE — 99223 1ST HOSP IP/OBS HIGH 75: CPT | Performed by: FAMILY MEDICINE

## 2020-12-23 PROCEDURE — 99254 IP/OBS CNSLTJ NEW/EST MOD 60: CPT | Performed by: PHYSICIAN ASSISTANT

## 2020-12-23 PROCEDURE — 85025 COMPLETE CBC W/AUTO DIFF WBC: CPT | Performed by: PHYSICIAN ASSISTANT

## 2020-12-23 PROCEDURE — G1004 CDSM NDSC: HCPCS

## 2020-12-23 PROCEDURE — 80053 COMPREHEN METABOLIC PANEL: CPT | Performed by: PHYSICIAN ASSISTANT

## 2020-12-23 PROCEDURE — 99284 EMERGENCY DEPT VISIT MOD MDM: CPT | Performed by: PHYSICIAN ASSISTANT

## 2020-12-23 PROCEDURE — 96360 HYDRATION IV INFUSION INIT: CPT

## 2020-12-23 RX ORDER — ACETAMINOPHEN 325 MG/1
650 TABLET ORAL EVERY 6 HOURS PRN
Status: DISCONTINUED | OUTPATIENT
Start: 2020-12-23 | End: 2020-12-30 | Stop reason: HOSPADM

## 2020-12-23 RX ORDER — DEXTROSE AND SODIUM CHLORIDE 5; .45 G/100ML; G/100ML
100 INJECTION, SOLUTION INTRAVENOUS CONTINUOUS
Status: DISCONTINUED | OUTPATIENT
Start: 2020-12-23 | End: 2020-12-30 | Stop reason: HOSPADM

## 2020-12-23 RX ORDER — AMLODIPINE BESYLATE 10 MG/1
10 TABLET ORAL DAILY
Status: DISCONTINUED | OUTPATIENT
Start: 2020-12-24 | End: 2020-12-30 | Stop reason: HOSPADM

## 2020-12-23 RX ORDER — PANTOPRAZOLE SODIUM 40 MG/1
40 INJECTION, POWDER, FOR SOLUTION INTRAVENOUS
Status: DISCONTINUED | OUTPATIENT
Start: 2020-12-24 | End: 2020-12-30 | Stop reason: HOSPADM

## 2020-12-23 RX ORDER — ONDANSETRON 2 MG/ML
4 INJECTION INTRAMUSCULAR; INTRAVENOUS EVERY 4 HOURS PRN
Status: DISCONTINUED | OUTPATIENT
Start: 2020-12-23 | End: 2020-12-30 | Stop reason: HOSPADM

## 2020-12-23 RX ORDER — IBUPROFEN 400 MG/1
400 TABLET ORAL EVERY 6 HOURS PRN
Status: DISCONTINUED | OUTPATIENT
Start: 2020-12-23 | End: 2020-12-30 | Stop reason: HOSPADM

## 2020-12-23 RX ORDER — SODIUM CHLORIDE, SODIUM LACTATE, POTASSIUM CHLORIDE, CALCIUM CHLORIDE 600; 310; 30; 20 MG/100ML; MG/100ML; MG/100ML; MG/100ML
125 INJECTION, SOLUTION INTRAVENOUS CONTINUOUS
Status: DISCONTINUED | OUTPATIENT
Start: 2020-12-23 | End: 2020-12-24

## 2020-12-23 RX ORDER — OXYCODONE HYDROCHLORIDE 5 MG/1
5 TABLET ORAL EVERY 6 HOURS PRN
Status: DISCONTINUED | OUTPATIENT
Start: 2020-12-23 | End: 2020-12-30 | Stop reason: HOSPADM

## 2020-12-23 RX ADMIN — IOHEXOL 100 ML: 350 INJECTION, SOLUTION INTRAVENOUS at 10:44

## 2020-12-23 RX ADMIN — ACETAMINOPHEN 650 MG: 325 TABLET, FILM COATED ORAL at 22:27

## 2020-12-23 RX ADMIN — SODIUM CHLORIDE, SODIUM LACTATE, POTASSIUM CHLORIDE, AND CALCIUM CHLORIDE 125 ML/HR: .6; .31; .03; .02 INJECTION, SOLUTION INTRAVENOUS at 14:45

## 2020-12-23 RX ADMIN — DEXTROSE AND SODIUM CHLORIDE 100 ML/HR: 5; .45 INJECTION, SOLUTION INTRAVENOUS at 18:08

## 2020-12-23 NOTE — PLAN OF CARE
Problem: Potential for Falls  Goal: Patient will remain free of falls  Description: INTERVENTIONS:  - Assess patient frequently for physical needs  -  Identify cognitive and physical deficits and behaviors that affect risk of falls    -  [place] fall precautions as indicated by assessment   - Educate patient/family on patient safety including physical limitations  - Instruct patient to call for assistance with activity based on assessment  - Modify environment to reduce risk of injury  - Consider OT/PT consult to assist with strengthening/mobility  Outcome: Progressing     Problem: PAIN - ADULT  Goal: Verbalizes/displays adequate comfort level or baseline comfort level  Description: Interventions:  - Encourage patient to monitor pain and request assistance  - Assess pain using appropriate pain scale  - Administer analgesics based on type and severity of pain and evaluate response  - Implement non-pharmacological measures as appropriate and evaluate response  - Consider cultural and social influences on pain and pain management  - Notify physician/advanced practitioner if interventions unsuccessful or patient reports new pain  Outcome: Progressing     Problem: INFECTION - ADULT  Goal: Absence or prevention of progression during hospitalization  Description: INTERVENTIONS:  - Assess and monitor for signs and symptoms of infection  - Monitor lab/diagnostic results  - Monitor all insertion sites, i e  indwelling lines, tubes, and drains  - Monitor endotracheal if appropriate and nasal secretions for changes in amount and color  - [place]lin appropriate cooling/warming therapies per order  - Administer medications as ordered  - Instruct and encourage patient and family to use good hand hygiene technique  - Identify and instruct in appropriate isolation precautions for identified infection/condition  Outcome: Progressing  Goal: Absence of fever/infection during neutropenic period  Description: INTERVENTIONS:  - Monitor WBC    Outcome: Progressing     Problem: SAFETY ADULT  Goal: Patient will remain free of falls  Description: INTERVENTIONS:  - Assess patient frequently for physical needs  -  Identify cognitive and physical deficits and behaviors that affect risk of falls    -  [place] fall precautions as indicated by assessment   - Educate patient/family on patient safety including physical limitations  - Instruct patient to call for assistance with activity based on assessment  - Modify environment to reduce risk of injury  - Consider OT/PT consult to assist with strengthening/mobility  Outcome: Progressing  Goal: Maintain or return to baseline ADL function  Description: INTERVENTIONS:  -  Assess patient's ability to carry out ADLs; assess patient's baseline for ADL function and identify physical deficits which impact ability to perform ADLs (bathing, care of mouth/teeth, toileting, grooming, dressing, etc )  - Assess/evaluate cause of self-care deficits   - Assess range of motion  - Assess patient's mobility; develop plan if impaired  - Assess patient's need for assistive devices and provide as appropriate  - Encourage maximum independence but intervene and supervise when necessary  - Involve family in performance of ADLs  - Assess for home care needs following discharge   - Consider OT consult to assist with ADL evaluation and planning for discharge  - Provide patient education as appropriate  Outcome: Progressing  Goal: Maintain or return mobility status to optimal level  Description: INTERVENTIONS:  - Assess patient's baseline mobility status (ambulation, transfers, stairs, etc )    - Identify cognitive and physical deficits and behaviors that affect mobility  - Identify mobility aids required to assist with transfers and/or ambulation (gait belt, sit-to-stand, lift, walker, cane, etc )  - [place] fall precautions as indicated by assessment  - Record patient progress and toleration of activity level on Mobility SBAR; progress patient to next Phase/Stage  - Instruct patient to call for assistance with activity based on assessment  - Consider rehabilitation consult to assist with strengthening/weightbearing, etc   Outcome: Progressing     Problem: DISCHARGE PLANNING  Goal: Discharge to home or other facility with appropriate resources  Description: INTERVENTIONS:  - Identify barriers to discharge w/patient and caregiver  - Arrange for needed discharge resources and transportation as appropriate  - Identify discharge learning needs (meds, wound care, etc )  - Arrange for interpretive services to assist at discharge as needed  - Refer to Case Management Department for coordinating discharge planning if the patient needs post-hospital services based on physician/advanced practitioner order or complex needs related to functional status, cognitive ability, or social support system  Outcome: Progressing     Problem: Knowledge Deficit  Goal: Patient/family/caregiver demonstrates understanding of disease process, treatment plan, medications, and discharge instructions  Description: Complete learning assessment and assess knowledge base    Interventions:  - Provide teaching at level of understanding  - Provide teaching via preferred learning methods  Outcome: Progressing

## 2020-12-23 NOTE — TELEPHONE ENCOUNTER
Pt has stent placed nov 30th she was going to have an appt with tyra Monday however we needed to RS pt is just concerned about the stent and possibly needing to have it out can we please call

## 2020-12-23 NOTE — ASSESSMENT & PLAN NOTE
· Lap collette on 12/4/2020  · She is also s/p ERCP during last admission and developed post ERCP pancreatitis  · Today her lipase is 341  · LFTS are stable

## 2020-12-23 NOTE — ASSESSMENT & PLAN NOTE
· Found on CT scan today  · Surgery has been consulted: they are planning to start TPN for which pt will need a PICC line  She has been consented at this time  Final plan is pending approval by the surgical attending, regarding TPN  Surgery would also like a GI consult which has been ordered    · Check prealbumin  · S/p recent post ERCP pancreatitis  · Has nausea and some epigastric pain and tenderness  · No vomiting, no fever  · Keep NPO  · Justo@[employer] cc/hr for IV hydration due to NPO status  · Pain control as ordered  · zofran IV for nausea and vomiting

## 2020-12-23 NOTE — ED PROVIDER NOTES
History  Chief Complaint   Patient presents with    Abnormal Lab     Pt states had a CT done this morning that showed fluid around her pancreas  Pt sent by Dr Tj Ma to be admitted      49yo female with a history of GERD presenting for evaluation of an abnormal outpatient test  Patient recently was admitted last month for choledocholithiasis and underwent a cholecystectomy and ERCP with stent placement  Postoperative course was complicated by post-ERCP pancreatitis  She was eventually discharged on 12/7/20  Patient went for a repeat CT abdomen earlier today which revealed a large peripancreatic fluid collection with minimal diffuse wall thickening concerning for pseudocyst  Patient was called by her surgeon and advised to present to the hospital for admission  Patient does admit to ongoing RUQ and epigastric discomfort but has been tolerating PO intake without much issue  She denies any nausea or vomiting  No fevers or chills  History provided by:  Patient and medical records   used: No    Abdominal Pain  Pain location:  Epigastric and RUQ  Pain quality: sharp    Pain radiates to:  Does not radiate  Pain severity:  Moderate  Onset quality:  Gradual  Timing:  Constant  Progression:  Unchanged  Chronicity:  New  Relieved by:  Nothing  Worsened by:  Eating  Ineffective treatments:  None tried  Associated symptoms: diarrhea    Associated symptoms: no belching, no chest pain, no chills, no constipation, no cough, no dysuria, no fatigue, no fever, no hematuria, no nausea, no shortness of breath and no vomiting        Prior to Admission Medications   Prescriptions Last Dose Informant Patient Reported? Taking?    Ascorbic Acid (VITAMIN C) 500 MG CAPS  Self Yes No   Sig: Take by mouth daily    Calcium Carb-Cholecalciferol (CALTRATE 600+D) 600-800 MG-UNIT TABS  Self Yes No   Sig: Take 1 tablet by mouth daily   Multiple Vitamins-Minerals (ONE DAILY FOR WOMEN PO)  Self Yes No   amLODIPine (NORVASC) 10 mg tablet  Self No No   Sig: Take 1 tablet (10 mg total) by mouth daily   cyanocobalamin (VITAMIN B-12) 500 mcg tablet  Self Yes No   Sig: Take 1 tablet by mouth daily   fexofenadine-pseudoephedrine (ALLEGRA-D)  MG per tablet  Self Yes No   fluticasone (FLONASE) 50 mcg/act nasal spray  Self No No   Si spray into each nostril daily   lisinopril (ZESTRIL) 5 mg tablet  Self No No   Sig: Take 1 tablet (5 mg total) by mouth daily   meclizine (ANTIVERT) 25 mg tablet  Self No No   Sig: Take 1 tablet (25 mg total) by mouth 3 (three) times a day as needed for dizziness      Facility-Administered Medications: None       Past Medical History:   Diagnosis Date    GERD (gastroesophageal reflux disease)        Past Surgical History:   Procedure Laterality Date    BREAST BIOPSY Left     core    CHOLECYSTECTOMY LAPAROSCOPIC N/A 2020    Procedure: CHOLECYSTECTOMY LAPAROSCOPIC, possible open;  Surgeon: Elyse Shipley MD;  Location[facility];  Service: General       Family History   Problem Relation Age of Onset    Arthritis Mother     Skin cancer Mother     Skin cancer Father     Arthritis Maternal Grandmother     Hiatal hernia Maternal Grandfather     Arthritis Family     Hiatal hernia Family     Skin cancer Family     Varicose Veins Family         Of lower extremities    Lung cancer Brother      I have reviewed and agree with the history as documented  E-Cigarette/Vaping    E-Cigarette Use Never User      E-Cigarette/Vaping Substances     Social History     Tobacco Use    Smoking status: Never Smoker    Smokeless tobacco: Never Used   Substance Use Topics    Alcohol use: Yes     Frequency: 2-4 times a month     Drinks per session: 1 or 2     Binge frequency: Never     Comment: Social drinker    Drug use: No       Review of Systems   Constitutional: Negative for chills, fatigue and fever  HENT: Negative for drooling and voice change  Eyes: Negative for discharge and redness  Respiratory: Negative for cough, shortness of breath and stridor  Cardiovascular: Negative for chest pain and leg swelling  Gastrointestinal: Positive for abdominal pain and diarrhea  Negative for constipation, nausea and vomiting  Genitourinary: Negative for dysuria and hematuria  Musculoskeletal: Negative for neck pain and neck stiffness  Skin: Negative for color change and rash  Neurological: Negative for seizures and syncope  Psychiatric/Behavioral: Negative for confusion  The patient is not nervous/anxious  All other systems reviewed and are negative  Physical Exam  Physical Exam  Vitals signs and nursing note reviewed  Constitutional:       General: She is not in acute distress  Appearance: She is well-developed  She is not diaphoretic  Comments: Non-toxic appearing   HENT:      Head: Normocephalic and atraumatic  Right Ear: External ear normal       Left Ear: External ear normal       Nose: Nose normal    Eyes:      General: No scleral icterus  Right eye: No discharge  Left eye: No discharge  Conjunctiva/sclera: Conjunctivae normal    Neck:      Musculoskeletal: Normal range of motion and neck supple  Cardiovascular:      Rate and Rhythm: Normal rate and regular rhythm  Heart sounds: Normal heart sounds  No murmur  Pulmonary:      Effort: Pulmonary effort is normal  No respiratory distress  Breath sounds: Normal breath sounds  No stridor  No wheezing or rales  Abdominal:      General: Bowel sounds are normal  There is no distension  Palpations: Abdomen is soft  Tenderness: There is abdominal tenderness in the right upper quadrant and epigastric area  There is no guarding  Comments: Surgical incision sites c/d/i  No rebound or guarding  Musculoskeletal: Normal range of motion  General: No deformity  Lymphadenopathy:      Cervical: No cervical adenopathy  Skin:     General: Skin is warm and dry  Neurological:      Mental Status: She is alert  She is not disoriented  GCS: GCS eye subscore is 4  GCS verbal subscore is 5  GCS motor subscore is 6     Psychiatric:         Behavior: Behavior normal          Vital Signs  ED Triage Vitals   Temperature Pulse Respirations Blood Pressure SpO2   12/23/20 1436 12/23/20 1431 12/23/20 1431 12/23/20 1431 12/23/20 1431   98 3 °F (36 8 °C) 105 18 135/79 99 %      Temp src Heart Rate Source Patient Position - Orthostatic VS BP Location FiO2 (%)   -- 12/23/20 1431 -- 12/23/20 1431 --    Monitor  Right arm       Pain Score       --                  Vitals:    12/23/20 1431   BP: 135/79   Pulse: 105         Visual Acuity      ED Medications  Medications   lactated ringers infusion (125 mL/hr Intravenous New Bag 12/23/20 1445)       Diagnostic Studies  Results Reviewed     Procedure Component Value Units Date/Time    Lipase [[id number]]  (Normal) Collected: 12/23/20 1444    Lab Status: Final result Specimen: Blood from Arm, Right Updated: 12/23/20 1508     Lipase 341 u/L     Comprehensive metabolic panel [[id number]]  (Abnormal) Collected: 12/23/20 1444    Lab Status: Final result Specimen: Blood from Arm, Right Updated: 12/23/20 1508     Sodium 141 mmol/L      Potassium 4 2 mmol/L      Chloride 105 mmol/L      CO2 30 mmol/L      ANION GAP 6 mmol/L      BUN 10 mg/dL      Creatinine 0 96 mg/dL      Glucose 116 mg/dL      Calcium 9 4 mg/dL      Corrected Calcium 10 0 mg/dL      AST 30 U/L      ALT 42 U/L      Alkaline Phosphatase 94 U/L      Total Protein 7 9 g/dL      Albumin 3 3 g/dL      Total Bilirubin 0 30 mg/dL      eGFR 67 ml/min/1 73sq m     Narrative:      Shahrzad guidelines for Chronic Kidney Disease (CKD):     Stage 1 with normal or high GFR (GFR > 90 mL/min/1 73 square meters)    Stage 2 Mild CKD (GFR = 60-89 mL/min/1 73 square meters)    Stage 3A Moderate CKD (GFR = 45-59 mL/min/1 73 square meters)    Stage 3B Moderate CKD (GFR = 30-44 mL/min/1 73 square meters)    Stage 4 Severe CKD (GFR = 15-29 mL/min/1 73 square meters)    Stage 5 End Stage CKD (GFR <15 mL/min/1 73 square meters)  Note: GFR calculation is accurate only with a steady state creatinine    CBC and differential [[id number]]  (Abnormal) Collected: 12/23/20 1444    Lab Status: Final result Specimen: Blood from Arm, Right Updated: 12/23/20 1451     WBC 7 68 Thousand/uL      RBC 3 78 Million/uL      Hemoglobin 11 5 g/dL      Hematocrit 36 1 %      MCV 96 fL      MCH 30 4 pg      MCHC 31 9 g/dL      RDW 12 0 %      MPV 9 9 fL      Platelets 690 Thousands/uL      nRBC 0 /100 WBCs      Neutrophils Relative 68 %      Immat GRANS % 0 %      Lymphocytes Relative 22 %      Monocytes Relative 9 %      Eosinophils Relative 1 %      Basophils Relative 0 %      Neutrophils Absolute 5 23 Thousands/µL      Immature Grans Absolute 0 01 Thousand/uL      Lymphocytes Absolute 1 65 Thousands/µL      Monocytes Absolute 0 66 Thousand/µL      Eosinophils Absolute 0 10 Thousand/µL      Basophils Absolute 0 03 Thousands/µL                  No orders to display              Procedures  Procedures         ED Course                     MDM  Number of Diagnoses or Management Options  Pancreatic pseudocyst: new and requires workup  Diagnosis management comments: 80-year-old female presenting for an abnormal CT today  Patient was recently admitted for choledocholithiasis and underwent ERCP and subsequently developed post-ERCP pancreatitis  She went for a CT today and was found to have a peripancreatic fluid collection concerning for a pseudocyst  Patient was sent here for admission  She is afebrile and hemodynamically stable  No signs of peritonitis on abdominal exam     Initial ED plan: Check abdominal labs and start lactated ringer's infusion  Will admit to [facility] per general surgery recommendations          Amount and/or Complexity of Data Reviewed  Clinical lab tests: ordered and reviewed  Tests in the radiology section of CPT®: reviewed  Review and summarize past medical records: yes  Discuss the patient with other providers: yes    Risk of Complications, Morbidity, and/or Mortality  Presenting problems: moderate  Diagnostic procedures: moderate  Management options: moderate        Disposition  Final diagnoses:   Pancreatic pseudocyst     Time reflects when diagnosis was documented in both MDM as applicable and the Disposition within this note     Time User Action Codes Description Comment    12/23/2020  2:52 PM [address] [place] [address] [K86 3] Pancreatic pseudocyst       ED Disposition     ED Disposition Condition Date/Time Comment    Admit Stable Wed Dec 23, 2020  3:19 PM Case was discussed with Dr Clay Wolfe and the patient's admission status was agreed to be Admission Status: inpatient status to the service of Dr Clay Wolfe   Follow-up Information    None         Patient's Medications   Discharge Prescriptions    No medications on file     No discharge procedures on file      PDMP Review     None          ED Provider  Electronically Signed by           Trisha Ford PA-C  12/23/20 1559

## 2020-12-23 NOTE — H&P
H&P- Silvia Petty 1965, 48 y o  female MRN: [id number]    Unit/Bed#: ED 25 Encounter: [id number]    Primary Care Provider: Lorraine Purdy MD   Date and time admitted to hospital: 12/23/2020  2:27 PM        * Pseudocyst of pancreas  Assessment & Plan  · Found on CT scan today  · Surgery has been consulted: they are planning to start TPN for which pt will need a PICC line  She has been consented at this time  Final plan is pending approval by the surgical attending, regarding TPN  Surgery would also like a GI consult which has been ordered  · Check prealbumin  · S/p recent post ERCP pancreatitis  · Has nausea and some epigastric pain and tenderness  · No vomiting, no fever  · Keep NPO  · Brock@[employer] cc/hr for IV hydration due to NPO status  · Pain control as ordered  · zofran IV for nausea and vomiting      Cholelithiasis with choledocholithiasisresolved as of 12/23/2020  Assessment & Plan  · Recent cholecystectomy     Status post cholecystectomy  Assessment & Plan  · Lap collette on 12/4/2020  · She is also s/p ERCP during last admission and developed post ERCP pancreatitis  · Today her lipase is 341  · LFTS are stable    Essential hypertension  Assessment & Plan  · /79 (BP Location: Right arm)   Pulse 105   Temp 98 3 °F (36 8 °C)   Resp 18   LMP 02/22/2019   SpO2 99%   · Continue home meds: amlodipine   · Lisinopril has been discontinued as OP    VTE Prophylaxis: Enoxaparin (Lovenox)  / sequential compression device   Code Status: full code  POLST: There is no POLST form on file for this patient (pre-hospital)  Discussion with family: discussed with  at bedside  All questions answered in detail    Anticipated Length of Stay:  Patient will be admitted on an Inpatient basis with an anticipated length of stay of  > 2 midnights     Justification for Hospital Stay: Pt with recent post ERCP pancreatitis and cholecystectomy presents with abd pain and found to have pancreatic psueodcyst, she will need further surgical an GI eval and treatment    Total Time for Visit, including Counseling / Coordination of Care: 45 minutes  Greater than 50% of this total time spent on direct patient counseling and coordination of care  Chief Complaint:   abd pain and nausea    History of Present Illness:    Chadwick Dixon is a 48 y o  female who presents with ongoing nausea and abd pain since the discharge s/p lap collette and post ERCP pancreatitis, had a follow up CT scan done today and is found to have a pseudocyst  Pain is 5/10, epigastric, worsens on eating, associated nausea and no vomiting, mild weight loss endorsed, loose stool noted x 2/day  No fever, CP,SOB, palpitations, headaches, blurry vision, sinus pressure or tenderness, no joint swelling or pain or tenderness, no dysuria, no CVA tenderness, no chills or tremors, no LOC or syncope, no jaundice, no falls    Review of Systems:    Review of Systems   As per HPI, all other systems reviewed and are negative    Past Medical and Surgical History:     Past Medical History:   Diagnosis Date    GERD (gastroesophageal reflux disease)        Past Surgical History:   Procedure Laterality Date    BREAST BIOPSY Left 2006    core    CHOLECYSTECTOMY LAPAROSCOPIC N/A 12/4/2020    Procedure: CHOLECYSTECTOMY LAPAROSCOPIC, possible open;  Surgeon: Obie Su MD;  Location: [facility];  Service: General       Meds/Allergies:    Prior to Admission medications    Medication Sig Start Date End Date Taking?  Authorizing Provider   amLODIPine (NORVASC) 10 mg tablet Take 1 tablet (10 mg total) by mouth daily 12/8/20 1/7/21  Krunal Cary MD   Ascorbic Acid (VITAMIN C) 500 MG CAPS Take by mouth daily     Historical Provider, MD   Calcium Carb-Cholecalciferol (CALTRATE 600+D) 600-800 MG-UNIT TABS Take 1 tablet by mouth daily    Historical Provider, MD   cyanocobalamin (VITAMIN B-12) 500 mcg tablet Take 1 tablet by mouth daily 6/13/16   Historical Provider, MD fexofenadine-pseudoephedrine (ALLEGRA-D)  MG per tablet     Historical Provider, MD   fluticasone (FLONASE) 50 mcg/act nasal spray 1 spray into each nostril daily 6/30/20   Elke Gustafson MD   meclizine (ANTIVERT) 25 mg tablet Take 1 tablet (25 mg total) by mouth 3 (three) times a day as needed for dizziness 6/29/18   Elke Gustafson MD   Multiple Vitamins-Minerals (ONE DAILY FOR WOMEN PO)     Historical Provider, MD   lisinopril (ZESTRIL) 5 mg tablet Take 1 tablet (5 mg total) by mouth daily 12/8/20 12/23/20  Cheryl Packer MD     I have reviewed home medications with patient personally  Allergies:    Allergies   Allergen Reactions    Lactose Other (See Comments)     intolerance    Other      SEASONAL ALLERGIES       Social History:     Marital Status: /Civil Union     Substance Use History:   Social History     Substance and Sexual Activity   Alcohol Use Yes    Frequency: 2-4 times a month    Drinks per session: 1 or 2    Binge frequency: Never    Comment: Social drinker     Social History     Tobacco Use   Smoking Status Never Smoker   Smokeless Tobacco Never Used     Social History     Substance and Sexual Activity   Drug Use No       Family History:    cancer    Physical Exam:     Vitals:   Blood Pressure: 135/79 (12/23/20 1431)  Pulse: 105 (12/23/20 1431)  Temperature: 98 3 °F (36 8 °C) (12/23/20 1436)  Respirations: 18 (12/23/20 1431)  SpO2: 99 % (12/23/20 1431)    Physical Exam    General- Awake, alert and oriented x 3, looks comfortable  HEENT- Normocephalic, atraumatic, oral mucosa- moist  Neck- Supple, No carotid bruit, no JVD  CVS- Normal S1/ S2, Regular rate and rhythm, No murmur, No edema  Respiratory system- B/L clear breath sounds, no wheezing  Abdomen- epigastric tenderness, BS(+) 4 quads  Genitourinary- No suprapubic tenderness, No CVA tenderness  Skin- No new bruise or rash  Musculoskeletal- No gross deformity  Psych- No acute psychosis  CNS- CN II- XII grossly intact, No acute focal neurologic deficit noted    Additional Data:     Lab Results: I have personally reviewed pertinent reports  Results from last 7 days   Lab Units 12/23/20  1444   WBC Thousand/uL 7 68   HEMOGLOBIN g/dL 11 5   HEMATOCRIT % 36 1   PLATELETS Thousands/uL 482*   NEUTROS PCT % 68   LYMPHS PCT % 22   MONOS PCT % 9   EOS PCT % 1     Results from last 7 days   Lab Units 12/23/20  1444   SODIUM mmol/L 141   POTASSIUM mmol/L 4 2   CHLORIDE mmol/L 105   CO2 mmol/L 30   BUN mg/dL 10   CREATININE mg/dL 0 96   ANION GAP mmol/L 6   CALCIUM mg/dL 9 4   ALBUMIN g/dL 3 3*   TOTAL BILIRUBIN mg/dL 0 30   ALK PHOS U/L 94   ALT U/L 42   AST U/L 30   GLUCOSE RANDOM mg/dL 116                       Imaging: I have personally reviewed pertinent reports  No orders to display       EKG, Pathology, and Other Studies Reviewed on Admission:   · EKG: no EKG avl for today to review    [facility] Reviewed: Yes     ** Please Note: This note has been constructed using a voice recognition system   **

## 2020-12-23 NOTE — TELEPHONE ENCOUNTER
FYI: Spoke with patient  Reassured patient to follow Dr Schneider's instructions and go to ED  Significant findings on CT scan

## 2020-12-23 NOTE — ASSESSMENT & PLAN NOTE
· /79 (BP Location: Right arm)   Pulse 105   Temp 98 3 °F (36 8 °C)   Resp 18   LMP 02/22/2019   SpO2 99%   · Continue home meds: amlodipine   · Lisinopril has been discontinued as OP

## 2020-12-24 ENCOUNTER — APPOINTMENT (INPATIENT)
Dept: INTERVENTIONAL RADIOLOGY/VASCULAR | Facility: HOSPITAL | Age: 55
DRG: 440 | End: 2020-12-24
Attending: RADIOLOGY
Payer: COMMERCIAL

## 2020-12-24 LAB
ALBUMIN SERPL BCP-MCNC: 2.7 G/DL (ref 3.5–5)
ALP SERPL-CCNC: 82 U/L (ref 46–116)
ALT SERPL W P-5'-P-CCNC: 31 U/L (ref 12–78)
ANION GAP SERPL CALCULATED.3IONS-SCNC: 10 MMOL/L (ref 4–13)
AST SERPL W P-5'-P-CCNC: 23 U/L (ref 5–45)
BASOPHILS # BLD AUTO: 0.03 THOUSANDS/ΜL (ref 0–0.1)
BASOPHILS NFR BLD AUTO: 1 % (ref 0–1)
BILIRUB SERPL-MCNC: 0.3 MG/DL (ref 0.2–1)
BUN SERPL-MCNC: 5 MG/DL (ref 5–25)
CALCIUM ALBUM COR SERPL-MCNC: 9.5 MG/DL (ref 8.3–10.1)
CALCIUM SERPL-MCNC: 8.5 MG/DL (ref 8.3–10.1)
CHLORIDE SERPL-SCNC: 105 MMOL/L (ref 100–108)
CO2 SERPL-SCNC: 28 MMOL/L (ref 21–32)
CREAT SERPL-MCNC: 0.76 MG/DL (ref 0.6–1.3)
EOSINOPHIL # BLD AUTO: 0.21 THOUSAND/ΜL (ref 0–0.61)
EOSINOPHIL NFR BLD AUTO: 4 % (ref 0–6)
ERYTHROCYTE [DISTWIDTH] IN BLOOD BY AUTOMATED COUNT: 12.1 % (ref 11.6–15.1)
GFR SERPL CREATININE-BSD FRML MDRD: 89 ML/MIN/1.73SQ M
GLUCOSE SERPL-MCNC: 118 MG/DL (ref 65–140)
HCT VFR BLD AUTO: 33.4 % (ref 34.8–46.1)
HGB BLD-MCNC: 10.9 G/DL (ref 11.5–15.4)
IMM GRANULOCYTES # BLD AUTO: 0.02 THOUSAND/UL (ref 0–0.2)
IMM GRANULOCYTES NFR BLD AUTO: 0 % (ref 0–2)
LYMPHOCYTES # BLD AUTO: 0.88 THOUSANDS/ΜL (ref 0.6–4.47)
LYMPHOCYTES NFR BLD AUTO: 16 % (ref 14–44)
MAGNESIUM SERPL-MCNC: 1.6 MG/DL (ref 1.6–2.6)
MCH RBC QN AUTO: 31 PG (ref 26.8–34.3)
MCHC RBC AUTO-ENTMCNC: 32.6 G/DL (ref 31.4–37.4)
MCV RBC AUTO: 95 FL (ref 82–98)
MONOCYTES # BLD AUTO: 0.51 THOUSAND/ΜL (ref 0.17–1.22)
MONOCYTES NFR BLD AUTO: 9 % (ref 4–12)
NEUTROPHILS # BLD AUTO: 3.82 THOUSANDS/ΜL (ref 1.85–7.62)
NEUTS SEG NFR BLD AUTO: 70 % (ref 43–75)
NRBC BLD AUTO-RTO: 0 /100 WBCS
PHOSPHATE SERPL-MCNC: 4.8 MG/DL (ref 2.7–4.5)
PLATELET # BLD AUTO: 388 THOUSANDS/UL (ref 149–390)
PMV BLD AUTO: 10.1 FL (ref 8.9–12.7)
POTASSIUM SERPL-SCNC: 3.6 MMOL/L (ref 3.5–5.3)
PREALB SERPL-MCNC: 23.6 MG/DL (ref 18–40)
PROT SERPL-MCNC: 7 G/DL (ref 6.4–8.2)
RBC # BLD AUTO: 3.52 MILLION/UL (ref 3.81–5.12)
SODIUM SERPL-SCNC: 143 MMOL/L (ref 136–145)
TRIGL SERPL-MCNC: 129 MG/DL
WBC # BLD AUTO: 5.47 THOUSAND/UL (ref 4.31–10.16)

## 2020-12-24 PROCEDURE — C1751 CATH, INF, PER/CENT/MIDLINE: HCPCS

## 2020-12-24 PROCEDURE — NC001 PR NO CHARGE: Performed by: RADIOLOGY

## 2020-12-24 PROCEDURE — 99232 SBSQ HOSP IP/OBS MODERATE 35: CPT | Performed by: SURGERY

## 2020-12-24 PROCEDURE — 36573 INSJ PICC RS&I 5 YR+: CPT | Performed by: RADIOLOGY

## 2020-12-24 PROCEDURE — 3E0436Z INTRODUCTION OF NUTRITIONAL SUBSTANCE INTO CENTRAL VEIN, PERCUTANEOUS APPROACH: ICD-10-PCS | Performed by: STUDENT IN AN ORGANIZED HEALTH CARE EDUCATION/TRAINING PROGRAM

## 2020-12-24 PROCEDURE — 84100 ASSAY OF PHOSPHORUS: CPT | Performed by: PHYSICIAN ASSISTANT

## 2020-12-24 PROCEDURE — 99232 SBSQ HOSP IP/OBS MODERATE 35: CPT | Performed by: PHYSICIAN ASSISTANT

## 2020-12-24 PROCEDURE — 80053 COMPREHEN METABOLIC PANEL: CPT | Performed by: FAMILY MEDICINE

## 2020-12-24 PROCEDURE — 36569 INSJ PICC 5 YR+ W/O IMAGING: CPT

## 2020-12-24 PROCEDURE — C1769 GUIDE WIRE: HCPCS

## 2020-12-24 PROCEDURE — 84134 ASSAY OF PREALBUMIN: CPT | Performed by: PHYSICIAN ASSISTANT

## 2020-12-24 PROCEDURE — 02HV33Z INSERTION OF INFUSION DEVICE INTO SUPERIOR VENA CAVA, PERCUTANEOUS APPROACH: ICD-10-PCS | Performed by: RADIOLOGY

## 2020-12-24 PROCEDURE — 85025 COMPLETE CBC W/AUTO DIFF WBC: CPT | Performed by: FAMILY MEDICINE

## 2020-12-24 PROCEDURE — 83735 ASSAY OF MAGNESIUM: CPT | Performed by: PHYSICIAN ASSISTANT

## 2020-12-24 PROCEDURE — 84478 ASSAY OF TRIGLYCERIDES: CPT | Performed by: PHYSICIAN ASSISTANT

## 2020-12-24 PROCEDURE — C9113 INJ PANTOPRAZOLE SODIUM, VIA: HCPCS | Performed by: PHYSICIAN ASSISTANT

## 2020-12-24 RX ORDER — MECLIZINE HYDROCHLORIDE 25 MG/1
25 TABLET ORAL EVERY 8 HOURS PRN
Status: DISCONTINUED | OUTPATIENT
Start: 2020-12-24 | End: 2020-12-26

## 2020-12-24 RX ORDER — LIDOCAINE WITH 8.4% SOD BICARB 0.9%(10ML)
SYRINGE (ML) INJECTION CODE/TRAUMA/SEDATION MEDICATION
Status: COMPLETED | OUTPATIENT
Start: 2020-12-24 | End: 2020-12-24

## 2020-12-24 RX ADMIN — ENOXAPARIN SODIUM 40 MG: 40 INJECTION SUBCUTANEOUS at 08:19

## 2020-12-24 RX ADMIN — Medication: at 21:50

## 2020-12-24 RX ADMIN — PANTOPRAZOLE SODIUM 40 MG: 40 INJECTION, POWDER, FOR SOLUTION INTRAVENOUS at 08:19

## 2020-12-24 RX ADMIN — AMLODIPINE BESYLATE 10 MG: 10 TABLET ORAL at 08:19

## 2020-12-24 RX ADMIN — Medication 3 ML: at 15:45

## 2020-12-24 RX ADMIN — DEXTROSE AND SODIUM CHLORIDE 100 ML/HR: 5; .45 INJECTION, SOLUTION INTRAVENOUS at 03:57

## 2020-12-24 NOTE — PROCEDURES
Insert PICC line    Date/Time: 12/24/2020 4:23 PM  Performed by: Eusebia Cochran MD  Authorized by: Eusebia Cochran MD     Patient location:  IR  Consent:     Consent obtained:  Written    Consent given by:  Patient    Risks discussed:  Bleeding and infection  Universal protocol:     Immediately prior to procedure, a time out was called: yes      Patient identity confirmed:  Provided demographic data  Pre-procedure details:     Hand hygiene: Hand hygiene performed prior to insertion      Sterile barrier technique: All elements of maximal sterile technique followed      Skin preparation:  2% chlorhexidine    Skin preparation agent: Skin preparation agent completely dried prior to procedure    Indications:     PICC line indications: total parenteral nutrition    Anesthesia (see MAR for exact dosages): Anesthesia method:  Local infiltration    Local anesthetic:  Lidocaine 1% w/o epi  Procedure details:     Location:  Brachial    Vessel type: vein      Laterality:  Right    Approach: percutaneous technique used      Patient position:  Flat    Procedural supplies:  Single lumen    Catheter size:  4 Fr    Ultrasound guidance: yes      Sterile ultrasound techniques: Sterile gel and sterile probe covers were used      Number of attempts:  2    Successful placement: yes      Total catheter length (cm):  42    Catheter out on skin (cm):  1  Post-procedure details:     Post-procedure:  Dressing applied    Assessment:  Blood return through all ports, free fluid flow and placement verified by x-ray    Post-procedure complications: none      Patient tolerance of procedure:   Tolerated well, no immediate complications

## 2020-12-24 NOTE — CASE MANAGEMENT
ADALBERTO discussed pt w/ [facility] and Carmen HURTADO  Will handoff to CM Naomi after lunch  Pt to get PICC line today to start TPN ;later this evening, w/ possible d/c over the weekend  Tab Crum says  Informed Carmen HURTADO of same

## 2020-12-24 NOTE — ASSESSMENT & PLAN NOTE
· Lap collette on 12/4/2020  · She is also s/p ERCP during last admission and developed post ERCP pancreatitis  · LFTs stable, lipase normal

## 2020-12-24 NOTE — CASE MANAGEMENT
CM informed ADALBERTO Vera of pt's need for Home TPN   As indicated by [facility] surgery team

## 2020-12-24 NOTE — ASSESSMENT & PLAN NOTE
· Found on CT scan 12/23; per surgery, conservative management with NPO and reassessment to eval for resolution  · Surgery consulted: planning on PICC/TPN   · GI consult pending  · S/p recent post ERCP pancreatitis  · Keep NPO, continue [email or web address] cc/hr for IV hydration due to NPO status  · Pain control as needed  · Zofran IV for nausea and vomiting

## 2020-12-24 NOTE — UTILIZATION REVIEW
Notification of Inpatient Admission/Inpatient Authorization Request   This is a Notification of Inpatient Admission for [address]  Be advised that this patient was admitted to our facility under Inpatient Status  Contact Dayana Santillan at [phone] for additional admission information  [facility] DEPT DEDICATED [place] [phone]  Patient Name:   Pao Zayas   YOB: 1965       [address]   [address]:   [address]   Tax ID: [id number]  NPI: [id number] Attending Provider/NPI:  Phone:  Address: Aubrey Spence [[id number]]  [phone]  Same as WILIAN/Kendra Mccall 1106 of Service Code: 24     Place of Service Name:  [address] [place] [address]   Start Date: 12/23/20 1519 Discharge Date & Time: No discharge date for patient encounter  Type of Admission: Inpatient Status Discharge Disposition   (if discharged): Home/Self Care   Patient Diagnoses: Pancreatic pseudocyst [K86 3]  Abdominal pain [R10 9]     Orders: Admission Orders (From admission, onward)     Ordered        12/23/20 1519  Inpatient Admission  Once                    Assigned Utilization Review Contact: Dayana Santillan  Utilization   Network Utilization Review Department  Phone: [phone]; Fax [phone]  Email: [email or web address]   ATTENTION PAYERS: Please call the assigned Utilization  directly with any questions or concerns ALL voicemails in the department are confidential  Send all requests for admission clinical reviews, approved or denied determinations and any other requests to dedicated fax number belonging to the campus where the patient is receiving treatment

## 2020-12-24 NOTE — PROGRESS NOTES
Progress Note -Surgery PA  Jeancarlos Tierney 48 y o  female MRN: [id number]  Unit/Bed#: MS Vidal-01 Encounter: [id number]      Assessment   47y F w developing pancreatic pseudocyst  - CT AP 12/23/20: serpiginous acute peripancreatic fluid collections with minimal diffuse wall thickening attributable to developing pseudocyst   No gas seen within fluid collections therefore low suspicion for abscess however not excluded  Fluid collection posterior and inferior to pancreas tracking along the proximals  Commas enteric vessels and extending into the inferior lesser sac and gallbladder fossa  This also communicates with a collection in the gallbladder fossa  Total measurement of the collection is 12 1 x 3 9 x 5 4 cm  - AVSS, no leukocytosis  - Albumin 2 7 from 3 3; Prealbumin 23 6  Plan   -- Continue diet NPO/IVF  -- Monitor labs and abdominal exams  -- Possible PICC for TPN  -- analgesia, prn    ______________________________________________________________________  Subjective:   Patient feels that her pain is about the same however on palpation she is less tender  She does feel bloated  She is passing flatus but had a bowel movement for 2 days  She states this is normal for her she denies any fever, chills, nausea or vomiting  She denies any chest pain, shortness of breath, palpitations  Objective:    Vitals:  /79   Pulse 89   Temp 98 1 °F (36 7 °C)   Resp 18   Ht 5' 7" (1 702 m)   Wt 86 kg (189 lb 9 5 oz)   LMP 02/22/2019   SpO2 97%   BMI 29 69 kg/m²     I/Os:  No intake/output data recorded      I/O this shift:  In: 1508 3 [I V :1508 3]  Out: 300 [Urine:300]    Invasive Devices     Peripheral Intravenous Line            Peripheral IV 12/23/20 Right Antecubital less than 1 day                Medications:  Current Facility-Administered Medications   Medication Dose Route Frequency    acetaminophen (TYLENOL) tablet 650 mg  650 mg Oral Q6H PRN    amLODIPine (NORVASC) tablet 10 mg  10 mg Oral Daily    dextrose 5 % and sodium chloride 0 45 % infusion  100 mL/hr Intravenous Continuous    enoxaparin (LOVENOX) subcutaneous injection 40 mg  40 mg Subcutaneous Daily    ibuprofen (MOTRIN) tablet 400 mg  400 mg Oral Q6H PRN    lactated ringers infusion  125 mL/hr Intravenous Continuous    ondansetron (ZOFRAN) injection 4 mg  4 mg Intravenous Q4H PRN    oxyCODONE (ROXICODONE) IR tablet 5 mg  5 mg Oral Q6H PRN    pantoprazole (PROTONIX) injection 40 mg  40 mg Intravenous Q24H [facility]                 Lab Results and Cultures:   CBC with diff:   Lab Results   Component Value Date    WBC 5 47 12/24/2020    HGB 10 9 (L) 12/24/2020    HCT 33 4 (L) 12/24/2020    MCV 95 12/24/2020     12/24/2020    MCH 31 0 12/24/2020    MCHC 32 6 12/24/2020    RDW 12 1 12/24/2020    MPV 10 1 12/24/2020    NRBC 0 12/24/2020       BMP/CMP:  Lab Results   Component Value Date     04/26/2016    K 3 6 12/24/2020    K 4 7 06/13/2018     12/24/2020     06/13/2018    CO2 28 12/24/2020    CO2 28 06/13/2018    BUN 5 12/24/2020    BUN 16 06/13/2018    CREATININE 0 76 12/24/2020    CREATININE 0 99 04/26/2016    CALCIUM 8 5 12/24/2020    CALCIUM 9 1 06/13/2018    AST 23 12/24/2020    AST 14 06/13/2018    ALT 31 12/24/2020    ALT 10 06/13/2018    ALKPHOS 82 12/24/2020    ALKPHOS 92 06/13/2018    PROT 6 7 04/26/2016    BILITOT 0 4 04/26/2016    EGFR 89 12/24/2020       Lipid Panel:   Lab Results   Component Value Date    CHOL 170 04/26/2016       Coags:   No results found for: PT, PTT, INR     Urinalysis: No results found for: Palma Safer, SPECGRAV, PHUR, LEUKOCYTESUR, NITRITE, PROTEINUA, GLUCOSEU, KETONESU, BILIRUBINUR, BLOODU     Urine Culture: No results found for: URINECX   Wound Culure: No results found for: WOUNDCULT  Blood Culture:   Lab Results   Component Value Date    BLOODCX No Growth After 5 Days  12/02/2020    BLOODCX No Growth After 5 Days   12/02/2020         Physical Exam:  General Appearance:    Alert and orientated x 3, cooperative, no distress, appears stated age   Lungs:     Clear to auscultation bilaterally, respirations unlabored, no wheezes    Heart:    Regular rate and rhythm, S1 and S2 normal, no murmur   Abdomen:    Normoactive BS, soft, mild epigastric tenderness improved from yesterday's exam, non rigid, no masses, no palpated organomegaly   Extremities:  Extremities normal, no calf tenderness, no cyanosis or edema   Pulses:   2+ and symmetric all extremities   Skin:   Skin color, texture, turgor normal, no rashes   Neurologic:   CNII-XII intact, normal strength, affect appropriate       Imaging:  Ct Abdomen Pelvis W Contrast    Result Date: 12/23/2020  Impression: Acute large communicating serpiginous peripancreatic fluid collection with minimal diffuse wall thickening  This may represent fluid loculation and developing pseudocyst  Although there is no gas within the collection, superinfected fluid collection is not excluded  See above discussion  No evidence of pancreatic necrosis  Mild residual albeit moderately improved acute pancreatitis  This study demonstrates a significant  finding and was documented as such in [facility] for liaison and referring practitioner notification   Workstation performed: [id number]       VTE Pharmacologic Prophylaxis: Enoxaparin (Lovenox)  VTE Mechanical Prophylaxis: sequential compression device    Arina Sands PA-C   12/24/2020

## 2020-12-24 NOTE — CONSULTS
Consult received re: TPN  Consult appreciated  Recommend start with 1L Standard TPN, and then in next 24-48 hours recommend increasing to goal of:  1000 ml 30% Dextrose, 1000 ml 10% Amino Acids, 250 ml 20% Lipids for: 1920 kcal (meet approximately 90% goal energy needs), 100 grams protein (meet approximately % goal protein needs), 50 grams fat, 300 grams Carbohydrate

## 2020-12-24 NOTE — PROGRESS NOTES
Confirmed with pharmacy, TPN to be delivered from  at approx 2100  Will notify nursing when it arrives

## 2020-12-24 NOTE — UTILIZATION REVIEW
Initial Clinical Review    Admission: Date/Time/Statement:   Admission Orders (From admission, onward)     Ordered        12/23/20 1519  Inpatient Admission  Once                   Orders Placed This Encounter   Procedures    Inpatient Admission     Standing Status:   Standing     Number of Occurrences:   1     Order Specific Question:   Admitting Physician     Answer:   Adria Liriano [[id number]]     Order Specific Question:   Level of Care     Answer:   Med Surg [16]     Order Specific Question:   Estimated length of stay     Answer:   More than 2 Midnights     Order Specific Question:   Certification     Answer:   I certify that inpatient services are medically necessary for this patient for a duration of greater than two midnights  See H&P and MD Progress Notes for additional information about the patient's course of treatment  ED Arrival Information     Expected Arrival Acuity Means of Arrival Escorted By Service Admission Type    - 12/23/2020 14:23 Urgent Walk-In Spouse General Medicine Urgent    Arrival Complaint    ABDOMINAL PAIN        Chief Complaint   Patient presents with    Abnormal Lab     Pt states had a CT done this morning that showed fluid around her pancreas  Pt sent by Dr Luis Manuel Mccullough to be admitted      Assessment/Plan: 49 yo female to ED from home w/ ongoing nausea and abd pain since DC following lap choley and post ERCP pancreatitis   CT revealed pseudocyst  Pain worse w/ eating   Mild weight loss and loose stools x2 days   Admitted IP staus plan for NPO , IVF , pain control , zofran for N/V   Surgery consult planning to start PICC line for TPN   Surgery would also like GI consult   Hx HTN cont meds  12/23 Surgery consult   Developing pseudocyst pancreas  Plan for conservative treatment w/ NPO , TPN , allow for rest of the pancreatic function   Cont to monitor for fever, inc wbc and inc abd pain    Check lipid profile and prealbumin , serial labs, abd exams , PPI IV and consult GI       12/23 Surgery Note   Some tenderness to palpation , feels bloated   + flatus   Cont diet NPO , IVF , monitor labs and abd exams , analgesia prn   ED Triage Vitals   Temperature Pulse Respirations Blood Pressure SpO2   12/23/20 1436 12/23/20 1431 12/23/20 1431 12/23/20 1431 12/23/20 1431   98 3 °F (36 8 °C) 105 18 135/79 99 %      Temp src Heart Rate Source Patient Position - Orthostatic VS BP Location FiO2 (%)   -- 12/23/20 1431 12/23/20 1654 12/23/20 1431 --    Monitor Sitting Right arm       Pain Score       12/23/20 1700       No Pain          Wt Readings from Last 1 Encounters:   12/23/20 86 kg (189 lb 9 5 oz)     Additional Vital Signs:  12/24/20 07:43:14  98 1 °F (36 7 °C)  89    128/79  95  97 %       12/23/20 23:05:53  98 4 °F (36 9 °C)  89  18  131/81  98  96 %       12/23/20 17:36:05  98 4 °F (36 9 °C)  96    142/84  103  98 %       12/23/20 1654    93  18  128/86  100  99 %  None (Room air)  Sitting   12/23/20 1436  98 3 °F (36 8 °C)                   Pertinent Labs/Diagnostic Test Results:   12/23 CT abd Acute large communicating serpiginous peripancreatic fluid collection with minimal diffuse wall thickening  This may represent fluid loculation and developing pseudocyst  Although there is no gas within the collection, superinfected fluid collection is   not excluded   See above discussion    No evidence of pancreatic necrosis    Mild residual albeit moderately improved acute pancreatitis    Results from last 7 days   Lab Units 12/24/20  0506 12/23/20  1444   WBC Thousand/uL 5 47 7 68   HEMOGLOBIN g/dL 10 9* 11 5   HEMATOCRIT % 33 4* 36 1   PLATELETS Thousands/uL 388 482*   NEUTROS ABS Thousands/µL 3 82 5 23     Results from last 7 days   Lab Units 12/24/20  0506 12/23/20  1444   SODIUM mmol/L 143 141   POTASSIUM mmol/L 3 6 4 2   CHLORIDE mmol/L 105 105   CO2 mmol/L 28 30   ANION GAP mmol/L 10 6   BUN mg/dL 5 10   CREATININE mg/dL 0 76 0 96   EGFR ml/min/1 73sq m 89 67   CALCIUM mg/dL 8 5 9 4 MAGNESIUM mg/dL 1 6  --    PHOSPHORUS mg/dL 4 8*  --      Results from last 7 days   Lab Units 12/24/20  0506 12/23/20  1444   AST U/L 23 30   ALT U/L 31 42   ALK PHOS U/L 82 94   TOTAL PROTEIN g/dL 7 0 7 9   ALBUMIN g/dL 2 7* 3 3*   TOTAL BILIRUBIN mg/dL 0 30 0 30     Results from last 7 days   Lab Units 12/24/20  0506 12/23/20  1444   GLUCOSE RANDOM mg/dL 118 116     Results from last 7 days   Lab Units 12/23/20  1444   LIPASE u/L 341     ED Treatment:   Medication Administration from 12/23/2020 1422 to 12/23/2020 1702       Date/Time Order Dose Route Action     12/23/2020 1445 lactated ringers infusion 125 mL/hr Intravenous New Bag        Past Medical History:   Diagnosis Date    GERD (gastroesophageal reflux disease)      Present on Admission:   (Resolved) Cholelithiasis with choledocholithiasis   Essential hypertension   Pseudocyst of pancreas      Admitting Diagnosis: Pancreatic pseudocyst [K86 3]  Abdominal pain [R10 9]  Age/Sex: 48 y o  female  Admission Orders:  Scheduled Medications:  amLODIPine, 10 mg, Oral, Daily  enoxaparin, 40 mg, Subcutaneous, Daily  pantoprazole, 40 mg, Intravenous, Q24H [address]      Continuous IV Infusions:  dextrose 5 % and sodium chloride 0 45 %, 100 mL/hr, Intravenous, Continuous  lactated ringers, 125 mL/hr, Intravenous, Continuous      PRN Meds:  acetaminophen, 650 mg, Oral, Q6H PRN  ibuprofen, 400 mg, Oral, Q6H PRN  ondansetron, 4 mg, Intravenous, Q4H PRN  oxyCODONE, 5 mg, Oral, Q6H PRN    I&O   Up and OOB   NPO     IP CONSULT TO ACUTE CARE SURGERY  IP CONSULT TO GASTROENTEROLOGY  IP CONSULT TO NUTRITION SERVICES    Network Utilization Review Department  ATTENTION: Please call with any questions or concerns to [phone] and carefully listen to the prompts so that you are directed to the right person   All voicemails are confidential   Tomas Rogers all requests for admission clinical reviews, approved or denied determinations and any other requests to dedicated fax number below belonging to the campus where the patient is receiving treatment   List of dedicated fax numbers for the Facilities:  [address] DENIALS (Administrative/Medical Necessity) [phone]   [address]  (Maternity/NICU/Pediatrics) [phone] [address] Yogi Weinstein 8278 (  Meng Mitchell "Isatu" 103) [address] [address] Gunner Almodovar 14[address] [place] [address] [phone]

## 2020-12-24 NOTE — PROGRESS NOTES
Progress Note - Jeancarlos Tierney 1965, 48 y o  female MRN: [id number]    Unit/Bed#: -01 Encounter: [id number]    Primary Care Provider: Vicky Graves MD   Date and time admitted to hospital: 12/23/2020  2:27 PM    * Pseudocyst of pancreas  Assessment & Plan  · Found on CT scan 12/23  · Surgery consulted: planning on PICC/TPN   · GI consult pending  · S/p recent post ERCP pancreatitis  · Keep NPO, continue [email or web address] cc/hr for IV hydration due to NPO status  · Pain control as needed  · Zofran IV for nausea and vomiting      Status post cholecystectomy  Assessment & Plan  · Lap collette on 12/4/2020  · She is also s/p ERCP during last admission and developed post ERCP pancreatitis  · LFTs stable, lipase normal    Essential hypertension  Assessment & Plan  · Continue home meds: amlodipine   · Lisinopril has been discontinued as OP        VTE Pharmacologic Prophylaxis:   Pharmacologic: Enoxaparin (Lovenox)  Mechanical VTE Prophylaxis in Place: Yes    Patient Centered Rounds: I have performed bedside rounds with nursing staff today  Discussions with Specialists or Other Care Team Provider: surgery, CM     Education and Discussions with Family / Patient: patient     Time Spent for Care: 20 minutes  More than 50% of total time spent on counseling and coordination of care as described above  Current Length of Stay: 1 day(s)    Current Patient Status: Inpatient   Certification Statement: The patient will continue to require additional inpatient hospital stay due to PICC line to start TPN    Discharge Plan: not medically stable for discharge     Code Status: Level 1 - Full Code      Subjective:   Patient seen, she just had a PICC line placed  She is doing okay  No nausea or vomiting  No fevers  No chest pain or shortness of breath  She understands that she will need to remain in the hospital through the weekend because of logistics to get TPN at home    She also understands that repeat imaging could show that she needs additional intervention, per her conversation with the gastroenterologist     Objective:     Vitals:   Temp (24hrs), Av 3 °F (36 8 °C), Min:98 1 °F (36 7 °C), Max:98 4 °F (36 9 °C)    Temp:  [98 1 °F (36 7 °C)-98 4 °F (36 9 °C)] 98 1 °F (36 7 °C)  HR:  [] 89  Resp:  [18] 18  BP: (128-142)/(79-86) 128/79  SpO2:  [96 %-99 %] 97 %  Body mass index is 29 69 kg/m²  Input and Output Summary (last 24 hours): Intake/Output Summary (Last 24 hours) at 2020 1101  Last data filed at 2020 0901  Gross per 24 hour   Intake 1508 33 ml   Output 300 ml   Net 1208 33 ml       Physical Exam:     Physical Exam  Vitals signs and nursing note reviewed  Constitutional:       Appearance: Normal appearance  She is not ill-appearing or toxic-appearing  Cardiovascular:      Rate and Rhythm: Normal rate and regular rhythm  Heart sounds: Normal heart sounds  Pulmonary:      Effort: Pulmonary effort is normal  No respiratory distress  Breath sounds: Normal breath sounds  No wheezing  Abdominal:      General: Bowel sounds are normal  There is no distension  Palpations: Abdomen is soft  Tenderness: There is no guarding  Skin:     General: Skin is warm and dry  Coloration: Skin is not pale  Neurological:      Mental Status: She is alert and oriented to person, place, and time           Additional Data:     Labs:    Results from last 7 days   Lab Units 20  0506   WBC Thousand/uL 5 47   HEMOGLOBIN g/dL 10 9*   HEMATOCRIT % 33 4*   PLATELETS Thousands/uL 388   NEUTROS PCT % 70   LYMPHS PCT % 16   MONOS PCT % 9   EOS PCT % 4     Results from last 7 days   Lab Units 20  0506   SODIUM mmol/L 143   POTASSIUM mmol/L 3 6   CHLORIDE mmol/L 105   CO2 mmol/L 28   BUN mg/dL 5   CREATININE mg/dL 0 76   ANION GAP mmol/L 10   CALCIUM mg/dL 8 5   ALBUMIN g/dL 2 7*   TOTAL BILIRUBIN mg/dL 0 30   ALK PHOS U/L 82   ALT U/L 31   AST U/L 23   GLUCOSE RANDOM mg/dL 118 * I Have Reviewed All Lab Data Listed Above  * Additional Pertinent Lab Tests Reviewed: Abundio 66 Admission Reviewed    Imaging:    Imaging Reports Reviewed Today Include: CT scan 12/23  Imaging Personally Reviewed by Myself Includes:      Recent Cultures (last 7 days):           Last 24 Hours Medication List:   Current Facility-Administered Medications   Medication Dose Route Frequency Provider Last Rate    acetaminophen  650 mg Oral Q6H PRN Celeste Ayala MD      amLODIPine  10 mg Oral Daily Celeste Ayala MD      dextrose 5 % and sodium chloride 0 45 %  100 mL/hr Intravenous Continuous Celeste Ayala  mL/hr (12/24/20 0357)    enoxaparin  40 mg Subcutaneous Daily Celeste Ayala MD      ibuprofen  400 mg Oral Q6H PRN Celeste Ayala MD      lactated ringers  125 mL/hr Intravenous Continuous Shahana Christianson PA-C 125 mL/hr (12/23/20 1445)    ondansetron  4 mg Intravenous Q4H PRN Celeste Ayala MD      oxyCODONE  5 mg Oral Q6H PRN Celeste Ayala MD      pantoprazole  40 mg Intravenous Q24H [address] [place]          Today, Patient Was Seen By: Terry Tabares PA-C    ** Please Note: Dictation voice to text software may have been used in the creation of this document   **

## 2020-12-25 LAB
ALBUMIN SERPL BCP-MCNC: 2.8 G/DL (ref 3.5–5)
ALP SERPL-CCNC: 82 U/L (ref 46–116)
ALT SERPL W P-5'-P-CCNC: 34 U/L (ref 12–78)
ANION GAP SERPL CALCULATED.3IONS-SCNC: 12 MMOL/L (ref 4–13)
AST SERPL W P-5'-P-CCNC: 24 U/L (ref 5–45)
BILIRUB SERPL-MCNC: 0.2 MG/DL (ref 0.2–1)
BUN SERPL-MCNC: 4 MG/DL (ref 5–25)
CALCIUM ALBUM COR SERPL-MCNC: 9.8 MG/DL (ref 8.3–10.1)
CALCIUM SERPL-MCNC: 8.8 MG/DL (ref 8.3–10.1)
CHLORIDE SERPL-SCNC: 106 MMOL/L (ref 100–108)
CO2 SERPL-SCNC: 27 MMOL/L (ref 21–32)
CREAT SERPL-MCNC: 0.69 MG/DL (ref 0.6–1.3)
GFR SERPL CREATININE-BSD FRML MDRD: 99 ML/MIN/1.73SQ M
GLUCOSE SERPL-MCNC: 124 MG/DL (ref 65–140)
MAGNESIUM SERPL-MCNC: 1.8 MG/DL (ref 1.6–2.6)
PHOSPHATE SERPL-MCNC: 4.5 MG/DL (ref 2.7–4.5)
POTASSIUM SERPL-SCNC: 3.6 MMOL/L (ref 3.5–5.3)
PROT SERPL-MCNC: 7.1 G/DL (ref 6.4–8.2)
SODIUM SERPL-SCNC: 145 MMOL/L (ref 136–145)
TRIGL SERPL-MCNC: 136 MG/DL

## 2020-12-25 PROCEDURE — 99255 IP/OBS CONSLTJ NEW/EST HI 80: CPT | Performed by: INTERNAL MEDICINE

## 2020-12-25 PROCEDURE — 84478 ASSAY OF TRIGLYCERIDES: CPT | Performed by: PHYSICIAN ASSISTANT

## 2020-12-25 PROCEDURE — 99232 SBSQ HOSP IP/OBS MODERATE 35: CPT | Performed by: SURGERY

## 2020-12-25 PROCEDURE — C9113 INJ PANTOPRAZOLE SODIUM, VIA: HCPCS | Performed by: PHYSICIAN ASSISTANT

## 2020-12-25 PROCEDURE — 84100 ASSAY OF PHOSPHORUS: CPT | Performed by: PHYSICIAN ASSISTANT

## 2020-12-25 PROCEDURE — 80053 COMPREHEN METABOLIC PANEL: CPT | Performed by: PHYSICIAN ASSISTANT

## 2020-12-25 PROCEDURE — 83735 ASSAY OF MAGNESIUM: CPT | Performed by: PHYSICIAN ASSISTANT

## 2020-12-25 RX ADMIN — MECLIZINE HYDROCHLORIDE 25 MG: 25 TABLET ORAL at 21:20

## 2020-12-25 RX ADMIN — DEXTROSE AND SODIUM CHLORIDE 100 ML/HR: 5; .45 INJECTION, SOLUTION INTRAVENOUS at 00:17

## 2020-12-25 RX ADMIN — MECLIZINE HYDROCHLORIDE 25 MG: 25 TABLET ORAL at 00:19

## 2020-12-25 RX ADMIN — ENOXAPARIN SODIUM 40 MG: 40 INJECTION SUBCUTANEOUS at 10:19

## 2020-12-25 RX ADMIN — PANTOPRAZOLE SODIUM 40 MG: 40 INJECTION, POWDER, FOR SOLUTION INTRAVENOUS at 10:20

## 2020-12-25 RX ADMIN — AMLODIPINE BESYLATE 10 MG: 10 TABLET ORAL at 10:19

## 2020-12-25 RX ADMIN — Medication: at 21:04

## 2020-12-25 RX ADMIN — MECLIZINE HYDROCHLORIDE 25 MG: 25 TABLET ORAL at 10:25

## 2020-12-25 RX ADMIN — DEXTROSE AND SODIUM CHLORIDE 100 ML/HR: 5; .45 INJECTION, SOLUTION INTRAVENOUS at 20:40

## 2020-12-25 RX ADMIN — DEXTROSE AND SODIUM CHLORIDE 100 ML/HR: 5; .45 INJECTION, SOLUTION INTRAVENOUS at 10:26

## 2020-12-25 NOTE — NURSING NOTE
Pt complaining of dizziness after TPN was hung  Vital Signs stable  SLIM Violetta Alcantar aware, continue to monitor

## 2020-12-25 NOTE — CASE MANAGEMENT
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 [employer],  She can drive  Patient has no  MH hx and no hx of SA   Patients PCP has a  and uses [facility] for pharmacy   Patient has not used HHC is past  Patient/ family is agreeable to referrals to [facility], [address]  at discharge  [place] of choice discusses  Patient does not have POA, designated HCR is   Patient's anticipated transportation home is: family  Anticipated discharge plan: Matti Saini for possible TPN needs  CM department will follow thru dc  CM reviewed discharge planning process including the following: identifying caregivers at home, preference for d/c planning needs, availability of treatment team to discuss questions or concerns patient and/or family may have regarding diagnosis, plan of care, old or new medications and discharge planning   CM will continue to follow for care coordination and update assessment as appropriate      CM department will follow thru dc

## 2020-12-25 NOTE — CONSULTS
Consultation - [facility]  Marcial Garcia 48 y o  female MRN: [id number]  Unit/Bed#: -01 Encounter: [id number]        Consults    Reason for Consult / Principal Problem:  Pancreatic pseudocyst    HPI:  This 41-year-old female for history of essential hypertension was initially admitted to [facility] with the complaint of abdominal pain and nausea on November 30th, 2020  She was found to have cholelithiasis, choledocholithiasis, and elevated liver enzymes  She underwent endoscopic retrograde cholangiopancreatography with sphincterotomy and stone removal under member 35th  Multiple pigmented stones were removed from a dilated common bile duct following sphincterotomy and balloon dilation of the ampulla Vater  One large stone could not be removed and a 7 Western Aimee by 7 cm stent was placed  The patient developed post ERCP pancreatitis  Following improvement the patient underwent laparoscopic cholecystectomy on December 4, 2020  The the    Patient continued to experience nausea as well as abdominal pain since discharge from the hospital   The abdominal pain is provoked by eating  Patient described the pain as a 3-4/10 and is not associated with vomiting  She has had some weight loss  She denies fevers or chills but she admitted to 2 episodes of loose stool per day  She denied darkening of the urine or evidence of jaundice  She was seen in the outpatient setting by Dr Rosalina Patel and based on the symptoms, a CT scan the abdomen and pelvis was ordered  CT scan demonstrated a serpiginous communicating peripancreatic fluid collection with minimal diffuse wall thickening attributed to a developing pseudocyst   There is no gas within the fluid collection  The fluid was predominantly posterior and inferior to the pancreas tracking inferiorly along the proximal superior mesenteric vessels and extending into the inferior lesser sac and gallbladder fossa    It appears as if the gallbladder fossa communicates the peripancreatic fluid collection  REVIEW OF SYSTEMS:    CONSTITUTIONAL: Denies any fever, chills, or rigors  Good appetite, and no recent weight loss  HEENT: No earache or tinnitus  Denies hearing loss or visual disturbances  CARDIOVASCULAR: No chest pain or palpitations  RESPIRATORY: Denies any cough, hemoptysis, shortness of breath or dyspnea on exertion  GASTROINTESTINAL: As noted in the History of Present Illness  GENITOURINARY: No problems with urination  Denies any hematuria or dysuria  NEUROLOGIC: No dizziness or vertigo, denies headaches  MUSCULOSKELETAL: Denies any muscle or joint pain  SKIN: Denies skin rashes or itching  ENDOCRINE: Denies excessive thirst  Denies intolerance to heat or cold  PSYCHOSOCIAL: Denies depression or anxiety  Denies any recent memory loss         Historical Information   Past Medical History:   Diagnosis Date    GERD (gastroesophageal reflux disease)      Past Surgical History:   Procedure Laterality Date    BREAST BIOPSY Left 2006    core    CHOLECYSTECTOMY LAPAROSCOPIC N/A 12/4/2020    Procedure: CHOLECYSTECTOMY LAPAROSCOPIC, possible open;  Surgeon: Martita Nicholas MD;  Location: [facility];  Service: General    IR PICC LINE PLACEMENT DOUBLE LUMEN  12/24/2020     Social History   Social History     Substance and Sexual Activity   Alcohol Use Yes    Frequency: 2-4 times a month    Drinks per session: 1 or 2    Binge frequency: Never    Comment: Social drinker     Social History     Substance and Sexual Activity   Drug Use No     Social History     Tobacco Use   Smoking Status Never Smoker   Smokeless Tobacco Never Used     Family History   Problem Relation Age of Onset    Arthritis Mother     Skin cancer Mother     Skin cancer Father     Arthritis Maternal Grandmother     Hiatal hernia Maternal Grandfather     Arthritis Family     Hiatal hernia Family     Skin cancer Family     Varicose Veins Family         Of lower extremities  Lung cancer Brother        Meds/Allergies     Medications Prior to Admission   Medication    amLODIPine (NORVASC) 10 mg tablet    Ascorbic Acid (VITAMIN C) 500 MG CAPS    Calcium Carb-Cholecalciferol (CALTRATE 600+D) 600-800 MG-UNIT TABS    cyanocobalamin (VITAMIN B-12) 500 mcg tablet    fexofenadine-pseudoephedrine (ALLEGRA-D)  MG per tablet    fluticasone (FLONASE) 50 mcg/act nasal spray    meclizine (ANTIVERT) 25 mg tablet    Multiple Vitamins-Minerals (ONE DAILY FOR WOMEN PO)     Current Facility-Administered Medications   Medication Dose Route Frequency    acetaminophen (TYLENOL) tablet 650 mg  650 mg Oral Q6H PRN    Adult 3-in-1 TPN (standard base / standard electrolytes)   Intravenous Continuous TPN    amLODIPine (NORVASC) tablet 10 mg  10 mg Oral Daily    dextrose 5 % and sodium chloride 0 45 % infusion  100 mL/hr Intravenous Continuous    enoxaparin (LOVENOX) subcutaneous injection 40 mg  40 mg Subcutaneous Daily    ibuprofen (MOTRIN) tablet 400 mg  400 mg Oral Q6H PRN    meclizine (ANTIVERT) tablet 25 mg  25 mg Oral Q8H PRN    ondansetron (ZOFRAN) injection 4 mg  4 mg Intravenous Q4H PRN    oxyCODONE (ROXICODONE) IR tablet 5 mg  5 mg Oral Q6H PRN    pantoprazole (PROTONIX) injection 40 mg  40 mg Intravenous Q24H RICHY       Allergies   Allergen Reactions    Lactose Other (See Comments)     intolerance    Other      SEASONAL ALLERGIES           Objective     Blood pressure 122/78, pulse 87, temperature 98 4 °F (36 9 °C), resp  rate 18, height 5' 7" (1 702 m), weight 86 kg (189 lb 9 5 oz), last menstrual period 02/22/2019, SpO2 95 %        Intake/Output Summary (Last 24 hours) at 12/25/2020 0727  Last data filed at 12/24/2020 0901  Gross per 24 hour   Intake 1508 33 ml   Output 300 ml   Net 1208 33 ml         PHYSICAL EXAM:      General Appearance:   Alert, cooperative, no distress, appears stated age    [de-identified]:   Normocephalic, atraumatic, anicteric, PERRLA   Neck:  Supple, symmetrical, trachea midline, no adenopathy;    thyroid: no enlargement/tenderness/nodules; no carotid  bruit or JVD    Lungs:   Clear to auscultation bilaterally; no rales, rhonchi or wheezing; respirations unlabored    Heart[de-identified]   S1 and S2 normal; regular rate and rhythm; no murmur, rub, or gallop  Abdomen:   Soft, tender in the epigastrium and right upper quadrant, non-distended; normal bowel sounds; no masses, no organomegaly    Genitalia:   Deferred    Rectal:   Deferred    Extremities:  No cyanosis, clubbing or edema    Pulses:  2+ and symmetric all extremities    Skin:  Skin color, texture, turgor normal, no rashes or lesions    Lymph nodes:  No palpable cervical, axillary or inguinal lymphadenopathy        Lab Results:   Results from last 7 days   Lab Units 12/24/20  0506   WBC Thousand/uL 5 47   HEMOGLOBIN g/dL 10 9*   HEMATOCRIT % 33 4*   PLATELETS Thousands/uL 388   NEUTROS PCT % 70   LYMPHS PCT % 16   MONOS PCT % 9   EOS PCT % 4     Results from last 7 days   Lab Units 12/25/20  0545   POTASSIUM mmol/L 3 6   CHLORIDE mmol/L 106   CO2 mmol/L 27   BUN mg/dL 4*   CREATININE mg/dL 0 69   CALCIUM mg/dL 8 8   ALK PHOS U/L 82   ALT U/L 34   AST U/L 24         Results from last 7 days   Lab Units 12/23/20  1444   LIPASE u/L 341       Imaging Studies: I have personally reviewed pertinent imaging studies  Ct Abdomen Pelvis W Contrast    Result Date: 12/23/2020  Impression: Acute large communicating serpiginous peripancreatic fluid collection with minimal diffuse wall thickening  This may represent fluid loculation and developing pseudocyst  Although there is no gas within the collection, superinfected fluid collection is not excluded  See above discussion  No evidence of pancreatic necrosis  Mild residual albeit moderately improved acute pancreatitis  This study demonstrates a significant  finding and was documented as such in [facility] for liaison and referring practitioner notification   Workstation performed: [id number]     Ir Picc Line Placement Double Lumen    Result Date: 12/24/2020  Impression: Impression: Successful placement of a 42 cm right upper extremity single lumen Power PICC  Workstation performed: [id number]       ASSESSMENT and PLAN:      1  Pancreatic pseudocyst  - post ERCP pancreatitis 1st began on December 1, 2020  - ERCP demonstrated choledocholithiasis with small sphincterotomy and to balloon dilations, 8 mm and 10 mm, removal of multiple pigmented stones but a single large stone could not be removed, so a 7 Western Aimee, 7 cm stent was placed  - CT scan of the abdomen and pelvis performed on December 2nd demonstrated findings consistent with acute pancreatitis  There is peripancreatic fluid with areas of mild rim thickening in the central mesentery which may be due to early loculation  There was evidence of interval decompression of the common bile duct and placement of a biliary stent along with pneumobilia  - CT scan of the abdomen and pelvis performed December 23rd shows a large communicating serpiginous peripancreatic fluid collection with minimal diffuse wall thickening  This may represent fluid loculation and developing pseudocyst   The collection is predominantly posterior and inferior to the pancreas tracking inferiorly along with proximal superior mesenteric vessels extending into the inferior lesser sac and gallbladder fossa with collection in the gallbladder fossa is seem to communicate with the peripancreatic collection  There was no gas within the collection  There is no evidence of pancreatic necrosis  There was moderately improved acute pancreatitis    - patient is afebrile and vitals are stable the  - lipase levels normal at 341  - white blood cell count is normal at 5 47  - liver enzymes on 12/23 are normal  - currently the patient is NPO and will be receiving intravenous nutrition  - I have discussed this case with Dr Dede Banks MD, interventionalist in [place]  for consideration of either endoscopic retrograde cholangiopancreatography or eventual endoscopic ultrasound-guided transgastric drainage (EUS guided drainage is not indicated at this time)   - will perform MRI/MRCP to look for possible disruption of the pancreatic duct with the pancreatic duct leak     If a pancreatic duct leak is identified or suspected, then ERCP can be performed with endoscopic placement of a pancreatic stent to bridge the site of duct disruption and help heal the leak

## 2020-12-25 NOTE — PROGRESS NOTES
[facility] Progress Note  Patient: Jeancarlos Tierney 48 y o  female   MRN: [id number]  PCP: Vicky Graves MD  Unit/Bed#: -Emmie Encounter: [id number]  Date Of Visit: 20    Assessment:    Principal Problem:    Pseudocyst of pancreas  Active Problems:    Essential hypertension    Status post cholecystectomy      Plan:  Pancreatic pseudocyst  Continue NPO/IV fluids/TPN  Surgery and GI following  Serial exams    Hypertension  Blood pressure is stable    VTE Pharmacologic Prophylaxis:   Pharmacologic: Heparin  Mechanical VTE Prophylaxis in Place: Yes    Patient Centered Rounds: I have performed bedside rounds with nursing staff today  Discussions with Specialists or Other Care Team Provider:     Education and Discussions with Family / Patient:     Time Spent for Care: 30 minutes  More than 50% of total time spent on counseling and coordination of care as described above  Current Length of Stay: 2 day(s)    Current Patient Status: Inpatient   Certification Statement: The patient will continue to require additional inpatient hospital stay due to Above medical problems    Discharge Plan:     Code Status: Level 1 - Full Code      Subjective:   Patient seen and examined  Tolerating TPN well  No complaints  Objective:     Vitals:   Temp (24hrs), Av 1 °F (37 3 °C), Min:98 4 °F (36 9 °C), Max:99 9 °F (37 7 °C)    Temp:  [98 4 °F (36 9 °C)-99 9 °F (37 7 °C)] 99 4 °F (37 4 °C)  HR:  [86-94] 93  Resp:  [16-18] 18  BP: (121-130)/(78-80) 121/79  SpO2:  [94 %-97 %] 97 %  Body mass index is 29 69 kg/m²  Input and Output Summary (last 24 hours):        Intake/Output Summary (Last 24 hours) at 2020 1653  Last data filed at 2020 1225  Gross per 24 hour   Intake 1000 ml   Output    Net 1000 ml       Physical Exam:     Alert and oriented x3  Mucous membranes are moist  Lungs are clear to auscultation  Heart sounds are regular S1-S2  Abdomen soft nontender  Extremities no edema    Additional Data:     Labs:    Results from last 7 days   Lab Units 20  0506   WBC Thousand/uL 5 47   HEMOGLOBIN g/dL 10 9*   HEMATOCRIT % 33 4*   PLATELETS Thousands/uL 388   NEUTROS PCT % 70   LYMPHS PCT % 16   MONOS PCT % 9   EOS PCT % 4     Results from last 7 days   Lab Units 20  0545   POTASSIUM mmol/L 3 6   CHLORIDE mmol/L 106   CO2 mmol/L 27   BUN mg/dL 4*   CREATININE mg/dL 0 69   CALCIUM mg/dL 8 8   ALK PHOS U/L 82   ALT U/L 34   AST U/L 24           * I Have Reviewed All Lab Data Listed Above  * Additional Pertinent Lab Tests Reviewed: [facility] Admission Reviewed    Imaging:    Imaging Reports Reviewed Today Include:   Imaging Personally Reviewed by Myself Includes:     Recent Cultures (last 7 days):           Last 24 Hours Medication List:   Current Facility-Administered Medications   Medication Dose Route Frequency Provider Last Rate    acetaminophen  650 mg Oral Q6H PRN Lavella Meals, MD      Adult TPN (STANDARD BASE/STANDARD ELECTROLYTE)   Intravenous Continuous TPN Donita Griggs PA-C 48 7 mL/hr at 20 2150    Adult TPN (STANDARD BASE/STANDARD ELECTROLYTE)   Intravenous Continuous TPN Bedelia Simmonds, MD      amLODIPine  10 mg Oral Daily Lavella Meals, MD      dextrose 5 % and sodium chloride 0 45 %  100 mL/hr Intravenous Continuous Lavella Meals,  mL/hr (20 1026)    enoxaparin  40 mg Subcutaneous Daily Lavella Meals, MD      ibuprofen  400 mg Oral Q6H PRN Lavella Meals, MD      meclizine  25 mg Oral Q8H PRN Dwayne Pat PA-C      ondansetron  4 mg Intravenous Q4H PRN Lavella Meals, MD      oxyCODONE  5 mg Oral Q6H PRN Lavella Meals, MD      pantoprazole  40 mg Intravenous Q24H [address], PA-C          Today, Patient Was Seen By: Fabiola Monsalve MD    ** Please Note: Dragon 360 Dictation voice to text software may have been used in the creation of this document   **

## 2020-12-25 NOTE — PLAN OF CARE
Problem: Potential for Falls  Goal: Patient will remain free of falls  Description: INTERVENTIONS:  - Assess patient frequently for physical needs  -  Identify cognitive and physical deficits and behaviors that affect risk of falls    -  [place] fall precautions as indicated by assessment   - Educate patient/family on patient safety including physical limitations  - Instruct patient to call for assistance with activity based on assessment  - Modify environment to reduce risk of injury    Problem: PAIN - ADULT  Goal: Verbalizes/displays adequate comfort level or baseline comfort level  Description: Interventions:  - Encourage patient to monitor pain and request assistance  - Assess pain using appropriate pain scale  - Administer analgesics based on type and severity of pain and evaluate response  - Implement non-pharmacological measures as appropriate and evaluate response  - Consider cultural and social influences on pain and pain management  - Notify physician/advanced practitioner if interventions unsuccessful or patient reports new pain  Outcome: Progressing     Problem: INFECTION - ADULT  Goal: Absence or prevention of progression during hospitalization  Description: INTERVENTIONS:  - Assess and monitor for signs and symptoms of infection  - Monitor lab/diagnostic results  - Monitor all insertion sites, i e  indwelling lines, tubes, and drains  - Monitor endotracheal if appropriate and nasal secretions for changes in amount and color  - [place] appropriate cooling/warming therapies per order  - Administer medications as ordered  - Instruct and encourage patient and family to use good hand hygiene technique  - Identify and instruct in appropriate isolation precautions for identified infection/condition  Outcome: Progressing   Outcome: Progressing

## 2020-12-25 NOTE — PROGRESS NOTES
Progress Note -Surgery PA  Chadwick Dixon 48 y o  female MRN: [id number]  Unit/Bed#: -01 Encounter: [id number]      Assessment   47y F w developing pancreatic pseudocyst vs pancreatic duct leak  - CT AP 12/23/20: serpiginous acute peripancreatic fluid collections with minimal diffuse wall thickening attributable to developing pseudocyst   No gas seen within fluid collections therefore low suspicion for abscess however not excluded  Fluid collection posterior and inferior to pancreas tracking along the proximals  Commas enteric vessels and extending into the inferior lesser sac and gallbladder fossa  This also communicates with a collection in the gallbladder fossa  Total measurement of the collection is 12 1 x 3 9 x 5 4 cm  - AVSS, no leukocytosis  - Albumin 2 7 from 3 3; Prealbumin 23 6  Plan   -- Continue diet NPO/IVF  -- Monitor labs and abdominal exams  -- continue PICC for TPN  -- analgesia, prn      Discussed her case at length with Dr Kareen Stewart and he has discussed with Dr Tanisha Argueta  Possibility of PD leak contributing  She already has a CBD stent from ERCP on 11/30 so cystic duct leak unlikely  Plan for MRCP tomorrow to better characterize this fluid  It is not organized enough for drainage  Called and updated her  by phone this morning    ______________________________________________________________________  Subjective:   Patient feels that her pain is about the same and she is overall relatively comfortable except her vertigo has acutely worsened  She did get meclizine last night and this morning  Objective:    Vitals:  /80   Pulse 86   Temp 98 4 °F (36 9 °C)   Resp 18   Ht 5' 7" (1 702 m)   Wt 86 kg (189 lb 9 5 oz)   LMP 02/22/2019   SpO2 97%   BMI 29 69 kg/m²     I/Os:  I/O last 3 completed shifts:   In: 1508 3 [I V :1508 3]  Out: 300 [Urine:300]    I/O this shift:  In: 1000 [I V :1000]  Out: -     Invasive Devices     Peripherally Inserted Central Catheter Line PICC Line 12/24/20 Right Brachial less than 1 day          Peripheral Intravenous Line            Peripheral IV 12/23/20 Right Antecubital 1 day                Medications:  Current Facility-Administered Medications   Medication Dose Route Frequency    acetaminophen (TYLENOL) tablet 650 mg  650 mg Oral Q6H PRN    Adult 3-in-1 TPN (standard base / standard electrolytes)   Intravenous Continuous TPN    amLODIPine (NORVASC) tablet 10 mg  10 mg Oral Daily    dextrose 5 % and sodium chloride 0 45 % infusion  100 mL/hr Intravenous Continuous    enoxaparin (LOVENOX) subcutaneous injection 40 mg  40 mg Subcutaneous Daily    ibuprofen (MOTRIN) tablet 400 mg  400 mg Oral Q6H PRN    meclizine (ANTIVERT) tablet 25 mg  25 mg Oral Q8H PRN    ondansetron (ZOFRAN) injection 4 mg  4 mg Intravenous Q4H PRN    oxyCODONE (ROXICODONE) IR tablet 5 mg  5 mg Oral Q6H PRN    pantoprazole (PROTONIX) injection 40 mg  40 mg Intravenous Q24H [address]                 Lab Results and Cultures:   CBC with diff:   Lab Results   Component Value Date    WBC 5 47 12/24/2020    HGB 10 9 (L) 12/24/2020    HCT 33 4 (L) 12/24/2020    MCV 95 12/24/2020     12/24/2020    MCH 31 0 12/24/2020    MCHC 32 6 12/24/2020    RDW 12 1 12/24/2020    MPV 10 1 12/24/2020    NRBC 0 12/24/2020       BMP/CMP:  Lab Results   Component Value Date     04/26/2016    K 3 6 12/25/2020    K 4 7 06/13/2018     12/25/2020     06/13/2018    CO2 27 12/25/2020    CO2 28 06/13/2018    BUN 4 (L) 12/25/2020    BUN 16 06/13/2018    CREATININE 0 69 12/25/2020    CREATININE 0 99 04/26/2016    CALCIUM 8 8 12/25/2020    CALCIUM 9 1 06/13/2018    AST 24 12/25/2020    AST 14 06/13/2018    ALT 34 12/25/2020    ALT 10 06/13/2018    ALKPHOS 82 12/25/2020    ALKPHOS 92 06/13/2018    PROT 6 7 04/26/2016    BILITOT 0 4 04/26/2016    EGFR 99 12/25/2020       Lipid Panel:   Lab Results   Component Value Date    CHOL 170 04/26/2016       Coags:   No results found for: PT, PTT, INR     Urinalysis: No results found for: Ranjan Roses, SPECGRAV, PHUR, LEUKOCYTESUR, NITRITE, PROTEINUA, GLUCOSEU, KETONESU, BILIRUBINUR, BLOODU     Urine Culture: No results found for: URINECX   Wound Culure: No results found for: WOUNDCULT  Blood Culture:   Lab Results   Component Value Date    BLOODCX No Growth After 5 Days  12/02/2020    BLOODCX No Growth After 5 Days  12/02/2020         Physical Exam:  General Appearance:    Alert and orientated x 3, cooperative, no distress, appears stated age   Lungs:     respirations unlabored    Heart:    Regular rate and rhythm, S1 and S2 normal, no murmur   Abdomen:     Soft and flat   Extremities:  Extremities normal, no calf tenderness, no cyanosis or edema   Pulses:   2+ and symmetric all extremities   Skin:   Skin color, texture, turgor normal, no rashes   Neurologic:   CNII-XII intact, normal strength, affect appropriate       Imaging:  Ct Abdomen Pelvis W Contrast    Result Date: 12/23/2020  Impression: Acute large communicating serpiginous peripancreatic fluid collection with minimal diffuse wall thickening  This may represent fluid loculation and developing pseudocyst  Although there is no gas within the collection, superinfected fluid collection is not excluded  See above discussion  No evidence of pancreatic necrosis  Mild residual albeit moderately improved acute pancreatitis  This study demonstrates a significant  finding and was documented as such in [facility] for liaison and referring practitioner notification   Workstation performed: [id number]       VTE Pharmacologic Prophylaxis: Enoxaparin (Lovenox)  VTE Mechanical Prophylaxis: sequential compression device    Willi Dutton MD   12/25/2020

## 2020-12-25 NOTE — NURSING NOTE
Pt still complaining of dizziness, pt walked to bathroom without difficulty, vital signs stable, pt has a history of vertigo  SLIM aware Leeta Odor aware, new order for meclizine    Will continue to monitor

## 2020-12-25 NOTE — PLAN OF CARE
Problem: Potential for Falls  Goal: Patient will remain free of falls  Description: INTERVENTIONS:  - Assess patient frequently for physical needs  -  Identify cognitive and physical deficits and behaviors that affect risk of falls    -  [place] fall precautions as indicated by assessment   - Educate patient/family on patient safety including physical limitations  - Instruct patient to call for assistance with activity based on assessment  - Modify environment to reduce risk of injury  - Consider OT/PT consult to assist with strengthening/mobility  Outcome: Progressing

## 2020-12-25 NOTE — CASE MANAGEMENT
Responding to consult for [facility] AT [facility], Home TPN  Getting PICC line kaila  Discussed with Marion Grigsby  Likely Monday discharge  Home TPN orders Pending  Form for Home TPN orders given to Juan Carlos Or  Await home TPN orders

## 2020-12-26 ENCOUNTER — APPOINTMENT (INPATIENT)
Dept: MRI IMAGING | Facility: HOSPITAL | Age: 55
DRG: 440 | End: 2020-12-26
Payer: COMMERCIAL

## 2020-12-26 PROCEDURE — 99232 SBSQ HOSP IP/OBS MODERATE 35: CPT | Performed by: PHYSICIAN ASSISTANT

## 2020-12-26 PROCEDURE — C9113 INJ PANTOPRAZOLE SODIUM, VIA: HCPCS | Performed by: PHYSICIAN ASSISTANT

## 2020-12-26 PROCEDURE — G1004 CDSM NDSC: HCPCS

## 2020-12-26 PROCEDURE — 99232 SBSQ HOSP IP/OBS MODERATE 35: CPT | Performed by: INTERNAL MEDICINE

## 2020-12-26 PROCEDURE — 74183 MRI ABD W/O CNTR FLWD CNTR: CPT

## 2020-12-26 PROCEDURE — A9585 GADOBUTROL INJECTION: HCPCS | Performed by: INTERNAL MEDICINE

## 2020-12-26 PROCEDURE — 99233 SBSQ HOSP IP/OBS HIGH 50: CPT | Performed by: INTERNAL MEDICINE

## 2020-12-26 RX ORDER — MECLIZINE HYDROCHLORIDE 25 MG/1
25 TABLET ORAL EVERY 8 HOURS PRN
Status: DISCONTINUED | OUTPATIENT
Start: 2020-12-26 | End: 2020-12-30 | Stop reason: HOSPADM

## 2020-12-26 RX ADMIN — Medication: at 21:23

## 2020-12-26 RX ADMIN — PANTOPRAZOLE SODIUM 40 MG: 40 INJECTION, POWDER, FOR SOLUTION INTRAVENOUS at 08:58

## 2020-12-26 RX ADMIN — GADOBUTROL 8 ML: 604.72 INJECTION INTRAVENOUS at 08:13

## 2020-12-26 RX ADMIN — MECLIZINE HYDROCHLORIDE 25 MG: 25 TABLET ORAL at 22:23

## 2020-12-26 RX ADMIN — DEXTROSE AND SODIUM CHLORIDE 100 ML/HR: 5; .45 INJECTION, SOLUTION INTRAVENOUS at 06:24

## 2020-12-26 RX ADMIN — ENOXAPARIN SODIUM 40 MG: 40 INJECTION SUBCUTANEOUS at 08:59

## 2020-12-26 RX ADMIN — AMLODIPINE BESYLATE 10 MG: 10 TABLET ORAL at 08:59

## 2020-12-26 RX ADMIN — DEXTROSE AND SODIUM CHLORIDE 100 ML/HR: 5; .45 INJECTION, SOLUTION INTRAVENOUS at 18:20

## 2020-12-26 NOTE — PROGRESS NOTES
Progress Note - Aditya Crookping 1965, 48 y o  female MRN: [id number]    Unit/Bed#: -01 Encounter: [id number]    Primary Care Provider: Emily Payne MD   Date and time admitted to hospital: 12/23/2020  2:27 PM        * Pseudocyst of pancreas  Assessment & Plan  Post ERCP pancreatitis began on December 1, 2020  ERCP at the time demonstrated choledocholithiasis, with small sphincterotomy status post 7 cm stent placement    CT of the abdomen pelvis on admission 12/23 demonstrated large communicating serpiginous peripancreatic fluid collection with minimal diffuse thickening  This may represent fluid loculation a developing pseudocyst   The collection is predominantly posterior inferior to the pancreas tracking inferiorly along the proximal superior mesenteric vessels extending into the inferior lesser sac and gallbladder fossa with collection in the gallbladder E seem to communicate with the peripancreatic collection  There was moderately improved acute pancreatitis  MRI/MRCP 12/26/2020 shows intact main pancreatic duct without communication to the acute peripancreatic fluid collection/of the pseudocyst   There is a rim enhancing fluid collection in the gallbladder fossa status post cholecystectomy  Given its increased size from 12/23 May represent a biloma with persistent biliary leak  There is evidence of choledocholithiasis with mild common bile duct dilatation  Discussed with gastroenterology, recommendations greatly appreciated  Patient is afebrile, has normal white count and hemodynamically stable  No evidence of abscess  Recommending to hold off the ERCP at this time  HIDA scan ordered to rule out possible cystic duct leak age    Continue IV TPN      Essential hypertension  Assessment & Plan  · Continue home meds: amlodipine   · Lisinopril has been discontinued as OP      VTE Pharmacologic Prophylaxis:   Pharmacologic: Heparin  Mechanical VTE Prophylaxis in Place: Yes    Patient Centered Rounds: I have performed bedside rounds with nursing staff today  Discussions with Specialists or Other Care Team Provider gastroenterology    Education and Discussions with Family / Patient:  Patient    Time Spent for Care: 30 minutes  More than 50% of total time spent on counseling and coordination of care as described above  Current Length of Stay: 3 day(s)    Current Patient Status: Inpatient   Certification Statement: The patient will continue to require additional inpatient hospital stay due to Above medical problems    Discharge Plan:  Follow up on HIDA scan result    Code Status: Level 1 - Full Code      Subjective:   Patient seen and examined this morning  She feels better  Denies any complaints  No nausea vomiting abdominal pain  Tolerating TPN with no issues  Objective:     Vitals:   Temp (24hrs), Av 6 °F (37 °C), Min:97 9 °F (36 6 °C), Max:99 1 °F (37 3 °C)    Temp:  [97 9 °F (36 6 °C)-99 1 °F (37 3 °C)] 99 1 °F (37 3 °C)  HR:  [87-93] 89  Resp:  [16-18] 18  BP: (117-120)/(78-79) 117/78  SpO2:  [96 %-99 %] 99 %  Body mass index is 29 69 kg/m²  Input and Output Summary (last 24 hours): Intake/Output Summary (Last 24 hours) at 2020 1554  Last data filed at 2020 0734  Gross per 24 hour   Intake  33 ml   Output    Net  33 ml       Physical Exam:     Physical Exam  Vitals signs and nursing note reviewed  Constitutional:       Appearance: Normal appearance  HENT:      Head: Normocephalic and atraumatic  Neck:      Musculoskeletal: Normal range of motion and neck supple  Cardiovascular:      Rate and Rhythm: Normal rate and regular rhythm  Pulmonary:      Effort: Pulmonary effort is normal  No respiratory distress  Breath sounds: Normal breath sounds  Abdominal:      General: Abdomen is flat  Tenderness: There is abdominal tenderness  Musculoskeletal: Normal range of motion  Skin:     General: Skin is warm     Neurological: General: No focal deficit present  Mental Status: She is alert and oriented to person, place, and time  Additional Data:     Labs:    Results from last 7 days   Lab Units 12/24/20  0506   WBC Thousand/uL 5 47   HEMOGLOBIN g/dL 10 9*   HEMATOCRIT % 33 4*   PLATELETS Thousands/uL 388   NEUTROS PCT % 70   LYMPHS PCT % 16   MONOS PCT % 9   EOS PCT % 4     Results from last 7 days   Lab Units 12/25/20  0545   SODIUM mmol/L 145   POTASSIUM mmol/L 3 6   CHLORIDE mmol/L 106   CO2 mmol/L 27   BUN mg/dL 4*   CREATININE mg/dL 0 69   ANION GAP mmol/L 12   CALCIUM mg/dL 8 8   ALBUMIN g/dL 2 8*   TOTAL BILIRUBIN mg/dL 0 20   ALK PHOS U/L 82   ALT U/L 34   AST U/L 24   GLUCOSE RANDOM mg/dL 124       * I Have Reviewed All Lab Data Listed Above  * Additional Pertinent Lab Tests Reviewed:  [facility] Reviewed    Imaging:    Imaging Reports Reviewed Today Include:  MRCP, CT of the abdomen  Imaging Personally Reviewed by Myself Includes:      Recent Cultures (last 7 days):           Last 24 Hours Medication List:   Current Facility-Administered Medications   Medication Dose Route Frequency Provider Last Rate    acetaminophen  650 mg Oral Q6H PRN Owen Vázquez MD      Adult TPN (CUSTOM BASE/STANDARD ELECTROLYTE)   Intravenous Continuous TPN Ofelia Griggs PA-C      Adult TPN (STANDARD BASE/STANDARD ELECTROLYTE)   Intravenous Continuous TPN Ehsan Duncan MD 48 7 mL/hr at 12/25/20 2104    amLODIPine  10 mg Oral Daily Owen Vázquez MD      dextrose 5 % and sodium chloride 0 45 %  100 mL/hr Intravenous Continuous Owen Vázquez  mL/hr (12/26/20 1973)    enoxaparin  40 mg Subcutaneous Daily Owen Vázquez MD      ibuprofen  400 mg Oral Q6H PRN Owen Vázquez MD      meclizine  25 mg Oral Q8H PRN Cuca Flor MD      ondansetron  4 mg Intravenous Q4H PRN Owen Vázquez MD      oxyCODONE  5 mg Oral Q6H PRN Owen Vázquez MD      pantoprazole  40 mg Intravenous Q24H [address] [facility] MALINA Mckay Today, Patient Was Seen By: Roseann Sanchez MD    ** Please Note: Dictation voice to text software may have been used in the creation of this document   **

## 2020-12-26 NOTE — PLAN OF CARE
Problem: Potential for Falls  Goal: Patient will remain free of falls  Description: INTERVENTIONS:  - Assess patient frequently for physical needs  -  Identify cognitive and physical deficits and behaviors that affect risk of falls    -  [place] fall precautions as indicated by assessment   - Educate patient/family on patient safety including physical limitations  - Instruct patient to call for assistance with activity based on assessment  - Modify environment to reduce risk of injury  - Consider OT/PT consult to assist with strengthening/mobility  Outcome: Progressing     Problem: PAIN - ADULT  Goal: Verbalizes/displays adequate comfort level or baseline comfort level  Description: Interventions:  - Encourage patient to monitor pain and request assistance  - Assess pain using appropriate pain scale  - Administer analgesics based on type and severity of pain and evaluate response  - Implement non-pharmacological measures as appropriate and evaluate response  - Consider cultural and social influences on pain and pain management  - Notify physician/advanced practitioner if interventions unsuccessful or patient reports new pain  Outcome: Progressing     Problem: INFECTION - ADULT  Goal: Absence or prevention of progression during hospitalization  Description: INTERVENTIONS:  - Assess and monitor for signs and symptoms of infection  - Monitor lab/diagnostic results  - Monitor all insertion sites, i e  indwelling lines, tubes, and drains  - Monitor endotracheal if appropriate and nasal secretions for changes in amount and color  - [place] appropriate cooling/warming therapies per order  - Administer medications as ordered  - Instruct and encourage patient and family to use good hand hygiene technique  - Identify and instruct in appropriate isolation precautions for identified infection/condition  Outcome: Progressing  Goal: Absence of fever/infection during neutropenic period  Description: INTERVENTIONS:  - Monitor WBC    Outcome: Progressing     Problem: SAFETY ADULT  Goal: Patient will remain free of falls  Description: INTERVENTIONS:  - Assess patient frequently for physical needs  -  Identify cognitive and physical deficits and behaviors that affect risk of falls    -  [place] fall precautions as indicated by assessment   - Educate patient/family on patient safety including physical limitations  - Instruct patient to call for assistance with activity based on assessment  - Modify environment to reduce risk of injury  - Consider OT/PT consult to assist with strengthening/mobility  Outcome: Progressing  Goal: Maintain or return to baseline ADL function  Description: INTERVENTIONS:  -  Assess patient's ability to carry out ADLs; assess patient's baseline for ADL function and identify physical deficits which impact ability to perform ADLs (bathing, care of mouth/teeth, toileting, grooming, dressing, etc )  - Assess/evaluate cause of self-care deficits   - Assess range of motion  - Assess patient's mobility; develop plan if impaired  - Assess patient's need for assistive devices and provide as appropriate  - Encourage maximum independence but intervene and supervise when necessary  - Involve family in performance of ADLs  - Assess for home care needs following discharge   - Consider OT consult to assist with ADL evaluation and planning for discharge  - Provide patient education as appropriate  Outcome: Progressing  Goal: Maintain or return mobility status to optimal level  Description: INTERVENTIONS:  - Assess patient's baseline mobility status (ambulation, transfers, stairs, etc )    - Identify cognitive and physical deficits and behaviors that affect mobility  - Identify mobility aids required to assist with transfers and/or ambulation (gait belt, sit-to-stand, lift, walker, cane, etc )  - [place] fall precautions as indicated by assessment  - Record patient progress and toleration of activity level on Mobility SBAR; progress patient to next Phase/Stage  - Instruct patient to call for assistance with activity based on assessment  - Consider rehabilitation consult to assist with strengthening/weightbearing, etc   Outcome: Progressing     Problem: DISCHARGE PLANNING  Goal: Discharge to home or other facility with appropriate resources  Description: INTERVENTIONS:  - Identify barriers to discharge w/patient and caregiver  - Arrange for needed discharge resources and transportation as appropriate  - Identify discharge learning needs (meds, wound care, etc )  - Arrange for interpretive services to assist at discharge as needed  - Refer to Case Management Department for coordinating discharge planning if the patient needs post-hospital services based on physician/advanced practitioner order or complex needs related to functional status, cognitive ability, or social support system  Outcome: Progressing     Problem: Knowledge Deficit  Goal: Patient/family/caregiver demonstrates understanding of disease process, treatment plan, medications, and discharge instructions  Description: Complete learning assessment and assess knowledge base  Interventions:  - Provide teaching at level of understanding  - Provide teaching via preferred learning methods  Outcome: Progressing     Problem: Nutrition/Hydration-ADULT  Goal: Nutrient/Hydration intake appropriate for improving, restoring or maintaining nutritional needs  Description: Monitor and assess patient's nutrition/hydration status for malnutrition  Collaborate with interdisciplinary team and initiate plan and interventions as ordered  Monitor patient's weight and dietary intake as ordered or per policy  Utilize nutrition screening tool and intervene as necessary  Determine patient's food preferences and provide high-protein, high-caloric foods as appropriate       INTERVENTIONS:  - Monitor oral intake, urinary output, labs, and treatment plans  - Assess nutrition and hydration status and recommend course of action  - Evaluate amount of meals eaten  - Assist patient with eating if necessary   - Allow adequate time for meals  - Recommend/ encourage appropriate diets, oral nutritional supplements, and vitamin/mineral supplements  - Order, calculate, and assess calorie counts as needed  - Recommend, monitor, and adjust tube feedings and TPN/PPN based on assessed needs  - Assess need for intravenous fluids  - Provide specific nutrition/hydration education as appropriate  - Include patient/family/caregiver in decisions related to nutrition  Outcome: Progressing

## 2020-12-26 NOTE — PLAN OF CARE
Problem: PAIN - ADULT  Goal: Verbalizes/displays adequate comfort level or baseline comfort level  Description: Interventions:  - Encourage patient to monitor pain and request assistance  - Assess pain using appropriate pain scale  - Administer analgesics based on type and severity of pain and evaluate response  - Implement non-pharmacological measures as appropriate and evaluate response  - Consider cultural and social influences on pain and pain management  - Notify physician/advanced practitioner if interventions unsuccessful or patient reports new pain  Outcome: Progressing     Problem: INFECTION - ADULT  Goal: Absence or prevention of progression during hospitalization  Description: INTERVENTIONS:  - Assess and monitor for signs and symptoms of infection  - Monitor lab/diagnostic results  - Monitor all insertion sites, i e  indwelling lines, tubes, and drains  - Monitor endotracheal if appropriate and nasal secretions for changes in amount and color  - [place] appropriate cooling/warming therapies per order  - Administer medications as ordered  - Instruct and encourage patient and family to use good hand hygiene technique  - Identify and instruct in appropriate isolation precautions for identified infection/condition  Outcome: Progressing  Goal: Absence of fever/infection during neutropenic period  Description: INTERVENTIONS:  - Monitor WBC    Outcome: Progressing     Problem: SAFETY ADULT  Goal: Patient will remain free of falls  Description: INTERVENTIONS:  - Assess patient frequently for physical needs  -  Identify cognitive and physical deficits and behaviors that affect risk of falls    -  [place] fall precautions as indicated by assessment   - Educate patient/family on patient safety including physical limitations  - Instruct patient to call for assistance with activity based on assessment  - Modify environment to reduce risk of injury  - Consider OT/PT consult to assist with strengthening/mobility  Outcome: Completed  Goal: Maintain or return to baseline ADL function  Description: INTERVENTIONS:  -  Assess patient's ability to carry out ADLs; assess patient's baseline for ADL function and identify physical deficits which impact ability to perform ADLs (bathing, care of mouth/teeth, toileting, grooming, dressing, etc )  - Assess/evaluate cause of self-care deficits   - Assess range of motion  - Assess patient's mobility; develop plan if impaired  - Assess patient's need for assistive devices and provide as appropriate  - Encourage maximum independence but intervene and supervise when necessary  - Involve family in performance of ADLs  - Assess for home care needs following discharge   - Consider OT consult to assist with ADL evaluation and planning for discharge  - Provide patient education as appropriate  Outcome: Progressing  Goal: Maintain or return mobility status to optimal level  Description: INTERVENTIONS:  - Assess patient's baseline mobility status (ambulation, transfers, stairs, etc )    - Identify cognitive and physical deficits and behaviors that affect mobility  - Identify mobility aids required to assist with transfers and/or ambulation (gait belt, sit-to-stand, lift, walker, cane, etc )  - [place] fall precautions as indicated by assessment  - Record patient progress and toleration of activity level on Mobility SBAR; progress patient to next Phase/Stage  - Instruct patient to call for assistance with activity based on assessment  - Consider rehabilitation consult to assist with strengthening/weightbearing, etc   Outcome: Progressing     Problem: DISCHARGE PLANNING  Goal: Discharge to home or other facility with appropriate resources  Description: INTERVENTIONS:  - Identify barriers to discharge w/patient and caregiver  - Arrange for needed discharge resources and transportation as appropriate  - Identify discharge learning needs (meds, wound care, etc )  - Arrange for interpretive services to assist at discharge as needed  - Refer to Case Management Department for coordinating discharge planning if the patient needs post-hospital services based on physician/advanced practitioner order or complex needs related to functional status, cognitive ability, or social support system  Outcome: Progressing     Problem: Knowledge Deficit  Goal: Patient/family/caregiver demonstrates understanding of disease process, treatment plan, medications, and discharge instructions  Description: Complete learning assessment and assess knowledge base  Interventions:  - Provide teaching at level of understanding  - Provide teaching via preferred learning methods  Outcome: Progressing     Problem: Nutrition/Hydration-ADULT  Goal: Nutrient/Hydration intake appropriate for improving, restoring or maintaining nutritional needs  Description: Monitor and assess patient's nutrition/hydration status for malnutrition  Collaborate with interdisciplinary team and initiate plan and interventions as ordered  Monitor patient's weight and dietary intake as ordered or per policy  Utilize nutrition screening tool and intervene as necessary  Determine patient's food preferences and provide high-protein, high-caloric foods as appropriate       INTERVENTIONS:  - Monitor oral intake, urinary output, labs, and treatment plans  - Assess nutrition and hydration status and recommend course of action  - Evaluate amount of meals eaten  - Assist patient with eating if necessary   - Allow adequate time for meals  - Recommend/ encourage appropriate diets, oral nutritional supplements, and vitamin/mineral supplements  - Order, calculate, and assess calorie counts as needed  - Recommend, monitor, and adjust tube feedings and TPN/PPN based on assessed needs  - Assess need for intravenous fluids  - Provide specific nutrition/hydration education as appropriate  - Include patient/family/caregiver in decisions related to nutrition  Outcome: Progressing

## 2020-12-26 NOTE — PROGRESS NOTES
Progress Note -Surgery PA  Karli Mandel 48 y o  female MRN: [id number]  Unit/Bed#: -01 Encounter: [id number]      Assessment   47y F w developing pancreatic pseudocyst vs pancreatic duct leak  - CT AP 12/23/20: serpiginous acute peripancreatic fluid collections with minimal diffuse wall thickening attributable to developing pseudocyst   No gas seen within fluid collections therefore low suspicion for abscess however not excluded   Fluid collection posterior and inferior to pancreas tracking along the proximals   [place] enteric vessels and extending into the inferior lesser sac and gallbladder fossa   This also communicates with a collection in the gallbladder fossa   Total measurement of the collection is 12 1 x 3 9 x 5 4 cm  - AVSS    Plan   -- Continue diet NPO and IVF with PICC/TPN for supplemental Nutrition  -- MRCP performed this morning  Awaiting results  Possible intervention by GI with ERCP for stent placement if MRI shows pancreatic duct leak  -- Analgesia, prn  -- Monitor labs and abdominal exams    ______________________________________________________________________  Subjective:   Patient states she is feeling better since she was admitted and does not have any pain  She denies any fevers or chills  Objective:    Vitals:  /78   Pulse 93   Temp 98 2 °F (36 8 °C)   Resp 16   Ht 5' 7" (1 702 m)   Wt 86 kg (189 lb 9 5 oz)   LMP 02/22/2019   SpO2 96%   BMI 29 69 kg/m²     I/Os:  I/O last 3 completed shifts: In: 2953 3 [I V :2953 3]  Out: -     No intake/output data recorded      Invasive Devices     Peripherally Inserted Central Catheter Line            PICC Line 12/24/20 Right Brachial 1 day          Peripheral Intravenous Line            Peripheral IV 12/23/20 Right Antecubital 2 days                Medications:  Current Facility-Administered Medications   Medication Dose Route Frequency    acetaminophen (TYLENOL) tablet 650 mg  650 mg Oral Q6H PRN    Adult 3-in-1 TPN (standard base / standard electrolytes)   Intravenous Continuous TPN    amLODIPine (NORVASC) tablet 10 mg  10 mg Oral Daily    dextrose 5 % and sodium chloride 0 45 % infusion  100 mL/hr Intravenous Continuous    enoxaparin (LOVENOX) subcutaneous injection 40 mg  40 mg Subcutaneous Daily    ibuprofen (MOTRIN) tablet 400 mg  400 mg Oral Q6H PRN    meclizine (ANTIVERT) tablet 25 mg  25 mg Oral Q8H PRN    ondansetron (ZOFRAN) injection 4 mg  4 mg Intravenous Q4H PRN    oxyCODONE (ROXICODONE) IR tablet 5 mg  5 mg Oral Q6H PRN    pantoprazole (PROTONIX) injection 40 mg  40 mg Intravenous Q24H [address]                 Lab Results and Cultures:   CBC with diff:   Lab Results   Component Value Date    WBC 5 47 12/24/2020    HGB 10 9 (L) 12/24/2020    HCT 33 4 (L) 12/24/2020    MCV 95 12/24/2020     12/24/2020    MCH 31 0 12/24/2020    MCHC 32 6 12/24/2020    RDW 12 1 12/24/2020    MPV 10 1 12/24/2020    NRBC 0 12/24/2020       BMP/CMP:  Lab Results   Component Value Date     04/26/2016    K 3 6 12/25/2020    K 4 7 06/13/2018     12/25/2020     06/13/2018    CO2 27 12/25/2020    CO2 28 06/13/2018    BUN 4 (L) 12/25/2020    BUN 16 06/13/2018    CREATININE 0 69 12/25/2020    CREATININE 0 99 04/26/2016    CALCIUM 8 8 12/25/2020    CALCIUM 9 1 06/13/2018    AST 24 12/25/2020    AST 14 06/13/2018    ALT 34 12/25/2020    ALT 10 06/13/2018    ALKPHOS 82 12/25/2020    ALKPHOS 92 06/13/2018    PROT 6 7 04/26/2016    BILITOT 0 4 04/26/2016    EGFR 99 12/25/2020       Lipid Panel:   Lab Results   Component Value Date    CHOL 170 04/26/2016       Urine Culture: No results found for: URINECX   Wound Culure: No results found for: WOUNDCULT  Blood Culture:   Lab Results   Component Value Date    BLOODCX No Growth After 5 Days  12/02/2020    BLOODCX No Growth After 5 Days   12/02/2020         Physical Exam:  General Appearance:    Alert and orientated x 3, cooperative, no distress, appears stated age   Lungs:     Clear to auscultation bilaterally, respirations unlabored, no wheezes    Heart:    Regular rate and rhythm, S1 and S2 normal   Abdomen:    Normoactive BS, soft, mild epigastric tenderness, non rigid, no masses, no palpated organomegaly   Extremities:  Extremities normal, no calf tenderness, no cyanosis or edema   Pulses:   2+ and symmetric all extremities   Skin:   Skin color, texture, turgor normal, no rashes, no jaundice   Neurologic:   CNII-XII intact, normal strength, affect appropriate       Imaging:  Ct Abdomen Pelvis W Contrast    Result Date: 12/23/2020  Impression: Acute large communicating serpiginous peripancreatic fluid collection with minimal diffuse wall thickening  This may represent fluid loculation and developing pseudocyst  Although there is no gas within the collection, superinfected fluid collection is not excluded  See above discussion  No evidence of pancreatic necrosis  Mild residual albeit moderately improved acute pancreatitis  This study demonstrates a significant  finding and was documented as such in [facility] for liaison and referring practitioner notification  Workstation performed: [id number]     Ir Picc Line Placement Double Lumen    Result Date: 12/24/2020  Impression: Impression: Successful placement of a 42 cm right upper extremity single lumen Power PICC   Workstation performed: [id number]       VTE Pharmacologic Prophylaxis: Enoxaparin (Lovenox)  VTE Mechanical Prophylaxis: sequential compression device    Vicky Maurer PA-C   12/26/2020

## 2020-12-26 NOTE — PROGRESS NOTES
Progress Note - Katy Shah 48 y o  female MRN: [id number]    Unit/Bed#: -01 Encounter: [id number]    Subjective:     Patient states she is feeling better today  She denies any fevers or chills  She states that her abdominal pain has improved considerably  She is currently receiving parenteral nutrition  She denies any nausea or vomiting  The MRI/MRCP was performed today  I also had an opportunity to speak with 2 separate radiologists regarding the MRI MRCP findings  Objective:     Vitals: Blood pressure 119/78, pulse 93, temperature 98 2 °F (36 8 °C), resp  rate 16, height 5' 7" (1 702 m), weight 86 kg (189 lb 9 5 oz), last menstrual period 02/22/2019, SpO2 96 %  ,Body mass index is 29 69 kg/m²        Intake/Output Summary (Last 24 hours) at 12/26/2020 1242  Last data filed at 12/26/2020 0873  Gross per 24 hour   Intake 1953 33 ml   Output    Net 1953 33 ml       Physical Exam:     General Appearance: Alert, appears stated age and cooperative  HEENT:  Normocephalic, atraumatic, nonicteric, PERRLA  Neck:  Supple, symmetrical, trachea midline  Lungs: Clear to auscultation bilaterally, no rales or rhonchi, no labored breathing/accessory muscle use  Heart: Regular rate and rhythm, S1, S2 normal, no murmur, click, rub or gallop  Abdomen: Soft, non-tender, non-distended; bowel sounds normal; no masses or no organomegaly  Extremities: No cyanosis, edema  Lymphatic:  No cervical, inguinal, axillary adenopathy  Skin:  No jaundice, lesions, pallor  Pulses:  2+ and equal bilaterally, no bruits    Invasive Devices     Peripherally Inserted Central Catheter Line            PICC Line 12/24/20 Right Brachial 1 day          Peripheral Intravenous Line            Peripheral IV 12/23/20 Right Antecubital 2 days                Lab Results:  Results from last 7 days   Lab Units 12/24/20  0506   WBC Thousand/uL 5 47   HEMOGLOBIN g/dL 10 9*   HEMATOCRIT % 33 4*   PLATELETS Thousands/uL 388   NEUTROS PCT % 70   LYMPHS PCT % 16   MONOS PCT % 9   EOS PCT % 4     Results from last 7 days   Lab Units 12/25/20  0545   POTASSIUM mmol/L 3 6   CHLORIDE mmol/L 106   CO2 mmol/L 27   BUN mg/dL 4*   CREATININE mg/dL 0 69   CALCIUM mg/dL 8 8   ALK PHOS U/L 82   ALT U/L 34   AST U/L 24         Results from last 7 days   Lab Units 12/23/20  1444   LIPASE u/L 341       Imaging Studies: I have personally reviewed pertinent imaging studies  Mri Abdomen W Wo Contrast    Addendum Date: 12/26/2020    ADDENDUM: Additional finding as follows: Review of the previous CT again demonstrates that there is likely communication between the peripancreatic collection and the cholecystectomy bed collection best seen on coronal images 70 through 71  It is uncertain whether this is primarily a fluid collection related to pancreatitis extending into the gallbladder fossa versus biloma  I personally discussed this study with Tyrell Washington on 12/26/2020 at 10:55 AM      Result Date: 12/26/2020  Impression: 1  Intact main pancreatic duct without communication to the acute peripancreatic fluid collection/early pseudocyst  2   Rim-enhancing fluid collection in the gallbladder fossa status post cholecystectomy  Given slight increase in size from 12/23/2020, this may represent a biloma with persistent biliary leak  Abscess not excluded  3   Choledocholithiasis with mild common bile duct dilatation  The study was marked in [facility] for immediate notification  Workstation performed: [id number]     Ct Abdomen Pelvis W Contrast    Result Date: 12/23/2020  Impression: Acute large communicating serpiginous peripancreatic fluid collection with minimal diffuse wall thickening  This may represent fluid loculation and developing pseudocyst  Although there is no gas within the collection, superinfected fluid collection is not excluded  See above discussion  No evidence of pancreatic necrosis  Mild residual albeit moderately improved acute pancreatitis   This study demonstrates a significant  finding and was documented as such in [facility] for liaison and referring practitioner notification  Workstation performed: [id number]     Ir Picc Line Placement Double Lumen    Result Date: 12/24/2020  Impression: Impression: Successful placement of a 42 cm right upper extremity single lumen Power PICC  Workstation performed: [id number]       Assessment and Plan:     1  Pancreatic pseudocyst  - post ERCP pancreatitis 1st began on December 1, 2020  - ERCP demonstrated choledocholithiasis with small sphincterotomy and to balloon dilations, 8 mm and 10 mm, removal of multiple pigmented stones but a single large stone could not be removed, so a 7 [employer] Aimee, 7 cm stent was placed  - CT scan of the abdomen and pelvis performed on December 2nd demonstrated findings consistent with acute pancreatitis  There is peripancreatic fluid with areas of mild rim thickening in the central mesentery which may be due to early loculation  There was evidence of interval decompression of the c and a trying to resolve things because ommon bile duct and placement of a biliary stent along with pneumobilia  - CT scan of the abdomen and pelvis performed December 23rd shows a large communicating serpiginous peripancreatic fluid collection with minimal diffuse wall thickening  This may represent fluid loculation and developing pseudocyst   The collection is predominantly posterior and inferior to the pancreas tracking inferiorly along with proximal superior mesenteric vessels extending into the inferior lesser sac and gallbladder fossa with collection in the gallbladder fossa is seem to communicate with the peripancreatic collection  There was no gas within the collection  There is no evidence of pancreatic necrosis  There was moderately improved acute pancreatitis    -  today's MRI/MRCP shows an intact main pancreatic duct without communication to the acute peripancreatic fluid collection/early pseudocyst   There is a rim enhancing fluid collection in the gallbladder fossa status post cholecystectomy  Given its increase in size from 12/23, this may represent a biloma with persistent biliary leak  There is evidence of choledocholithiasis with mild common bile duct dilation     - As the patient is afebrile with normal white cell count there is no reason to suspect an abscess  - an urgent ERCP does not appear to be indicated  Stenting the pancreatic duct will be of no value  - I have ordered a hepatobiliary scan to rule out possible cystic duct leakage    It seems unlikely even with cystic duct leakage that there would be a biloma collection since the patient has a 7 Western Aimee stent in the common bile duct    - continue with current IV parenteral nutrition

## 2020-12-26 NOTE — ASSESSMENT & PLAN NOTE
Post ERCP pancreatitis began on December 1, 2020  ERCP at the time demonstrated choledocholithiasis, with small sphincterotomy status post 7 cm stent placement    CT of the abdomen pelvis on admission 12/23 demonstrated large communicating serpiginous peripancreatic fluid collection with minimal diffuse thickening  This may represent fluid loculation a developing pseudocyst   The collection is predominantly posterior inferior to the pancreas tracking inferiorly along the proximal superior mesenteric vessels extending into the inferior lesser sac and gallbladder fossa with collection in the gallbladder E seem to communicate with the peripancreatic collection  There was moderately improved acute pancreatitis  MRI/MRCP 12/26/2020 shows intact main pancreatic duct without communication to the acute peripancreatic fluid collection/of the pseudocyst   There is a rim enhancing fluid collection in the gallbladder fossa status post cholecystectomy  Given its increased size from 12/23 May represent a biloma with persistent biliary leak  There is evidence of choledocholithiasis with mild common bile duct dilatation  Discussed with gastroenterology, recommendations greatly appreciated  Patient is afebrile, has normal white count and hemodynamically stable  No evidence of abscess  Recommending to hold off the ERCP at this time  HIDA scan ordered to rule out possible cystic duct leak age    Continue IV TPN

## 2020-12-27 LAB
ALBUMIN SERPL BCP-MCNC: 2.7 G/DL (ref 3.5–5)
ALP SERPL-CCNC: 79 U/L (ref 46–116)
ALT SERPL W P-5'-P-CCNC: 40 U/L (ref 12–78)
ANION GAP SERPL CALCULATED.3IONS-SCNC: 8 MMOL/L (ref 4–13)
AST SERPL W P-5'-P-CCNC: 32 U/L (ref 5–45)
BILIRUB DIRECT SERPL-MCNC: 0.06 MG/DL (ref 0–0.2)
BILIRUB SERPL-MCNC: 0.1 MG/DL (ref 0.2–1)
BUN SERPL-MCNC: 9 MG/DL (ref 5–25)
CALCIUM SERPL-MCNC: 8.3 MG/DL (ref 8.3–10.1)
CHLORIDE SERPL-SCNC: 106 MMOL/L (ref 100–108)
CO2 SERPL-SCNC: 27 MMOL/L (ref 21–32)
CREAT SERPL-MCNC: 0.81 MG/DL (ref 0.6–1.3)
ERYTHROCYTE [DISTWIDTH] IN BLOOD BY AUTOMATED COUNT: 11.9 % (ref 11.6–15.1)
GFR SERPL CREATININE-BSD FRML MDRD: 83 ML/MIN/1.73SQ M
GLUCOSE SERPL-MCNC: 120 MG/DL (ref 65–140)
HAV IGM SER QL: NORMAL
HBV CORE IGM SER QL: NORMAL
HBV SURFACE AG SER QL: NORMAL
HCT VFR BLD AUTO: 32.9 % (ref 34.8–46.1)
HCV AB SER QL: NORMAL
HGB BLD-MCNC: 10.8 G/DL (ref 11.5–15.4)
MAGNESIUM SERPL-MCNC: 1.9 MG/DL (ref 1.6–2.6)
MCH RBC QN AUTO: 31.6 PG (ref 26.8–34.3)
MCHC RBC AUTO-ENTMCNC: 32.8 G/DL (ref 31.4–37.4)
MCV RBC AUTO: 96 FL (ref 82–98)
PHOSPHATE SERPL-MCNC: 4 MG/DL (ref 2.7–4.5)
PLATELET # BLD AUTO: 278 THOUSANDS/UL (ref 149–390)
PMV BLD AUTO: 10.2 FL (ref 8.9–12.7)
POTASSIUM SERPL-SCNC: 3.9 MMOL/L (ref 3.5–5.3)
PROT SERPL-MCNC: 6.8 G/DL (ref 6.4–8.2)
RBC # BLD AUTO: 3.42 MILLION/UL (ref 3.81–5.12)
SODIUM SERPL-SCNC: 141 MMOL/L (ref 136–145)
WBC # BLD AUTO: 6.02 THOUSAND/UL (ref 4.31–10.16)

## 2020-12-27 PROCEDURE — 85027 COMPLETE CBC AUTOMATED: CPT | Performed by: PHYSICIAN ASSISTANT

## 2020-12-27 PROCEDURE — C9113 INJ PANTOPRAZOLE SODIUM, VIA: HCPCS | Performed by: PHYSICIAN ASSISTANT

## 2020-12-27 PROCEDURE — 80048 BASIC METABOLIC PNL TOTAL CA: CPT | Performed by: PHYSICIAN ASSISTANT

## 2020-12-27 PROCEDURE — 84100 ASSAY OF PHOSPHORUS: CPT | Performed by: PHYSICIAN ASSISTANT

## 2020-12-27 PROCEDURE — 83735 ASSAY OF MAGNESIUM: CPT | Performed by: PHYSICIAN ASSISTANT

## 2020-12-27 PROCEDURE — 99232 SBSQ HOSP IP/OBS MODERATE 35: CPT | Performed by: INTERNAL MEDICINE

## 2020-12-27 PROCEDURE — 99233 SBSQ HOSP IP/OBS HIGH 50: CPT | Performed by: INTERNAL MEDICINE

## 2020-12-27 PROCEDURE — 80074 ACUTE HEPATITIS PANEL: CPT | Performed by: INTERNAL MEDICINE

## 2020-12-27 PROCEDURE — 80076 HEPATIC FUNCTION PANEL: CPT | Performed by: INTERNAL MEDICINE

## 2020-12-27 RX ADMIN — PANTOPRAZOLE SODIUM 40 MG: 40 INJECTION, POWDER, FOR SOLUTION INTRAVENOUS at 09:39

## 2020-12-27 RX ADMIN — AMLODIPINE BESYLATE 10 MG: 10 TABLET ORAL at 09:39

## 2020-12-27 RX ADMIN — DEXTROSE AND SODIUM CHLORIDE 100 ML/HR: 5; .45 INJECTION, SOLUTION INTRAVENOUS at 22:13

## 2020-12-27 RX ADMIN — DEXTROSE AND SODIUM CHLORIDE 100 ML/HR: 5; .45 INJECTION, SOLUTION INTRAVENOUS at 13:04

## 2020-12-27 RX ADMIN — DEXTROSE AND SODIUM CHLORIDE 100 ML/HR: 5; .45 INJECTION, SOLUTION INTRAVENOUS at 03:04

## 2020-12-27 RX ADMIN — ENOXAPARIN SODIUM 40 MG: 40 INJECTION SUBCUTANEOUS at 09:39

## 2020-12-27 RX ADMIN — MECLIZINE HYDROCHLORIDE 25 MG: 25 TABLET ORAL at 22:42

## 2020-12-27 RX ADMIN — Medication: at 22:27

## 2020-12-27 NOTE — PLAN OF CARE
Problem: PAIN - ADULT  Goal: Verbalizes/displays adequate comfort level or baseline comfort level  Description: Interventions:  - Encourage patient to monitor pain and request assistance  - Assess pain using appropriate pain scale  - Administer analgesics based on type and severity of pain and evaluate response  - Implement non-pharmacological measures as appropriate and evaluate response  - Consider cultural and social influences on pain and pain management  - Notify physician/advanced practitioner if interventions unsuccessful or patient reports new pain  Outcome: Progressing     Problem: INFECTION - ADULT  Goal: Absence or prevention of progression during hospitalization  Description: INTERVENTIONS:  - Assess and monitor for signs and symptoms of infection  - Monitor lab/diagnostic results  - Monitor all insertion sites, i e  indwelling lines, tubes, and drains  - Monitor endotracheal if appropriate and nasal secretions for changes in amount and color  - [place] appropriate cooling/warming therapies per order  - Administer medications as ordered  - Instruct and encourage patient and family to use good hand hygiene technique  - Identify and instruct in appropriate isolation precautions for identified infection/condition  Outcome: Progressing     Problem: Nutrition/Hydration-ADULT  Goal: Nutrient/Hydration intake appropriate for improving, restoring or maintaining nutritional needs  Description: Monitor and assess patient's nutrition/hydration status for malnutrition  Collaborate with interdisciplinary team and initiate plan and interventions as ordered  Monitor patient's weight and dietary intake as ordered or per policy  Utilize nutrition screening tool and intervene as necessary  Determine patient's food preferences and provide high-protein, high-caloric foods as appropriate       INTERVENTIONS:  - Monitor oral intake, urinary output, labs, and treatment plans  - Assess nutrition and hydration status and recommend course of action  - Evaluate amount of meals eaten  - Assist patient with eating if necessary   - Allow adequate time for meals  - Recommend/ encourage appropriate diets, oral nutritional supplements, and vitamin/mineral supplements  - Order, calculate, and assess calorie counts as needed  - Recommend, monitor, and adjust tube feedings and TPN/PPN based on assessed needs  - Assess need for intravenous fluids  - Provide specific nutrition/hydration education as appropriate  - Include patient/family/caregiver in decisions related to nutrition  Outcome: Progressing

## 2020-12-27 NOTE — ASSESSMENT & PLAN NOTE
· S/P recent CBD stent and ERCP with post ERCP pancreatitis  · CT of the abdomen pelvis on admission 12/23 concerning for peripancreatic fluid collection suspected pseudocyst  · Off antibiotics    Has remained hemodynamically stable, afebrile, without leukocytosis  · Follow-up HIDA scan (-) for leak  · GI and surgery following, okay to advance diet as tolerated  · Discontinue TPN  · Possible discharge in the next 24 hours if continues to do well and remained stable    Recent Labs     12/27/20  0510 12/28/20  0610 12/29/20  0622   WBC 6 02  --   --    ALT 40 44 49   AST 32 34 34   ALKPHOS 79 84 92

## 2020-12-27 NOTE — PLAN OF CARE
Problem: PAIN - ADULT  Goal: Verbalizes/displays adequate comfort level or baseline comfort level  Description: Interventions:  - Encourage patient to monitor pain and request assistance  - Assess pain using appropriate pain scale  - Administer analgesics based on type and severity of pain and evaluate response  - Implement non-pharmacological measures as appropriate and evaluate response  - Consider cultural and social influences on pain and pain management  - Notify physician/advanced practitioner if interventions unsuccessful or patient reports new pain  Outcome: Progressing     Problem: INFECTION - ADULT  Goal: Absence or prevention of progression during hospitalization  Description: INTERVENTIONS:  - Assess and monitor for signs and symptoms of infection  - Monitor lab/diagnostic results  - Monitor all insertion sites, i e  indwelling lines, tubes, and drains  - Monitor endotracheal if appropriate and nasal secretions for changes in amount and color  - [place] appropriate cooling/warming therapies per order  - Administer medications as ordered  - Instruct and encourage patient and family to use good hand hygiene technique  - Identify and instruct in appropriate isolation precautions for identified infection/condition  Outcome: Progressing  Goal: Absence of fever/infection during neutropenic period  Description: INTERVENTIONS:  - Monitor WBC    Outcome: Progressing     Problem: SAFETY ADULT  Goal: Maintain or return to baseline ADL function  Description: INTERVENTIONS:  -  Assess patient's ability to carry out ADLs; assess patient's baseline for ADL function and identify physical deficits which impact ability to perform ADLs (bathing, care of mouth/teeth, toileting, grooming, dressing, etc )  - Assess/evaluate cause of self-care deficits   - Assess range of motion  - Assess patient's mobility; develop plan if impaired  - Assess patient's need for assistive devices and provide as appropriate  - Encourage maximum independence but intervene and supervise when necessary  - Involve family in performance of ADLs  - Assess for home care needs following discharge   - Consider OT consult to assist with ADL evaluation and planning for discharge  - Provide patient education as appropriate  Outcome: Progressing  Goal: Maintain or return mobility status to optimal level  Description: INTERVENTIONS:  - Assess patient's baseline mobility status (ambulation, transfers, stairs, etc )    - Identify cognitive and physical deficits and behaviors that affect mobility  - Identify mobility aids required to assist with transfers and/or ambulation (gait belt, sit-to-stand, lift, walker, cane, etc )  - [place] fall precautions as indicated by assessment  - Record patient progress and toleration of activity level on Mobility SBAR; progress patient to next Phase/Stage  - Instruct patient to call for assistance with activity based on assessment  - Consider rehabilitation consult to assist with strengthening/weightbearing, etc   Outcome: Progressing     Problem: DISCHARGE PLANNING  Goal: Discharge to home or other facility with appropriate resources  Description: INTERVENTIONS:  - Identify barriers to discharge w/patient and caregiver  - Arrange for needed discharge resources and transportation as appropriate  - Identify discharge learning needs (meds, wound care, etc )  - Arrange for interpretive services to assist at discharge as needed  - Refer to Case Management Department for coordinating discharge planning if the patient needs post-hospital services based on physician/advanced practitioner order or complex needs related to functional status, cognitive ability, or social support system  Outcome: Progressing     Problem: Knowledge Deficit  Goal: Patient/family/caregiver demonstrates understanding of disease process, treatment plan, medications, and discharge instructions  Description: Complete learning assessment and assess knowledge base   Interventions:  - Provide teaching at level of understanding  - Provide teaching via preferred learning methods  Outcome: Progressing     Problem: Nutrition/Hydration-ADULT  Goal: Nutrient/Hydration intake appropriate for improving, restoring or maintaining nutritional needs  Description: Monitor and assess patient's nutrition/hydration status for malnutrition  Collaborate with interdisciplinary team and initiate plan and interventions as ordered  Monitor patient's weight and dietary intake as ordered or per policy  Utilize nutrition screening tool and intervene as necessary  Determine patient's food preferences and provide high-protein, high-caloric foods as appropriate       INTERVENTIONS:  - Monitor oral intake, urinary output, labs, and treatment plans  - Assess nutrition and hydration status and recommend course of action  - Evaluate amount of meals eaten  - Assist patient with eating if necessary   - Allow adequate time for meals  - Recommend/ encourage appropriate diets, oral nutritional supplements, and vitamin/mineral supplements  - Order, calculate, and assess calorie counts as needed  - Recommend, monitor, and adjust tube feedings and TPN/PPN based on assessed needs  - Assess need for intravenous fluids  - Provide specific nutrition/hydration education as appropriate  - Include patient/family/caregiver in decisions related to nutrition  Outcome: Progressing

## 2020-12-27 NOTE — PROGRESS NOTES
[facility]- Internal Medicine Progress Note      PATIENT INFORMATION      Patient: Vicki Ortiz 48 y o  female   MRN: [id number]  PCP: Martha Sears MD  Unit/Bed#: [facility] [place] Encounter: [id number]  Date Of Visit: 12/27/20  Current Length of Stay: 4 day(s)     ASSESSMENTS & PLAN        Principal Problem:    Pseudocyst of pancreas  Active Problems:    Status post cholecystectomy    Essential hypertension      * Pseudocyst of pancreas  Assessment & Plan  Post ERCP pancreatitis began on December 1, 2020  ERCP at the time demonstrated choledocholithiasis, with small sphincterotomy status post 7 cm stent placement    CT of the abdomen pelvis on admission 12/23 demonstrated large communicating serpiginous peripancreatic fluid collection with minimal diffuse thickening  This may represent fluid loculation a developing pseudocyst   The collection is predominantly posterior inferior to the pancreas tracking inferiorly along the proximal superior mesenteric vessels extending into the inferior lesser sac and gallbladder fossa with collection in the gallbladder E seem to communicate with the peripancreatic collection  There was moderately improved acute pancreatitis  MRI/MRCP 12/26/2020 shows intact main pancreatic duct without communication to the acute peripancreatic fluid collection/of the pseudocyst   There is a rim enhancing fluid collection in the gallbladder fossa status post cholecystectomy  Given its increased size from 12/23 May represent a biloma with persistent biliary leak  There is evidence of choledocholithiasis with mild common bile duct dilatation  Normal T bili-  Continue conservative management-no need for urgent ERCP  GI on board    Plan for HIDA scan- If bile duct leakage demonstrated plan for percutaneous cholecystostomy per GI  patient is not a candidate for endoscopic cystic gastrostomy at this point  Continue IV TPN      Status post cholecystectomy  Assessment & Plan  · Lap collette on 2020  · She is also s/p ERCP during last admission and developed post ERCP pancreatitis  · LFTs stable, lipase normal    Essential hypertension  Assessment & Plan  · Continue home meds: amlodipine   · Lisinopril has been discontinued as OP        VTE Pharmacologic Prophylaxis: Lovenox  VTE Mechanical Prophylaxis: SCD's    Disposition:  Ongoing management      SUBJECTIVE     Patient lying comfortable in the bed  Discussed with patient plan for HIDA scan  Patient had 3 loose bowel movement yesterday  Denied nausea vomiting , no abdominal pain  TPN, fluids are running      OBJECTIVE     Vitals:   Temp (24hrs), Av °F (37 2 °C), Min:98 8 °F (37 1 °C), Max:99 1 °F (37 3 °C)    Temp:  [98 8 °F (37 1 °C)-99 1 °F (37 3 °C)] 98 8 °F (37 1 °C)  HR:  [86-89] 89  Resp:  [16-18] 16  BP: (117)/(75-78) 117/75  SpO2:  [97 %-99 %] 97 %  Body mass index is 29 69 kg/m²  Input and Output Summary (last 24 hours): Intake/Output Summary (Last 24 hours) at 2020 1221  Last data filed at 2020 1820  Gross per 24 hour   Intake 999 ml   Output    Net 999 ml       Physical Exam:   GENERAL: NAD  HEENT:  NC/AT, PERRL, EOMI, MMM, no scleral icterus  CARDIAC:  RRR, +S1/S2, no S3/S4 heard, no m/g/r  PULMONARY:  CTA B/L, no wheezing/rales/rhonci, non-labored breathing  ABDOMEN:  Soft, NT/ND, +BS, no rebound/guarding/rigidity  Extremities:  2+ Pulses in DP/PT  No edema, cyanosis, or clubbing  NEUROLOGIC:  Alert/oriented x3   No motor or sensory deficits  SKIN:  No rashes or erythema    ADDITIONAL DATA     Labs & Recent Cultures:     Results from last 7 days   Lab Units 20  0510 20  0506   WBC Thousand/uL 6 02 5 47   HEMOGLOBIN g/dL 10 8* 10 9*   HEMATOCRIT % 32 9* 33 4*   PLATELETS Thousands/uL 278 388   NEUTROS PCT %  --  70   LYMPHS PCT %  --  16   MONOS PCT %  --  9   EOS PCT %  --  4     Results from last 7 days   Lab Units 20  0510   POTASSIUM mmol/L 3 9   CHLORIDE mmol/L 106   CO2 mmol/L 27 BUN mg/dL 9   CREATININE mg/dL 0 81   CALCIUM mg/dL 8 3   ALK PHOS U/L 79   ALT U/L 40   AST U/L 32                     Nutrition:  Diet NPO; Sips with meds  Adult 3-in-1 TPN (custom base / standard electrolytes)  Radiology Results:   MRI abdomen w wo contrast   Final Result by  (12/27 1221)   Addendum 1 of 1 by Zoey Jenkins MD (12/26 1112)   ADDENDUM:      Additional finding as follows:      Review of the previous CT again demonstrates that there is likely    communication between the peripancreatic collection and the    cholecystectomy bed collection best seen on coronal images 70 through 71  It is uncertain whether this is primarily a fluid    collection related to pancreatitis extending into the gallbladder fossa    versus biloma  I personally discussed this study with Vandana Zavala on 12/26/2020 at    10:55 AM                Final      IR PICC line placement double lumen   Final Result by Stephane Guzman MD (12/24 7258)   Impression:    Successful placement of a 42 cm right upper extremity single lumen Power PICC        Workstation performed: [id number]         NM inpatient order    (Results Pending)     Scheduled Medications:  amLODIPine, 10 mg, Daily  enoxaparin, 40 mg, Daily  pantoprazole, 40 mg, Q24H [address]        PRN MEDS:  acetaminophen, 650 mg, Q6H PRN  ibuprofen, 400 mg, Q6H PRN  meclizine, 25 mg, Q8H PRN  ondansetron, 4 mg, Q4H PRN  oxyCODONE, 5 mg, Q6H PRN        Last 24 Hours Medication List:   Current Facility-Administered Medications   Medication Dose Route Frequency Provider Last Rate    acetaminophen  650 mg Oral Q6H PRN Nilesh Zuniga MD      Adult TPN (CUSTOM BASE/STANDARD ELECTROLYTE)   Intravenous Continuous TPN Christi Griggs PA-C 96 6 mL/hr at 12/26/20 2123    amLODIPine  10 mg Oral Daily Nilesh Zuniga MD      dextrose 5 % and sodium chloride 0 45 %  100 mL/hr Intravenous Continuous Nilesh Zuniga  mL/hr (12/27/20 0304)    enoxaparin  40 mg Subcutaneous Daily Nilesh Zuniga MD  ibuprofen  400 mg Oral Q6H PRN Afua Nathan MD      meclizine  25 mg Oral Q8H PRN Dar Flores MD      ondansetron  4 mg Intravenous Q4H PRN Afua Natahn MD      oxyCODONE  5 mg Oral Q6H PRN Afua Nathan MD      pantoprazole  40 mg Intravenous Q24H [facility] ADITYA PollackC            Time Spent for Care: 30 mins spent in total   More than 50% of total time spent on counseling and coordination of care as described above  Current Length of Stay: 4 day(s)      Code Status: Level 1 - Full Code          ** Please Note: This note is constructed using a voice recognition dictation system   **

## 2020-12-27 NOTE — PROGRESS NOTES
Progress Note - Arvil Aase 48 y o  female MRN: [id number]    Unit/Bed#: -01 Encounter: [id number]    Subjective: The patient continues to complain of mild right upper quadrant and epigastric abdominal pain  This is not associated with shortness of breath or chest pain  She denies fevers or chills  She denies nausea vomiting  Her white blood cell count remains normal and liver enzyme panel was normal     Objective:     Vitals: Blood pressure 117/75, pulse 89, temperature 98 8 °F (37 1 °C), resp  rate 16, height 5' 7" (1 702 m), weight 86 kg (189 lb 9 5 oz), last menstrual period 02/22/2019, SpO2 97 %  ,Body mass index is 29 69 kg/m²        Intake/Output Summary (Last 24 hours) at 12/27/2020 1122  Last data filed at 12/26/2020 1820  Gross per 24 hour   Intake 999 ml   Output    Net 999 ml       Physical Exam:     General Appearance: Alert, appears stated age and cooperative  HEENT:  Normocephalic, atraumatic, nonicteric, PERRLA  Neck:  Supple, symmetrical, trachea midline  Lungs: Clear to auscultation bilaterally, no rales or rhonchi, no labored breathing/accessory muscle use  Heart: Regular rate and rhythm, S1, S2 normal, no murmur, click, rub or gallop  Abdomen: Soft, non-tender, non-distended; bowel sounds normal; no masses or no organomegaly  Extremities: No cyanosis, edema  Lymphatic:  No cervical, inguinal, axillary adenopathy  Skin:  No jaundice, lesions, pallor  Pulses:  2+ and equal bilaterally, no bruits    Invasive Devices     Peripherally Inserted Central Catheter Line            PICC Line 12/24/20 Right Brachial 2 days          Peripheral Intravenous Line            Peripheral IV 12/23/20 Right Antecubital 3 days                Lab Results:  Results from last 7 days   Lab Units 12/27/20  0510 12/24/20  0506   WBC Thousand/uL 6 02 5 47   HEMOGLOBIN g/dL 10 8* 10 9*   HEMATOCRIT % 32 9* 33 4*   PLATELETS Thousands/uL 278 388   NEUTROS PCT %  --  70   LYMPHS PCT %  --  16   MONOS PCT %  --  9 EOS PCT %  --  4     Results from last 7 days   Lab Units 12/27/20  0510   POTASSIUM mmol/L 3 9   CHLORIDE mmol/L 106   CO2 mmol/L 27   BUN mg/dL 9   CREATININE mg/dL 0 81   CALCIUM mg/dL 8 3   ALK PHOS U/L 79   ALT U/L 40   AST U/L 32         Results from last 7 days   Lab Units 12/23/20  1444   LIPASE u/L 341       Imaging Studies: I have personally reviewed pertinent imaging studies  Mri Abdomen W Wo Contrast    Addendum Date: 12/26/2020    ADDENDUM: Additional finding as follows: Review of the previous CT again demonstrates that there is likely communication between the peripancreatic collection and the cholecystectomy bed collection best seen on coronal images 70 through 71  It is uncertain whether this is primarily a fluid collection related to pancreatitis extending into the gallbladder fossa versus biloma  I personally discussed this study with Gregory Laws on 12/26/2020 at 10:55 AM      Result Date: 12/26/2020  Impression: 1  Intact main pancreatic duct without communication to the acute peripancreatic fluid collection/early pseudocyst  2   Rim-enhancing fluid collection in the gallbladder fossa status post cholecystectomy  Given slight increase in size from 12/23/2020, this may represent a biloma with persistent biliary leak  Abscess not excluded  3   Choledocholithiasis with mild common bile duct dilatation  The study was marked in [facility] for immediate notification  Workstation performed: [id number]     Ct Abdomen Pelvis W Contrast    Result Date: 12/23/2020  Impression: Acute large communicating serpiginous peripancreatic fluid collection with minimal diffuse wall thickening  This may represent fluid loculation and developing pseudocyst  Although there is no gas within the collection, superinfected fluid collection is not excluded  See above discussion  No evidence of pancreatic necrosis  Mild residual albeit moderately improved acute pancreatitis   This study demonstrates a significant finding and was documented as such in [facility] for liaison and referring practitioner notification  Workstation performed: [id number]     Ir Picc Line Placement Double Lumen    Result Date: 12/24/2020  Impression: Impression: Successful placement of a 42 cm right upper extremity single lumen Power PICC  Workstation performed: [id number]       Assessment and Plan:     1  Pancreatic pseudocyst  - post ERCP pancreatitis 1st began on December 1, 2020  - ERCP demonstrated choledocholithiasis with small sphincterotomy and to balloon dilations, 8 mm and 10 mm, removal of multiple pigmented stones but a single large stone could not be removed, so a 7 [employer] Aimee, 7 cm stent was placed  - CT scan of the abdomen and pelvis performed on December 2nd demonstrated findings consistent with acute pancreatitis   There is peripancreatic fluid with areas of mild rim thickening in the central mesentery which may be due to early loculation   There was evidence of interval decompression of the c and a trying to resolve things because ommon bile duct and placement of a biliary stent along with pneumobilia  - CT scan of the abdomen and pelvis performed December 23rd shows a large communicating serpiginous peripancreatic fluid collection with minimal diffuse wall thickening   This may represent fluid loculation and developing pseudocyst   The collection is predominantly posterior and inferior to the pancreas tracking inferiorly along with proximal superior mesenteric vessels extending into the inferior lesser sac and gallbladder fossa with collection in the gallbladder fossa is seem to communicate with the peripancreatic collection   There was no gas within the collection   There is no evidence of pancreatic necrosis   There was moderately improved acute pancreatitis    -  today's MRI/MRCP shows an intact main pancreatic duct with possible communication to the acute peripancreatic fluid collection/early pseudocyst   However, discussion with another radiologist doubts communication  There is a rim enhancing fluid collection in the gallbladder fossa status post cholecystectomy, slightly larger compared to  CT on 12/24 (2 6 X 3 7 cm on CT compared to 3 1 X 4 0 cm on MRI yesterday)  Given its increase in size from 12/23, this may represent a biloma with persistent biliary leak  There is evidence of choledocholithiasis (two stones seen on MRI) with mild common bile duct dilation     - As the patient is afebrile with normal white cell count there is no reason to suspect an abscess  - an urgent ERCP does not appear to be indicated  Stenting the pancreatic duct will be of no value  - I have ordered a hepatobiliary scan to rule out possible cystic duct leakage  It seems unlikely even with cystic duct leakage that there would be a biloma collection since the patient has a 7 Western Aimee stent in the common bile duct    - I don't believe that continuing TPN long term would be beneficial   If her pain is related to pancreatitis (which I doubt), then our goal is to switch to enteral feeds as soon as possible  Enteral feeds reduce the likelihood of bacterial transmigration which could result in infected pseudocyst     - Hepatobiliary scan tomorrow  If a bile leak is demonstrated, then I would recommend placement of a perc drain into the gb fossa (not a stent exchange)  - I discussed this case with Dr Tanisha Argueta today  This patient is not a candidate for endoscopic cystgastrostomy at this point (the pseudocyst is too immature)    - following discussion with the surgical service, the plan is to begin a clear liquid diet on this patient and see how she tolerates it    If she tolerates that well then my recommendation is that the TPN should be discontinued and the patient should be maintained on a enteral diet plan

## 2020-12-28 ENCOUNTER — APPOINTMENT (INPATIENT)
Dept: NUCLEAR MEDICINE | Facility: HOSPITAL | Age: 55
DRG: 440 | End: 2020-12-28
Payer: COMMERCIAL

## 2020-12-28 LAB
ALBUMIN SERPL BCP-MCNC: 2.7 G/DL (ref 3.5–5)
ALP SERPL-CCNC: 84 U/L (ref 46–116)
ALT SERPL W P-5'-P-CCNC: 44 U/L (ref 12–78)
ANION GAP SERPL CALCULATED.3IONS-SCNC: 10 MMOL/L (ref 4–13)
AST SERPL W P-5'-P-CCNC: 34 U/L (ref 5–45)
BILIRUB SERPL-MCNC: 0.1 MG/DL (ref 0.2–1)
BUN SERPL-MCNC: 13 MG/DL (ref 5–25)
CALCIUM ALBUM COR SERPL-MCNC: 9.3 MG/DL (ref 8.3–10.1)
CALCIUM SERPL-MCNC: 8.3 MG/DL (ref 8.3–10.1)
CHLORIDE SERPL-SCNC: 106 MMOL/L (ref 100–108)
CO2 SERPL-SCNC: 25 MMOL/L (ref 21–32)
CREAT SERPL-MCNC: 0.76 MG/DL (ref 0.6–1.3)
GFR SERPL CREATININE-BSD FRML MDRD: 89 ML/MIN/1.73SQ M
GLUCOSE SERPL-MCNC: 125 MG/DL (ref 65–140)
POTASSIUM SERPL-SCNC: 3.6 MMOL/L (ref 3.5–5.3)
PROT SERPL-MCNC: 6.7 G/DL (ref 6.4–8.2)
SODIUM SERPL-SCNC: 141 MMOL/L (ref 136–145)

## 2020-12-28 PROCEDURE — 99024 POSTOP FOLLOW-UP VISIT: CPT | Performed by: SURGERY

## 2020-12-28 PROCEDURE — 99232 SBSQ HOSP IP/OBS MODERATE 35: CPT | Performed by: INTERNAL MEDICINE

## 2020-12-28 PROCEDURE — C9113 INJ PANTOPRAZOLE SODIUM, VIA: HCPCS | Performed by: PHYSICIAN ASSISTANT

## 2020-12-28 PROCEDURE — G1004 CDSM NDSC: HCPCS

## 2020-12-28 PROCEDURE — 78226 HEPATOBILIARY SYSTEM IMAGING: CPT

## 2020-12-28 PROCEDURE — 80053 COMPREHEN METABOLIC PANEL: CPT | Performed by: INTERNAL MEDICINE

## 2020-12-28 PROCEDURE — A9537 TC99M MEBROFENIN: HCPCS

## 2020-12-28 RX ADMIN — ENOXAPARIN SODIUM 40 MG: 40 INJECTION SUBCUTANEOUS at 08:21

## 2020-12-28 RX ADMIN — AMLODIPINE BESYLATE 10 MG: 10 TABLET ORAL at 08:20

## 2020-12-28 RX ADMIN — DEXTROSE AND SODIUM CHLORIDE 100 ML/HR: 5; .45 INJECTION, SOLUTION INTRAVENOUS at 08:23

## 2020-12-28 RX ADMIN — PANTOPRAZOLE SODIUM 40 MG: 40 INJECTION, POWDER, FOR SOLUTION INTRAVENOUS at 08:21

## 2020-12-28 RX ADMIN — Medication: at 23:29

## 2020-12-28 RX ADMIN — DEXTROSE AND SODIUM CHLORIDE 100 ML/HR: 5; .45 INJECTION, SOLUTION INTRAVENOUS at 21:15

## 2020-12-28 NOTE — PROGRESS NOTES
Progress Note  Ángel Goodson 48 y o  female MRN: [id number]  Unit/Bed#: -01 Encounter: [id number]    Subjective:  Patient lying in bed comfortably but still reporting abdominal discomfort although she reports that is different than she has had the past several days  She denies any nausea or vomiting  She reports she has had several bowel movements  She denies any fevers or chills  She is going for hepatobiliary scan today at 1:00 p m       Objective:     Vitals:   Vitals:    12/28/20 0854   BP:    Pulse: 97   Resp:    Temp: 98 4 °F (36 9 °C)   SpO2: 98%   ,Body mass index is 29 69 kg/m²        Intake/Output Summary (Last 24 hours) at 12/28/2020 0947  Last data filed at 12/28/2020 1385  Gross per 24 hour   Intake 2873 33 ml   Output    Net 2873 33 ml       Physical Exam:     General Appearance: Alert, appears stated age and cooperative  Lungs: Clear to auscultation bilaterally, no rales or rhonchi, no labored breathing/accessory muscle use  Heart: Regular rate and rhythm, S1, S2 normal, no murmur, click, rub or gallop  Abdomen: Soft, non-tender, non-distended; bowel sounds normal; no masses or no organomegaly  Extremities: No cyanosis, edema    Invasive Devices     Peripherally Inserted Central Catheter Line            PICC Line 12/24/20 Right Brachial 3 days          Peripheral Intravenous Line            Peripheral IV 12/27/20 Distal;Right;Ventral (anterior) Forearm less than 1 day                Lab Results:  Admission on 12/23/2020   Component Date Value    WBC 12/23/2020 7 68     RBC 12/23/2020 3 78*    Hemoglobin 12/23/2020 11 5     Hematocrit 12/23/2020 36 1     MCV 12/23/2020 96     MCH 12/23/2020 30 4     MCHC 12/23/2020 31 9     RDW 12/23/2020 12 0     MPV 12/23/2020 9 9     Platelets 04/30/7947 482*    nRBC 12/23/2020 0     Neutrophils Relative 12/23/2020 68     Immat GRANS % 12/23/2020 0     Lymphocytes Relative 12/23/2020 22     Monocytes Relative 12/23/2020 9     Eosinophils Relative 12/23/2020 1     Basophils Relative 12/23/2020 0     Neutrophils Absolute 12/23/2020 5 23     Immature Grans Absolute 12/23/2020 0 01     Lymphocytes Absolute 12/23/2020 1 65     Monocytes Absolute 12/23/2020 0 66     Eosinophils Absolute 12/23/2020 0 10     Basophils Absolute 12/23/2020 0 03     Sodium 12/23/2020 141     Potassium 12/23/2020 4 2     Chloride 12/23/2020 105     CO2 12/23/2020 30     ANION GAP 12/23/2020 6     BUN 12/23/2020 10     Creatinine 12/23/2020 0 96     Glucose 12/23/2020 116     Calcium 12/23/2020 9 4     Corrected Calcium 12/23/2020 10 0     AST 12/23/2020 30     ALT 12/23/2020 42     Alkaline Phosphatase 12/23/2020 94     Total Protein 12/23/2020 7 9     Albumin 12/23/2020 3 3*    Total Bilirubin 12/23/2020 0 30     eGFR 12/23/2020 67     Lipase 12/23/2020 341     Sodium 12/24/2020 143     Potassium 12/24/2020 3 6     Chloride 12/24/2020 105     CO2 12/24/2020 28     ANION GAP 12/24/2020 10     BUN 12/24/2020 5     Creatinine 12/24/2020 0 76     Glucose 12/24/2020 118     Calcium 12/24/2020 8 5     Corrected Calcium 12/24/2020 9 5     AST 12/24/2020 23     ALT 12/24/2020 31     Alkaline Phosphatase 12/24/2020 82     Total Protein 12/24/2020 7 0     Albumin 12/24/2020 2 7*    Total Bilirubin 12/24/2020 0 30     eGFR 12/24/2020 89     WBC 12/24/2020 5 47     RBC 12/24/2020 3 52*    Hemoglobin 12/24/2020 10 9*    Hematocrit 12/24/2020 33 4*    MCV 12/24/2020 95     MCH 12/24/2020 31 0     MCHC 12/24/2020 32 6     RDW 12/24/2020 12 1     MPV 12/24/2020 10 1     Platelets 08/01/2102 388     nRBC 12/24/2020 0     Neutrophils Relative 12/24/2020 70     Immat GRANS % 12/24/2020 0     Lymphocytes Relative 12/24/2020 16     Monocytes Relative 12/24/2020 9     Eosinophils Relative 12/24/2020 4     Basophils Relative 12/24/2020 1     Neutrophils Absolute 12/24/2020 3 82     Immature Grans Absolute 12/24/2020 0 02     Lymphocytes Absolute 12/24/2020 0 88     Monocytes Absolute 12/24/2020 0 51     Eosinophils Absolute 12/24/2020 0 21     Basophils Absolute 12/24/2020 0 03     Triglycerides 12/24/2020 129     Magnesium 12/24/2020 1 6     Phosphorus 12/24/2020 4 8*    Prealbumin 12/24/2020 23 6     Sodium 12/25/2020 145     Potassium 12/25/2020 3 6     Chloride 12/25/2020 106     CO2 12/25/2020 27     ANION GAP 12/25/2020 12     BUN 12/25/2020 4*    Creatinine 12/25/2020 0 69     Glucose 12/25/2020 124     Calcium 12/25/2020 8 8     Corrected Calcium 12/25/2020 9 8     AST 12/25/2020 24     ALT 12/25/2020 34     Alkaline Phosphatase 12/25/2020 82     Total Protein 12/25/2020 7 1     Albumin 12/25/2020 2 8*    Total Bilirubin 12/25/2020 0 20     eGFR 12/25/2020 99     Phosphorus 12/25/2020 4 5     Magnesium 12/25/2020 1 8     Triglycerides 12/25/2020 136     Sodium 12/27/2020 141     Potassium 12/27/2020 3 9     Chloride 12/27/2020 106     CO2 12/27/2020 27     ANION GAP 12/27/2020 8     BUN 12/27/2020 9     Creatinine 12/27/2020 0 81     Glucose 12/27/2020 120     Calcium 12/27/2020 8 3     eGFR 12/27/2020 83     Magnesium 12/27/2020 1 9     Phosphorus 12/27/2020 4 0     WBC 12/27/2020 6 02     RBC 12/27/2020 3 42*    Hemoglobin 12/27/2020 10 8*    Hematocrit 12/27/2020 32 9*    MCV 12/27/2020 96     MCH 12/27/2020 31 6     MCHC 12/27/2020 32 8     RDW 12/27/2020 11 9     Platelets 26/28/9097 278     MPV 12/27/2020 10 2     Hepatitis B Surface Ag 12/27/2020 Non-reactive     Hep A IgM 12/27/2020 Non-reactive     Hepatitis C Ab 12/27/2020 Non-reactive     Hep B C IgM 12/27/2020 Non-reactive     Total Bilirubin 12/27/2020 0 10*    Bilirubin, Direct 12/27/2020 0 06     Alkaline Phosphatase 12/27/2020 79     AST 12/27/2020 32     ALT 12/27/2020 40     Total Protein 12/27/2020 6 8     Albumin 12/27/2020 2 7*    Sodium 12/28/2020 141     Potassium 12/28/2020 3 6     Chloride 12/28/2020 106  CO2 12/28/2020 25     ANION GAP 12/28/2020 10     BUN 12/28/2020 13     Creatinine 12/28/2020 0 76     Glucose 12/28/2020 125     Calcium 12/28/2020 8 3     Corrected Calcium 12/28/2020 9 3     AST 12/28/2020 34     ALT 12/28/2020 44     Alkaline Phosphatase 12/28/2020 84     Total Protein 12/28/2020 6 7     Albumin 12/28/2020 2 7*    Total Bilirubin 12/28/2020 0 10*    eGFR 12/28/2020 89        Imaging Studies:   I have personally reviewed pertinent imaging studies  Xr Chest Portable    Result Date: 12/6/2020  Narrative: CHEST INDICATION:   pain with inspiration  COMPARISON:  December 4, 2020  EXAM PERFORMED/VIEWS:  XR CHEST PORTABLE FINDINGS: Cardiomediastinal silhouette appears unremarkable  Mild elevation of the right hemidiaphragm  Persistent opacities in the bases of both hemithoraces, probably a combination of pleural effusions and atelectasis and/or consolidation in the lower lobes  No pneumothorax  Osseous structures appear within normal limits for patient age  Biliary stent present  Impression: Atelectasis and/or consolidation in the bases of both lower lobes  Possible coexisting pleural effusions  No significant change since 12/4/2020  Workstation performed: [id number]     Xr Chest 1 View Portable    Result Date: 11/30/2020  Narrative: CHEST INDICATION:   epigastric pain  COMPARISON:  None EXAM PERFORMED/VIEWS:  XR CHEST PORTABLE FINDINGS: Cardiomediastinal silhouette appears unremarkable  The lungs are clear  No pneumothorax or pleural effusion  Osseous structures appear within normal limits for patient age  Impression: No acute cardiopulmonary disease   Workstation performed: [id number]     Mri Abdomen W Wo Contrast    Addendum Date: 12/26/2020 Addendum:   ADDENDUM: Additional finding as follows: Review of the previous CT again demonstrates that there is likely communication between the peripancreatic collection and the cholecystectomy bed collection best seen on coronal images 70 through 71  It is uncertain whether this is primarily a fluid collection related to pancreatitis extending into the gallbladder fossa versus biloma  I personally discussed this study with Mirtha Slade on 12/26/2020 at 10:55 AM      Result Date: 12/26/2020  Narrative: MRI - ABDOMEN - WITH AND WITHOUT CONTRAST INDICATION:  Pancreatic pseudocyst COMPARISON: 12/23/2020 TECHNIQUE:  The following pulse sequences were obtained prior to and following the administration of intravenous contrast:     Coronal and axial T2 with TE of 90 and 180 respectively, axial T2 with fat saturation, axial FIESTA fat-sat, axial T1-weighted in-and-out-of phase, axial DWI/ADC, precontrast axial T1 with fat saturation, post-contrast dynamic axial T1 with fat saturation at 20, 70, and 180 seconds, coronal T1  with fat saturation and 7 minute delayed axial T1 with fat saturation  2-D and 3-D MRCP were obtained  Imaging performed on 1 5T MRI   IV Contrast:  8 mL of Gadobutrol injection (SINGLE-DOSE) FINDINGS: LOWER CHEST:   Unremarkable  LIVER: Normal in size and configuration  No suspicious mass  The hepatic veins and portal veins are patent  BILE DUCTS: There is a 7 mm stone within the common bile duct with an additional 3 mm stone just proximal to the larger stone  The common bile duct is minimally dilated measuring 7 mm  Known common bile duct stent seen on CT is not well visualized  GALLBLADDER:  Postcholecystectomy  There is a rim-enhancing fluid collection within the operative bed measuring 3 1 x 4 0 cm, previously 2 6 x 3 7 cm  There is mild internal debris  PANCREAS: As seen on CT, there is a serpiginous fluid collection along the pancreatic body consistent with acute peripancreatic collection/early pseudocyst   This is difficult to measure given serpiginous configuration, measuring approximately 4 0 x 5 7 cm, previously 4 1 x 5 9 cm  The pancreatic duct is intact  There is no communication with the collection  No main pancreatic ductal dilation  ADRENAL GLANDS:  Normal  SPLEEN:  Normal  KIDNEYS/PROXIMAL URETERS: No hydroureteronephrosis  No suspicious renal mass  BOWEL:   No dilated loops of bowel  PERITONEUM/RETROPERITONEUM: No ascites  LYMPH NODES: No abdominal lymphadenopathy  VASCULAR STRUCTURES:  No aneurysm  ABDOMINAL WALL:  Unremarkable  OSSEOUS STRUCTURES:  No suspicious osseous lesion  Impression: 1  Intact main pancreatic duct without communication to the acute peripancreatic fluid collection/early pseudocyst  2   Rim-enhancing fluid collection in the gallbladder fossa status post cholecystectomy  Given slight increase in size from 12/23/2020, this may represent a biloma with persistent biliary leak  Abscess not excluded  3   Choledocholithiasis with mild common bile duct dilatation  The study was marked in [facility] for immediate notification  Workstation performed: [id number]     Fl Ercp Biliary Only    Result Date: 12/1/2020  Narrative: ERCP INDICATION: Choledocholithiasis with obstruction  COMPARISON:  November 29, 2020 IMAGES:  4 FLUOROSCOPY TIME:   513 seconds CONTRAST: 50 mL of iohexol (OMNIPAQUE) FINDINGS: Fluoroscopic guidance was provided for performance of ERCP  BILIARY: Limited contrast opacified static submitted images obtained during as part of fluoroscopic guidance demonstrate multiple filling defects in the common bile duct  The majority of the filling defects are absent on later images  Final image demonstrates presence of a plastic biliary stent  Impression: Fluoroscopic guidance for ERCP  Please see procedure report for full details  Workstation performed: [id number]     Xr Chest Portable Icu    Result Date: 12/4/2020  Narrative: CHEST INDICATION:   hypoxia  COMPARISON:  November 29, 2020 EXAM PERFORMED/VIEWS:  XR CHEST PORTABLE ICU FINDINGS: Cardiomediastinal silhouette appears unremarkable  Right basilar density seen with obscuration of the dome of diaphragm   Lungs are hypoinflated Left base partly obscured Osseous structures appear within normal limits for patient age  Impression: No pulmonary congestion seen Bibasilar densities right greater than left may be due to atelectasis and effusion  Underlying infiltrates are not entirely excluded Mildly worse from the previous study Workstation performed: [id number]     Ct Abdomen Pelvis W Contrast    Result Date: 12/23/2020  Narrative: CT ABDOMEN AND PELVIS WITH IV CONTRAST INDICATION:   K85 10: Biliary acute pancreatitis without necrosis or infection  COMPARISON:  CT abdomen and pelvis 12/2/2020 TECHNIQUE:  CT examination of the abdomen and pelvis was performed  Axial, sagittal, and coronal 2D reformatted images were created from the source data and submitted for interpretation  Radiation dose length product (DLP) for this visit:  355 mGy-cm   This examination, like all CT scans performed in the [facility], was performed utilizing techniques to minimize radiation dose exposure, including the use of iterative reconstruction and automated exposure control  IV Contrast:  100 mL of iohexol (OMNIPAQUE)  350 Enteric Contrast:  Enteric contrast was administered  FINDINGS: ABDOMEN LOWER CHEST:  No clinically significant abnormality identified in the visualized lower chest  LIVER/BILIARY TREE:  Trace intrahepatic pneumobilia attributed to biliary stent  No duct dilation  Liver otherwise unremarkable  GALLBLADDER:  Post cholecystectomy  SPLEEN:  Unremarkable  PANCREAS:  Minimal residual albeit improved peripancreatic fat stranding  No intraparenchymal gas to suggest necrosis  ADRENAL GLANDS:  Unremarkable  KIDNEYS/URETERS: One or more sharply circumscribed subcentimeter renal hypodensities are noted  These lesions are too small to accurately characterize, but are statistically most likely to represent benign cortical renal cyst(s)    According to the guidelines published in the [facility] Paper of the ACR Incidental Findings Committee (Radiology 2010), no further workup of these lesions is recommended    Kidneys otherwise unremarkable  No perinephric collection  STOMACH AND BOWEL:  Unremarkable  APPENDIX:  A normal appendix was visualized  ABDOMINOPELVIC CAVITY:  Communicating serpiginous acute peripancreatic fluid collections with minimal diffuse wall thickening attributed to developing pseudocysts  Although no gas within the fluid collections, abscess is not excluded  Collection is predominantly posterior and inferior to the pancreas tracking inferiorly along the proximal superior mesenteric vessels and extending into the inferior lesser sac and gallbladder fossa  Collection in the gallbladder fossa is seen to communicate with the peripancreatic collection on image 71 series 601  Collection overall measures approximately 12 1 x 3 9 x 5 4 cm image 72 series 601 and image 36 series 2  Focal tubular inferior extension of this dominant collection inferiorly between the proximal jejunal branches of the SMA and SMV images 39 through 48  series 2  No free gas or enlarged lymph nodes  VESSELS:  Unremarkable for patient's age  PELVIS REPRODUCTIVE ORGANS:  Unremarkable for patient's age  URINARY BLADDER:  Unremarkable  ABDOMINAL WALL/INGUINAL REGIONS:  Subcentimeter ventral umbilical abdominal wall diastases containing fat  No bowel herniation  No inguinal hernia or mass  OSSEOUS STRUCTURES:  No acute fracture or osseous destructive lesion identified  Mild degenerative changes of the spine  Impression: Acute large communicating serpiginous peripancreatic fluid collection with minimal diffuse wall thickening  This may represent fluid loculation and developing pseudocyst  Although there is no gas within the collection, superinfected fluid collection is not excluded  See above discussion  No evidence of pancreatic necrosis  Mild residual albeit moderately improved acute pancreatitis   This study demonstrates a significant  finding and was documented as such in Epic for liaison and referring practitioner notification  Workstation performed: [id number]     Ct Abdomen Pelvis W Contrast    Result Date: 12/2/2020  Narrative: CT ABDOMEN AND PELVIS WITH IV CONTRAST INDICATION:  Left upper quadrant abdominal pain with concern for pancreatitis, worsening leukocytosis, assess for necrosis or fluid collection  ERCP 11/30/2020 with removal of multiple thickening and stones and stent placement  COMPARISON:  CT abdomen and pelvis 11/29/2020 TECHNIQUE:  CT examination of the abdomen and pelvis was performed  Axial, sagittal, and coronal 2D reformatted images were created from the source data and submitted for interpretation  Radiation dose length product (DLP) for this visit:  693 49 mGy-cm   This examination, like all CT scans performed in the [facility], was performed utilizing techniques to minimize radiation dose exposure, including the use of iterative  reconstruction and automated exposure control  IV Contrast:  100 mL of iohexol (OMNIPAQUE) Enteric Contrast:  Enteric contrast was not administered  FINDINGS: ABDOMEN LOWER CHEST:  Small bilateral pleural effusions with associated dependent atelectasis  LIVER/BILIARY TREE:  Liver is normal in size and configuration  Areas of parenchymal hypoattenuation around the fissure for the ligamentum teres again noted likely due to focal fatty infiltration  A biliary stent is noted  The common bile duct is decompressed  Intrahepatic biliary ductal dilatation is decreased with mild residual prominence of right hepatic lobe biliary radicals  Small amount of pneumobilia likely related to recent ERCP  The hepatic and portal veins are patent  SMV is patent  GALLBLADDER:  Cholelithiasis  There is air within the gallbladder likely related to recent ERCP  SPLEEN:  Unremarkable  PANCREAS:  The pancreas appears enlarged and edematous particularly in the head, neck, and proximal body    There is peripancreatic fat stranding and fluid  Findings are in keeping with acute pancreatitis  No main pancreatic ductal dilatation  No focal pancreatic parenchymal hypoenhancement to suggest necrosis  The splenic vein is patent  There are areas of mild rim thickening surrounding peripancreatic fluid in the central mesentery (series 2 image 39) which may be due to early loculation  ADRENAL GLANDS:  Unremarkable  KIDNEYS/URETERS:  Excreted contrast is noted within the renal collecting systems  No hydronephrosis  Unchanged subcentimeter hypoattenuating foci in the left kidney lower pole, too small to further characterize  STOMACH AND BOWEL:  No disproportionate dilatation of small or large bowel  APPENDIX:  The appendix is unremarkable  ABDOMINOPELVIC CAVITY:  Small volume of abdominal ascites and mesenteric edema mostly in the retroperitoneum related to acute pancreatitis  There is a moderate volume of ascites in the pelvis, which appears slightly loculated posteriorly  No pneumoperitoneum  No lymphadenopathy  VESSELS:  No abdominal aortic aneurysm  PELVIS REPRODUCTIVE ORGANS:  The uterus is present  No suspicious adnexal mass  URINARY BLADDER:  Unremarkable  ABDOMINAL WALL/INGUINAL REGIONS:  Subcutaneous emphysema and fat stranding in the left anterior abdominal wall likely due to subcutaneous injections  OSSEOUS STRUCTURES:  No acute fracture or destructive osseous lesion  Impression: 1  Findings in keeping with acute pancreatitis  Peripancreatic fluid with areas of mild rim thickening in the central mesentery which may be due to early loculation  2   Interval decompression of the common bile duct and placement of biliary stent  Pneumobilia likely due to recent ERCP  3   Small bilateral pleural effusions with associated dependent atelectasis  The study was marked in [facility] for immediate notification   Workstation performed: [id number]     Ct Abdomen Pelvis With Contrast    Result Date: 11/29/2020  Narrative: CT ABDOMEN AND PELVIS WITH IV CONTRAST INDICATION:   Epigastric pain epigastric pain, elevated LFTs  COMPARISON:  None  TECHNIQUE:  CT examination of the abdomen and pelvis was performed  Axial, sagittal, and coronal 2D reformatted images were created from the source data and submitted for interpretation  Radiation dose length product (DLP) for this visit:  698 mGy-cm  This examination, like all CT scans performed in the [facility], was performed utilizing techniques to minimize radiation dose exposure, including the use of iterative reconstruction and automated exposure control  IV Contrast:  100 mL of iohexol (OMNIPAQUE) Enteric Contrast:  Enteric contrast was not administered  FINDINGS: ABDOMEN LOWER CHEST:  No clinically significant abnormality identified in the visualized lower chest  LIVER/BILIARY TREE: There is intrahepatic biliary dilatation  The common bile duct is dilated up to 15 mm  There are 2 adjacent stones in the distal common bile duct measure up to 1 cm, consistent with choledocholithiasis  GALLBLADDER:  There are gallstone(s) within the gallbladder, without pericholecystic inflammatory changes  SPLEEN:  Unremarkable  PANCREAS:  Unremarkable  ADRENAL GLANDS:  Unremarkable  KIDNEYS/URETERS:  No hydronephrosis or urinary tract calculus  One or more sharply circumscribed subcentimeter renal hypodensities are present, too small to accurately characterize, and statistically most likely benign findings  According to recent literature (Radiology 2019) no further workup of these findings is recommended  STOMACH AND BOWEL:  Unremarkable  APPENDIX:  A normal appendix was visualized  ABDOMINOPELVIC CAVITY:  No ascites  No pneumoperitoneum  No lymphadenopathy  VESSELS:  Unremarkable for patient's age  PELVIS REPRODUCTIVE ORGANS:  Unremarkable for patient's age  URINARY BLADDER:  Unremarkable  ABDOMINAL WALL/INGUINAL REGIONS:  Unremarkable   OSSEOUS STRUCTURES:  No acute fracture or destructive osseous lesion  Impression: Cholelithiasis without CT evidence of cholecystitis  Biliary dilatation due to choledocholithiasis  The study was marked in [facility] for immediate notification  Workstation performed: [id number]     Ir Picc Line Placement Double Lumen    Result Date: 12/24/2020  Narrative: EXAMINATION: Right upper extremity single lumen Power PICC placement INDICATION: Need for TPN CONTRAST: N/A FLUOROSCOPY TIME:   2 24 SECS IMAGES:  5 ANESTHESIA: Local lidocaine PROCEDURE:  The patient was identified verbally and by wristband  Timeout was performed  Informed consent was obtained  Following obtaining informed consent, the patient was prepped and draped in the usual sterile fashion  All elements of maximal sterile barrier technique, cap and mask and sterile gown and sterile gloves and sterile drape and hand hygiene and 2% chlorhexidine for cutaneous antisepsis  Sterile ultrasound technique with sterile gel and sterile probe covers was also utilized  Using ultrasound guidance, access was gained to the patient's right brachial vein using a micropuncture system  The micropuncture dilator was exchanged for a peel away sheath  A mandril wire was advanced to the level of the RA/SVC junction to measure the catheter length  The catheter was cut to size and placed through the peel away sheath  The patient tolerated the procedure well without complication  The patient left the IR department in stable condition  Findings: 1  Using ultrasound guidance, the right brachial vein was cannulated using seldinger technique  The right brachial vein was evaluated as a potential access site  The right brachial vein was patent, and free of thrombus  Static images of the vessel was obtained  Visualization of real time needle entry into the vessel was obtained  2   A fluoroscopic image demonstrated a newly placed PICC with the central aspect at the RA/SVC junction    The catheter tubing is smooth in contour  Impression: Impression: Successful placement of a 42 cm right upper extremity single lumen Power PICC  Workstation performed: [id number]         Assessment & Plan  Pancreatic pseudocyst  - Status post ERCP pancreatitis 1st began on December 1, 2020  - ERCP demonstrated choledocholithiasis with small sphincterotomy and to balloon dilations, 8 mm and 10 mm, removal of multiple pigmented stones but a single large stone could not be removed, so a 7 [place], 7 cm stent was placed  - CT scan of the abdomen and pelvis performed on December 2nd demonstrated findings consistent with acute pancreatitis   There is peripancreatic fluid with areas of mild rim thickening in the central mesentery which may be due to early loculation   There was evidence of interval decompression of the c and a trying to resolve things because ommon bile duct and placement of a biliary stent along with pneumobilia  - CT scan of the abdomen and pelvis performed December 23rd shows a large communicating serpiginous peripancreatic fluid collection with minimal diffuse wall thickening   This may represent fluid loculation and developing pseudocyst   The collection is predominantly posterior and inferior to the pancreas tracking inferiorly along with proximal superior mesenteric vessels extending into the inferior lesser sac and gallbladder fossa with collection in the gallbladder fossa is seem to communicate with the peripancreatic collection   There was no gas within the collection   There is no evidence of pancreatic necrosis   There was moderately improved acute pancreatitis  - MRI/MRCP shows an intact main pancreatic duct with possible communication to the acute peripancreatic fluid collection/early pseudocyst   However, discussion with another radiologist doubts communication    There is a rim enhancing fluid collection in the gallbladder fossa status post cholecystectomy, slightly larger compared to  CT on 12/24 (2 6 X 3 7 cm on CT compared to 3 1 X 4 0 cm on MRI Saturday)   Given its increase in size from 12/23, this may represent a biloma with persistent biliary leak   There is evidence of choledocholithiasis (two stones seen on MRI) with mild common bile duct dilation     - As the patient is afebrile with normal white cell count there is no reason to suspect an abscess  - Urgent ERCP does not appear to be indicated   Stenting the pancreatic duct will be of no value  - Hepatobiliary scan to rule out possible cystic duct leakage   It seems unlikely even with cystic duct leakage that there would be a biloma collection since the patient has a 7 [employer] stent in the common bile duct  Scheduled for today at 1pm    -If patient has evidence of a bile leak then we would consult IR for percutaneous drainage into the gallbladder fossa and leave the stent in place that she currently has in her CBD   - Dr Cachorro Machuca has discuss this case in detail with Dr Tanisha Argueta  - The patient is not a candidate for an endoscopic cyst gastrostomy because her pseudocyst is too immature   -Will discuss with surgical service about starting clear liquids and discontinuing TPN if she does tolerate this  Patient will be seen and examined by Dr Arciniega Hidden         Clover Alvarez PA-C  12/28/2020,9:47 AM

## 2020-12-28 NOTE — PLAN OF CARE
Problem: PAIN - ADULT  Goal: Verbalizes/displays adequate comfort level or baseline comfort level  Description: Interventions:  - Encourage patient to monitor pain and request assistance  - Assess pain using appropriate pain scale  - Administer analgesics based on type and severity of pain and evaluate response  - Implement non-pharmacological measures as appropriate and evaluate response  - Consider cultural and social influences on pain and pain management  - Notify physician/advanced practitioner if interventions unsuccessful or patient reports new pain  Outcome: Progressing     Problem: INFECTION - ADULT  Goal: Absence or prevention of progression during hospitalization  Description: INTERVENTIONS:  - Assess and monitor for signs and symptoms of infection  - Monitor lab/diagnostic results  - Monitor all insertion sites, i e  indwelling lines, tubes, and drains  - Monitor endotracheal if appropriate and nasal secretions for changes in amount and color  - [place] appropriate cooling/warming therapies per order  - Administer medications as ordered  - Instruct and encourage patient and family to use good hand hygiene technique  - Identify and instruct in appropriate isolation precautions for identified infection/condition  Outcome: Progressing     Problem: Nutrition/Hydration-ADULT  Goal: Nutrient/Hydration intake appropriate for improving, restoring or maintaining nutritional needs  Description: Monitor and assess patient's nutrition/hydration status for malnutrition  Collaborate with interdisciplinary team and initiate plan and interventions as ordered  Monitor patient's weight and dietary intake as ordered or per policy  Utilize nutrition screening tool and intervene as necessary  Determine patient's food preferences and provide high-protein, high-caloric foods as appropriate       INTERVENTIONS:  - Monitor oral intake, urinary output, labs, and treatment plans  - Assess nutrition and hydration status and recommend course of action  - Evaluate amount of meals eaten  - Assist patient with eating if necessary   - Allow adequate time for meals  - Recommend/ encourage appropriate diets, oral nutritional supplements, and vitamin/mineral supplements  - Order, calculate, and assess calorie counts as needed  - Recommend, monitor, and adjust tube feedings and TPN/PPN based on assessed needs  - Assess need for intravenous fluids  - Provide specific nutrition/hydration education as appropriate  - Include patient/family/caregiver in decisions related to nutrition  Outcome: Progressing

## 2020-12-28 NOTE — PROGRESS NOTES
Progress Note - General Surgery   Rona Langston 48 y o  female MRN: [id number]  Unit/Bed#: -01 Encounter: [id number]    Assessment/Plan  Rona Langston is a 48 y o  female     Pancreatic pseudocyst vs pancreatic ductal leak s/p gallstone pancreatitis, ERCP with stent placement for choledocholithiasis, and laparoscopic cholecystectomy  AVSS, LFTs WNL  Intermittent abdominal pain, comfortable this AM  +flatus     Follow up with results of HIDA, scheduled for this afternoon  If HIDA positive for bile leak, consult IR for placement of percutaneous cholecystostomy tube  If HIDA negative, agree with plan for trial of clear liquids and stopping TPN if patient is able to tolerate PO intake  Serial abdominal exams  Trend labs, monitor WBC and LFTs  Ambulation/OOB  Pain control PRN  Medicine primary, GI following      Subjective/Objective     Subjective: No acute events overnight     Objective:     Blood pressure 118/80, pulse 97, temperature 98 4 °F (36 9 °C), resp  rate 19, height 5' 7" (1 702 m), weight 86 kg (189 lb 9 5 oz), last menstrual period 02/22/2019, SpO2 98 %  ,Body mass index is 29 69 kg/m²        Intake/Output Summary (Last 24 hours) at 12/28/2020 1011  Last data filed at 12/28/2020 6538  Gross per 24 hour   Intake 2873 33 ml   Output    Net 2873 33 ml       Invasive Devices     Peripherally Inserted Central Catheter Line            PICC Line 12/24/20 Right Brachial 3 days          Peripheral Intravenous Line            Peripheral IV 12/27/20 Distal;Right;Ventral (anterior) Forearm less than 1 day                Physical Exam: /80   Pulse 97   Temp 98 4 °F (36 9 °C)   Resp 19   Ht 5' 7" (1 702 m)   Wt 86 kg (189 lb 9 5 oz)   LMP 02/22/2019   SpO2 98%   BMI 29 69 kg/m²   General appearance: alert and oriented, in no acute distress  Lungs: clear to auscultation bilaterally  Heart: regular rate and rhythm, S1, S2 normal, no murmur, click, rub or gallop  Abdomen: soft, non-distended, active bowel sounds, mild tenderness to palpation in epigastrum, no guarding or rebound  Extremities: extremities normal, warm and well-perfused; no cyanosis, clubbing, or edema    Lab, Imaging and other studies:I have personally reviewed pertinent lab results       VTE Pharmacologic Prophylaxis: Enoxaparin (Lovenox)  VTE Mechanical Prophylaxis: sequential compression device    Recent Results (from the past 36 hour(s))   Basic metabolic panel    Collection Time: 12/27/20  5:10 AM   Result Value Ref Range    Sodium 141 136 - 145 mmol/L    Potassium 3 9 3 5 - 5 3 mmol/L    Chloride 106 100 - 108 mmol/L    CO2 27 21 - 32 mmol/L    ANION GAP 8 4 - 13 mmol/L    BUN 9 5 - 25 mg/dL    Creatinine 0 81 0 60 - 1 30 mg/dL    Glucose 120 65 - 140 mg/dL    Calcium 8 3 8 3 - 10 1 mg/dL    eGFR 83 ml/min/1 73sq m   Magnesium    Collection Time: 12/27/20  5:10 AM   Result Value Ref Range    Magnesium 1 9 1 6 - 2 6 mg/dL   Phosphorus    Collection Time: 12/27/20  5:10 AM   Result Value Ref Range    Phosphorus 4 0 2 7 - 4 5 mg/dL   CBC    Collection Time: 12/27/20  5:10 AM   Result Value Ref Range    WBC 6 02 4 31 - 10 16 Thousand/uL    RBC 3 42 (L) 3 81 - 5 12 Million/uL    Hemoglobin 10 8 (L) 11 5 - 15 4 g/dL    Hematocrit 32 9 (L) 34 8 - 46 1 %    MCV 96 82 - 98 fL    MCH 31 6 26 8 - 34 3 pg    MCHC 32 8 31 4 - 37 4 g/dL    RDW 11 9 11 6 - 15 1 %    Platelets 465 512 - 821 Thousands/uL    MPV 10 2 8 9 - 12 7 fL   Hepatic function panel    Collection Time: 12/27/20  5:10 AM   Result Value Ref Range    Total Bilirubin 0 10 (L) 0 20 - 1 00 mg/dL    Bilirubin, Direct 0 06 0 00 - 0 20 mg/dL    Alkaline Phosphatase 79 46 - 116 U/L    AST 32 5 - 45 U/L    ALT 40 12 - 78 U/L    Total Protein 6 8 6 4 - 8 2 g/dL    Albumin 2 7 (L) 3 5 - 5 0 g/dL   Hepatitis panel, acute    Collection Time: 12/27/20  6:45 AM   Result Value Ref Range    Hepatitis B Surface Ag Non-reactive Non-reactive, NonReactive - Confirmed    Hep A IgM Non-reactive Non-reactive, Equivocal-Suggest Recollect    Hepatitis C Ab Non-reactive Non-reactive    Hep B C IgM Non-reactive Non-reactive   Comprehensive metabolic panel    Collection Time: 12/28/20  6:10 AM   Result Value Ref Range    Sodium 141 136 - 145 mmol/L    Potassium 3 6 3 5 - 5 3 mmol/L    Chloride 106 100 - 108 mmol/L    CO2 25 21 - 32 mmol/L    ANION GAP 10 4 - 13 mmol/L    BUN 13 5 - 25 mg/dL    Creatinine 0 76 0 60 - 1 30 mg/dL    Glucose 125 65 - 140 mg/dL    Calcium 8 3 8 3 - 10 1 mg/dL    Corrected Calcium 9 3 8 3 - 10 1 mg/dL    AST 34 5 - 45 U/L    ALT 44 12 - 78 U/L    Alkaline Phosphatase 84 46 - 116 U/L    Total Protein 6 7 6 4 - 8 2 g/dL    Albumin 2 7 (L) 3 5 - 5 0 g/dL    Total Bilirubin 0 10 (L) 0 20 - 1 00 mg/dL    eGFR 89 ml/min/1 73sq m

## 2020-12-28 NOTE — PROGRESS NOTES
[facility]- Internal Medicine Progress Note      PATIENT INFORMATION      Patient: Darlyn Alanis 48 y o  female   MRN: [id number]  PCP: Warren Marshall MD  Unit/Bed#: MS Prasad01 Encounter: [id number]  Date Of Visit: 12/28/20  Current Length of Stay: 5 day(s)     ASSESSMENTS & PLAN        Principal Problem:    Pseudocyst of pancreas  Active Problems:    Status post cholecystectomy    Essential hypertension      * Pseudocyst of pancreas  Assessment & Plan  Post ERCP pancreatitis began on December 1, 2020  ERCP at the time demonstrated choledocholithiasis, with small sphincterotomy status post 7 cm stent placement    CT of the abdomen pelvis on admission 12/23 demonstrated large communicating serpiginous peripancreatic fluid collection with minimal diffuse thickening  This may represent fluid loculation a developing pseudocyst   The collection is predominantly posterior inferior to the pancreas tracking inferiorly along the proximal superior mesenteric vessels extending into the inferior lesser sac and gallbladder fossa with collection in the gallbladder E seem to communicate with the peripancreatic collection  There was moderately improved acute pancreatitis  MRI/MRCP 12/26/2020 shows intact main pancreatic duct without communication to the acute peripancreatic fluid collection/of the pseudocyst   There is a rim enhancing fluid collection in the gallbladder fossa status post cholecystectomy  Given its increased size from 12/23 May represent a biloma with persistent biliary leak  There is evidence of choledocholithiasis with mild common bile duct dilatation  Normal T bili-alk-phos, liver enzyme  Continue conservative management-no need for urgent ERCP  GI on board  for HIDA scan today  Possible perc collette if needed  Otherwise plan to start p o   Intake gradually and Dc TPN      Status post cholecystectomy  Assessment & Plan  · Lap collette on 12/4/2020  · She is also s/p ERCP during last admission and developed post ERCP pancreatitis  · LFTs stable, lipase normal    Essential hypertension  Assessment & Plan  · Continue home meds: amlodipine   · Lisinopril has been discontinued as OP        VTE Pharmacologic Prophylaxis: Enoxaparin (Lovenox)   VTE Mechanical Prophylaxis: SCD's        SUBJECTIVE     Patient lying comfortably in bed  Denies any nausea, vomiting, abdominal pain  TPN is running  Discussed with the patient plan for HIDA scan today  OBJECTIVE     Vitals:   Temp (24hrs), Av 3 °F (36 8 °C), Min:98 1 °F (36 7 °C), Max:98 5 °F (36 9 °C)    Temp:  [98 1 °F (36 7 °C)-98 5 °F (36 9 °C)] 98 4 °F (36 9 °C)  HR:  [95-97] 97  Resp:  [18-19] 19  BP: (117-121)/(80) 118/80  SpO2:  [98 %-99 %] 98 %  Body mass index is 29 69 kg/m²  Input and Output Summary (last 24 hours): Intake/Output Summary (Last 24 hours) at 2020 1328  Last data filed at 2020 2134  Gross per 24 hour   Intake 1000 ml   Output    Net 1000 ml       Physical Exam:   GENERAL: NAD  HEENT:  NC/AT, PERRL, EOMI, MMM, no scleral icterus  CARDIAC:  RRR, +S1/S2, no S3/S4 heard, no m/g/r  PULMONARY:  CTA B/L, no wheezing/rales/rhonci, non-labored breathing  ABDOMEN:  Soft, NT/ND, +BS, no rebound/guarding/rigidity  Extremities:  2+ Pulses in DP/PT  No edema, cyanosis, or clubbing  NEUROLOGIC:  Alert/oriented x3   No motor or sensory deficits  SKIN:  No rashes or erythema        ADDITIONAL DATA     Labs & Recent Cultures:     Results from last 7 days   Lab Units 20  0510 20  0506   WBC Thousand/uL 6 02 5 47   HEMOGLOBIN g/dL 10 8* 10 9*   HEMATOCRIT % 32 9* 33 4*   PLATELETS Thousands/uL 278 388   NEUTROS PCT %  --  70   LYMPHS PCT %  --  16   MONOS PCT %  --  9   EOS PCT %  --  4     Results from last 7 days   Lab Units 20  0610   POTASSIUM mmol/L 3 6   CHLORIDE mmol/L 106   CO2 mmol/L 25   BUN mg/dL 13   CREATININE mg/dL 0 76   CALCIUM mg/dL 8 3   ALK PHOS U/L 84   ALT U/L 44   AST U/L 34 Nutrition:  Diet NPO; Sips with meds  Adult 3-in-1 TPN (custom base / standard electrolytes)  Adult 3-in-1 TPN (custom base / standard electrolytes)  Radiology Results:   MRI abdomen w wo contrast   Final Result by  (12/28 7087)   Addendum 1 of 1 by Glen Nunez MD (12/26 1552)   ADDENDUM:      Additional finding as follows:      Review of the previous CT again demonstrates that there is likely    communication between the peripancreatic collection and the    cholecystectomy bed collection best seen on coronal images 70 through 71  It is uncertain whether this is primarily a fluid    collection related to pancreatitis extending into the gallbladder fossa    versus biloma  I personally discussed this study with Tommie Kamara on 12/26/2020 at    10:55 AM                Final      IR PICC line placement double lumen   Final Result by Nevaeh Tripathi MD (12/24 7178)   Impression:    Successful placement of a 42 cm right upper extremity single lumen Power PICC        Workstation performed: [id number]         NM hepatobiliary    (Results Pending)     Scheduled Medications:  amLODIPine, 10 mg, Daily  enoxaparin, 40 mg, Daily  pantoprazole, 40 mg, Q24H [facility]        PRN MEDS:  acetaminophen, 650 mg, Q6H PRN  ibuprofen, 400 mg, Q6H PRN  meclizine, 25 mg, Q8H PRN  ondansetron, 4 mg, Q4H PRN  oxyCODONE, 5 mg, Q6H PRN        Last 24 Hours Medication List:   Current Facility-Administered Medications   Medication Dose Route Frequency Provider Last Rate    acetaminophen  650 mg Oral Q6H PRN Sarabjit Baeza MD      Adult TPN (CUSTOM BASE/STANDARD ELECTROLYTE)   Intravenous Continuous TPN Rhonda Feliciano MD 96 6 mL/hr at 12/27/20 2227    Adult TPN (CUSTOM BASE/STANDARD ELECTROLYTE)   Intravenous Continuous TPN Mary Griggs PA-C      amLODIPine  10 mg Oral Daily Sarabjit Baeza MD      dextrose 5 % and sodium chloride 0 45 %  100 mL/hr Intravenous Continuous Sarabjit Baeza  mL/hr (12/28/20 7771)    enoxaparin  40 mg Subcutaneous Daily Sarabjit Baeza MD      ibuprofen  400 mg Oral Q6H PRN Sarabjit Baeza MD      meclizine  25 mg Oral Q8H PRN Stefany Henderson MD      ondansetron  4 mg Intravenous Q4H PRN Sarabjit Baeza MD      oxyCODONE  5 mg Oral Q6H PRN Sarabjit Baeza MD      pantoprazole  40 mg Intravenous Q24H [facility] intermediate Marion Griggs PA-C            Time Spent for Care: 30 mins spent in total   More than 50% of total time spent on counseling and coordination of care as described above  Current Length of Stay: 5 day(s)      Code Status: Level 1 - Full Code          ** Please Note: This note is constructed using a voice recognition dictation system   **

## 2020-12-28 NOTE — UTILIZATION REVIEW
Continued Stay Review    Date: 12/28/20                         Current Patient Class: Inpatient Current Level of Care: Med Surg    HPI:55 y o  female s/p lap collette 12/4/20, with development of post ERCP pancreatitis, and HTN initially admitted on 12/23/20 for evaluation and treatment of pseudocyst of pancreas  NPO with IV fluids, pain control  General Surgery and Gastroenterology on consult, PICC line was placed on 12/24 for initiation of  TPN  Gastroenterology suspected pancreatic duct leak, recommended MRI/MRCP which showed intact main pancreatic duct without communication to the acute peripancreatic fluid collection/early pseudocyst  Gastroenterology recommended hepatobiliary scan  Patient remained on TPN      12/28 Gastroenterology: Hepatobiliary scan today  to rule out possible cystic duct leakage  If patient has evidence of a bile leak then we would consult IR for percutaneous drainage into the gallbladder fossa and leave the stent in place that she currently has in her CBD  General Surgery: Follow up with results of HIDA, scheduled for this afternoon  If HIDA positive for bile leak, consult IR for placement of percutaneous cholecystostomy tube  If HIDA negative, agree with plan for trial of clear liquids and stopping TPN if patient is able to tolerate PO intake  Trend labs, monitor WBC and LFTs, pain control PRN  Internal Medicine:  Patient had HIDA scan this afternoon, demonstrating no evidence of bile leak  As per surgery trial of clear liquids and stopping TPN if patient is able to tolerate p o  Intake  Pertinent Labs/Diagnostic Results:       12/28 Hepatobiliary scan:  No definite scintigraphic evidence for bile leak  12/26 MRI abdomen:   1   Intact main pancreatic duct without communication to the acute peripancreatic fluid collection/early pseudocyst    2   Rim-enhancing fluid collection in the gallbladder fossa status post cholecystectomy    Given slight increase in size from 12/23/2020, this may represent a biloma with persistent biliary leak  Abscess not excluded  3   Choledocholithiasis with mild common bile duct dilatation            Results from last 7 days   Lab Units 12/27/20  0510 12/24/20  0506 12/23/20  1444   WBC Thousand/uL 6 02 5 47 7 68   HEMOGLOBIN g/dL 10 8* 10 9* 11 5   HEMATOCRIT % 32 9* 33 4* 36 1   PLATELETS Thousands/uL 278 388 482*   NEUTROS ABS Thousands/µL  --  3 82 5 23         Results from last 7 days   Lab Units 12/28/20  0610 12/27/20  0510 12/25/20  0545 12/24/20  0506 12/23/20  1444   SODIUM mmol/L 141 141 145 143 141   POTASSIUM mmol/L 3 6 3 9 3 6 3 6 4 2   CHLORIDE mmol/L 106 106 106 105 105   CO2 mmol/L 25 27 27 28 30   ANION GAP mmol/L 10 8 12 10 6   BUN mg/dL 13 9 4* 5 10   CREATININE mg/dL 0 76 0 81 0 69 0 76 0 96   EGFR ml/min/1 73sq m 89 83 99 89 67   CALCIUM mg/dL 8 3 8 3 8 8 8 5 9 4   MAGNESIUM mg/dL  --  1 9 1 8 1 6  --    PHOSPHORUS mg/dL  --  4 0 4 5 4 8*  --      Results from last 7 days   Lab Units 12/28/20  0610 12/27/20  0510 12/25/20  0545 12/24/20  0506 12/23/20  1444   AST U/L 34 32 24 23 30   ALT U/L 44 40 34 31 42   ALK PHOS U/L 84 79 82 82 94   TOTAL PROTEIN g/dL 6 7 6 8 7 1 7 0 7 9   ALBUMIN g/dL 2 7* 2 7* 2 8* 2 7* 3 3*   TOTAL BILIRUBIN mg/dL 0 10* 0 10* 0 20 0 30 0 30   BILIRUBIN DIRECT mg/dL  --  0 06  --   --   --          Results from last 7 days   Lab Units 12/28/20  0610 12/27/20  0510 12/25/20  0545 12/24/20  0506 12/23/20  1444   GLUCOSE RANDOM mg/dL 125 120 124 118 116                 Results from last 7 days   Lab Units 12/27/20  0645   HEP B S AG  Non-reactive   HEP C AB  Non-reactive   HEP B C IGM  Non-reactive     Results from last 7 days   Lab Units 12/23/20  1444   LIPASE u/L 341           Vital Signs:       Date/Time  Temp  Pulse  Resp  BP  MAP (mmHg)  SpO2  O2 Device   12/28/20 15:48:23  98 5 °F (36 9 °C)  101  18  119/81  94  100 %     12/28/20 08:54:27  98 4 °F (36 9 °C)  97        98 %     12/28/20 08:26:24       118/80  93       12/28/20 0802              None (Room air)   12/27/20 23:46:31  98 5 °F (36 9 °C)    19  117/80  92       12/27/20 1924    Nga Vásquez    None (Room air)   12/27/20 15:38:25  98 1 °F (36 7 °C)  95  18  121/80  94  99 %     12/27/20 0700  98 8 °F (37 1 °C)  89  16  117/75  91       12/26/20 21:13:51  98 8 °F (37 1 °C)  86  16  117/78  91  97 %     12/26/20 2016              None (Room air)   12/26/20 15:13:41  99 1 °F (37 3 °C)  89  18  117/78  91  99 %     12/26/20 15:13:20  99 1 °F (37 3 °C)  87  18  117/78  91  99 %     12/26/20 07:26:49  98 2 °F (36 8 °C)  93  16  119/78  92  96 %             Medications:   Scheduled Medications:      amLODIPine, 10 mg, Oral, Daily  enoxaparin, 40 mg, Subcutaneous, Daily  pantoprazole, 40 mg, Intravenous, Q24H RICHY      Continuous IV Infusions:      Adult TPN (CUSTOM BASE/STANDARD ELECTROLYTE), , Intravenous, Continuous TPN  Adult TPN (CUSTOM BASE/STANDARD ELECTROLYTE), , Intravenous, Continuous TPN  dextrose 5 % and sodium chloride 0 45 %, 100 mL/hr, Intravenous, Continuous      PRN Meds:      acetaminophen, 650 mg, Oral, Q6H PRN  ibuprofen, 400 mg, Oral, Q6H PRN  meclizine, 25 mg, Oral, Q8H PRN  ondansetron, 4 mg, Intravenous, Q4H PRN  oxyCODONE, 5 mg, Oral, Q6H PRN        Discharge Plan: TBD            Network Utilization Review Department  ATTENTION: Please call with any questions or concerns to [phone] and carefully listen to the prompts so that you are directed to the right person  All voicemails are confidential   Edson Elder all requests for admission clinical reviews, approved or denied determinations and any other requests to dedicated fax number below belonging to the campus where the patient is receiving treatment   List of dedicated fax numbers for the Facilities:  [address] DENIALS (Administrative/Medical Necessity) [phone]   [address] (Maternity/NICU/Pediatrics) [phone]   [address]  [address]  [facility] [address] [place] [address] [address] [place] [address] [facility] [phone]   [place] [address] [phone]

## 2020-12-29 LAB
ALBUMIN SERPL BCP-MCNC: 2.7 G/DL (ref 3.5–5)
ALP SERPL-CCNC: 92 U/L (ref 46–116)
ALT SERPL W P-5'-P-CCNC: 49 U/L (ref 12–78)
ANION GAP SERPL CALCULATED.3IONS-SCNC: 9 MMOL/L (ref 4–13)
AST SERPL W P-5'-P-CCNC: 34 U/L (ref 5–45)
BILIRUB SERPL-MCNC: 0.2 MG/DL (ref 0.2–1)
BUN SERPL-MCNC: 11 MG/DL (ref 5–25)
CALCIUM ALBUM COR SERPL-MCNC: 9.5 MG/DL (ref 8.3–10.1)
CALCIUM SERPL-MCNC: 8.5 MG/DL (ref 8.3–10.1)
CHLORIDE SERPL-SCNC: 106 MMOL/L (ref 100–108)
CO2 SERPL-SCNC: 26 MMOL/L (ref 21–32)
CREAT SERPL-MCNC: 0.8 MG/DL (ref 0.6–1.3)
GFR SERPL CREATININE-BSD FRML MDRD: 84 ML/MIN/1.73SQ M
GLUCOSE SERPL-MCNC: 115 MG/DL (ref 65–140)
POTASSIUM SERPL-SCNC: 3.9 MMOL/L (ref 3.5–5.3)
PROT SERPL-MCNC: 7 G/DL (ref 6.4–8.2)
SODIUM SERPL-SCNC: 141 MMOL/L (ref 136–145)

## 2020-12-29 PROCEDURE — 80053 COMPREHEN METABOLIC PANEL: CPT | Performed by: INTERNAL MEDICINE

## 2020-12-29 PROCEDURE — 99024 POSTOP FOLLOW-UP VISIT: CPT | Performed by: SURGERY

## 2020-12-29 PROCEDURE — 99232 SBSQ HOSP IP/OBS MODERATE 35: CPT | Performed by: PHYSICIAN ASSISTANT

## 2020-12-29 PROCEDURE — 99232 SBSQ HOSP IP/OBS MODERATE 35: CPT | Performed by: INTERNAL MEDICINE

## 2020-12-29 PROCEDURE — C9113 INJ PANTOPRAZOLE SODIUM, VIA: HCPCS | Performed by: PHYSICIAN ASSISTANT

## 2020-12-29 RX ADMIN — DEXTROSE AND SODIUM CHLORIDE 100 ML/HR: 5; .45 INJECTION, SOLUTION INTRAVENOUS at 06:23

## 2020-12-29 RX ADMIN — PANTOPRAZOLE SODIUM 40 MG: 40 INJECTION, POWDER, FOR SOLUTION INTRAVENOUS at 08:34

## 2020-12-29 RX ADMIN — AMLODIPINE BESYLATE 10 MG: 10 TABLET ORAL at 08:34

## 2020-12-29 RX ADMIN — ENOXAPARIN SODIUM 40 MG: 40 INJECTION SUBCUTANEOUS at 08:34

## 2020-12-29 RX ADMIN — DEXTROSE AND SODIUM CHLORIDE 100 ML/HR: 5; .45 INJECTION, SOLUTION INTRAVENOUS at 16:51

## 2020-12-29 RX ADMIN — MECLIZINE HYDROCHLORIDE 25 MG: 25 TABLET ORAL at 23:56

## 2020-12-29 NOTE — PROGRESS NOTES
Progress Note  Candace Couch 48 y o  female MRN: [id number]  Unit/Bed#: -01 Encounter: [id number]    Subjective:  Patient is sitting in the chair comfortably  Patient denies any severe abdominal pain  Patient reports abdominal cramping prior to a bowel movement  Patient denies any nausea vomiting  Patient did tolerate a clear liquid breakfast     Objective:     Vitals:   Vitals:    12/29/20 0756   BP: 117/77   Pulse:    Resp: 16   Temp: 98 °F (36 7 °C)   SpO2:    ,Body mass index is 29 69 kg/m²        Intake/Output Summary (Last 24 hours) at 12/29/2020 0954  Last data filed at 12/29/2020 3036  Gross per 24 hour   Intake 2420 ml   Output    Net 2420 ml       Physical Exam:     General Appearance: Alert, appears stated age and cooperative  Lungs: Clear to auscultation bilaterally, no rales or rhonchi, no labored breathing/accessory muscle use  Heart: Regular rate and rhythm, S1, S2 normal, no murmur, click, rub or gallop  Abdomen: Soft, non-tender, non-distended; bowel sounds normal; no masses or no organomegaly  Extremities: No cyanosis, edema    Invasive Devices     Peripherally Inserted Central Catheter Line            PICC Line 12/24/20 Right Brachial 4 days          Peripheral Intravenous Line            Peripheral IV 12/27/20 Distal;Right;Ventral (anterior) Forearm 1 day                Lab Results:  Admission on 12/23/2020   Component Date Value    WBC 12/23/2020 7 68     RBC 12/23/2020 3 78*    Hemoglobin 12/23/2020 11 5     Hematocrit 12/23/2020 36 1     MCV 12/23/2020 96     MCH 12/23/2020 30 4     MCHC 12/23/2020 31 9     RDW 12/23/2020 12 0     MPV 12/23/2020 9 9     Platelets 96/86/7421 482*    nRBC 12/23/2020 0     Neutrophils Relative 12/23/2020 68     Immat GRANS % 12/23/2020 0     Lymphocytes Relative 12/23/2020 22     Monocytes Relative 12/23/2020 9     Eosinophils Relative 12/23/2020 1     Basophils Relative 12/23/2020 0     Neutrophils Absolute 12/23/2020 5 23     Immature Grans Absolute 12/23/2020 0 01     Lymphocytes Absolute 12/23/2020 1 65     Monocytes Absolute 12/23/2020 0 66     Eosinophils Absolute 12/23/2020 0 10     Basophils Absolute 12/23/2020 0 03     Sodium 12/23/2020 141     Potassium 12/23/2020 4 2     Chloride 12/23/2020 105     CO2 12/23/2020 30     ANION GAP 12/23/2020 6     BUN 12/23/2020 10     Creatinine 12/23/2020 0 96     Glucose 12/23/2020 116     Calcium 12/23/2020 9 4     Corrected Calcium 12/23/2020 10 0     AST 12/23/2020 30     ALT 12/23/2020 42     Alkaline Phosphatase 12/23/2020 94     Total Protein 12/23/2020 7 9     Albumin 12/23/2020 3 3*    Total Bilirubin 12/23/2020 0 30     eGFR 12/23/2020 67     Lipase 12/23/2020 341     Sodium 12/24/2020 143     Potassium 12/24/2020 3 6     Chloride 12/24/2020 105     CO2 12/24/2020 28     ANION GAP 12/24/2020 10     BUN 12/24/2020 5     Creatinine 12/24/2020 0 76     Glucose 12/24/2020 118     Calcium 12/24/2020 8 5     Corrected Calcium 12/24/2020 9 5     AST 12/24/2020 23     ALT 12/24/2020 31     Alkaline Phosphatase 12/24/2020 82     Total Protein 12/24/2020 7 0     Albumin 12/24/2020 2 7*    Total Bilirubin 12/24/2020 0 30     eGFR 12/24/2020 89     WBC 12/24/2020 5 47     RBC 12/24/2020 3 52*    Hemoglobin 12/24/2020 10 9*    Hematocrit 12/24/2020 33 4*    MCV 12/24/2020 95     MCH 12/24/2020 31 0     MCHC 12/24/2020 32 6     RDW 12/24/2020 12 1     MPV 12/24/2020 10 1     Platelets 60/18/2243 388     nRBC 12/24/2020 0     Neutrophils Relative 12/24/2020 70     Immat GRANS % 12/24/2020 0     Lymphocytes Relative 12/24/2020 16     Monocytes Relative 12/24/2020 9     Eosinophils Relative 12/24/2020 4     Basophils Relative 12/24/2020 1     Neutrophils Absolute 12/24/2020 3 82     Immature Grans Absolute 12/24/2020 0 02     Lymphocytes Absolute 12/24/2020 0 88     Monocytes Absolute 12/24/2020 0 51     Eosinophils Absolute 12/24/2020 0 21  Basophils Absolute 12/24/2020 0 03     Triglycerides 12/24/2020 129     Magnesium 12/24/2020 1 6     Phosphorus 12/24/2020 4 8*    Prealbumin 12/24/2020 23 6     Sodium 12/25/2020 145     Potassium 12/25/2020 3 6     Chloride 12/25/2020 106     CO2 12/25/2020 27     ANION GAP 12/25/2020 12     BUN 12/25/2020 4*    Creatinine 12/25/2020 0 69     Glucose 12/25/2020 124     Calcium 12/25/2020 8 8     Corrected Calcium 12/25/2020 9 8     AST 12/25/2020 24     ALT 12/25/2020 34     Alkaline Phosphatase 12/25/2020 82     Total Protein 12/25/2020 7 1     Albumin 12/25/2020 2 8*    Total Bilirubin 12/25/2020 0 20     eGFR 12/25/2020 99     Phosphorus 12/25/2020 4 5     Magnesium 12/25/2020 1 8     Triglycerides 12/25/2020 136     Sodium 12/27/2020 141     Potassium 12/27/2020 3 9     Chloride 12/27/2020 106     CO2 12/27/2020 27     ANION GAP 12/27/2020 8     BUN 12/27/2020 9     Creatinine 12/27/2020 0 81     Glucose 12/27/2020 120     Calcium 12/27/2020 8 3     eGFR 12/27/2020 83     Magnesium 12/27/2020 1 9     Phosphorus 12/27/2020 4 0     WBC 12/27/2020 6 02     RBC 12/27/2020 3 42*    Hemoglobin 12/27/2020 10 8*    Hematocrit 12/27/2020 32 9*    MCV 12/27/2020 96     MCH 12/27/2020 31 6     MCHC 12/27/2020 32 8     RDW 12/27/2020 11 9     Platelets 88/71/6470 278     MPV 12/27/2020 10 2     Hepatitis B Surface Ag 12/27/2020 Non-reactive     Hep A IgM 12/27/2020 Non-reactive     Hepatitis C Ab 12/27/2020 Non-reactive     Hep B C IgM 12/27/2020 Non-reactive     Total Bilirubin 12/27/2020 0 10*    Bilirubin, Direct 12/27/2020 0 06     Alkaline Phosphatase 12/27/2020 79     AST 12/27/2020 32     ALT 12/27/2020 40     Total Protein 12/27/2020 6 8     Albumin 12/27/2020 2 7*    Sodium 12/28/2020 141     Potassium 12/28/2020 3 6     Chloride 12/28/2020 106     CO2 12/28/2020 25     ANION GAP 12/28/2020 10     BUN 12/28/2020 13     Creatinine 12/28/2020 0 76  Glucose 12/28/2020 125     Calcium 12/28/2020 8 3     Corrected Calcium 12/28/2020 9 3     AST 12/28/2020 34     ALT 12/28/2020 44     Alkaline Phosphatase 12/28/2020 84     Total Protein 12/28/2020 6 7     Albumin 12/28/2020 2 7*    Total Bilirubin 12/28/2020 0 10*    eGFR 12/28/2020 89     Sodium 12/29/2020 141     Potassium 12/29/2020 3 9     Chloride 12/29/2020 106     CO2 12/29/2020 26     ANION GAP 12/29/2020 9     BUN 12/29/2020 11     Creatinine 12/29/2020 0 80     Glucose 12/29/2020 115     Calcium 12/29/2020 8 5     Corrected Calcium 12/29/2020 9 5     AST 12/29/2020 34     ALT 12/29/2020 49     Alkaline Phosphatase 12/29/2020 92     Total Protein 12/29/2020 7 0     Albumin 12/29/2020 2 7*    Total Bilirubin 12/29/2020 0 20     eGFR 12/29/2020 84        Imaging Studies:   I have personally reviewed pertinent imaging studies  Xr Chest Portable    Result Date: 12/6/2020  Narrative: CHEST INDICATION:   pain with inspiration  COMPARISON:  December 4, 2020  EXAM PERFORMED/VIEWS:  XR CHEST PORTABLE FINDINGS: Cardiomediastinal silhouette appears unremarkable  Mild elevation of the right hemidiaphragm  Persistent opacities in the bases of both hemithoraces, probably a combination of pleural effusions and atelectasis and/or consolidation in the lower lobes  No pneumothorax  Osseous structures appear within normal limits for patient age  Biliary stent present  Impression: Atelectasis and/or consolidation in the bases of both lower lobes  Possible coexisting pleural effusions  No significant change since 12/4/2020  Workstation performed: [id number]     Xr Chest 1 View Portable    Result Date: 11/30/2020  Narrative: CHEST INDICATION:   epigastric pain  COMPARISON:  None EXAM PERFORMED/VIEWS:  XR CHEST PORTABLE FINDINGS: Cardiomediastinal silhouette appears unremarkable  The lungs are clear  No pneumothorax or pleural effusion   Osseous structures appear within normal limits for patient age  Impression: No acute cardiopulmonary disease  Workstation performed: [id number]     Mri Abdomen W Wo Contrast    Addendum Date: 12/26/2020 Addendum:   ADDENDUM: Additional finding as follows: Review of the previous CT again demonstrates that there is likely communication between the peripancreatic collection and the cholecystectomy bed collection best seen on coronal images 70 through 70  It is uncertain whether this is primarily a fluid collection related to pancreatitis extending into the gallbladder fossa versus biloma  I personally discussed this study with Anushka Reyeses on 12/26/2020 at 10:55 AM      Result Date: 12/26/2020  Narrative: MRI - ABDOMEN - WITH AND WITHOUT CONTRAST INDICATION:  Pancreatic pseudocyst COMPARISON: 12/23/2020 TECHNIQUE:  The following pulse sequences were obtained prior to and following the administration of intravenous contrast:     Coronal and axial T2 with TE of 90 and 180 respectively, axial T2 with fat saturation, axial FIESTA fat-sat, axial T1-weighted in-and-out-of phase, axial DWI/ADC, precontrast axial T1 with fat saturation, post-contrast dynamic axial T1 with fat saturation at 20, 70, and 180 seconds, coronal T1  with fat saturation and 7 minute delayed axial T1 with fat saturation  2-D and 3-D MRCP were obtained  Imaging performed on 1 5T MRI   IV Contrast:  8 mL of Gadobutrol injection (SINGLE-DOSE) FINDINGS: LOWER CHEST:   Unremarkable  LIVER: Normal in size and configuration  No suspicious mass  The hepatic veins and portal veins are patent  BILE DUCTS: There is a 7 mm stone within the common bile duct with an additional 3 mm stone just proximal to the larger stone  The common bile duct is minimally dilated measuring 7 mm  Known common bile duct stent seen on CT is not well visualized  GALLBLADDER:  Postcholecystectomy  There is a rim-enhancing fluid collection within the operative bed measuring 3 1 x 4 0 cm, previously 2 6 x 3 7 cm    There is mild internal debris  PANCREAS: As seen on CT, there is a serpiginous fluid collection along the pancreatic body consistent with acute peripancreatic collection/early pseudocyst   This is difficult to measure given serpiginous configuration, measuring approximately 4 0 x 5 7 cm, previously 4 1 x 5 9 cm  The pancreatic duct is intact  There is no communication with the collection  No main pancreatic ductal dilation  ADRENAL GLANDS:  Normal  SPLEEN:  Normal  KIDNEYS/PROXIMAL URETERS: No hydroureteronephrosis  No suspicious renal mass  BOWEL:   No dilated loops of bowel  PERITONEUM/RETROPERITONEUM: No ascites  LYMPH NODES: No abdominal lymphadenopathy  VASCULAR STRUCTURES:  No aneurysm  ABDOMINAL WALL:  Unremarkable  OSSEOUS STRUCTURES:  No suspicious osseous lesion  Impression: 1  Intact main pancreatic duct without communication to the acute peripancreatic fluid collection/early pseudocyst  2   Rim-enhancing fluid collection in the gallbladder fossa status post cholecystectomy  Given slight increase in size from 12/23/2020, this may represent a biloma with persistent biliary leak  Abscess not excluded  3   Choledocholithiasis with mild common bile duct dilatation  The study was marked in [facility] for immediate notification  Workstation performed: [id number]     Nm Hepatobiliary    Result Date: 12/28/2020  Narrative: HEPATOBILIARY SCAN INDICATION: Rule out evidence of a biliary leak  , status post cholecystectomy, abdominal pain COMPARISON:  None available TECHNIQUE:   Following the intravenous administration of 4 8 mCi Tc-99m labeled mebrofenin, dynamic abdominal imaging was obtained in the anterior projection over a 60 minute time period  FINDINGS:  There is prompt, uniform accumulation with normal clearance of the radiopharmaceutical by the liver  There is normal filling of the intrahepatic ducts and common bile duct with normal excretion of the radiopharmaceutical into the duodenum    No discrete extravasation of radiotracer is visualized  Impression: No definite scintigraphic evidence for bile leak  Follow-up as clinically warranted  Workstation performed: [id number]     Fl Ercp Biliary Only    Result Date: 12/1/2020  Narrative: ERCP INDICATION: Choledocholithiasis with obstruction  COMPARISON:  November 29, 2020 IMAGES:  4 FLUOROSCOPY TIME:   513 seconds CONTRAST: 50 mL of iohexol (OMNIPAQUE) FINDINGS: Fluoroscopic guidance was provided for performance of ERCP  BILIARY: Limited contrast opacified static submitted images obtained during as part of fluoroscopic guidance demonstrate multiple filling defects in the common bile duct  The majority of the filling defects are absent on later images  Final image demonstrates presence of a plastic biliary stent  Impression: Fluoroscopic guidance for ERCP  Please see procedure report for full details  Workstation performed: [id number]     Xr Chest Portable Icu    Result Date: 12/4/2020  Narrative: CHEST INDICATION:   hypoxia  COMPARISON:  November 29, 2020 EXAM PERFORMED/VIEWS:  XR CHEST PORTABLE ICU FINDINGS: Cardiomediastinal silhouette appears unremarkable  Right basilar density seen with obscuration of the dome of diaphragm  Lungs are hypoinflated Left base partly obscured Osseous structures appear within normal limits for patient age  Impression: No pulmonary congestion seen Bibasilar densities right greater than left may be due to atelectasis and effusion  Underlying infiltrates are not entirely excluded Mildly worse from the previous study Workstation performed: [id number]     Ct Abdomen Pelvis W Contrast    Result Date: 12/23/2020  Narrative: CT ABDOMEN AND PELVIS WITH IV CONTRAST INDICATION:   K85 10: Biliary acute pancreatitis without necrosis or infection  COMPARISON:  CT abdomen and pelvis 12/2/2020 TECHNIQUE:  CT examination of the abdomen and pelvis was performed   Axial, sagittal, and coronal 2D reformatted images were created from the source data and submitted for interpretation  Radiation dose length product (DLP) for this visit:  355 mGy-cm   This examination, like all CT scans performed in the [facility], was performed utilizing techniques to minimize radiation dose exposure, including the use of iterative reconstruction and automated exposure control  IV Contrast:  100 mL of iohexol (OMNIPAQUE)  350 Enteric Contrast:  Enteric contrast was administered  FINDINGS: ABDOMEN LOWER CHEST:  No clinically significant abnormality identified in the visualized lower chest  LIVER/BILIARY TREE:  Trace intrahepatic pneumobilia attributed to biliary stent  No duct dilation  Liver otherwise unremarkable  GALLBLADDER:  Post cholecystectomy  SPLEEN:  Unremarkable  PANCREAS:  Minimal residual albeit improved peripancreatic fat stranding  No intraparenchymal gas to suggest necrosis  ADRENAL GLANDS:  Unremarkable  KIDNEYS/URETERS: One or more sharply circumscribed subcentimeter renal hypodensities are noted  These lesions are too small to accurately characterize, but are statistically most likely to represent benign cortical renal cyst(s)  According to the guidelines published in the [facility] Paper of the ACR Incidental Findings Committee (Radiology 2010), no further workup of these lesions is recommended    Kidneys otherwise unremarkable  No perinephric collection  STOMACH AND BOWEL:  Unremarkable  APPENDIX:  A normal appendix was visualized  ABDOMINOPELVIC CAVITY:  Communicating serpiginous acute peripancreatic fluid collections with minimal diffuse wall thickening attributed to developing pseudocysts  Although no gas within the fluid collections, abscess is not excluded  Collection is predominantly posterior and inferior to the pancreas tracking inferiorly along the proximal superior mesenteric vessels and extending into the inferior lesser sac and gallbladder fossa   Collection in the gallbladder fossa is seen to communicate with the peripancreatic collection on image 71 series 601  Collection overall measures approximately 12 1 x 3 9 x 5 4 cm image 72 series 601 and image 36 series 2  Focal tubular inferior extension of this dominant collection inferiorly between the proximal jejunal branches of the SMA and SMV images 39 through 48  series 2  No free gas or enlarged lymph nodes  VESSELS:  Unremarkable for patient's age  PELVIS REPRODUCTIVE ORGANS:  Unremarkable for patient's age  URINARY BLADDER:  Unremarkable  ABDOMINAL WALL/INGUINAL REGIONS:  Subcentimeter ventral umbilical abdominal wall diastases containing fat  No bowel herniation  No inguinal hernia or mass  OSSEOUS STRUCTURES:  No acute fracture or osseous destructive lesion identified  Mild degenerative changes of the spine  Impression: Acute large communicating serpiginous peripancreatic fluid collection with minimal diffuse wall thickening  This may represent fluid loculation and developing pseudocyst  Although there is no gas within the collection, superinfected fluid collection is not excluded  See above discussion  No evidence of pancreatic necrosis  Mild residual albeit moderately improved acute pancreatitis  This study demonstrates a significant  finding and was documented as such in [facility] for liaison and referring practitioner notification  Workstation performed: [id number]     Ct Abdomen Pelvis W Contrast    Result Date: 12/2/2020  Narrative: CT ABDOMEN AND PELVIS WITH IV CONTRAST INDICATION:  Left upper quadrant abdominal pain with concern for pancreatitis, worsening leukocytosis, assess for necrosis or fluid collection  ERCP 11/30/2020 with removal of multiple thickening and stones and stent placement  COMPARISON:  CT abdomen and pelvis 11/29/2020 TECHNIQUE:  CT examination of the abdomen and pelvis was performed  Axial, sagittal, and coronal 2D reformatted images were created from the source data and submitted for interpretation   Radiation dose length product (DLP) for this visit:  693 49 mGy-cm   This examination, like all CT scans performed in the [facility], was performed utilizing techniques to minimize radiation dose exposure, including the use of iterative  reconstruction and automated exposure control  IV Contrast:  100 mL of iohexol (OMNIPAQUE) Enteric Contrast:  Enteric contrast was not administered  FINDINGS: ABDOMEN LOWER CHEST:  Small bilateral pleural effusions with associated dependent atelectasis  LIVER/BILIARY TREE:  Liver is normal in size and configuration  Areas of parenchymal hypoattenuation around the fissure for the ligamentum teres again noted likely due to focal fatty infiltration  A biliary stent is noted  The common bile duct is decompressed  Intrahepatic biliary ductal dilatation is decreased with mild residual prominence of right hepatic lobe biliary radicals  Small amount of pneumobilia likely related to recent ERCP  The hepatic and portal veins are patent  SMV is patent  GALLBLADDER:  Cholelithiasis  There is air within the gallbladder likely related to recent ERCP  SPLEEN:  Unremarkable  PANCREAS:  The pancreas appears enlarged and edematous particularly in the head, neck, and proximal body  There is peripancreatic fat stranding and fluid  Findings are in keeping with acute pancreatitis  No main pancreatic ductal dilatation  No focal pancreatic parenchymal hypoenhancement to suggest necrosis  The splenic vein is patent  There are areas of mild rim thickening surrounding peripancreatic fluid in the central mesentery (series 2 image 39) which may be due to early loculation  ADRENAL GLANDS:  Unremarkable  KIDNEYS/URETERS:  Excreted contrast is noted within the renal collecting systems  No hydronephrosis  Unchanged subcentimeter hypoattenuating foci in the left kidney lower pole, too small to further characterize  STOMACH AND BOWEL:  No disproportionate dilatation of small or large bowel   APPENDIX:  The appendix is unremarkable  ABDOMINOPELVIC CAVITY:  Small volume of abdominal ascites and mesenteric edema mostly in the retroperitoneum related to acute pancreatitis  There is a moderate volume of ascites in the pelvis, which appears slightly loculated posteriorly  No pneumoperitoneum  No lymphadenopathy  VESSELS:  No abdominal aortic aneurysm  PELVIS REPRODUCTIVE ORGANS:  The uterus is present  No suspicious adnexal mass  URINARY BLADDER:  Unremarkable  ABDOMINAL WALL/INGUINAL REGIONS:  Subcutaneous emphysema and fat stranding in the left anterior abdominal wall likely due to subcutaneous injections  OSSEOUS STRUCTURES:  No acute fracture or destructive osseous lesion  Impression: 1  Findings in keeping with acute pancreatitis  Peripancreatic fluid with areas of mild rim thickening in the central mesentery which may be due to early loculation  2   Interval decompression of the common bile duct and placement of biliary stent  Pneumobilia likely due to recent ERCP  3   Small bilateral pleural effusions with associated dependent atelectasis  The study was marked in [facility] for immediate notification  Workstation performed: [id number]     Ct Abdomen Pelvis With Contrast    Result Date: 11/29/2020  Narrative: CT ABDOMEN AND PELVIS WITH IV CONTRAST INDICATION:   Epigastric pain epigastric pain, elevated LFTs  COMPARISON:  None  TECHNIQUE:  CT examination of the abdomen and pelvis was performed  Axial, sagittal, and coronal 2D reformatted images were created from the source data and submitted for interpretation  Radiation dose length product (DLP) for this visit:  698 mGy-cm  This examination, like all CT scans performed in the [facility], was performed utilizing techniques to minimize radiation dose exposure, including the use of iterative reconstruction and automated exposure control  IV Contrast:  100 mL of iohexol (OMNIPAQUE) Enteric Contrast:  Enteric contrast was not administered  FINDINGS: ABDOMEN LOWER CHEST:  No clinically significant abnormality identified in the visualized lower chest  LIVER/BILIARY TREE: There is intrahepatic biliary dilatation  The common bile duct is dilated up to 15 mm  There are 2 adjacent stones in the distal common bile duct measure up to 1 cm, consistent with choledocholithiasis  GALLBLADDER:  There are gallstone(s) within the gallbladder, without pericholecystic inflammatory changes  SPLEEN:  Unremarkable  PANCREAS:  Unremarkable  ADRENAL GLANDS:  Unremarkable  KIDNEYS/URETERS:  No hydronephrosis or urinary tract calculus  One or more sharply circumscribed subcentimeter renal hypodensities are present, too small to accurately characterize, and statistically most likely benign findings  According to recent literature (Radiology 2019) no further workup of these findings is recommended  STOMACH AND BOWEL:  Unremarkable  APPENDIX:  A normal appendix was visualized  ABDOMINOPELVIC CAVITY:  No ascites  No pneumoperitoneum  No lymphadenopathy  VESSELS:  Unremarkable for patient's age  PELVIS REPRODUCTIVE ORGANS:  Unremarkable for patient's age  URINARY BLADDER:  Unremarkable  ABDOMINAL WALL/INGUINAL REGIONS:  Unremarkable  OSSEOUS STRUCTURES:  No acute fracture or destructive osseous lesion  Impression: Cholelithiasis without CT evidence of cholecystitis  Biliary dilatation due to choledocholithiasis  The study was marked in [facility] for immediate notification  Workstation performed: [id number]     Ir Picc Line Placement Double Lumen    Result Date: 12/24/2020  Narrative: EXAMINATION: Right upper extremity single lumen Power PICC placement INDICATION: Need for TPN CONTRAST: N/A FLUOROSCOPY TIME:   2 24 SECS IMAGES:  5 ANESTHESIA: Local lidocaine PROCEDURE:  The patient was identified verbally and by wristband  Timeout was performed  Informed consent was obtained  Following obtaining informed consent, the patient was prepped and draped in the usual sterile fashion  All elements of maximal sterile barrier technique, cap and mask and sterile gown and sterile gloves and sterile drape and hand hygiene and 2% chlorhexidine for cutaneous antisepsis  Sterile ultrasound technique with sterile gel and sterile probe covers was also utilized  Using ultrasound guidance, access was gained to the patient's right brachial vein using a micropuncture system  The micropuncture dilator was exchanged for a peel away sheath  A mandril wire was advanced to the level of the RA/SVC junction to measure the catheter length  The catheter was cut to size and placed through the peel away sheath  The patient tolerated the procedure well without complication  The patient left the IR department in stable condition  Findings: 1  Using ultrasound guidance, the right brachial vein was cannulated using seldinger technique  The right brachial vein was evaluated as a potential access site  The right brachial vein was patent, and free of thrombus  Static images of the vessel was obtained  Visualization of real time needle entry into the vessel was obtained  2   A fluoroscopic image demonstrated a newly placed PICC with the central aspect at the RA/SVC junction  The catheter tubing is smooth in contour  Impression: Impression: Successful placement of a 42 cm right upper extremity single lumen Power PICC   Workstation performed: [id number]         Assessment & Plan  Pancreatic pseudocyst  - Status post ERCP pancreatitis 1st began on December 1, 2020  - ERCP demonstrated choledocholithiasis with small sphincterotomy and to balloon dilations, 8 mm and 10 mm, removal of multiple pigmented stones but a single large stone could not be removed, so a 7 [place], 7 cm stent was placed  - CT scan of the abdomen and pelvis performed on December 2nd demonstrated findings consistent with acute pancreatitis   There is peripancreatic fluid with areas of mild rim thickening in the central mesentery which may be due to early loculation   There was evidence of interval decompression of the c and a trying to resolve things because ommon bile duct and placement of a biliary stent along with pneumobilia  - CT scan of the abdomen and pelvis performed December 23rd shows a large communicating serpiginous peripancreatic fluid collection with minimal diffuse wall thickening   This may represent fluid loculation and developing pseudocyst   The collection is predominantly posterior and inferior to the pancreas tracking inferiorly along with proximal superior mesenteric vessels extending into the inferior lesser sac and gallbladder fossa with collection in the gallbladder fossa is seem to communicate with the peripancreatic collection   There was no gas within the collection   There is no evidence of pancreatic necrosis   There was moderately improved acute pancreatitis  - MRI/MRCP shows an intact main pancreatic duct with possible communication to the acute peripancreatic fluid collection/early pseudocyst   However, discussion with another radiologist doubts communication   There is a rim enhancing fluid collection in the gallbladder fossa status post cholecystectomy, slightly larger compared to  CT on 12/24 (2 6 X 3 7 cm on CT compared to 3 1 X 4 0 cm on MRI Saturday)   Given its increase in size from 12/23, this may represent a biloma with persistent biliary leak   There is evidence of choledocholithiasis (two stones seen on MRI) with mild common bile duct dilation     - As the patient is afebrile with normal white cell count there is no reason to suspect an abscess  - Urgent ERCP does not appear to be indicated   Stenting the pancreatic duct will be of no value  - Patients HIDA from yesterday showed no bile leak    - Diet as tolerated  - Continue to monitor LFT's  - The patient is not a candidate for an endoscopic cyst gastrostomy because her pseudocyst is too immature      Patient will be seen and examined by Dr Kenney Morris  Ann Marie Dialol PA-C  12/29/2020,9:54 AM

## 2020-12-29 NOTE — PROGRESS NOTES
Progress Note - General Surgery   Lizeth Urrutia 48 y o  female MRN: [id number]  Unit/Bed#: -01 Encounter: [id number]    Assessment/Plan  Lizeth Urrutia is a 48 y o  female     Pancreatic pseudocyst vs pancreatic ductal leak s/p gallstone pancreatitis, ERCP with stent placement for choledocholithiasis, and laparoscopic cholecystectomy  HIDA no evidence of bile leak  AVSS, LFTs WNL  Tolerated CLD, no nausea, vomiting, or post-prandial abdominal pain  One episode of abdominal cramping prior to bowel movement yesterday      Advance diet as tolerated to full liquids, continue advancing as tolerated  If patient is able to tolerate adequate PO intake, ok to allow TPN to   Serial abdominal exams  Trend labs, monitor WBC and LFTs  Ambulation/OOB  Pain control PRN  Medicine primary, GI following     Subjective/Objective     Subjective: No acute events overnight     Objective:     Blood pressure 117/77, pulse 93, temperature 98 °F (36 7 °C), resp  rate 16, height 5' 7" (1 702 m), weight 86 kg (189 lb 9 5 oz), last menstrual period 2019, SpO2 97 %  ,Body mass index is 29 69 kg/m²        Intake/Output Summary (Last 24 hours) at 2020 1020  Last data filed at 2020 2671  Gross per 24 hour   Intake 2420 ml   Output    Net 2420 ml       Invasive Devices     Peripherally Inserted Central Catheter Line            PICC Line 20 Right Brachial 4 days          Peripheral Intravenous Line            Peripheral IV 20 Distal;Right;Ventral (anterior) Forearm 1 day                Physical Exam: /77   Pulse 93   Temp 98 °F (36 7 °C)   Resp 16   Ht 5' 7" (1 702 m)   Wt 86 kg (189 lb 9 5 oz)   LMP 2019   SpO2 97%   BMI 29 69 kg/m²   General appearance: alert and oriented, in no acute distress  Lungs: clear to auscultation bilaterally  Heart: regular rate and rhythm, S1, S2 normal, no murmur, click, rub or gallop  Abdomen: soft, non-distended, mild tenderness to palpation in epigastrium, non-tender elsewhere, no guarding or rebound tenderness  Extremities: extremities normal, warm and well-perfused; no cyanosis, clubbing, or edema    Lab, Imaging and other studies:I have personally reviewed pertinent lab results       VTE Pharmacologic Prophylaxis: Enoxaparin (Lovenox)  VTE Mechanical Prophylaxis: sequential compression device    Recent Results (from the past 36 hour(s))   Comprehensive metabolic panel    Collection Time: 12/28/20  6:10 AM   Result Value Ref Range    Sodium 141 136 - 145 mmol/L    Potassium 3 6 3 5 - 5 3 mmol/L    Chloride 106 100 - 108 mmol/L    CO2 25 21 - 32 mmol/L    ANION GAP 10 4 - 13 mmol/L    BUN 13 5 - 25 mg/dL    Creatinine 0 76 0 60 - 1 30 mg/dL    Glucose 125 65 - 140 mg/dL    Calcium 8 3 8 3 - 10 1 mg/dL    Corrected Calcium 9 3 8 3 - 10 1 mg/dL    AST 34 5 - 45 U/L    ALT 44 12 - 78 U/L    Alkaline Phosphatase 84 46 - 116 U/L    Total Protein 6 7 6 4 - 8 2 g/dL    Albumin 2 7 (L) 3 5 - 5 0 g/dL    Total Bilirubin 0 10 (L) 0 20 - 1 00 mg/dL    eGFR 89 ml/min/1 73sq m   Comprehensive metabolic panel    Collection Time: 12/29/20  6:22 AM   Result Value Ref Range    Sodium 141 136 - 145 mmol/L    Potassium 3 9 3 5 - 5 3 mmol/L    Chloride 106 100 - 108 mmol/L    CO2 26 21 - 32 mmol/L    ANION GAP 9 4 - 13 mmol/L    BUN 11 5 - 25 mg/dL    Creatinine 0 80 0 60 - 1 30 mg/dL    Glucose 115 65 - 140 mg/dL    Calcium 8 5 8 3 - 10 1 mg/dL    Corrected Calcium 9 5 8 3 - 10 1 mg/dL    AST 34 5 - 45 U/L    ALT 49 12 - 78 U/L    Alkaline Phosphatase 92 46 - 116 U/L    Total Protein 7 0 6 4 - 8 2 g/dL    Albumin 2 7 (L) 3 5 - 5 0 g/dL    Total Bilirubin 0 20 0 20 - 1 00 mg/dL    eGFR 84 ml/min/1 73sq m

## 2020-12-29 NOTE — PROGRESS NOTES
Progress Note - Aditya Almendarez 1965, 48 y o  female MRN: [id number]    Unit/Bed#: -01 Encounter: [id number]    Primary Care Provider: Emily Payne MD   Date and time admitted to hospital: 12/23/2020  2:27 PM        * Pseudocyst of pancreas  Assessment & Plan  · S/P recent CBD stent and ERCP with post ERCP pancreatitis  · CT of the abdomen pelvis on admission 12/23 concerning for peripancreatic fluid collection suspected pseudocyst  · Off antibiotics  Has remained hemodynamically stable, afebrile, without leukocytosis  · Follow-up HIDA scan (-) for leak  · GI and surgery following, okay to advance diet as tolerated  · Discontinue TPN  · Possible discharge in the next 24 hours if continues to do well and remained stable    Recent Labs     12/27/20  0510 12/28/20  0610 12/29/20  0622   WBC 6 02  --   --    ALT 40 44 49   AST 32 34 34   ALKPHOS 79 84 92       Status post cholecystectomy  Assessment & Plan  · Lap collette on 12/4/2020  · She is also s/p ERCP during last admission and developed post ERCP pancreatitis  · LFTs stable, lipase normal    Essential hypertension  Assessment & Plan  · Continue home meds: amlodipine   · Lisinopril has been discontinued as OP        VTE Pharmacologic Prophylaxis:   Pharmacologic: Enoxaparin (Lovenox)  Mechanical VTE Prophylaxis in Place: ambulate    Patient Centered Rounds: I have performed bedside rounds with nursing staff today  Discussions with Specialists or Other Care Team Provider: GI, surgery,CM  Education and Discussions with Family / Patient: patient     Time Spent for Care: 20 minutes  More than 50% of total time spent on counseling and coordination of care as described above      Current Length of Stay: 6 day(s)    Current Patient Status: Inpatient   Certification Statement: The patient will continue to require additional inpatient hospital stay due to Continue to slowly advance diet and discontinue TPN    Discharge Plan:  Possible discharge in the next 24 hours if advancing diet and tolerating well    Code Status: Level 1 - Full Code      Subjective:   Patient seen this afternoon, out of bed to chair  She is feeling okay  She advanced the diet to clear liquids and did well  She is now okay with full liquid diet and will do that tonight  No fevers or chills  No abdominal pain now  Had a bowel movement, on the looser side as she has not been eating  Appears a little nervous about going home and advancing her diet too quickly  Objective:     Vitals:   Temp (24hrs), Av 2 °F (36 8 °C), Min:98 °F (36 7 °C), Max:98 5 °F (36 9 °C)    Temp:  [98 °F (36 7 °C)-98 5 °F (36 9 °C)] 98 2 °F (36 8 °C)  HR:  [] 100  Resp:  [16-18] 18  BP: (117-122)/(77-81) 122/81  SpO2:  [97 %] 97 %  Body mass index is 29 69 kg/m²  Input and Output Summary (last 24 hours): Intake/Output Summary (Last 24 hours) at 2020 1555  Last data filed at 2020 1308  Gross per 24 hour   Intake 2540 ml   Output    Net 2540 ml       Physical Exam:     Physical Exam  Vitals signs and nursing note reviewed  Constitutional:       General: She is not in acute distress  Appearance: Normal appearance  She is not ill-appearing or toxic-appearing  Cardiovascular:      Rate and Rhythm: Normal rate and regular rhythm  Heart sounds: Normal heart sounds  Pulmonary:      Effort: Pulmonary effort is normal  No respiratory distress  Breath sounds: Normal breath sounds  Abdominal:      General: Bowel sounds are normal  There is no distension  Palpations: Abdomen is soft  Skin:     General: Skin is warm and dry  Coloration: Skin is not jaundiced or pale  Neurological:      Mental Status: She is alert and oriented to person, place, and time           Additional Data:     Labs:    Results from last 7 days   Lab Units 20  0510 20  0506   WBC Thousand/uL 6 02 5 47   HEMOGLOBIN g/dL 10 8* 10 9*   HEMATOCRIT % 32 9* 33 4*   PLATELETS Thousands/uL 278 388   NEUTROS PCT %  --  70   LYMPHS PCT %  --  16   MONOS PCT %  --  9   EOS PCT %  --  4     Results from last 7 days   Lab Units 20  0622   SODIUM mmol/L 141   POTASSIUM mmol/L 3 9   CHLORIDE mmol/L 106   CO2 mmol/L 26   BUN mg/dL 11   CREATININE mg/dL 0 80   ANION GAP mmol/L 9   CALCIUM mg/dL 8 5   ALBUMIN g/dL 2 7*   TOTAL BILIRUBIN mg/dL 0 20   ALK PHOS U/L 92   ALT U/L 49   AST U/L 34   GLUCOSE RANDOM mg/dL 115                           * I Have Reviewed All Lab Data Listed Above  * Additional Pertinent Lab Tests Reviewed: All Labs Within Last 24 Hours Reviewed    Imaging:    Imaging Reports Reviewed Today Include:  HIDA scan  no evidence for bile leak  Imaging Personally Reviewed by Myself Includes:      Recent Cultures (last 7 days):           Last 24 Hours Medication List:   Current Facility-Administered Medications   Medication Dose Route Frequency Provider Last Rate    acetaminophen  650 mg Oral Q6H PRN Lavella Meals, MD      Adult TPN (CUSTOM BASE/STANDARD ELECTROLYTE)   Intravenous Continuous TPN Donita Griggs PA-C 96 6 mL/hr at 20 2329    amLODIPine  10 mg Oral Daily Lavella Meals, MD      dextrose 5 % and sodium chloride 0 45 %  100 mL/hr Intravenous Continuous Lavella Meals,  mL/hr (20 9754)    enoxaparin  40 mg Subcutaneous Daily Lavella Meals, MD      ibuprofen  400 mg Oral Q6H PRN Lavella Meals, MD      meclizine  25 mg Oral Q8H PRN Fabiola Goods, MD      ondansetron  4 mg Intravenous Q4H PRN Lavella Meals, MD      oxyCODONE  5 mg Oral Q6H PRN Lavella Meals, MD      pantoprazole  40 mg Intravenous Q24H [id number], [place]          Today, Patient Was Seen By: Yaneli Davila PA-C    ** Please Note: Dictation voice to text software may have been used in the creation of this document   **

## 2020-12-29 NOTE — CASE MANAGEMENT
Per rounds, SLIM informed care team pt diet being advanced, if tolerated, projected dc is tomorrow  CM dept to follow pt through dc

## 2020-12-30 VITALS
WEIGHT: 189.6 LBS | DIASTOLIC BLOOD PRESSURE: 70 MMHG | BODY MASS INDEX: 29.76 KG/M2 | HEIGHT: 67 IN | OXYGEN SATURATION: 97 % | RESPIRATION RATE: 18 BRPM | HEART RATE: 91 BPM | SYSTOLIC BLOOD PRESSURE: 114 MMHG | TEMPERATURE: 98.1 F

## 2020-12-30 PROCEDURE — 99024 POSTOP FOLLOW-UP VISIT: CPT | Performed by: SURGERY

## 2020-12-30 PROCEDURE — 99232 SBSQ HOSP IP/OBS MODERATE 35: CPT | Performed by: PHYSICIAN ASSISTANT

## 2020-12-30 PROCEDURE — 99239 HOSP IP/OBS DSCHRG MGMT >30: CPT | Performed by: NURSE PRACTITIONER

## 2020-12-30 PROCEDURE — C9113 INJ PANTOPRAZOLE SODIUM, VIA: HCPCS | Performed by: PHYSICIAN ASSISTANT

## 2020-12-30 RX ORDER — PANTOPRAZOLE SODIUM 40 MG/1
40 TABLET, DELAYED RELEASE ORAL DAILY
Qty: 30 TABLET | Refills: 0 | Status: SHIPPED | OUTPATIENT
Start: 2020-12-30 | End: 2021-01-28 | Stop reason: SDUPTHER

## 2020-12-30 RX ADMIN — ENOXAPARIN SODIUM 40 MG: 40 INJECTION SUBCUTANEOUS at 10:46

## 2020-12-30 RX ADMIN — DEXTROSE AND SODIUM CHLORIDE 100 ML/HR: 5; .45 INJECTION, SOLUTION INTRAVENOUS at 02:17

## 2020-12-30 RX ADMIN — AMLODIPINE BESYLATE 10 MG: 10 TABLET ORAL at 10:46

## 2020-12-30 RX ADMIN — PANTOPRAZOLE SODIUM 40 MG: 40 INJECTION, POWDER, FOR SOLUTION INTRAVENOUS at 10:46

## 2020-12-30 NOTE — DISCHARGE INSTRUCTIONS
Pancreatic Pseudocyst   WHAT YOU NEED TO KNOW:   What is a pancreatic pseudocyst?  A pancreatic pseudocyst is a sac of fluid on or near your pancreas  What increases my risk for a pancreatic pseudocyst?   · Pancreatitis (inflammation of the pancreas)     · Alcohol abuse     · Recent surgery or an injury to the abdomen    · Disease of the gallbladder or bile ducts     · High levels of triglycerides in your blood    What are the signs and symptoms of a pancreatic pseudocyst?  You may have no symptoms, or you may have any of the following:  · Pain in your abdomen     · Nausea or vomiting     · Weight loss or loss of appetite     · Yellow eyes or skin     · Swelling or a lump in your abdomen     · Shortness of breath    How is a pancreatic pseudocyst diagnosed? Your healthcare provider will examine you, and ask about other medical conditions you may have  He may ask if you have been injured or had surgery recently  You may need the following:  · A CT, or CAT scan, is a type of x-ray that is taken of your abdomen  The pictures may show a pseudocyst  You may be given a dye before the pictures are taken to help healthcare providers see the pictures better  Tell the healthcare provider if you have ever had an allergic reaction to contrast dye  How is a pancreatic pseudocyst treated? Pseudocysts may go away with no treatment  You may need surgery to remove the pseudocyst, or drain the fluid from it  When should I contact my healthcare provider? · You have a fever  · You have questions or concerns about your condition or care  When should I seek immediate care or call 911? · Your heart is beating faster than normal, or you are breathing faster than normal     · Your pain worsens, or your abdomen feels hard  · You have chest pain or feel short of breath  · You feel dizzy or are sweating more than normal     · Your lips or nails turn blue  CARE AGREEMENT:   You have the right to help plan your care  Learn about your health condition and how it may be treated  Discuss treatment options with your healthcare providers to decide what care you want to receive  You always have the right to refuse treatment  The above information is an  only  It is not intended as medical advice for individual conditions or treatments  Talk to your doctor, nurse or pharmacist before following any medical regimen to see if it is safe and effective for you  © Copyright [address] Information is for End User's use only and may not be sold, redistributed or otherwise used for commercial purposes   All illustrations and images included in CareNotes® are the copyrighted property of [employer] D [employer] M [employer]  or [address] [place] [address]

## 2020-12-30 NOTE — DISCHARGE SUMMARY
Discharge- Candace Couch 1965, 48 y o  female MRN: [id number]    Unit/Bed#: -01 Encounter: [id number]    Primary Care Provider: Nicole Hays MD   Date and time admitted to hospital: 12/23/2020  2:27 PM    * Pseudocyst of pancreas  Assessment & Plan  · S/P recent CBD stent and ERCP with post ERCP pancreatitis  · CT of the abdomen pelvis on admission 12/23 concerning for peripancreatic fluid collection suspected pseudocyst  · Off antibiotics  Has remained hemodynamically stable, afebrile, without leukocytosis  · Follow-up HIDA scan (-) for leak  · GI and surgery following, okay to advance diet as tolerated  · TPN off since yesterday  · Patient tolerating her diet well; vital signs stable; labs stable  · Patient stable for discharge  Recent Labs     12/28/20  0610 12/29/20  0622   ALT 44 49   AST 34 34   ALKPHOS 84 92       Status post cholecystectomy  Assessment & Plan  · Lap collette on 12/4/2020  · She is also s/p ERCP during last admission and developed post ERCP pancreatitis  · LFTs stable, lipase normal    Essential hypertension  Assessment & Plan  · Continue home meds: Amlodipine   · Lisinopril has been discontinued as OP    Discharging Physician / Practitioner: NISHA Christensen  PCP: Nicole Hays MD  Admission Date:   Admission Orders (From admission, onward)     Ordered        12/23/20 1519  Inpatient Admission  Once                   Discharge Date: 12/30/20    Resolved Problems  Date Reviewed: 12/30/2020          Resolved    Cholelithiasis with choledocholithiasis 12/23/2020     Resolved by  Kosta Carreno MD          Consultations During Hospital Stay:  · GI  · General Surgery  · Case Management    Procedures Performed:   · None    Significant Findings / Test Results:   · CT Abdomen/Pelvis w contrast (12/23/2020): Acute large communicating serpiginous peripancreatic fluid collection with minimal diffuse wall thickening   This may represent fluid loculation and developing pseudocyst  Although there is no gas within the collection, superinfected fluid collection is not excluded  See above discussion  No evidence of pancreatic necrosis  Mild residual albeit moderately improved acute pancreatitis  · MRI abdomen w wo contrast (12/26/2020): 1  Intact main pancreatic duct without communication to the acute peripancreatic fluid collection/early pseudocyst  2   Rim-enhancing fluid collection in the gallbladder fossa status post cholecystectomy  Given slight increase in size from 12/23/2020, this may represent a biloma with persistent biliary leak  Abscess not excluded  3   Choledocholithiasis with mild common bile duct dilatation  Review of the previous CT again demonstrates that there is likely communication between the peripancreatic collection and the cholecystectomy bed collection best seen on coronal images 70 through 71  It is uncertain whether this is primarily a fluid collection related to pancreatitis extending into the gallbladder fossa versus biloma  · NM Hepatobiliary (12/28/2020): No definite scintigraphic evidence for bile leak  Follow-up as clinically warranted  Incidental Findings:   · None     Test Results Pending at Discharge (will require follow up): · None     Outpatient Tests Requested:  · None    Complications:  None    Reason for Admission: Pseudocyst of Pancreas    Hospital Course:     Willa Deras is a 48 y o  female patient who originally presented to the hospital on 12/23/2020 due to abdominal pain and nausea  She was s/p lap collette on 12/4 and post ERCP during that visit  She developed post ERCP pancreatitis  On arrival to the ER, patient had a CT scan of her abdomen for evaluation and was found to have a pseudocyst on her pancreas  Full results noted above  Patient started on TPN and a PICC line was inserted  General surgery was consulted as well; fortunately, patient was able to be treated conservatively and did not require surgery    She was maintained on TPN x 6 days and was finally started on a diet; she tolerated advancement of her diet without difficulty  Patient deemed stable for discharge by our general surgery team and GI  She will follow up outpatient with both groups  Please see above list of diagnoses and related plan for additional information  Condition at Discharge: good     Discharge Day Visit / Exam:     Subjective:  Patient resting in the recliner; denies complaints of chest pain, shortness of breath, fever, or chills  Feels well after eating lunch  Vitals: Blood Pressure: 114/70 (12/30/20 1046)  Pulse: 91 (12/30/20 0722)  Temperature: 98 1 °F (36 7 °C) (12/30/20 0722)  Temp Source: Oral (12/30/20 0722)  Respirations: 18 (12/30/20 0722)  Height: 5' 7" (170 2 cm) (12/24/20 0935)  Weight - Scale: 86 kg (189 lb 9 5 oz) (12/23/20 1733)  SpO2: 97 % (12/30/20 0722)     Exam:   Physical Exam  Constitutional:       General: She is not in acute distress  Appearance: She is normal weight  She is not ill-appearing  Cardiovascular:      Rate and Rhythm: Normal rate and regular rhythm  Pulses: Normal pulses  Heart sounds: Normal heart sounds  Pulmonary:      Effort: Pulmonary effort is normal       Breath sounds: Normal breath sounds  Abdominal:      General: Bowel sounds are normal       Palpations: Abdomen is soft  Musculoskeletal: Normal range of motion  General: No swelling  Skin:     General: Skin is warm and dry  Capillary Refill: Capillary refill takes less than 2 seconds  Neurological:      Mental Status: She is alert and oriented to person, place, and time  Psychiatric:         Mood and Affect: Mood normal        Discussion with Family: Declined    Discharge instructions/Information to patient and family:   See after visit summary for information provided to patient and family        Provisions for Follow-Up Care:  See after visit summary for information related to follow-up care and any pertinent home health orders  Disposition:     Home    Planned Readmission: No     Discharge Statement:  I spent 35 minutes discharging the patient  This time was spent on the day of discharge  I had direct contact with the patient on the day of discharge  Greater than 50% of the total time was spent examining patient, answering all patient questions, arranging and discussing plan of care with patient as well as directly providing post-discharge instructions  Additional time then spent on discharge activities  Discharge Medications:  See after visit summary for reconciled discharge medications provided to patient and family        ** Please Note: This note has been constructed using a voice recognition system **

## 2020-12-30 NOTE — ASSESSMENT & PLAN NOTE
· S/P recent CBD stent and ERCP with post ERCP pancreatitis  · CT of the abdomen pelvis on admission 12/23 concerning for peripancreatic fluid collection suspected pseudocyst  · Off antibiotics  Has remained hemodynamically stable, afebrile, without leukocytosis  · Follow-up HIDA scan (-) for leak  · GI and surgery following, okay to advance diet as tolerated  · TPN off since yesterday  · Patient tolerating her diet well; vital signs stable; labs stable  · Patient stable for discharge      Recent Labs     12/28/20  0610 12/29/20  0622   ALT 44 49   AST 34 34   ALKPHOS 84 92

## 2020-12-30 NOTE — PROGRESS NOTES
Progress Note - General Surgery   Sharyn Madrid 48 y o  female MRN: [id number]  Unit/Bed#: -01 Encounter: [id number]    Assessment/Plan  Sharyn Madrid is a 48 y o  female     Pancreatic pseudocyst vs pancreatic ductal leak s/p gallstone pancreatitis, ERCP with stent placement for choledocholithiasis, and laparoscopic cholecystectomy  AVSS, LFTs WNL  Tolerated FLD, no nausea, vomiting, or post-prandial abdominal pain     Advance diet as tolerated to regular low fat diet  If patient is able to tolerate adequate PO intake, ok for discharge home from surgical perspective  She may follow up in next 2-3 weeks with Dr Herminia Olivares for routine visit  Serial abdominal exams  Trend labs, monitor WBC and LFTs  Ambulation/OOB  Pain control PRN  Medicine primary, GI following     Subjective/Objective     Subjective: No acute events overnight     Objective:     Blood pressure 109/70, pulse 91, temperature 98 1 °F (36 7 °C), temperature source Oral, resp  rate 18, height 5' 7" (1 702 m), weight 86 kg (189 lb 9 5 oz), last menstrual period 02/22/2019, SpO2 97 %  ,Body mass index is 29 69 kg/m²        Intake/Output Summary (Last 24 hours) at 12/30/2020 1014  Last data filed at 12/30/2020 0217  Gross per 24 hour   Intake 2330 ml   Output    Net 2330 ml       Invasive Devices     Peripherally Inserted Central Catheter Line            PICC Line 12/24/20 Right Brachial 5 days          Peripheral Intravenous Line            Peripheral IV 12/27/20 Distal;Right;Ventral (anterior) Forearm 2 days                Physical Exam: /70 (BP Location: Right arm)   Pulse 91   Temp 98 1 °F (36 7 °C) (Oral)   Resp 18   Ht 5' 7" (1 702 m)   Wt 86 kg (189 lb 9 5 oz)   LMP 02/22/2019   SpO2 97%   BMI 29 69 kg/m²   General appearance: alert and oriented, in no acute distress  Lungs: clear to auscultation bilaterally  Heart: regular rate and rhythm, S1, S2 normal, no murmur, click, rub or gallop  Abdomen: soft, non-distended, active bowel sounds, mild tenderness to palpation in epigastrum  Extremities: extremities normal, warm and well-perfused; no cyanosis, clubbing, or edema    Lab, Imaging and other studies:I have personally reviewed pertinent lab results       VTE Pharmacologic Prophylaxis: Enoxaparin (Lovenox)  VTE Mechanical Prophylaxis: sequential compression device    Recent Results (from the past 36 hour(s))   Comprehensive metabolic panel    Collection Time: 12/29/20  6:22 AM   Result Value Ref Range    Sodium 141 136 - 145 mmol/L    Potassium 3 9 3 5 - 5 3 mmol/L    Chloride 106 100 - 108 mmol/L    CO2 26 21 - 32 mmol/L    ANION GAP 9 4 - 13 mmol/L    BUN 11 5 - 25 mg/dL    Creatinine 0 80 0 60 - 1 30 mg/dL    Glucose 115 65 - 140 mg/dL    Calcium 8 5 8 3 - 10 1 mg/dL    Corrected Calcium 9 5 8 3 - 10 1 mg/dL    AST 34 5 - 45 U/L    ALT 49 12 - 78 U/L    Alkaline Phosphatase 92 46 - 116 U/L    Total Protein 7 0 6 4 - 8 2 g/dL    Albumin 2 7 (L) 3 5 - 5 0 g/dL    Total Bilirubin 0 20 0 20 - 1 00 mg/dL    eGFR 84 ml/min/1 73sq m

## 2020-12-30 NOTE — PROGRESS NOTES
Progress Note  Samir Patricia 48 y o  female MRN: [id number]  Unit/Bed#: -01 Encounter: [id number]    Subjective:  Patient tolerating a full liquid diet with no problem  Patient reports that the last episode of pain she had was yesterday  Patient denies any nausea vomiting  Patient has been off TPN since last evening  Objective:     Vitals:   Vitals:    12/30/20 0722   BP: 109/70   Pulse: 91   Resp: 18   Temp: 98 1 °F (36 7 °C)   SpO2: 97%   ,Body mass index is 29 69 kg/m²        Intake/Output Summary (Last 24 hours) at 12/30/2020 0936  Last data filed at 12/30/2020 0217  Gross per 24 hour   Intake 2330 ml   Output    Net 2330 ml       Physical Exam:     General Appearance: Alert, appears stated age and cooperative  Lungs: Clear to auscultation bilaterally, no rales or rhonchi, no labored breathing/accessory muscle use  Heart: Regular rate and rhythm, S1, S2 normal, no murmur, click, rub or gallop  Abdomen: Soft, non-tender, non-distended; bowel sounds normal; no masses or no organomegaly  Extremities: No cyanosis, edema    Invasive Devices     Peripherally Inserted Central Catheter Line            PICC Line 12/24/20 Right Brachial 5 days          Peripheral Intravenous Line            Peripheral IV 12/27/20 Distal;Right;Ventral (anterior) Forearm 2 days                Lab Results:  Admission on 12/23/2020   Component Date Value    WBC 12/23/2020 7 68     RBC 12/23/2020 3 78*    Hemoglobin 12/23/2020 11 5     Hematocrit 12/23/2020 36 1     MCV 12/23/2020 96     MCH 12/23/2020 30 4     MCHC 12/23/2020 31 9     RDW 12/23/2020 12 0     MPV 12/23/2020 9 9     Platelets 68/52/1056 482*    nRBC 12/23/2020 0     Neutrophils Relative 12/23/2020 68     Immat GRANS % 12/23/2020 0     Lymphocytes Relative 12/23/2020 22     Monocytes Relative 12/23/2020 9     Eosinophils Relative 12/23/2020 1     Basophils Relative 12/23/2020 0     Neutrophils Absolute 12/23/2020 5 23     Immature Grans Absolute 12/23/2020 0 01     Lymphocytes Absolute 12/23/2020 1 65     Monocytes Absolute 12/23/2020 0 66     Eosinophils Absolute 12/23/2020 0 10     Basophils Absolute 12/23/2020 0 03     Sodium 12/23/2020 141     Potassium 12/23/2020 4 2     Chloride 12/23/2020 105     CO2 12/23/2020 30     ANION GAP 12/23/2020 6     BUN 12/23/2020 10     Creatinine 12/23/2020 0 96     Glucose 12/23/2020 116     Calcium 12/23/2020 9 4     Corrected Calcium 12/23/2020 10 0     AST 12/23/2020 30     ALT 12/23/2020 42     Alkaline Phosphatase 12/23/2020 94     Total Protein 12/23/2020 7 9     Albumin 12/23/2020 3 3*    Total Bilirubin 12/23/2020 0 30     eGFR 12/23/2020 67     Lipase 12/23/2020 341     Sodium 12/24/2020 143     Potassium 12/24/2020 3 6     Chloride 12/24/2020 105     CO2 12/24/2020 28     ANION GAP 12/24/2020 10     BUN 12/24/2020 5     Creatinine 12/24/2020 0 76     Glucose 12/24/2020 118     Calcium 12/24/2020 8 5     Corrected Calcium 12/24/2020 9 5     AST 12/24/2020 23     ALT 12/24/2020 31     Alkaline Phosphatase 12/24/2020 82     Total Protein 12/24/2020 7 0     Albumin 12/24/2020 2 7*    Total Bilirubin 12/24/2020 0 30     eGFR 12/24/2020 89     WBC 12/24/2020 5 47     RBC 12/24/2020 3 52*    Hemoglobin 12/24/2020 10 9*    Hematocrit 12/24/2020 33 4*    MCV 12/24/2020 95     MCH 12/24/2020 31 0     MCHC 12/24/2020 32 6     RDW 12/24/2020 12 1     MPV 12/24/2020 10 1     Platelets 78/48/9502 388     nRBC 12/24/2020 0     Neutrophils Relative 12/24/2020 70     Immat GRANS % 12/24/2020 0     Lymphocytes Relative 12/24/2020 16     Monocytes Relative 12/24/2020 9     Eosinophils Relative 12/24/2020 4     Basophils Relative 12/24/2020 1     Neutrophils Absolute 12/24/2020 3 82     Immature Grans Absolute 12/24/2020 0 02     Lymphocytes Absolute 12/24/2020 0 88     Monocytes Absolute 12/24/2020 0 51     Eosinophils Absolute 12/24/2020 0 21     Basophils Absolute 12/24/2020 0 03     Triglycerides 12/24/2020 129     Magnesium 12/24/2020 1 6     Phosphorus 12/24/2020 4 8*    Prealbumin 12/24/2020 23 6     Sodium 12/25/2020 145     Potassium 12/25/2020 3 6     Chloride 12/25/2020 106     CO2 12/25/2020 27     ANION GAP 12/25/2020 12     BUN 12/25/2020 4*    Creatinine 12/25/2020 0 69     Glucose 12/25/2020 124     Calcium 12/25/2020 8 8     Corrected Calcium 12/25/2020 9 8     AST 12/25/2020 24     ALT 12/25/2020 34     Alkaline Phosphatase 12/25/2020 82     Total Protein 12/25/2020 7 1     Albumin 12/25/2020 2 8*    Total Bilirubin 12/25/2020 0 20     eGFR 12/25/2020 99     Phosphorus 12/25/2020 4 5     Magnesium 12/25/2020 1 8     Triglycerides 12/25/2020 136     Sodium 12/27/2020 141     Potassium 12/27/2020 3 9     Chloride 12/27/2020 106     CO2 12/27/2020 27     ANION GAP 12/27/2020 8     BUN 12/27/2020 9     Creatinine 12/27/2020 0 81     Glucose 12/27/2020 120     Calcium 12/27/2020 8 3     eGFR 12/27/2020 83     Magnesium 12/27/2020 1 9     Phosphorus 12/27/2020 4 0     WBC 12/27/2020 6 02     RBC 12/27/2020 3 42*    Hemoglobin 12/27/2020 10 8*    Hematocrit 12/27/2020 32 9*    MCV 12/27/2020 96     MCH 12/27/2020 31 6     MCHC 12/27/2020 32 8     RDW 12/27/2020 11 9     Platelets 94/32/6141 278     MPV 12/27/2020 10 2     Hepatitis B Surface Ag 12/27/2020 Non-reactive     Hep A IgM 12/27/2020 Non-reactive     Hepatitis C Ab 12/27/2020 Non-reactive     Hep B C IgM 12/27/2020 Non-reactive     Total Bilirubin 12/27/2020 0 10*    Bilirubin, Direct 12/27/2020 0 06     Alkaline Phosphatase 12/27/2020 79     AST 12/27/2020 32     ALT 12/27/2020 40     Total Protein 12/27/2020 6 8     Albumin 12/27/2020 2 7*    Sodium 12/28/2020 141     Potassium 12/28/2020 3 6     Chloride 12/28/2020 106     CO2 12/28/2020 25     ANION GAP 12/28/2020 10     BUN 12/28/2020 13     Creatinine 12/28/2020 0 76     Glucose 12/28/2020 125     Calcium 12/28/2020 8 3     Corrected Calcium 12/28/2020 9 3     AST 12/28/2020 34     ALT 12/28/2020 44     Alkaline Phosphatase 12/28/2020 84     Total Protein 12/28/2020 6 7     Albumin 12/28/2020 2 7*    Total Bilirubin 12/28/2020 0 10*    eGFR 12/28/2020 89     Sodium 12/29/2020 141     Potassium 12/29/2020 3 9     Chloride 12/29/2020 106     CO2 12/29/2020 26     ANION GAP 12/29/2020 9     BUN 12/29/2020 11     Creatinine 12/29/2020 0 80     Glucose 12/29/2020 115     Calcium 12/29/2020 8 5     Corrected Calcium 12/29/2020 9 5     AST 12/29/2020 34     ALT 12/29/2020 49     Alkaline Phosphatase 12/29/2020 92     Total Protein 12/29/2020 7 0     Albumin 12/29/2020 2 7*    Total Bilirubin 12/29/2020 0 20     eGFR 12/29/2020 84        Imaging Studies:   I have personally reviewed pertinent imaging studies  Xr Chest Portable    Result Date: 12/6/2020  Narrative: CHEST INDICATION:   pain with inspiration  COMPARISON:  December 4, 2020  EXAM PERFORMED/VIEWS:  XR CHEST PORTABLE FINDINGS: Cardiomediastinal silhouette appears unremarkable  Mild elevation of the right hemidiaphragm  Persistent opacities in the bases of both hemithoraces, probably a combination of pleural effusions and atelectasis and/or consolidation in the lower lobes  No pneumothorax  Osseous structures appear within normal limits for patient age  Biliary stent present  Impression: Atelectasis and/or consolidation in the bases of both lower lobes  Possible coexisting pleural effusions  No significant change since 12/4/2020  Workstation performed: [id number]     Mri Abdomen W Wo Contrast    Addendum Date: 12/26/2020 Addendum:   ADDENDUM: Additional finding as follows: Review of the previous CT again demonstrates that there is likely communication between the peripancreatic collection and the cholecystectomy bed collection best seen on coronal images 70 through 70    It is uncertain whether this is primarily a fluid collection related to pancreatitis extending into the gallbladder fossa versus biloma  I personally discussed this study with Calderon Siemens on 12/26/2020 at 10:55 AM      Result Date: 12/26/2020  Narrative: MRI - ABDOMEN - WITH AND WITHOUT CONTRAST INDICATION:  Pancreatic pseudocyst COMPARISON: 12/23/2020 TECHNIQUE:  The following pulse sequences were obtained prior to and following the administration of intravenous contrast:     Coronal and axial T2 with TE of 90 and 180 respectively, axial T2 with fat saturation, axial FIESTA fat-sat, axial T1-weighted in-and-out-of phase, axial DWI/ADC, precontrast axial T1 with fat saturation, post-contrast dynamic axial T1 with fat saturation at 20, 70, and 180 seconds, coronal T1  with fat saturation and 7 minute delayed axial T1 with fat saturation  2-D and 3-D MRCP were obtained  Imaging performed on 1 5T MRI   IV Contrast:  8 mL of Gadobutrol injection (SINGLE-DOSE) FINDINGS: LOWER CHEST:   Unremarkable  LIVER: Normal in size and configuration  No suspicious mass  The hepatic veins and portal veins are patent  BILE DUCTS: There is a 7 mm stone within the common bile duct with an additional 3 mm stone just proximal to the larger stone  The common bile duct is minimally dilated measuring 7 mm  Known common bile duct stent seen on CT is not well visualized  GALLBLADDER:  Postcholecystectomy  There is a rim-enhancing fluid collection within the operative bed measuring 3 1 x 4 0 cm, previously 2 6 x 3 7 cm  There is mild internal debris  PANCREAS: As seen on CT, there is a serpiginous fluid collection along the pancreatic body consistent with acute peripancreatic collection/early pseudocyst   This is difficult to measure given serpiginous configuration, measuring approximately 4 0 x 5 7 cm, previously 4 1 x 5 9 cm  The pancreatic duct is intact  There is no communication with the collection  No main pancreatic ductal dilation   ADRENAL GLANDS:  Normal  SPLEEN: Normal  KIDNEYS/PROXIMAL URETERS: No hydroureteronephrosis  No suspicious renal mass  BOWEL:   No dilated loops of bowel  PERITONEUM/RETROPERITONEUM: No ascites  LYMPH NODES: No abdominal lymphadenopathy  VASCULAR STRUCTURES:  No aneurysm  ABDOMINAL WALL:  Unremarkable  OSSEOUS STRUCTURES:  No suspicious osseous lesion  Impression: 1  Intact main pancreatic duct without communication to the acute peripancreatic fluid collection/early pseudocyst  2   Rim-enhancing fluid collection in the gallbladder fossa status post cholecystectomy  Given slight increase in size from 12/23/2020, this may represent a biloma with persistent biliary leak  Abscess not excluded  3   Choledocholithiasis with mild common bile duct dilatation  The study was marked in [facility] for immediate notification  Workstation performed: [id number]     Nm Hepatobiliary    Result Date: 12/28/2020  Narrative: HEPATOBILIARY SCAN INDICATION: Rule out evidence of a biliary leak  , status post cholecystectomy, abdominal pain COMPARISON:  None available TECHNIQUE:   Following the intravenous administration of 4 8 mCi Tc-99m labeled mebrofenin, dynamic abdominal imaging was obtained in the anterior projection over a 60 minute time period  FINDINGS:  There is prompt, uniform accumulation with normal clearance of the radiopharmaceutical by the liver  There is normal filling of the intrahepatic ducts and common bile duct with normal excretion of the radiopharmaceutical into the duodenum  No discrete extravasation of radiotracer is visualized  Impression: No definite scintigraphic evidence for bile leak  Follow-up as clinically warranted  Workstation performed: [id number]     Fl Ercp Biliary Only    Result Date: 12/1/2020  Narrative: ERCP INDICATION: Choledocholithiasis with obstruction   COMPARISON:  November 29, 2020 IMAGES:  4 FLUOROSCOPY TIME:   513 seconds CONTRAST: 50 mL of iohexol (OMNIPAQUE) FINDINGS: Fluoroscopic guidance was provided for performance of ERCP  BILIARY: Limited contrast opacified static submitted images obtained during as part of fluoroscopic guidance demonstrate multiple filling defects in the common bile duct  The majority of the filling defects are absent on later images  Final image demonstrates presence of a plastic biliary stent  Impression: Fluoroscopic guidance for ERCP  Please see procedure report for full details  Workstation performed: [id number]     Xr Chest Portable Icu    Result Date: 12/4/2020  Narrative: CHEST INDICATION:   hypoxia  COMPARISON:  November 29, 2020 EXAM PERFORMED/VIEWS:  XR CHEST PORTABLE ICU FINDINGS: Cardiomediastinal silhouette appears unremarkable  Right basilar density seen with obscuration of the dome of diaphragm  Lungs are hypoinflated Left base partly obscured Osseous structures appear within normal limits for patient age  Impression: No pulmonary congestion seen Bibasilar densities right greater than left may be due to atelectasis and effusion  Underlying infiltrates are not entirely excluded Mildly worse from the previous study Workstation performed: [id number]     Ct Abdomen Pelvis W Contrast    Result Date: 12/23/2020  Narrative: CT ABDOMEN AND PELVIS WITH IV CONTRAST INDICATION:   K85 10: Biliary acute pancreatitis without necrosis or infection  COMPARISON:  CT abdomen and pelvis 12/2/2020 TECHNIQUE:  CT examination of the abdomen and pelvis was performed  Axial, sagittal, and coronal 2D reformatted images were created from the source data and submitted for interpretation  Radiation dose length product (DLP) for this visit:  355 mGy-cm   This examination, like all CT scans performed in the [facility], was performed utilizing techniques to minimize radiation dose exposure, including the use of iterative reconstruction and automated exposure control  IV Contrast:  100 mL of iohexol (OMNIPAQUE)  350 Enteric Contrast:  Enteric contrast was administered  FINDINGS: ABDOMEN LOWER CHEST:  No clinically significant abnormality identified in the visualized lower chest  LIVER/BILIARY TREE:  Trace intrahepatic pneumobilia attributed to biliary stent  No duct dilation  Liver otherwise unremarkable  GALLBLADDER:  Post cholecystectomy  SPLEEN:  Unremarkable  PANCREAS:  Minimal residual albeit improved peripancreatic fat stranding  No intraparenchymal gas to suggest necrosis  ADRENAL GLANDS:  Unremarkable  KIDNEYS/URETERS: One or more sharply circumscribed subcentimeter renal hypodensities are noted  These lesions are too small to accurately characterize, but are statistically most likely to represent benign cortical renal cyst(s)  According to the guidelines published in the [facility] Paper of the ACR Incidental Findings Committee (Radiology 2010), no further workup of these lesions is recommended    Kidneys otherwise unremarkable  No perinephric collection  STOMACH AND BOWEL:  Unremarkable  APPENDIX:  A normal appendix was visualized  ABDOMINOPELVIC CAVITY:  Communicating serpiginous acute peripancreatic fluid collections with minimal diffuse wall thickening attributed to developing pseudocysts  Although no gas within the fluid collections, abscess is not excluded  Collection is predominantly posterior and inferior to the pancreas tracking inferiorly along the proximal superior mesenteric vessels and extending into the inferior lesser sac and gallbladder fossa  Collection in the gallbladder fossa is seen to communicate with the peripancreatic collection on image 71 series 601  Collection overall measures approximately 12 1 x 3 9 x 5 4 cm image 72 series 601 and image 36 series 2  Focal tubular inferior extension of this dominant collection inferiorly between the proximal jejunal branches of the SMA and SMV images 39 through 48  series 2  No free gas or enlarged lymph nodes  VESSELS:  Unremarkable for patient's age  PELVIS REPRODUCTIVE ORGANS:  Unremarkable for patient's age  URINARY BLADDER:  Unremarkable  ABDOMINAL WALL/INGUINAL REGIONS:  Subcentimeter ventral umbilical abdominal wall diastases containing fat  No bowel herniation  No inguinal hernia or mass  OSSEOUS STRUCTURES:  No acute fracture or osseous destructive lesion identified  Mild degenerative changes of the spine  Impression: Acute large communicating serpiginous peripancreatic fluid collection with minimal diffuse wall thickening  This may represent fluid loculation and developing pseudocyst  Although there is no gas within the collection, superinfected fluid collection is not excluded  See above discussion  No evidence of pancreatic necrosis  Mild residual albeit moderately improved acute pancreatitis  This study demonstrates a significant  finding and was documented as such in [facility] for liaison and referring practitioner notification  Workstation performed: [id number]     Ct Abdomen Pelvis W Contrast    Result Date: 12/2/2020  Narrative: CT ABDOMEN AND PELVIS WITH IV CONTRAST INDICATION:  Left upper quadrant abdominal pain with concern for pancreatitis, worsening leukocytosis, assess for necrosis or fluid collection  ERCP 11/30/2020 with removal of multiple thickening and stones and stent placement  COMPARISON:  CT abdomen and pelvis 11/29/2020 TECHNIQUE:  CT examination of the abdomen and pelvis was performed  Axial, sagittal, and coronal 2D reformatted images were created from the source data and submitted for interpretation  Radiation dose length product (DLP) for this visit:  693 49 mGy-cm   This examination, like all CT scans performed in the [facility], was performed utilizing techniques to minimize radiation dose exposure, including the use of iterative  reconstruction and automated exposure control  IV Contrast:  100 mL of iohexol (OMNIPAQUE) Enteric Contrast:  Enteric contrast was not administered   FINDINGS: ABDOMEN LOWER CHEST:  Small bilateral pleural effusions with associated dependent atelectasis  LIVER/BILIARY TREE:  Liver is normal in size and configuration  Areas of parenchymal hypoattenuation around the fissure for the ligamentum teres again noted likely due to focal fatty infiltration  A biliary stent is noted  The common bile duct is decompressed  Intrahepatic biliary ductal dilatation is decreased with mild residual prominence of right hepatic lobe biliary radicals  Small amount of pneumobilia likely related to recent ERCP  The hepatic and portal veins are patent  SMV is patent  GALLBLADDER:  Cholelithiasis  There is air within the gallbladder likely related to recent ERCP  SPLEEN:  Unremarkable  PANCREAS:  The pancreas appears enlarged and edematous particularly in the head, neck, and proximal body  There is peripancreatic fat stranding and fluid  Findings are in keeping with acute pancreatitis  No main pancreatic ductal dilatation  No focal pancreatic parenchymal hypoenhancement to suggest necrosis  The splenic vein is patent  There are areas of mild rim thickening surrounding peripancreatic fluid in the central mesentery (series 2 image 39) which may be due to early loculation  ADRENAL GLANDS:  Unremarkable  KIDNEYS/URETERS:  Excreted contrast is noted within the renal collecting systems  No hydronephrosis  Unchanged subcentimeter hypoattenuating foci in the left kidney lower pole, too small to further characterize  STOMACH AND BOWEL:  No disproportionate dilatation of small or large bowel  APPENDIX:  The appendix is unremarkable  ABDOMINOPELVIC CAVITY:  Small volume of abdominal ascites and mesenteric edema mostly in the retroperitoneum related to acute pancreatitis  There is a moderate volume of ascites in the pelvis, which appears slightly loculated posteriorly  No pneumoperitoneum  No lymphadenopathy  VESSELS:  No abdominal aortic aneurysm  PELVIS REPRODUCTIVE ORGANS:  The uterus is present  No suspicious adnexal mass  URINARY BLADDER:  Unremarkable   ABDOMINAL WALL/INGUINAL REGIONS:  Subcutaneous emphysema and fat stranding in the left anterior abdominal wall likely due to subcutaneous injections  OSSEOUS STRUCTURES:  No acute fracture or destructive osseous lesion  Impression: 1  Findings in keeping with acute pancreatitis  Peripancreatic fluid with areas of mild rim thickening in the central mesentery which may be due to early loculation  2   Interval decompression of the common bile duct and placement of biliary stent  Pneumobilia likely due to recent ERCP  3   Small bilateral pleural effusions with associated dependent atelectasis  The study was marked in [facility] for immediate notification  Workstation performed: [id number]     Ir Picc Line Placement Double Lumen    Result Date: 12/24/2020  Narrative: EXAMINATION: Right upper extremity single lumen Power PICC placement INDICATION: Need for TPN CONTRAST: N/A FLUOROSCOPY TIME:   2 24 SECS IMAGES:  5 ANESTHESIA: Local lidocaine PROCEDURE:  The patient was identified verbally and by wristband  Timeout was performed  Informed consent was obtained  Following obtaining informed consent, the patient was prepped and draped in the usual sterile fashion  All elements of maximal sterile barrier technique, cap and mask and sterile gown and sterile gloves and sterile drape and hand hygiene and 2% chlorhexidine for cutaneous antisepsis  Sterile ultrasound technique with sterile gel and sterile probe covers was also utilized  Using ultrasound guidance, access was gained to the patient's right brachial vein using a micropuncture system  The micropuncture dilator was exchanged for a peel away sheath  A mandril wire was advanced to the level of the RA/SVC junction to measure the catheter length  The catheter was cut to size and placed through the peel away sheath  The patient tolerated the procedure well without complication  The patient left the IR department in stable condition  Findings: 1    Using ultrasound guidance, the right brachial vein was cannulated using seldinger technique  The right brachial vein was evaluated as a potential access site  The right brachial vein was patent, and free of thrombus  Static images of the vessel was obtained  Visualization of real time needle entry into the vessel was obtained  2   A fluoroscopic image demonstrated a newly placed PICC with the central aspect at the RA/SVC junction  The catheter tubing is smooth in contour  Impression: Impression: Successful placement of a 42 cm right upper extremity single lumen Power PICC  Workstation performed: [id number]         Assessment & Plan  Pancreatic pseudocyst  - Status post ERCP pancreatitis 1st began on December 1, 2020  - ERCP demonstrated choledocholithiasis with small sphincterotomy and to balloon dilations, 8 mm and 10 mm, removal of multiple pigmented stones but a single large stone could not be removed, so a 7 [place], 7 cm stent was placed  - CT scan of the abdomen and pelvis performed on December 2nd demonstrated findings consistent with acute pancreatitis   There is peripancreatic fluid with areas of mild rim thickening in the central mesentery which may be due to early loculation   There was evidence of interval decompression of the c and a trying to resolve things because ommon bile duct and placement of a biliary stent along with pneumobilia    - CT scan of the abdomen and pelvis performed December 23rd shows a large communicating serpiginous peripancreatic fluid collection with minimal diffuse wall thickening   This may represent fluid loculation and developing pseudocyst   The collection is predominantly posterior and inferior to the pancreas tracking inferiorly along with proximal superior mesenteric vessels extending into the inferior lesser sac and gallbladder fossa with collection in the gallbladder fossa is seem to communicate with the peripancreatic collection   There was no gas within the collection   There is no evidence of pancreatic necrosis   There was moderately improved acute pancreatitis  - MRI/MRCP shows an intact main pancreatic duct with possible communication to the acute peripancreatic fluid collection/early pseudocyst   However, discussion with another radiologist doubts communication   There is a rim enhancing fluid collection in the gallbladder fossa status post cholecystectomy, slightly larger compared to  CT on 12/24 (2 6 X 3 7 cm on CT compared to 3 1 X 4 0 cm on MRI Saturday)   Given its increase in size from 12/23, this may represent a biloma with persistent biliary leak   There is evidence of choledocholithiasis (two stones seen on MRI) with mild common bile duct dilation     - As the patient is afebrile with normal white cell count there is no reason to suspect an abscess  - Urgent ERCP does not appear to be indicated   Stenting the pancreatic duct will be of no value  - Patients HIDA showed no bile leak    - Will start lo fat soft diet  - Continue to monitor LFT's  - The patient is not a candidate for an endoscopic cyst gastrostomy because her pseudocyst is too immature  - Will repeat CT of the abdomen with contrast in 12 weeks  If patient tolerates lunch patient should be stable for discharge from a GI perspective  Patient will have outpatient follow-up in our clinic in the next 2-3 weeks  Patient will be seen and examined by Dr Gino Sal PA-C  12/30/2020,9:36 AM

## 2020-12-31 ENCOUNTER — TRANSITIONAL CARE MANAGEMENT (OUTPATIENT)
Dept: INTERNAL MEDICINE CLINIC | Age: 55
End: 2020-12-31

## 2020-12-31 ENCOUNTER — TELEPHONE (OUTPATIENT)
Dept: SURGERY | Facility: CLINIC | Age: 55
End: 2020-12-31

## 2021-01-04 ENCOUNTER — TELEPHONE (OUTPATIENT)
Dept: INTERNAL MEDICINE CLINIC | Age: 56
End: 2021-01-04

## 2021-01-04 NOTE — TELEPHONE ENCOUNTER
LMOM for pt to call me at the [facility]  LMOM instructing patient to call [facility] upon receipt of message    Need to change appt to virtual

## 2021-01-07 ENCOUNTER — TELEMEDICINE (OUTPATIENT)
Dept: INTERNAL MEDICINE CLINIC | Age: 56
End: 2021-01-07
Payer: COMMERCIAL

## 2021-01-07 DIAGNOSIS — H81.10 BENIGN PAROXYSMAL POSITIONAL VERTIGO, UNSPECIFIED LATERALITY: ICD-10-CM

## 2021-01-07 DIAGNOSIS — I10 ESSENTIAL HYPERTENSION: ICD-10-CM

## 2021-01-07 DIAGNOSIS — K80.50 CHOLEDOCHOLITHIASIS: Primary | Chronic | ICD-10-CM

## 2021-01-07 DIAGNOSIS — Z90.49 STATUS POST CHOLECYSTECTOMY: ICD-10-CM

## 2021-01-07 DIAGNOSIS — K86.3 PSEUDOCYST OF PANCREAS: ICD-10-CM

## 2021-01-07 PROCEDURE — 1111F DSCHRG MED/CURRENT MED MERGE: CPT | Performed by: INTERNAL MEDICINE

## 2021-01-07 PROCEDURE — 99213 OFFICE O/P EST LOW 20 MIN: CPT | Performed by: INTERNAL MEDICINE

## 2021-01-07 RX ORDER — MECLIZINE HYDROCHLORIDE 25 MG/1
25 TABLET ORAL 3 TIMES DAILY PRN
Qty: 30 TABLET | Refills: 0 | Status: SHIPPED | OUTPATIENT
Start: 2021-01-07 | End: 2022-01-11

## 2021-01-07 NOTE — PROGRESS NOTES
Virtual Regular Visit      Assessment/Plan:    Problem List Items Addressed This Visit        Digestive    Choledocholithiasis - Primary (Chronic)    Pseudocyst of pancreas       Cardiovascular and Mediastinum    Essential hypertension       Other    Status post cholecystectomy      Other Visit Diagnoses     Benign paroxysmal positional vertigo, unspecified laterality        Relevant Medications    meclizine (ANTIVERT) 25 mg tablet               Reason for visit is   Chief Complaint   Patient presents with    Follow-up     follow up acute biliary pancreatitis  Review labs         Encounter provider Spencer Goyal MD    Provider located at [facility] [facility] [id number]      Recent Visits  Date Type Provider Dept   01/04/21 Telephone [facility]   Showing recent visits within past 7 days and meeting all other requirements     Today's Visits  Date Type Provider Dept   01/07/21 Telemedicine Spencer Goyal MD [facility]   Showing today's visits and meeting all other requirements     Future Appointments  No visits were found meeting these conditions  Showing future appointments within next 150 days and meeting all other requirements        The patient was identified by name and date of birth  Ofelia Alejandra was informed that this is a telemedicine visit and that the visit is being conducted through [address] [place] [address] and patient was informed that this is not a secure, HIPAA-compliant platform  She agrees to proceed     My office door was closed  No one else was in the room  She acknowledged consent and understanding of privacy and security of the video platform  The patient has agreed to participate and understands they can discontinue the visit at any time  Patient is aware this is a billable service       Argentina Olvera is a 48 y o  female follow-up from acute pancreatitis hypertension   Follow-up from the recent hospitalization for biliary pancreatitis she developed the abdominal pain and the the now to be biliary pancreatitis gallbladder was removed and with the ERCP common bile duct stones were taken out but 1 stone was left the there for some reason and post ERCP she developed a pancreatitis again    She was admitted to the hospital for pseudocyst of pancreas and the post ERCP pancreatitis I reviewed the records she is doing well now no pain except when she eat a bigger me, also she is burping heartburns are much better she is on Protonix 40 mg once a day she denied any chest pain or shortness of breath no diarrhea no nausea or vomiting no urinary symptoms I will see her back in about 3 months and follow-up certainly she does not have any complaints right now in she is doing well I reviewed her complete metabolic profile her liver functions are back to normal and alkaline phosphatase normal       Past Medical History:   Diagnosis Date    GERD (gastroesophageal reflux disease)        Past Surgical History:   Procedure Laterality Date    BREAST BIOPSY Left 2006    core    CHOLECYSTECTOMY LAPAROSCOPIC N/A 12/4/2020    Procedure: CHOLECYSTECTOMY LAPAROSCOPIC, possible open;  Surgeon: Obie Su MD;  Location: [facility];  Service: General    IR PICC LINE PLACEMENT DOUBLE LUMEN  12/24/2020       Current Outpatient Medications   Medication Sig Dispense Refill    amLODIPine (NORVASC) 10 mg tablet Take 1 tablet (10 mg total) by mouth daily 30 tablet 0    Ascorbic Acid (VITAMIN C) 500 MG CAPS Take by mouth daily       Calcium Carb-Cholecalciferol (CALTRATE 600+D) 600-800 MG-UNIT TABS Take 1 tablet by mouth daily      cyanocobalamin (VITAMIN B-12) 500 mcg tablet Take 1 tablet by mouth daily      fexofenadine-pseudoephedrine (ALLEGRA-D)  MG per tablet       fluticasone (FLONASE) 50 mcg/act nasal spray 1 spray into each nostril daily 1 Bottle 3    meclizine (ANTIVERT) 25 mg tablet Take 1 tablet (25 mg total) by mouth 3 (three) times a day as needed for dizziness 30 tablet 0    Multiple Vitamins-Minerals (ONE DAILY FOR WOMEN PO) daily       pantoprazole (PROTONIX) 40 mg tablet Take 1 tablet (40 mg total) by mouth daily 30 tablet 0     No current facility-administered medications for this visit  Allergies   Allergen Reactions    Lactose Other (See Comments)     intolerance    Other      SEASONAL ALLERGIES       Review of Systems   Constitutional: Negative for chills and fatigue  HENT: Negative for congestion, ear pain, hearing loss, postnasal drip, sinus pressure, sore throat and voice change  Eyes: Negative for pain, discharge and visual disturbance  Respiratory: Negative for cough, chest tightness and shortness of breath  Cardiovascular: Negative for chest pain, palpitations and leg swelling  Gastrointestinal: Positive for abdominal distention and abdominal pain  Negative for blood in stool, diarrhea, nausea and rectal pain  Symptoms are better than before after the pancreatitis was resolved   Genitourinary: Negative for difficulty urinating, dysuria and urgency  Musculoskeletal: Negative for arthralgias and joint swelling  Skin: Negative for rash  Allergic/Immunologic: Negative for environmental allergies and food allergies  Neurological: Negative for dizziness, tremors, weakness, numbness and headaches  Hematological: Negative for adenopathy  Psychiatric/Behavioral: Negative for behavioral problems and hallucinations  Video Exam    There were no vitals filed for this visit  Physical Exam  Constitutional:       Appearance: She is well-developed  HENT:      Head: Normocephalic and atraumatic  Nose: Nose normal    Eyes:      Pupils: Pupils are equal, round, and reactive to light  Neck:      Musculoskeletal: Normal range of motion  Abdominal:      General: Abdomen is flat     Neurological: Mental Status: She is alert and oriented to person, place, and time  Psychiatric:         Behavior: Behavior normal           I spent 15 minutes directly with the patient during this visit      VIRTUAL VISIT DISCLAIMER    Samir Patricia acknowledges that she has consented to an online visit or consultation  She understands that the online visit is based solely on information provided by her, and that, in the absence of a face-to-face physical evaluation by the physician, the diagnosis she receives is both limited and provisional in terms of accuracy and completeness  This is not intended to replace a full medical face-to-face evaluation by the physician  Samir Patricia understands and accepts these terms

## 2021-01-11 DIAGNOSIS — I10 ESSENTIAL HYPERTENSION: ICD-10-CM

## 2021-01-11 RX ORDER — AMLODIPINE BESYLATE 10 MG/1
10 TABLET ORAL DAILY
Qty: 30 TABLET | Refills: 0 | Status: SHIPPED | OUTPATIENT
Start: 2021-01-11 | End: 2021-02-12 | Stop reason: SDUPTHER

## 2021-01-12 ENCOUNTER — OFFICE VISIT (OUTPATIENT)
Dept: GASTROENTEROLOGY | Facility: CLINIC | Age: 56
End: 2021-01-12
Payer: COMMERCIAL

## 2021-01-12 VITALS
SYSTOLIC BLOOD PRESSURE: 116 MMHG | WEIGHT: 169.5 LBS | DIASTOLIC BLOOD PRESSURE: 86 MMHG | HEART RATE: 97 BPM | BODY MASS INDEX: 26.55 KG/M2

## 2021-01-12 DIAGNOSIS — K59.09 CHRONIC CONSTIPATION: ICD-10-CM

## 2021-01-12 DIAGNOSIS — K80.50 CHOLEDOCHOLITHIASIS: ICD-10-CM

## 2021-01-12 DIAGNOSIS — R10.11 RUQ ABDOMINAL PAIN: ICD-10-CM

## 2021-01-12 DIAGNOSIS — K86.3 PANCREATIC PSEUDOCYST: Primary | ICD-10-CM

## 2021-01-12 PROCEDURE — 1036F TOBACCO NON-USER: CPT | Performed by: PHYSICIAN ASSISTANT

## 2021-01-12 PROCEDURE — 99214 OFFICE O/P EST MOD 30 MIN: CPT | Performed by: PHYSICIAN ASSISTANT

## 2021-01-12 PROCEDURE — 1111F DSCHRG MED/CURRENT MED MERGE: CPT | Performed by: PHYSICIAN ASSISTANT

## 2021-01-12 NOTE — PROGRESS NOTES
[facility]adde[facility] - Outpatient Follow-up Note  Candace Couch 48 y o  female MRN: [id number]  Encounter: [id number]          ASSESSMENT AND PLAN:      1  Pancreatic pseudocyst  2  RUQ abdominal pain  3  Choledocholithiasis  - She needs a repeat ERCP with stent removal and stone removal after her initial ERCP on 11/30 showed multiple stones, one of which was large and could not be removed  - We will schedule for a ERCP in [place] with an advanced endoscopist per her request due to the difficulty of the first ERCP  - We are going to get a repeat MRI abdomen/MRCP 1 month from her last MRI to ensure the peripancreatic fluid collection/GB fossa collection are stable or improving; if her fluid collection is increasing in size and matured, she may need a cyst gastrostomy at the time of the repeat ERCP  - We did discuss that this fluid collection could take several months to resolve completely and she may need repeat imaging again  - Diet as tolerated, caution with high fat foods    4  Chronic Constipation  - Not a new problem; advised to start miralax daily or QOD   - She reports having a normal colonoscopy in 2019 with [facility], she is going to obtain the report so we can review this and determine when she needs her next colonoscopy  ______________________________________________________________________    SUBJECTIVE:  Blanca Pineda is a pleasant 47 yo F presenting for hospital follow up  She unfortunately had a complicated course over the past month  She initially presented to the hospital at the end of November with abdominal pain and nausea and evidence of cholelithiasis and choledocholithiasis on imaging  She underwent ERCP on November 30th with removal of multiple stones  But 1 large stone was unable to be removed so a 7 [place]imee stent was placed with plans for repeat ERCP with stent and stone removal in 6 weeks  She then developed post ERCP pancreatitis which delayed her cholecystectomy     She did get her gallbladder removed on December 4th  She was then rehospitalized on 12/23 due to an outpatient CT scan showing an acute large peripancreatic fluid collection concerning for developing pseudocyst  An MRI of the abdomen showed similar findings along with a fluid collection in the gallbladder fossa  A HIDA scan was done and did not show a bile leak  Overall she is doing better  She will get RUQ pain at times but denies any fevers  She will get nauseous as well but no vomiting  She is back to her normal bowel habits which is constipation  She can go 3-5 days between BMs  She will take miralax PRN  She had a colonoscopy in 2019 with [facility] which she believes was normal  There is no further diarrhea since stopping TPN  REVIEW OF SYSTEMS IS OTHERWISE NEGATIVE        Historical Information   Past Medical History:   Diagnosis Date    GERD (gastroesophageal reflux disease)      Past Surgical History:   Procedure Laterality Date    BREAST BIOPSY Left 2006    core    CHOLECYSTECTOMY LAPAROSCOPIC N/A 12/4/2020    Procedure: CHOLECYSTECTOMY LAPAROSCOPIC, possible open;  Surgeon: Kamar Chung MD;  Location: [facility];  Service: General    IR PICC LINE PLACEMENT DOUBLE LUMEN  12/24/2020     Social History   Social History     Substance and Sexual Activity   Alcohol Use Yes    Frequency: 2-4 times a month    Drinks per session: 1 or 2    Binge frequency: Never    Comment: Social drinker     Social History     Substance and Sexual Activity   Drug Use No     Social History     Tobacco Use   Smoking Status Never Smoker   Smokeless Tobacco Never Used     Family History   Problem Relation Age of Onset    Arthritis Mother     Skin cancer Mother     Skin cancer Father     Arthritis Maternal Grandmother     Hiatal hernia Maternal Grandfather     Arthritis Family     Hiatal hernia Family     Skin cancer Family     Varicose Veins Family         Of lower extremities    Lung cancer Brother        Meds/Allergies Current Outpatient Medications:     amLODIPine (NORVASC) 10 mg tablet    Ascorbic Acid (VITAMIN C) 500 MG CAPS    Calcium Carb-Cholecalciferol (CALTRATE 600+D) 600-800 MG-UNIT TABS    cyanocobalamin (VITAMIN B-12) 500 mcg tablet    fexofenadine-pseudoephedrine (ALLEGRA-D)  MG per tablet    fluticasone (FLONASE) 50 mcg/act nasal spray    meclizine (ANTIVERT) 25 mg tablet    Multiple Vitamins-Minerals (ONE DAILY FOR WOMEN PO)    pantoprazole (PROTONIX) 40 mg tablet    Allergies   Allergen Reactions    Lactose Other (See Comments)     intolerance    Other      SEASONAL ALLERGIES           Objective     Blood pressure 116/86, pulse 97, weight 76 9 kg (169 lb 8 oz), last menstrual period 02/22/2019  Body mass index is 26 55 kg/m²  PHYSICAL EXAM:      General Appearance:   Alert, cooperative, no distress   HEENT:   Normocephalic, atraumatic, anicteric      Neck:  Supple, symmetrical, trachea midline   Lungs:   Clear to auscultation bilaterally; no rales, rhonchi or wheezing; respirations unlabored    Heart[de-identified]   Regular rate and rhythm; no murmur, rub, or gallop  Abdomen:   Soft, non-tender, non-distended; normal bowel sounds; no masses, no organomegaly    Genitalia:   Deferred    Rectal:   Deferred    Extremities:  No cyanosis, clubbing or edema    Pulses:  2+ and symmetric    Skin:  No jaundice, rashes, or lesions    Lymph nodes:  No palpable cervical lymphadenopathy        Lab Results:   No visits with results within 1 Day(s) from this visit     Latest known visit with results is:   Admission on 12/23/2020, Discharged on 12/30/2020   Component Date Value    WBC 12/23/2020 7 68     RBC 12/23/2020 3 78*    Hemoglobin 12/23/2020 11 5     Hematocrit 12/23/2020 36 1     MCV 12/23/2020 96     MCH 12/23/2020 30 4     MCHC 12/23/2020 31 9     RDW 12/23/2020 12 0     MPV 12/23/2020 9 9     Platelets 03/31/1524 482*    nRBC 12/23/2020 0     Neutrophils Relative 12/23/2020 68     Immat GRANS % 12/23/2020 0     Lymphocytes Relative 12/23/2020 22     Monocytes Relative 12/23/2020 9     Eosinophils Relative 12/23/2020 1     Basophils Relative 12/23/2020 0     Neutrophils Absolute 12/23/2020 5 23     Immature Grans Absolute 12/23/2020 0 01     Lymphocytes Absolute 12/23/2020 1 65     Monocytes Absolute 12/23/2020 0 66     Eosinophils Absolute 12/23/2020 0 10     Basophils Absolute 12/23/2020 0 03     Sodium 12/23/2020 141     Potassium 12/23/2020 4 2     Chloride 12/23/2020 105     CO2 12/23/2020 30     ANION GAP 12/23/2020 6     BUN 12/23/2020 10     Creatinine 12/23/2020 0 96     Glucose 12/23/2020 116     Calcium 12/23/2020 9 4     Corrected Calcium 12/23/2020 10 0     AST 12/23/2020 30     ALT 12/23/2020 42     Alkaline Phosphatase 12/23/2020 94     Total Protein 12/23/2020 7 9     Albumin 12/23/2020 3 3*    Total Bilirubin 12/23/2020 0 30     eGFR 12/23/2020 67     Lipase 12/23/2020 341     Sodium 12/24/2020 143     Potassium 12/24/2020 3 6     Chloride 12/24/2020 105     CO2 12/24/2020 28     ANION GAP 12/24/2020 10     BUN 12/24/2020 5     Creatinine 12/24/2020 0 76     Glucose 12/24/2020 118     Calcium 12/24/2020 8 5     Corrected Calcium 12/24/2020 9 5     AST 12/24/2020 23     ALT 12/24/2020 31     Alkaline Phosphatase 12/24/2020 82     Total Protein 12/24/2020 7 0     Albumin 12/24/2020 2 7*    Total Bilirubin 12/24/2020 0 30     eGFR 12/24/2020 89     WBC 12/24/2020 5 47     RBC 12/24/2020 3 52*    Hemoglobin 12/24/2020 10 9*    Hematocrit 12/24/2020 33 4*    MCV 12/24/2020 95     MCH 12/24/2020 31 0     MCHC 12/24/2020 32 6     RDW 12/24/2020 12 1     MPV 12/24/2020 10 1     Platelets 52/91/7264 388     nRBC 12/24/2020 0     Neutrophils Relative 12/24/2020 70     Immat GRANS % 12/24/2020 0     Lymphocytes Relative 12/24/2020 16     Monocytes Relative 12/24/2020 9     Eosinophils Relative 12/24/2020 4     Basophils Relative 12/24/2020 1     Neutrophils Absolute 12/24/2020 3 82     Immature Grans Absolute 12/24/2020 0 02     Lymphocytes Absolute 12/24/2020 0 88     Monocytes Absolute 12/24/2020 0 51     Eosinophils Absolute 12/24/2020 0 21     Basophils Absolute 12/24/2020 0 03     Triglycerides 12/24/2020 129     Magnesium 12/24/2020 1 6     Phosphorus 12/24/2020 4 8*    Prealbumin 12/24/2020 23 6     Sodium 12/25/2020 145     Potassium 12/25/2020 3 6     Chloride 12/25/2020 106     CO2 12/25/2020 27     ANION GAP 12/25/2020 12     BUN 12/25/2020 4*    Creatinine 12/25/2020 0 69     Glucose 12/25/2020 124     Calcium 12/25/2020 8 8     Corrected Calcium 12/25/2020 9 8     AST 12/25/2020 24     ALT 12/25/2020 34     Alkaline Phosphatase 12/25/2020 82     Total Protein 12/25/2020 7 1     Albumin 12/25/2020 2 8*    Total Bilirubin 12/25/2020 0 20     eGFR 12/25/2020 99     Phosphorus 12/25/2020 4 5     Magnesium 12/25/2020 1 8     Triglycerides 12/25/2020 136     Sodium 12/27/2020 141     Potassium 12/27/2020 3 9     Chloride 12/27/2020 106     CO2 12/27/2020 27     ANION GAP 12/27/2020 8     BUN 12/27/2020 9     Creatinine 12/27/2020 0 81     Glucose 12/27/2020 120     Calcium 12/27/2020 8 3     eGFR 12/27/2020 83     Magnesium 12/27/2020 1 9     Phosphorus 12/27/2020 4 0     WBC 12/27/2020 6 02     RBC 12/27/2020 3 42*    Hemoglobin 12/27/2020 10 8*    Hematocrit 12/27/2020 32 9*    MCV 12/27/2020 96     MCH 12/27/2020 31 6     MCHC 12/27/2020 32 8     RDW 12/27/2020 11 9     Platelets 22/58/5690 278     MPV 12/27/2020 10 2     Hepatitis B Surface Ag 12/27/2020 Non-reactive     Hep A IgM 12/27/2020 Non-reactive     Hepatitis C Ab 12/27/2020 Non-reactive     Hep B C IgM 12/27/2020 Non-reactive     Total Bilirubin 12/27/2020 0 10*    Bilirubin, Direct 12/27/2020 0 06     Alkaline Phosphatase 12/27/2020 79     AST 12/27/2020 32     ALT 12/27/2020 40     Total Protein 12/27/2020 6  8     Albumin 12/27/2020 2 7*    Sodium 12/28/2020 141     Potassium 12/28/2020 3 6     Chloride 12/28/2020 106     CO2 12/28/2020 25     ANION GAP 12/28/2020 10     BUN 12/28/2020 13     Creatinine 12/28/2020 0 76     Glucose 12/28/2020 125     Calcium 12/28/2020 8 3     Corrected Calcium 12/28/2020 9 3     AST 12/28/2020 34     ALT 12/28/2020 44     Alkaline Phosphatase 12/28/2020 84     Total Protein 12/28/2020 6 7     Albumin 12/28/2020 2 7*    Total Bilirubin 12/28/2020 0 10*    eGFR 12/28/2020 89     Sodium 12/29/2020 141     Potassium 12/29/2020 3 9     Chloride 12/29/2020 106     CO2 12/29/2020 26     ANION GAP 12/29/2020 9     BUN 12/29/2020 11     Creatinine 12/29/2020 0 80     Glucose 12/29/2020 115     Calcium 12/29/2020 8 5     Corrected Calcium 12/29/2020 9 5     AST 12/29/2020 34     ALT 12/29/2020 49     Alkaline Phosphatase 12/29/2020 92     Total Protein 12/29/2020 7 0     Albumin 12/29/2020 2 7*    Total Bilirubin 12/29/2020 0 20     eGFR 12/29/2020 84          Radiology Results:   Mri Abdomen W Wo Contrast    Addendum Date: 12/26/2020 Addendum:   ADDENDUM: Additional finding as follows: Review of the previous CT again demonstrates that there is likely communication between the peripancreatic collection and the cholecystectomy bed collection best seen on coronal images 70 through 71  It is uncertain whether this is primarily a fluid collection related to pancreatitis extending into the gallbladder fossa versus biloma    I personally discussed this study with Shannan Peres on 12/26/2020 at 10:55 AM      Result Date: 12/26/2020  Narrative: MRI - ABDOMEN - WITH AND WITHOUT CONTRAST INDICATION:  Pancreatic pseudocyst COMPARISON: 12/23/2020 TECHNIQUE:  The following pulse sequences were obtained prior to and following the administration of intravenous contrast:     Coronal and axial T2 with TE of 90 and 180 respectively, axial T2 with fat saturation, axial FIESTA fat-sat, axial T1-weighted in-and-out-of phase, axial DWI/ADC, precontrast axial T1 with fat saturation, post-contrast dynamic axial T1 with fat saturation at 20, 70, and 180 seconds, coronal T1  with fat saturation and 7 minute delayed axial T1 with fat saturation  2-D and 3-D MRCP were obtained  Imaging performed on 1 5T MRI   IV Contrast:  8 mL of Gadobutrol injection (SINGLE-DOSE) FINDINGS: LOWER CHEST:   Unremarkable  LIVER: Normal in size and configuration  No suspicious mass  The hepatic veins and portal veins are patent  BILE DUCTS: There is a 7 mm stone within the common bile duct with an additional 3 mm stone just proximal to the larger stone  The common bile duct is minimally dilated measuring 7 mm  Known common bile duct stent seen on CT is not well visualized  GALLBLADDER:  Postcholecystectomy  There is a rim-enhancing fluid collection within the operative bed measuring 3 1 x 4 0 cm, previously 2 6 x 3 7 cm  There is mild internal debris  PANCREAS: As seen on CT, there is a serpiginous fluid collection along the pancreatic body consistent with acute peripancreatic collection/early pseudocyst   This is difficult to measure given serpiginous configuration, measuring approximately 4 0 x 5 7 cm, previously 4 1 x 5 9 cm  The pancreatic duct is intact  There is no communication with the collection  No main pancreatic ductal dilation  ADRENAL GLANDS:  Normal  SPLEEN:  Normal  KIDNEYS/PROXIMAL URETERS: No hydroureteronephrosis  No suspicious renal mass  BOWEL:   No dilated loops of bowel  PERITONEUM/RETROPERITONEUM: No ascites  LYMPH NODES: No abdominal lymphadenopathy  VASCULAR STRUCTURES:  No aneurysm  ABDOMINAL WALL:  Unremarkable  OSSEOUS STRUCTURES:  No suspicious osseous lesion  Impression: 1  Intact main pancreatic duct without communication to the acute peripancreatic fluid collection/early pseudocyst  2   Rim-enhancing fluid collection in the gallbladder fossa status post cholecystectomy  Given slight increase in size from 12/23/2020, this may represent a biloma with persistent biliary leak  Abscess not excluded  3   Choledocholithiasis with mild common bile duct dilatation  The study was marked in [facility] for immediate notification  Workstation performed: [id number]     Nm Hepatobiliary    Result Date: 12/28/2020  Narrative: HEPATOBILIARY SCAN INDICATION: Rule out evidence of a biliary leak  , status post cholecystectomy, abdominal pain COMPARISON:  None available TECHNIQUE:   Following the intravenous administration of 4 8 mCi Tc-99m labeled mebrofenin, dynamic abdominal imaging was obtained in the anterior projection over a 60 minute time period  FINDINGS:  There is prompt, uniform accumulation with normal clearance of the radiopharmaceutical by the liver  There is normal filling of the intrahepatic ducts and common bile duct with normal excretion of the radiopharmaceutical into the duodenum  No discrete extravasation of radiotracer is visualized  Impression: No definite scintigraphic evidence for bile leak  Follow-up as clinically warranted  Workstation performed: [id number]     Ct Abdomen Pelvis W Contrast    Result Date: 12/23/2020  Narrative: CT ABDOMEN AND PELVIS WITH IV CONTRAST INDICATION:   K85 10: Biliary acute pancreatitis without necrosis or infection  COMPARISON:  CT abdomen and pelvis 12/2/2020 TECHNIQUE:  CT examination of the abdomen and pelvis was performed  Axial, sagittal, and coronal 2D reformatted images were created from the source data and submitted for interpretation  Radiation dose length product (DLP) for this visit:  355 mGy-cm   This examination, like all CT scans performed in the [facility], was performed utilizing techniques to minimize radiation dose exposure, including the use of iterative reconstruction and automated exposure control  IV Contrast:  100 mL of iohexol (OMNIPAQUE)  350 Enteric Contrast:  Enteric contrast was administered  FINDINGS: ABDOMEN LOWER CHEST:  No clinically significant abnormality identified in the visualized lower chest  LIVER/BILIARY TREE:  Trace intrahepatic pneumobilia attributed to biliary stent  No duct dilation  Liver otherwise unremarkable  GALLBLADDER:  Post cholecystectomy  SPLEEN:  Unremarkable  PANCREAS:  Minimal residual albeit improved peripancreatic fat stranding  No intraparenchymal gas to suggest necrosis  ADRENAL GLANDS:  Unremarkable  KIDNEYS/URETERS: One or more sharply circumscribed subcentimeter renal hypodensities are noted  These lesions are too small to accurately characterize, but are statistically most likely to represent benign cortical renal cyst(s)  According to the guidelines published in the [facility] Paper of the ACR Incidental Findings Committee (Radiology 2010), no further workup of these lesions is recommended    Kidneys otherwise unremarkable  No perinephric collection  STOMACH AND BOWEL:  Unremarkable  APPENDIX:  A normal appendix was visualized  ABDOMINOPELVIC CAVITY:  Communicating serpiginous acute peripancreatic fluid collections with minimal diffuse wall thickening attributed to developing pseudocysts  Although no gas within the fluid collections, abscess is not excluded  Collection is predominantly posterior and inferior to the pancreas tracking inferiorly along the proximal superior mesenteric vessels and extending into the inferior lesser sac and gallbladder fossa  Collection in the gallbladder fossa is seen to communicate with the peripancreatic collection on image 71 series 601  Collection overall measures approximately 12 1 x 3 9 x 5 4 cm image 72 series 601 and image 36 series 2  Focal tubular inferior extension of this dominant collection inferiorly between the proximal jejunal branches of the SMA and SMV images 39 through 48  series 2  No free gas or enlarged lymph nodes  VESSELS:  Unremarkable for patient's age  PELVIS REPRODUCTIVE ORGANS:  Unremarkable for patient's age  URINARY BLADDER:  Unremarkable  ABDOMINAL WALL/INGUINAL REGIONS:  Subcentimeter ventral umbilical abdominal wall diastases containing fat  No bowel herniation  No inguinal hernia or mass  OSSEOUS STRUCTURES:  No acute fracture or osseous destructive lesion identified  Mild degenerative changes of the spine  Impression: Acute large communicating serpiginous peripancreatic fluid collection with minimal diffuse wall thickening  This may represent fluid loculation and developing pseudocyst  Although there is no gas within the collection, superinfected fluid collection is not excluded  See above discussion  No evidence of pancreatic necrosis  Mild residual albeit moderately improved acute pancreatitis  This study demonstrates a significant  finding and was documented as such in [facility] for liaison and referring practitioner notification  Workstation performed: [id number]     Ir Picc Line Placement Double Lumen    Result Date: 12/24/2020  Narrative: EXAMINATION: Right upper extremity single lumen Power PICC placement INDICATION: Need for TPN CONTRAST: N/A FLUOROSCOPY TIME:   2 24 SECS IMAGES:  5 ANESTHESIA: Local lidocaine PROCEDURE:  The patient was identified verbally and by wristband  Timeout was performed  Informed consent was obtained  Following obtaining informed consent, the patient was prepped and draped in the usual sterile fashion  All elements of maximal sterile barrier technique, cap and mask and sterile gown and sterile gloves and sterile drape and hand hygiene and 2% chlorhexidine for cutaneous antisepsis  Sterile ultrasound technique with sterile gel and sterile probe covers was also utilized  Using ultrasound guidance, access was gained to the patient's right brachial vein using a micropuncture system  The micropuncture dilator was exchanged for a peel away sheath  A mandril wire was advanced to the level of the RA/SVC junction to measure the catheter length    The catheter was cut to size and placed through the peel away sheath  The patient tolerated the procedure well without complication  The patient left the IR department in stable condition  Findings: 1  Using ultrasound guidance, the right brachial vein was cannulated using seldinger technique  The right brachial vein was evaluated as a potential access site  The right brachial vein was patent, and free of thrombus  Static images of the vessel was obtained  Visualization of real time needle entry into the vessel was obtained  2   A fluoroscopic image demonstrated a newly placed PICC with the central aspect at the RA/SVC junction  The catheter tubing is smooth in contour  Impression: Impression: Successful placement of a 42 cm right upper extremity single lumen Power PICC   Workstation performed: [id number]

## 2021-01-19 ENCOUNTER — TELEPHONE (OUTPATIENT)
Dept: SURGERY | Facility: CLINIC | Age: 56
End: 2021-01-19

## 2021-01-19 ENCOUNTER — OFFICE VISIT (OUTPATIENT)
Dept: SURGERY | Facility: CLINIC | Age: 56
End: 2021-01-19

## 2021-01-19 VITALS
HEIGHT: 67 IN | TEMPERATURE: 97.3 F | BODY MASS INDEX: 26.09 KG/M2 | DIASTOLIC BLOOD PRESSURE: 72 MMHG | HEART RATE: 97 BPM | WEIGHT: 166.2 LBS | SYSTOLIC BLOOD PRESSURE: 106 MMHG

## 2021-01-19 DIAGNOSIS — Z48.89 POSTOPERATIVE VISIT: Primary | ICD-10-CM

## 2021-01-19 PROCEDURE — 3008F BODY MASS INDEX DOCD: CPT | Performed by: PHYSICIAN ASSISTANT

## 2021-01-19 PROCEDURE — 99024 POSTOP FOLLOW-UP VISIT: CPT | Performed by: SURGERY

## 2021-01-19 NOTE — PROGRESS NOTES
Assessment/Plan:    No problem-specific Assessment & Plan notes found for this encounter  Subjective: pseudocyst     Patient ID: Candace Couch is a 48 y o  female          Objective:      LMP 02/22/2019          Physical Exam

## 2021-01-19 NOTE — PROGRESS NOTES
Post-Op Follow Up- General Surgery   Aisha Mallory 48 y o  female MRN: [id number]  Unit/Bed#:  Encounter: [id number]    Assessment/Plan     Assessment:  Status post cholecystectomy  Status post ERCP, post ERCP pancreatitis with pseudocyst, improved  Plan:  No further workup or intervention is needed from the surgical standpoint  The patient is scheduled for MRI next week and scheduled to see GI to remove biliary stent  History of Present Illness     HPI:  Aisha Mallory is a 48 y o  female who presents to my office for follow-up after laparoscopic cholecystectomy, ERCP, post ERCP pancreatitis with pseudocyst   Patient stated doing well, tolerating diet, occasional discomfort and pain on the right upper quadrant, she is able to tolerate diet for the most part  She denies having any fever, chills, nausea, vomiting, diarrhea, constipation, change in the color urine or stool        Historical Information   Past Medical History:   Diagnosis Date    GERD (gastroesophageal reflux disease)      Past Surgical History:   Procedure Laterality Date    BREAST BIOPSY Left 2006    core    CHOLECYSTECTOMY LAPAROSCOPIC N/A 12/4/2020    Procedure: CHOLECYSTECTOMY LAPAROSCOPIC, possible open;  Surgeon: Pravin Lunsford MD;  Location: [facility];  Service: General    IR PICC LINE PLACEMENT DOUBLE LUMEN  12/24/2020     Social History   Social History     Substance and Sexual Activity   Alcohol Use Yes    Frequency: 2-4 times a month    Drinks per session: 1 or 2    Binge frequency: Never    Comment: Social drinker     Social History     Substance and Sexual Activity   Drug Use No     Social History     Tobacco Use   Smoking Status Never Smoker   Smokeless Tobacco Never Used     Family History: non-contributory    Meds/Allergies   all medications and allergies reviewed     Current Outpatient Medications:     amLODIPine (NORVASC) 10 mg tablet, Take 1 tablet (10 mg total) by mouth daily, Disp: 30 tablet, Rfl: 0    Ascorbic Acid (VITAMIN C) 500 MG CAPS, Take by mouth daily , Disp: , Rfl:     Calcium Carb-Cholecalciferol (CALTRATE 600+D) 600-800 MG-UNIT TABS, Take 1 tablet by mouth daily, Disp: , Rfl:     cyanocobalamin (VITAMIN B-12) 500 mcg tablet, Take 1 tablet by mouth daily, Disp: , Rfl:     fexofenadine-pseudoephedrine (ALLEGRA-D)  MG per tablet, , Disp: , Rfl:     fluticasone (FLONASE) 50 mcg/act nasal spray, 1 spray into each nostril daily, Disp: 1 Bottle, Rfl: 3    meclizine (ANTIVERT) 25 mg tablet, Take 1 tablet (25 mg total) by mouth 3 (three) times a day as needed for dizziness, Disp: 30 tablet, Rfl: 0    Multiple Vitamins-Minerals (ONE DAILY FOR WOMEN PO), daily , Disp: , Rfl:     pantoprazole (PROTONIX) 40 mg tablet, Take 1 tablet (40 mg total) by mouth daily, Disp: 30 tablet, Rfl: 0  Allergies   Allergen Reactions    Lactose Other (See Comments)     intolerance    Other      SEASONAL ALLERGIES       Objective     Current Vitals:   Blood Pressure: 106/72 (01/19/21 0935)  Pulse: 97 (01/19/21 0935)  Temperature: (!) 97 3 °F (36 3 °C) (01/19/21 0935)  Temp Source: Temporal (01/19/21 0935)  Height: 5' 7" (170 2 cm) (01/19/21 0935)  Weight - Scale: 75 4 kg (166 lb 3 2 oz) (01/19/21 0935)      Physical Exam  Vitals signs and nursing note reviewed  Abdominal:      Comments: Abdomen is soft, nondistended, mildly tender in the epigastrium without guarding or rebound  There is no mass palpable  Incisions are well-healed without evidence of infection or incisional hernia

## 2021-01-22 ENCOUNTER — TELEPHONE (OUTPATIENT)
Dept: GASTROENTEROLOGY | Facility: CLINIC | Age: 56
End: 2021-01-22

## 2021-01-22 ENCOUNTER — PREP FOR PROCEDURE (OUTPATIENT)
Dept: GASTROENTEROLOGY | Facility: CLINIC | Age: 56
End: 2021-01-22

## 2021-01-22 DIAGNOSIS — K80.50 CHOLEDOCHOLITHIASIS: Primary | Chronic | ICD-10-CM

## 2021-01-22 NOTE — TELEPHONE ENCOUNTER
Rosa - patient called she has not heard from [facility] when they are going to schedule patient for stone removal and stent  Patient would like us to call her   [phone]

## 2021-01-22 NOTE — TELEPHONE ENCOUNTER
ERCP scheduled on 2/4/21 with Dr Demarco at [facility]  I gave pt verbal instructions/mailed

## 2021-01-26 ENCOUNTER — HOSPITAL ENCOUNTER (OUTPATIENT)
Dept: MRI IMAGING | Facility: HOSPITAL | Age: 56
Discharge: HOME | End: 2021-01-26
Payer: COMMERCIAL

## 2021-01-26 ENCOUNTER — TELEPHONE (OUTPATIENT)
Dept: SURGERY | Facility: CLINIC | Age: 56
End: 2021-01-26

## 2021-01-26 DIAGNOSIS — K80.50 CHOLEDOCHOLITHIASIS: ICD-10-CM

## 2021-01-26 DIAGNOSIS — K86.3 PANCREATIC PSEUDOCYST: ICD-10-CM

## 2021-01-26 DIAGNOSIS — R10.11 RUQ ABDOMINAL PAIN: ICD-10-CM

## 2021-01-26 PROCEDURE — 74183 MRI ABD W/O CNTR FLWD CNTR: CPT

## 2021-01-26 PROCEDURE — A9585 GADOBUTROL INJECTION: HCPCS | Performed by: PHYSICIAN ASSISTANT

## 2021-01-26 PROCEDURE — 76377 3D RENDER W/INTRP POSTPROCES: CPT

## 2021-01-26 PROCEDURE — G1004 CDSM NDSC: HCPCS

## 2021-01-26 RX ADMIN — GADOBUTROL 7 ML: 604.72 INJECTION INTRAVENOUS at 09:07

## 2021-01-28 DIAGNOSIS — K86.3 PANCREATIC PSEUDOCYST: ICD-10-CM

## 2021-01-28 RX ORDER — PANTOPRAZOLE SODIUM 40 MG/1
40 TABLET, DELAYED RELEASE ORAL DAILY
Qty: 30 TABLET | Refills: 0 | Status: SHIPPED | OUTPATIENT
Start: 2021-01-28 | End: 2021-03-30

## 2021-01-28 NOTE — TELEPHONE ENCOUNTER
Rosa - patient called for refill pantoprazole 40 mg - 1 tablet daily   Please call [facility] at [phone] ty

## 2021-02-03 ENCOUNTER — ANESTHESIA EVENT (OUTPATIENT)
Dept: GASTROENTEROLOGY | Facility: HOSPITAL | Age: 56
End: 2021-02-03

## 2021-02-03 RX ORDER — LIDOCAINE HYDROCHLORIDE 10 MG/ML
0.5 INJECTION, SOLUTION EPIDURAL; INFILTRATION; INTRACAUDAL; PERINEURAL ONCE AS NEEDED
Status: CANCELLED | OUTPATIENT
Start: 2021-02-03

## 2021-02-03 RX ORDER — SODIUM CHLORIDE, SODIUM LACTATE, POTASSIUM CHLORIDE, CALCIUM CHLORIDE 600; 310; 30; 20 MG/100ML; MG/100ML; MG/100ML; MG/100ML
125 INJECTION, SOLUTION INTRAVENOUS CONTINUOUS
Status: CANCELLED | OUTPATIENT
Start: 2021-02-03

## 2021-02-04 ENCOUNTER — HOSPITAL ENCOUNTER (OUTPATIENT)
Dept: RADIOLOGY | Facility: HOSPITAL | Age: 56
Discharge: HOME | End: 2021-02-04
Payer: COMMERCIAL

## 2021-02-04 ENCOUNTER — ANESTHESIA (OUTPATIENT)
Dept: GASTROENTEROLOGY | Facility: HOSPITAL | Age: 56
End: 2021-02-04

## 2021-02-04 ENCOUNTER — HOSPITAL ENCOUNTER (OUTPATIENT)
Dept: GASTROENTEROLOGY | Facility: HOSPITAL | Age: 56
Setting detail: OUTPATIENT SURGERY
Discharge: HOME | End: 2021-02-04
Attending: INTERNAL MEDICINE
Payer: COMMERCIAL

## 2021-02-04 VITALS
WEIGHT: 162 LBS | HEIGHT: 67 IN | DIASTOLIC BLOOD PRESSURE: 73 MMHG | OXYGEN SATURATION: 100 % | BODY MASS INDEX: 25.43 KG/M2 | RESPIRATION RATE: 18 BRPM | TEMPERATURE: 98 F | SYSTOLIC BLOOD PRESSURE: 131 MMHG | HEART RATE: 94 BPM

## 2021-02-04 VITALS — HEART RATE: 105 BPM

## 2021-02-04 DIAGNOSIS — K80.50 CHOLEDOCHOLITHIASIS: ICD-10-CM

## 2021-02-04 PROCEDURE — 43261 ENDO CHOLANGIOPANCREATOGRAPH: CPT | Performed by: INTERNAL MEDICINE

## 2021-02-04 PROCEDURE — 88305 TISSUE EXAM BY PATHOLOGIST: CPT | Performed by: PATHOLOGY

## 2021-02-04 PROCEDURE — 43264 ERCP REMOVE DUCT CALCULI: CPT | Performed by: INTERNAL MEDICINE

## 2021-02-04 PROCEDURE — 43275 ERCP REMOVE FORGN BODY DUCT: CPT | Performed by: INTERNAL MEDICINE

## 2021-02-04 PROCEDURE — C1769 GUIDE WIRE: HCPCS

## 2021-02-04 PROCEDURE — 74328 X-RAY BILE DUCT ENDOSCOPY: CPT

## 2021-02-04 RX ORDER — PROPOFOL 10 MG/ML
INJECTION, EMULSION INTRAVENOUS CONTINUOUS PRN
Status: DISCONTINUED | OUTPATIENT
Start: 2021-02-04 | End: 2021-02-04

## 2021-02-04 RX ORDER — FENTANYL CITRATE 50 UG/ML
INJECTION, SOLUTION INTRAMUSCULAR; INTRAVENOUS AS NEEDED
Status: DISCONTINUED | OUTPATIENT
Start: 2021-02-04 | End: 2021-02-04

## 2021-02-04 RX ORDER — PROPOFOL 10 MG/ML
INJECTION, EMULSION INTRAVENOUS AS NEEDED
Status: DISCONTINUED | OUTPATIENT
Start: 2021-02-04 | End: 2021-02-04

## 2021-02-04 RX ORDER — GLYCOPYRROLATE 0.2 MG/ML
INJECTION INTRAMUSCULAR; INTRAVENOUS AS NEEDED
Status: DISCONTINUED | OUTPATIENT
Start: 2021-02-04 | End: 2021-02-04

## 2021-02-04 RX ORDER — SUCCINYLCHOLINE/SOD CL,ISO/PF 100 MG/5ML
SYRINGE (ML) INTRAVENOUS AS NEEDED
Status: DISCONTINUED | OUTPATIENT
Start: 2021-02-04 | End: 2021-02-04

## 2021-02-04 RX ORDER — DEXAMETHASONE SODIUM PHOSPHATE 10 MG/ML
INJECTION, SOLUTION INTRAMUSCULAR; INTRAVENOUS AS NEEDED
Status: DISCONTINUED | OUTPATIENT
Start: 2021-02-04 | End: 2021-02-04

## 2021-02-04 RX ORDER — LIDOCAINE HYDROCHLORIDE 10 MG/ML
INJECTION, SOLUTION EPIDURAL; INFILTRATION; INTRACAUDAL; PERINEURAL AS NEEDED
Status: DISCONTINUED | OUTPATIENT
Start: 2021-02-04 | End: 2021-02-04

## 2021-02-04 RX ORDER — SODIUM CHLORIDE, SODIUM LACTATE, POTASSIUM CHLORIDE, CALCIUM CHLORIDE 600; 310; 30; 20 MG/100ML; MG/100ML; MG/100ML; MG/100ML
INJECTION, SOLUTION INTRAVENOUS CONTINUOUS PRN
Status: DISCONTINUED | OUTPATIENT
Start: 2021-02-04 | End: 2021-02-04

## 2021-02-04 RX ORDER — ONDANSETRON 2 MG/ML
INJECTION INTRAMUSCULAR; INTRAVENOUS AS NEEDED
Status: DISCONTINUED | OUTPATIENT
Start: 2021-02-04 | End: 2021-02-04

## 2021-02-04 RX ADMIN — SODIUM CHLORIDE, SODIUM LACTATE, POTASSIUM CHLORIDE, AND CALCIUM CHLORIDE: .6; .31; .03; .02 INJECTION, SOLUTION INTRAVENOUS at 10:34

## 2021-02-04 RX ADMIN — FENTANYL CITRATE 50 MCG: 50 INJECTION INTRAMUSCULAR; INTRAVENOUS at 10:37

## 2021-02-04 RX ADMIN — ONDANSETRON 4 MG: 2 INJECTION INTRAMUSCULAR; INTRAVENOUS at 10:53

## 2021-02-04 RX ADMIN — Medication 100 MG: at 10:40

## 2021-02-04 RX ADMIN — FENTANYL CITRATE 50 MCG: 50 INJECTION INTRAMUSCULAR; INTRAVENOUS at 10:48

## 2021-02-04 RX ADMIN — DEXAMETHASONE SODIUM PHOSPHATE 10 MG: 10 INJECTION, SOLUTION INTRAMUSCULAR; INTRAVENOUS at 10:53

## 2021-02-04 RX ADMIN — PROPOFOL 150 MG: 10 INJECTION, EMULSION INTRAVENOUS at 10:37

## 2021-02-04 RX ADMIN — GLYCOPYRROLATE 0.1 MG: 0.2 INJECTION, SOLUTION INTRAMUSCULAR; INTRAVENOUS at 10:50

## 2021-02-04 RX ADMIN — PROPOFOL 50 MG: 10 INJECTION, EMULSION INTRAVENOUS at 10:53

## 2021-02-04 RX ADMIN — LIDOCAINE HYDROCHLORIDE 80 MG: 10 INJECTION, SOLUTION EPIDURAL; INFILTRATION; INTRACAUDAL; PERINEURAL at 10:41

## 2021-02-04 RX ADMIN — PROPOFOL 140 MCG/KG/MIN: 10 INJECTION, EMULSION INTRAVENOUS at 10:42

## 2021-02-04 RX ADMIN — IOHEXOL 8 ML: 240 INJECTION, SOLUTION INTRATHECAL; INTRAVASCULAR; INTRAVENOUS; ORAL at 11:22

## 2021-02-04 RX ADMIN — PROPOFOL 50 MG: 10 INJECTION, EMULSION INTRAVENOUS at 10:40

## 2021-02-04 NOTE — H&P
History and Physical -  [facility]  Diya Monsivais 48 y o  female MRN: [id number]                  HPI: Diya Monsivais is a 48y o  year old female who presents for ERCP for history of choledocholithiasis  REVIEW OF SYSTEMS: Per the HPI, and otherwise unremarkable  Historical Information   Past Medical History:   Diagnosis Date    GERD (gastroesophageal reflux disease)     Hypertension      Past Surgical History:   Procedure Laterality Date    BREAST BIOPSY Left 2006    core    CHOLECYSTECTOMY LAPAROSCOPIC N/A 12/4/2020    Procedure: CHOLECYSTECTOMY LAPAROSCOPIC, possible open;  Surgeon: Aspen Qiu MD;  Location: [facility];  Service: General    IR PICC LINE PLACEMENT DOUBLE LUMEN  12/24/2020     Social History   Social History     Substance and Sexual Activity   Alcohol Use Yes    Frequency: 2-4 times a month    Drinks per session: 1 or 2    Binge frequency: Never    Comment: Social drinker     Social History     Substance and Sexual Activity   Drug Use No     Social History     Tobacco Use   Smoking Status Never Smoker   Smokeless Tobacco Never Used     Family History   Problem Relation Age of Onset    Arthritis Mother     Skin cancer Mother     Skin cancer Father     Arthritis Maternal Grandmother     Hiatal hernia Maternal Grandfather     Arthritis Family     Hiatal hernia Family     Skin cancer Family     Varicose Veins Family         Of lower extremities    Lung cancer Brother        Meds/Allergies     (Not in a hospital admission)      Allergies   Allergen Reactions    Lactose Other (See Comments)     intolerance    Other      SEASONAL ALLERGIES       Objective     Blood pressure 117/77, pulse 84, temperature 99 3 °F (37 4 °C), temperature source Tympanic, resp  rate 18, height 5' 7" (1 702 m), weight 73 5 kg (162 lb), last menstrual period 02/22/2019, SpO2 100 %, not currently breastfeeding        PHYSICAL EXAM    Gen: NAD  CV: RRR  CHEST: Clear  ABD: soft, NT/ND  EXT: no edema      ASSESSMENT/PLAN:  Belem Moreau is a 48y o  year old female who presents for ERCP for history of choledocholithiasis  The patient is stable and optimized for the procedure, we reviewed risk and benefits  Risk include but not limited to infection, bleeding, perforation and missing a lesion

## 2021-02-04 NOTE — ANESTHESIA PREPROCEDURE EVALUATION
Procedure:  ERCP    Hx of brief PONV following previous ERCP, will utilize TIVA and antiemetic prophylaxis  Relevant Problems   CARDIO   (+) Essential hypertension      GI/HEPATIC   (+) Acute biliary pancreatitis without infection or necrosis   (+) Post-ERCP acute pancreatitis   (+) Pseudocyst of pancreas        Physical Exam    Airway    Mallampati score: II  TM Distance: >3 FB  Neck ROM: full     Dental   No notable dental hx     Cardiovascular  Rhythm: regular, Rate: normal, Cardiovascular exam normal    Pulmonary  Pulmonary exam normal Breath sounds clear to auscultation,     Other Findings        Anesthesia Plan  ASA Score- 2     Anesthesia Type- general with ASA Monitors  Additional Monitors:   Airway Plan: ETT  Comment: Risks/benefits and alternatives discussed with patient including likely possibility of PONV and sore throat, as well as the rare possibilities of aspiration, dental/oropharyngeal/ocular injuries, or grave/life threatening anesthetic and surgical emergencies          Plan Factors-Exercise tolerance (METS): >4 METS  Patient summary reviewed  Patient instructed to abstain from smoking on day of procedure  Patient did not smoke on day of surgery  Induction- intravenous  Postoperative Plan- Plan for postoperative opioid use  Planned trial extubation    Informed Consent- Anesthetic plan and risks discussed with patient  I personally reviewed this patient with the CRNA  Discussed and agreed on the Anesthesia Plan with the STEPHANIE Edwards

## 2021-02-04 NOTE — ANESTHESIA POSTPROCEDURE EVALUATION
Post-Op Assessment Note    CV Status:  Stable    Pain management: adequate     Mental Status:  Alert and awake   Hydration Status:  Euvolemic   PONV Controlled:  Controlled   Airway Patency:  Patent      Post Op Vitals Reviewed: Yes      Staff: Anesthesiologist, CRNA         No complications documented      /81 (02/04/21 1126)    Temp 98 °F (36 7 °C) (02/04/21 1126)    Pulse 104 (02/04/21 1126)   Resp 16 (02/04/21 1126)    SpO2 100 % (02/04/21 1126)

## 2021-02-12 DIAGNOSIS — I10 ESSENTIAL HYPERTENSION: ICD-10-CM

## 2021-02-12 RX ORDER — AMLODIPINE BESYLATE 10 MG/1
10 TABLET ORAL DAILY
Qty: 90 TABLET | Refills: 1 | Status: SHIPPED | OUTPATIENT
Start: 2021-02-12 | End: 2021-07-06 | Stop reason: SDUPTHER

## 2021-02-15 ENCOUNTER — PREP FOR PROCEDURE (OUTPATIENT)
Dept: GASTROENTEROLOGY | Facility: CLINIC | Age: 56
End: 2021-02-15

## 2021-02-15 DIAGNOSIS — D13.5 AMPULLARY ADENOMA: Primary | ICD-10-CM

## 2021-02-16 NOTE — RESULT ENCOUNTER NOTE
I called the patient and explained the findings to her  She is agreeable to get EGD with side-viewing scope and EUS done  I will order this today  Dear staff please kindly arrange procedure in 2 months

## 2021-03-04 ENCOUNTER — TELEPHONE (OUTPATIENT)
Dept: GASTROENTEROLOGY | Facility: CLINIC | Age: 56
End: 2021-03-04

## 2021-03-05 ENCOUNTER — TELEPHONE (OUTPATIENT)
Dept: GASTROENTEROLOGY | Facility: CLINIC | Age: 56
End: 2021-03-05

## 2021-03-05 NOTE — TELEPHONE ENCOUNTER
Spoke to pt and advised she can follow up with us after EUS but to CB if any symptoms and we will schedule her OV appt sooner

## 2021-03-05 NOTE — TELEPHONE ENCOUNTER
Rosa - scheduled patient for the end of April F/U after her 4/12 procedure  Patient is feeling ok and wanted to wait unless you want to see her sooner  Please call at [phone] if you want the F/U moved up   Thxs

## 2021-03-30 ENCOUNTER — OFFICE VISIT (OUTPATIENT)
Dept: INTERNAL MEDICINE CLINIC | Age: 56
End: 2021-03-30
Payer: COMMERCIAL

## 2021-03-30 VITALS
TEMPERATURE: 98.2 F | HEART RATE: 74 BPM | HEIGHT: 68 IN | BODY MASS INDEX: 24.55 KG/M2 | SYSTOLIC BLOOD PRESSURE: 114 MMHG | OXYGEN SATURATION: 99 % | DIASTOLIC BLOOD PRESSURE: 66 MMHG | WEIGHT: 162 LBS

## 2021-03-30 DIAGNOSIS — K91.89 POST-ERCP ACUTE PANCREATITIS: Primary | ICD-10-CM

## 2021-03-30 DIAGNOSIS — I10 ESSENTIAL HYPERTENSION: ICD-10-CM

## 2021-03-30 DIAGNOSIS — K85.90 POST-ERCP ACUTE PANCREATITIS: Primary | ICD-10-CM

## 2021-03-30 PROCEDURE — 99214 OFFICE O/P EST MOD 30 MIN: CPT | Performed by: INTERNAL MEDICINE

## 2021-03-30 PROCEDURE — 3078F DIAST BP <80 MM HG: CPT | Performed by: INTERNAL MEDICINE

## 2021-03-30 PROCEDURE — 3074F SYST BP LT 130 MM HG: CPT | Performed by: INTERNAL MEDICINE

## 2021-03-30 PROCEDURE — 3008F BODY MASS INDEX DOCD: CPT | Performed by: INTERNAL MEDICINE

## 2021-03-30 PROCEDURE — 1036F TOBACCO NON-USER: CPT | Performed by: INTERNAL MEDICINE

## 2021-03-30 PROCEDURE — 3725F SCREEN DEPRESSION PERFORMED: CPT | Performed by: INTERNAL MEDICINE

## 2021-03-30 RX ORDER — OMEPRAZOLE 20 MG/1
20 CAPSULE, DELAYED RELEASE ORAL DAILY
COMMUNITY
Start: 2021-02-05 | End: 2021-07-06

## 2021-03-30 NOTE — PROGRESS NOTES
Assessment/Plan:     Diagnoses and all orders for this visit:    Post-ERCP acute pancreatitis  -     Hepatic function panel; Future    Other orders  -     omeprazole (PriLOSEC) 20 mg delayed release capsule; Take 20 mg by mouth daily      Hypertension is very well controlled  Gastroesophageal reflux disease she is taking omeprazole 20 mg as needed she is not taking it regularly       Subjective:   Chief Complaint   Patient presents with    Follow-up     3 month fu- htn        Patient ID: Deborah Hicks is a 48 y o  female  HPI  This is a very pleasant 48 years young lady who is here today for the regular follow-up she is doing well no new complaints sometimes abdominal discomfort but otherwise unremarkable  Hypertension she is on 10 mg of amlodipine she thinks that she does not need the medication because her blood pressure was pretty good before she started on the medication and her hospitalized  I discussed with her we will cut it down to 5 mg and she will have a nurse's visit to check her blood pressure in about 4 weeks and then I will see her back in about 3 months and maybe we can cut it down altogether if she does not required the blood pressure medication  History of a post ERCP pancreatitis patient is followed up by the GI, she is scheduled for ERCP and also endoscopic ultrasound  I will follow her up in about the 3 months will make sure that we get the LFTs last LFTs was within normal range  The following portions of the patient's history were reviewed and updated as appropriate: allergies, current medications, past family history, past medical history, past social history, past surgical history and problem list     Review of Systems   Constitutional: Negative for chills and fatigue  HENT: Negative for congestion, ear pain, hearing loss, postnasal drip, sinus pressure, sore throat and voice change  Eyes: Negative for pain, discharge and visual disturbance     Respiratory: Negative for cough, chest tightness and shortness of breath  Cardiovascular: Negative for chest pain, palpitations and leg swelling  Gastrointestinal: Negative for abdominal pain, blood in stool, diarrhea, nausea and rectal pain  Genitourinary: Negative for difficulty urinating, dysuria and urgency  Musculoskeletal: Negative for arthralgias and joint swelling  Skin: Negative for rash  Allergic/Immunologic: Negative for environmental allergies and food allergies  Neurological: Negative for dizziness, tremors, weakness, numbness and headaches  Hematological: Negative for adenopathy  Psychiatric/Behavioral: Negative for behavioral problems and hallucinations           Past Medical History:   Diagnosis Date    GERD (gastroesophageal reflux disease)     Hypertension          Current Outpatient Medications:     amLODIPine (NORVASC) 10 mg tablet, Take 1 tablet (10 mg total) by mouth daily, Disp: 90 tablet, Rfl: 1    Calcium Carb-Cholecalciferol (CALTRATE 600+D) 600-800 MG-UNIT TABS, Take 1 tablet by mouth daily, Disp: , Rfl:     cyanocobalamin (VITAMIN B-12) 500 mcg tablet, Take 1 tablet by mouth daily, Disp: , Rfl:     fexofenadine-pseudoephedrine (ALLEGRA-D)  MG per tablet, , Disp: , Rfl:     fluticasone (FLONASE) 50 mcg/act nasal spray, 1 spray into each nostril daily, Disp: 1 Bottle, Rfl: 3    meclizine (ANTIVERT) 25 mg tablet, Take 1 tablet (25 mg total) by mouth 3 (three) times a day as needed for dizziness, Disp: 30 tablet, Rfl: 0    Multiple Vitamins-Minerals (ONE DAILY FOR WOMEN PO), daily , Disp: , Rfl:     omeprazole (PriLOSEC) 20 mg delayed release capsule, Take 20 mg by mouth daily, Disp: , Rfl:     pantoprazole (PROTONIX) 40 mg tablet, Take 1 tablet (40 mg total) by mouth daily (Patient not taking: Reported on 3/30/2021), Disp: 30 tablet, Rfl: 0    Allergies   Allergen Reactions    Lactose - Food Allergy Other (See Comments)     intolerance    Other      SEASONAL ALLERGIES       Social History   Past Surgical History:   Procedure Laterality Date    BREAST BIOPSY Left 2006    core    CHOLECYSTECTOMY LAPAROSCOPIC N/A 12/4/2020    Procedure: CHOLECYSTECTOMY LAPAROSCOPIC, possible open;  Surgeon: Cholo Dan MD;  Location: [facility];  Service: General    IR PICC LINE PLACEMENT DOUBLE LUMEN  12/24/2020     Family History   Problem Relation Age of Onset    Arthritis Mother     Skin cancer Mother     Skin cancer Father     Arthritis Maternal Grandmother     Hiatal hernia Maternal Grandfather     Arthritis Family     Hiatal hernia Family     Skin cancer Family     Varicose Veins Family         Of lower extremities    Lung cancer Brother        Objective:  /66 (BP Location: Left arm, Patient Position: Sitting, Cuff Size: Standard)   Pulse 74   Temp 98 2 °F (36 8 °C) (Temporal)   Ht 5' 8 25" (1 734 m) Comment: shoes on  Wt 73 5 kg (162 lb) Comment: shoes on  LMP 02/22/2019   SpO2 99%   BMI 24 45 kg/m²        Physical Exam  Constitutional:       Appearance: She is well-developed  HENT:      Head: Normocephalic  Eyes:      Pupils: Pupils are equal, round, and reactive to light  Neck:      Musculoskeletal: Normal range of motion  Cardiovascular:      Heart sounds: No murmur  Pulmonary:      Breath sounds: Normal breath sounds  Abdominal:      General: Bowel sounds are normal    Musculoskeletal:         General: No tenderness  Skin:     General: Skin is warm  Neurological:      Mental Status: She is alert and oriented to person, place, and time

## 2021-04-07 PROBLEM — Z01.419 ENCOUNTER FOR GYNECOLOGICAL EXAMINATION WITHOUT ABNORMAL FINDING: Status: ACTIVE | Noted: 2021-04-07

## 2021-04-07 NOTE — PATIENT INSTRUCTIONS
Wellness Visit for Adults   AMBULATORY CARE:   A wellness visit  is when you see your healthcare provider to get screened for health problems  Your healthcare provider will also give you advice on how to stay healthy  Write down your questions so you remember to ask them  Ask your healthcare provider how often you should have a wellness visit  What happens at a wellness visit:  Your healthcare provider will ask about your health, and your family history of health problems  This includes high blood pressure, heart disease, and cancer  He or she will ask if you have symptoms that concern you, if you smoke, and about your mood  You may also be asked about your intake of medicines, supplements, food, and alcohol  Any of the following may be done:  · Your weight  will be checked  Your height may also be checked so your body mass index (BMI) can be calculated  Your BMI shows if you are at a healthy weight  · Your blood pressure  and heart rate will be checked  Your temperature may also be checked  · Blood and urine tests  may be done  Blood tests may be done to check your cholesterol levels  Abnormal cholesterol levels increase your risk for heart disease and stroke  You may also need a blood or urine test to check for diabetes if you are at increased risk  Urine tests may be done to look for signs of an infection or kidney disease  · A physical exam  includes checking your heartbeat and lungs with a stethoscope  Your healthcare provider may also check your skin to look for sun damage  · Screening tests  may be recommended  A screening test is done to check for diseases that may not cause symptoms  The screening tests you may need depend on your age, gender, family history, and lifestyle habits  For example, colorectal screening may be recommended if you are 48years old or older  Screening tests you need if you are a woman:   · A Pap smear  is used to screen for cervical cancer   Pap smears are usually done every 3 to 5 years depending on your age  You may need them more often if you have had abnormal Pap smear test results in the past  Ask your healthcare provider how often you should have a Pap smear  · A mammogram  is an x-ray of your breasts to screen for breast cancer  Experts recommend mammograms every 2 years starting at age 48 years  You may need a mammogram at age 52 years or younger if you have an increased risk for breast cancer  Talk to your healthcare provider about when you should start having mammograms and how often you need them  Vaccines you may need:   · Get an influenza vaccine  every year  The influenza vaccine protects you from the flu  Several types of viruses cause the flu  The viruses change over time, so new vaccines are made each year  · Get a tetanus-diphtheria (Td) booster vaccine  every 10 years  This vaccine protects you against tetanus and diphtheria  Tetanus is a severe infection that may cause painful muscle spasms and lockjaw  Diphtheria is a severe bacterial infection that causes a thick covering in the back of your mouth and throat  · Get a human papillomavirus (HPV) vaccine  if you are female and aged 23 to 32 or male 23 to 24 and never received it  This vaccine protects you from HPV infection  HPV is the most common infection spread by sexual contact  HPV may also cause vaginal, penile, and anal cancers  · Get a pneumococcal vaccine  if you are aged 72 years or older  The pneumococcal vaccine is an injection given to protect you from pneumococcal disease  Pneumococcal disease is an infection caused by pneumococcal bacteria  The infection may cause pneumonia, meningitis, or an ear infection  · Get a shingles vaccine  if you are 60 or older, even if you have had shingles before  The shingles vaccine is an injection to protect you from the varicella-zoster virus  This is the same virus that causes chickenpox   Shingles is a painful rash that develops in people who had chickenpox or have been exposed to the virus  How to eat healthy:  My Plate is a model for planning healthy meals  It shows the types and amounts of foods that should go on your plate  Fruits and vegetables make up about half of your plate, and grains and protein make up the other half  A serving of dairy is included on the side of your plate  The amount of calories and serving sizes you need depends on your age, gender, weight, and height  Examples of healthy foods are listed below:  · Eat a variety of vegetables  such as dark green, red, and orange vegetables  You can also include canned vegetables low in sodium (salt) and frozen vegetables without added butter or sauces  · Eat a variety of fresh fruits , canned fruit in 100% juice, frozen fruit, and dried fruit  · Include whole grains  At least half of the grains you eat should be whole grains  Examples include whole-wheat bread, wheat pasta, brown rice, and whole-grain cereals such as oatmeal     · Eat a variety of protein foods such as seafood (fish and shellfish), lean meat, and poultry without skin (turkey and chicken)  Examples of lean meats include pork leg, shoulder, or tenderloin, and beef round, sirloin, tenderloin, and extra lean ground beef  Other protein foods include eggs and egg substitutes, beans, peas, soy products, nuts, and seeds  · Choose low-fat dairy products such as skim or 1% milk or low-fat yogurt, cheese, and cottage cheese  · Limit unhealthy fats  such as butter, hard margarine, and shortening  Exercise:  Exercise at least 30 minutes per day on most days of the week  Some examples of exercise include walking, biking, dancing, and swimming  You can also fit in more physical activity by taking the stairs instead of the elevator or parking farther away from stores  Include muscle strengthening activities 2 days each week  Regular exercise provides many health benefits   It helps you manage your weight, and decreases your risk for type 2 diabetes, heart disease, stroke, and high blood pressure  Exercise can also help improve your mood  Ask your healthcare provider about the best exercise plan for you  General health and safety guidelines:   · Do not smoke  Nicotine and other chemicals in cigarettes and cigars can cause lung damage  Ask your healthcare provider for information if you currently smoke and need help to quit  E-cigarettes or smokeless tobacco still contain nicotine  Talk to your healthcare provider before you use these products  · Limit alcohol  A drink of alcohol is 12 ounces of beer, 5 ounces of wine, or 1½ ounces of liquor  · Lose weight, if needed  Being overweight increases your risk of certain health conditions  These include heart disease, high blood pressure, type 2 diabetes, and certain types of cancer  · Protect your skin  Do not sunbathe or use tanning beds  Use sunscreen with a SPF 15 or higher  Apply sunscreen at least 15 minutes before you go outside  Reapply sunscreen every 2 hours  Wear protective clothing, hats, and sunglasses when you are outside  · Drive safely  Always wear your seatbelt  Make sure everyone in your car wears a seatbelt  A seatbelt can save your life if you are in an accident  Do not use your cell phone when you are driving  This could distract you and cause an accident  Pull over if you need to make a call or send a text message  · Practice safe sex  Use latex condoms if are sexually active and have more than one partner  Your healthcare provider may recommend screening tests for sexually transmitted infections (STIs)  · Wear helmets, lifejackets, and protective gear  Always wear a helmet when you ride a bike or motorcycle, go skiing, or play sports that could cause a head injury  Wear protective equipment when you play sports  Wear a lifejacket when you are on a boat or doing water sports      © Copyright [employer] 2020 Information is for End User's use only and may not be sold, redistributed or otherwise used for commercial purposes  All illustrations and images included in CareNotes® are the copyrighted property of A D A M , Inc  or Latosha Lee  The above information is an  only  It is not intended as medical advice for individual conditions or treatments  Talk to your doctor, nurse or pharmacist before following any medical regimen to see if it is safe and effective for you  Breast Self Exam for Women   AMBULATORY CARE:   A breast self-exam (BSE)  is a way to check your breasts for lumps and other changes  Regular BSEs can help you know how your breasts normally look and feel  Most breast lumps or changes are not cancer, but you should always have them checked by a healthcare provider  Why you should do a BSE:  Breast cancer is the most common type of cancer in women  Even if you have mammograms, you may still want to do a BSE regularly  If you know how your breasts normally feel and look, it may help you know when to contact your healthcare provider  Mammograms can miss some cancers  You may find a lump during a BSE that did not show up on a mammogram   When you should do a BSE:  If you have periods, you may want to do your BSE 1 week after your period ends  This is the time when your breasts may be the least swollen, lumpy, or tender  You can do regular BSEs even if you are breastfeeding or have breast implants  Call your doctor if:   · You find any lumps or changes in your breasts  · You have breast pain or fluid coming from your nipples  · You have questions or concerns about your condition or care  How to do a BSE:       · Look at your breasts in a mirror  Look at the size and shape of each breast and nipple  Check for swelling, lumps, dimpling, scaly skin, or other skin changes  Look for nipple changes, such as a nipple that is painful or beginning to pull inward   Gently squeeze both nipples and check to see if fluid (that is not breast milk) comes out of them  If you find any of these or other breast changes, contact your healthcare provider  Check your breasts while you sit or  the following 3 positions:    ? Hang your arms down at your sides  ? Raise your hands and join them behind your head  ? Put firm pressure with your hands on your hips  Bend slightly forward while you look at your breasts in the mirror  · Lie down and feel your breasts  When you lie down, your breast tissue spreads out evenly over your chest  This makes it easier for you to feel for lumps and anything that may not be normal for your breasts  Do a BSE on one breast at a time  ? Place a small pillow or towel under your left shoulder  Put your left arm behind your head  ? Use the 3 middle fingers of your right hand  Use your fingertip pads, on the top of your fingers  Your fingertip pad is the most sensitive part of your finger  ? Use small circles to feel your breast tissue  Use your fingertip pads to make dime-sized, overlapping circles on your breast and armpits  Use light, medium, and firm pressure  First, press lightly  Second, press with medium pressure to feel a little deeper into the breast  Last, use firm pressure to feel deep within your breast     ? Examine your entire breast area  Examine the breast area from above the breast to below the breast where you feel only ribs  Make small circles with your fingertips, starting in the middle of your armpit  Make circles going up and down the breast area  Continue toward your breast and all the way across it  Examine the area from your armpit all the way over to the middle of your chest (breastbone)  Stop at the middle of your chest     ? Move the pillow or towel to your right shoulder, and put your right arm behind your head    Use the 3 fingertip pads of your left hand, and repeat the above steps to do a BSE on your right breast   What else you can do to check for breast problems or cancer:  Talk to your healthcare provider about mammograms  A mammogram is an x-ray of your breasts to screen for breast cancer or other problems  Your provider can tell you the benefits and risks of mammograms  The first mammogram is usually at age 39 or 48  Your provider may recommend you start at 36 or younger if your risk for breast cancer is high  Mammograms usually continue every 1 to 2 years until age 76  Follow up with your doctor as directed:  Write down your questions so you remember to ask them during your visits  © Copyright [address] Information is for End User's use only and may not be sold, redistributed or otherwise used for commercial purposes  All illustrations and images included in CareNotes® are the copyrighted property of A D A M , Inc  or [facility] Man   The above information is an  only  It is not intended as medical advice for individual conditions or treatments  Talk to your doctor, nurse or pharmacist before following any medical regimen to see if it is safe and effective for you  For vaginal dryness: You may use:     Coconut oil (organic, pure, unscented) as needed for moisture or lubrication  ( Do not use if allergic)       Replens moisture restore external comfort gel daily ( use as directed on the box)        Replens long lasting vaginal moisturizer  ( use as directed on the box)       For Vaginal Lubrication:        You may use:     Coconut oil (organic, pure, unscented) as needed for lubrication during intercourse  (Do not use if allergic)               Replens silky smooth lubricant, premium silicone based lubricant for intercourse  ( use as directed, a small amount will provide an enhanced natural feeling)     Any premium over the counter vaginal lubricant water or silicone based  Silicone based will have more staying power          For Vulvar hygiene:     No soaps or feminine wash to the vulva with the exception of Dove or Dove Sensitive Skin bar soap if necessary  Only perfume-free, dye-free laundry detergent, use a second rinse cycle  Avoid fabric softeners/dryer sheets  No lotion to the area  Coconut oil as a lubricant (if not using condoms) or another scent-free premium lubricant  Partner to avoid the same products as well     Loose fitting cotton underwear and loose fitting outer clothing     Over the counter probiotic to restore vaginal mulugeta may be helpful as well

## 2021-04-07 NOTE — ASSESSMENT & PLAN NOTE
ASSESSMENT/PLAN: Shirley Vaz is a 48 y o   who presents for annual gynecologic exam   Normal findings on routine gyn exam  Advised monthly SBE, annual CBE and yearly mammo  Next mammo scheduled 5/10/21  Reviewed ASCCP guidelines and recommended pap with cotesting q3 yrs for this low risk patient  STI testing was offered and the patient declines; she reports low risk  Diet/activity recommendations were reviewed  Encouraged daily Ca++ and vitamin D intake as well as daily weight bearing exercise for promotion of bone health    Discussed postmenopausal considerations and symptoms to report  RTO in one year for routine annual gyn exam or sooner PRN

## 2021-04-07 NOTE — PROGRESS NOTES
Assessment/Plan:    Encounter for gynecological examination without abnormal finding  ASSESSMENT/PLAN: Jose Arriaza is a 48 y o  Rexine Child who presents for annual gynecologic exam   Normal findings on routine gyn exam  Advised monthly SBE, annual CBE and yearly mammo  Next mammo scheduled 5/10/21  Reviewed ASCCP guidelines and recommended pap with cotesting q3 yrs for this low risk patient  STI testing was offered and the patient declines; she reports low risk  Diet/activity recommendations were reviewed  RTO in one year or sooner PRN  Vaginal atrophy  Vaginal dryness and tissue consistent with post menopausal changes  Advised Replens, and or coconut oil with intercourse  Script for estrace cream sent     Breast cancer screening by mammogram  Yearly mammo advised   Next mammo 5/10/21    Healthcare maintenance  Colonoscopy UTD,2019,   Will do cologuard this month 4/21       Diagnoses and all orders for this visit:    Encounter for gynecological examination without abnormal finding    Vaginal atrophy  -     estradiol (ESTRACE) 0 1 mg/g vaginal cream; Use 0 5 gms nightly x 7 days, Then taper to 0 5 gms 2-3 times per week    Breast cancer screening by mammogram    Healthcare maintenance          Subjective:      Patient ID: Jose Arriaza is a 48 y o  female  This patient presents for routine annual gyn exam   She denies acute gyn complaints  She denies  bleeding or spotting last menses approx 2 5 years ago, admits to 1 day of spotting 3 weeks prior to being 1 full year without menses  Denies VM sx, pelvic pain, breast concerns, abn discharge, bowel/bladder dysfunction, depression/anx  Admits to dyspareunia, uses coconut oil with min relief     and monog  Denies STI concerns          The following portions of the patient's history were reviewed and updated as appropriate: allergies, current medications, past family history, past medical history, past social history, past surgical history and problem list     Review of Systems   Constitutional: Negative  Negative for fever  Eyes: Negative for visual disturbance  Respiratory: Negative for cough and shortness of breath  Cardiovascular: Negative for chest pain  Gastrointestinal: Negative for abdominal pain, constipation and diarrhea  Genitourinary: Negative for dysuria, vaginal bleeding and vaginal discharge  Skin: Negative  Allergic/Immunologic: Negative  Neurological: Negative for headaches  Objective:      /80 (BP Location: Right arm, Patient Position: Sitting, Cuff Size: Standard)   Resp 14   Ht 5' 8" (1 727 m)   Wt 73 5 kg (162 lb)   LMP 02/22/2019   BMI 24 63 kg/m²          Physical Exam  Vitals signs and nursing note reviewed  Constitutional:       Appearance: Normal appearance  HENT:      Head: Normocephalic and atraumatic  Eyes:      Conjunctiva/sclera: Conjunctivae normal    Neck:      Musculoskeletal: Normal range of motion  Thyroid: No thyroid mass, thyromegaly or thyroid tenderness  Cardiovascular:      Rate and Rhythm: Normal rate and regular rhythm  Pulmonary:      Effort: Pulmonary effort is normal       Breath sounds: Normal breath sounds  Chest:      Chest wall: No mass or swelling  Breasts: Tesfaye Score is 5  Right: Normal          Left: Normal    Abdominal:      Palpations: Abdomen is soft  Hernia: There is no hernia in the left inguinal area or right inguinal area  Genitourinary:     General: Normal vulva  Exam position: Lithotomy position  Tesfaye stage (genital): 5  Labia:         Right: No rash  Left: No rash  Vagina: Normal       Cervix: Normal       Uterus: Normal        Adnexa: Right adnexa normal and left adnexa normal       Comments: Vaginal atrophy noted  Lymphadenopathy:      Upper Body:      Right upper body: No supraclavicular, axillary or pectoral adenopathy        Left upper body: No supraclavicular, axillary or pectoral adenopathy  Lower Body: No right inguinal adenopathy  No left inguinal adenopathy  Skin:     General: Skin is warm and dry  Neurological:      Mental Status: She is alert and oriented to person, place, and time  Psychiatric:         Mood and Affect: Mood normal          Behavior: Behavior normal          Thought Content:  Thought content normal          Judgment: Judgment normal

## 2021-04-08 ENCOUNTER — ANNUAL EXAM (OUTPATIENT)
Dept: OBGYN CLINIC | Facility: MEDICAL CENTER | Age: 56
End: 2021-04-08
Payer: COMMERCIAL

## 2021-04-08 VITALS
SYSTOLIC BLOOD PRESSURE: 124 MMHG | WEIGHT: 162 LBS | DIASTOLIC BLOOD PRESSURE: 80 MMHG | HEIGHT: 68 IN | RESPIRATION RATE: 14 BRPM | BODY MASS INDEX: 24.55 KG/M2

## 2021-04-08 DIAGNOSIS — N95.2 VAGINAL ATROPHY: ICD-10-CM

## 2021-04-08 DIAGNOSIS — Z01.419 ENCOUNTER FOR GYNECOLOGICAL EXAMINATION WITHOUT ABNORMAL FINDING: Primary | ICD-10-CM

## 2021-04-08 DIAGNOSIS — Z12.31 BREAST CANCER SCREENING BY MAMMOGRAM: ICD-10-CM

## 2021-04-08 DIAGNOSIS — Z00.00 HEALTHCARE MAINTENANCE: ICD-10-CM

## 2021-04-08 PROBLEM — E87.6 HYPOKALEMIA: Status: RESOLVED | Noted: 2020-12-04 | Resolved: 2021-04-08

## 2021-04-08 PROBLEM — D72.829 LEUKOCYTOSIS: Status: RESOLVED | Noted: 2020-12-02 | Resolved: 2021-04-08

## 2021-04-08 PROBLEM — L08.9 INFECTED SEBACEOUS CYST: Status: RESOLVED | Noted: 2020-06-30 | Resolved: 2021-04-08

## 2021-04-08 PROBLEM — Z90.49 STATUS POST CHOLECYSTECTOMY: Status: RESOLVED | Noted: 2020-12-09 | Resolved: 2021-04-08

## 2021-04-08 PROBLEM — L72.3 INFECTED SEBACEOUS CYST: Status: RESOLVED | Noted: 2020-06-30 | Resolved: 2021-04-08

## 2021-04-08 PROBLEM — I95.2 HYPOTENSION DUE TO DRUGS: Status: RESOLVED | Noted: 2020-12-16 | Resolved: 2021-04-08

## 2021-04-08 PROCEDURE — S0612 ANNUAL GYNECOLOGICAL EXAMINA: HCPCS | Performed by: OBSTETRICS & GYNECOLOGY

## 2021-04-08 RX ORDER — ESTRADIOL 0.1 MG/G
CREAM VAGINAL
Qty: 42.5 G | Refills: 1 | Status: SHIPPED | OUTPATIENT
Start: 2021-04-08 | End: 2022-04-14 | Stop reason: SDUPTHER

## 2021-04-08 NOTE — ASSESSMENT & PLAN NOTE
Vaginal dryness and tissue consistent with post menopausal changes  Advised Replens, and or coconut oil with intercourse     Script for estrace cream sent

## 2021-04-11 ENCOUNTER — ANESTHESIA EVENT (OUTPATIENT)
Dept: GASTROENTEROLOGY | Facility: HOSPITAL | Age: 56
End: 2021-04-11

## 2021-04-12 ENCOUNTER — HOSPITAL ENCOUNTER (OUTPATIENT)
Dept: GASTROENTEROLOGY | Facility: HOSPITAL | Age: 56
Setting detail: OUTPATIENT SURGERY
Discharge: HOME | End: 2021-04-12
Admitting: INTERNAL MEDICINE
Payer: COMMERCIAL

## 2021-04-12 ENCOUNTER — ANESTHESIA (OUTPATIENT)
Dept: GASTROENTEROLOGY | Facility: HOSPITAL | Age: 56
End: 2021-04-12

## 2021-04-12 VITALS
OXYGEN SATURATION: 100 % | SYSTOLIC BLOOD PRESSURE: 124 MMHG | RESPIRATION RATE: 16 BRPM | TEMPERATURE: 97 F | DIASTOLIC BLOOD PRESSURE: 72 MMHG | HEART RATE: 76 BPM

## 2021-04-12 DIAGNOSIS — D13.5 AMPULLARY ADENOMA: ICD-10-CM

## 2021-04-12 PROCEDURE — 43259 EGD US EXAM DUODENUM/JEJUNUM: CPT | Performed by: INTERNAL MEDICINE

## 2021-04-12 PROCEDURE — 88305 TISSUE EXAM BY PATHOLOGIST: CPT | Performed by: PATHOLOGY

## 2021-04-12 PROCEDURE — 43239 EGD BIOPSY SINGLE/MULTIPLE: CPT | Performed by: INTERNAL MEDICINE

## 2021-04-12 RX ORDER — FENTANYL CITRATE 50 UG/ML
INJECTION, SOLUTION INTRAMUSCULAR; INTRAVENOUS AS NEEDED
Status: DISCONTINUED | OUTPATIENT
Start: 2021-04-12 | End: 2021-04-12

## 2021-04-12 RX ORDER — PROPOFOL 10 MG/ML
INJECTION, EMULSION INTRAVENOUS CONTINUOUS PRN
Status: DISCONTINUED | OUTPATIENT
Start: 2021-04-12 | End: 2021-04-12

## 2021-04-12 RX ORDER — SODIUM CHLORIDE 9 MG/ML
INJECTION, SOLUTION INTRAVENOUS CONTINUOUS PRN
Status: DISCONTINUED | OUTPATIENT
Start: 2021-04-12 | End: 2021-04-12

## 2021-04-12 RX ORDER — LIDOCAINE HYDROCHLORIDE 10 MG/ML
INJECTION, SOLUTION EPIDURAL; INFILTRATION; INTRACAUDAL; PERINEURAL AS NEEDED
Status: DISCONTINUED | OUTPATIENT
Start: 2021-04-12 | End: 2021-04-12

## 2021-04-12 RX ADMIN — FENTANYL CITRATE 25 MCG: 50 INJECTION INTRAMUSCULAR; INTRAVENOUS at 13:12

## 2021-04-12 RX ADMIN — SODIUM CHLORIDE: 0.9 INJECTION, SOLUTION INTRAVENOUS at 13:10

## 2021-04-12 RX ADMIN — FENTANYL CITRATE 25 MCG: 50 INJECTION INTRAMUSCULAR; INTRAVENOUS at 13:28

## 2021-04-12 RX ADMIN — PROPOFOL 120 MCG/KG/MIN: 10 INJECTION, EMULSION INTRAVENOUS at 13:14

## 2021-04-12 RX ADMIN — SODIUM CHLORIDE: 0.9 INJECTION, SOLUTION INTRAVENOUS at 13:37

## 2021-04-12 RX ADMIN — LIDOCAINE HYDROCHLORIDE 7 ML: 10 INJECTION, SOLUTION EPIDURAL; INFILTRATION; INTRACAUDAL; PERINEURAL at 13:12

## 2021-04-12 NOTE — H&P
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  Biopsies were indefinite for low-grade dysplasia this could be secondary to inflammation therefore repeat examination is being performed today  REVIEW OF SYSTEMS: Per the HPI, and otherwise unremarkable  Historical Information   Past Medical History:   Diagnosis Date    GERD (gastroesophageal reflux disease)     Hypertension      Past Surgical History:   Procedure Laterality Date    BREAST BIOPSY Left 2006    core    CHOLECYSTECTOMY LAPAROSCOPIC N/A 12/4/2020    Procedure: CHOLECYSTECTOMY LAPAROSCOPIC, possible open;  Surgeon: Mavis Quesada MD;  Location: [facility];  Service: General    IR PICC PLACEMENT DOUBLE LUMEN  12/24/2020     Social History   Social History     Substance and Sexual Activity   Alcohol Use Yes    Frequency: 2-4 times a month    Drinks per session: 1 or 2    Binge frequency: Never    Comment: Social drinker     Social History     Substance and Sexual Activity   Drug Use No     Social History     Tobacco Use   Smoking Status Never Smoker   Smokeless Tobacco Never Used     Family History   Problem Relation Age of Onset    Arthritis Mother     Skin cancer Mother     Skin cancer Father     Arthritis Maternal Grandmother     Hiatal hernia Maternal Grandfather     Arthritis Family     Hiatal hernia Family     Skin cancer Family     Varicose Veins Family         Of lower extremities    Lung cancer Brother        Meds/Allergies     (Not in a hospital admission)      Allergies   Allergen Reactions    Lactose - Food Allergy Other (See Comments)     intolerance    Other      SEASONAL ALLERGIES       Objective     Blood pressure 138/74, pulse 69, temperature 98 7 °F (37 1 °C), temperature source Tympanic, resp   rate 16, last menstrual period 02/22/2019, SpO2 100 %, not currently breastfeeding        PHYSICAL EXAM    Gen: NAD  CV: RRR  CHEST: Clear  ABD: soft, NT/ND  EXT: no edema      ASSESSMENT/PLAN:  This is a 48y o  year old female here for EGD/EUS    PLAN:   Procedure:  EGD/EUS

## 2021-04-12 NOTE — ANESTHESIA PREPROCEDURE EVALUATION
Procedure:  EGD  ENDOSCOPIC ULTRASOUND (UPPER)    Relevant Problems   CARDIO   (+) Essential hypertension      GI/HEPATIC   (+) Acute biliary pancreatitis without infection or necrosis   (+) Post-ERCP acute pancreatitis   (+) Pseudocyst of pancreas      Other   (+) Choledocholithiasis        Physical Exam    Airway    Mallampati score: II  TM Distance: >3 FB  Neck ROM: full     Dental   No notable dental hx     Cardiovascular  Cardiovascular exam normal    Pulmonary  Pulmonary exam normal     Other Findings        Anesthesia Plan  ASA Score- 2     Anesthesia Type- IV sedation with anesthesia with ASA Monitors  Additional Monitors:   Airway Plan:           Plan Factors-Exercise tolerance (METS): >4 METS  Chart reviewed  Patient summary reviewed  Patient is not a current smoker  Patient did not smoke on day of surgery  Induction- intravenous  Postoperative Plan-     Informed Consent- Anesthetic plan and risks discussed with patient  I personally reviewed this patient with the CRNA  Discussed and agreed on the Anesthesia Plan with the STEPHANIE Adams

## 2021-04-12 NOTE — ANESTHESIA POSTPROCEDURE EVALUATION
Post-Op Assessment Note    CV Status:  Stable  Pain Score: 0    Pain management: adequate     Mental Status:  Arousable   Hydration Status:  Stable   PONV Controlled:  None   Airway Patency:  Patent      Post Op Vitals Reviewed: Yes      Staff: Anesthesiologist, CRNA         No complications documented      /70 (04/12/21 1344)    Temp (!) 97 °F (36 1 °C) (04/12/21 1344)    Pulse 81 (04/12/21 1344)   Resp 15 (04/12/21 1344)    SpO2 100 % (04/12/21 1344)

## 2021-04-13 ENCOUNTER — TELEPHONE (OUTPATIENT)
Dept: GASTROENTEROLOGY | Facility: CLINIC | Age: 56
End: 2021-04-13

## 2021-04-13 NOTE — TELEPHONE ENCOUNTER
No answer on mobile or home numbers  Left message for call back on mobile  The biopsy results showed precancerous changes at the ampulla and she will need another procedure called an ERCP to allow for us to take out this lesion  We are recommending this to be performed in the within the next month with Dr Radha Martinez

## 2021-04-16 ENCOUNTER — TELEPHONE (OUTPATIENT)
Dept: GASTROENTEROLOGY | Facility: CLINIC | Age: 56
End: 2021-04-16

## 2021-04-16 NOTE — TELEPHONE ENCOUNTER
Second attempt to call patient  She did not answer on her mobile phone  Her  picked up on the home phone number  Without going into excessive details are asked that she call the office for further explanation of her biopsy results and need for a repeat procedure

## 2021-04-20 ENCOUNTER — DOCUMENTATION (OUTPATIENT)
Dept: GASTROENTEROLOGY | Facility: MEDICAL CENTER | Age: 56
End: 2021-04-20

## 2021-04-20 ENCOUNTER — PREP FOR PROCEDURE (OUTPATIENT)
Dept: GASTROENTEROLOGY | Facility: MEDICAL CENTER | Age: 56
End: 2021-04-20

## 2021-04-20 DIAGNOSIS — D13.5 AMPULLARY ADENOMA: Primary | ICD-10-CM

## 2021-04-20 NOTE — PROGRESS NOTES
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

## 2021-04-21 ENCOUNTER — TELEPHONE (OUTPATIENT)
Dept: GASTROENTEROLOGY | Facility: CLINIC | Age: 56
End: 2021-04-21

## 2021-04-21 NOTE — TELEPHONE ENCOUNTER
----- Message from Zora Kelly MD sent at 4/20/2021  4:47 PM EDT -----    Please schedule the patient for a ERCP with ampullectomy    Thank you

## 2021-04-21 NOTE — TELEPHONE ENCOUNTER
ERCP scheduled on 5/20/21 with Dr Demarco at [facility] gave pt verbal instructions/mailed

## 2021-04-27 ENCOUNTER — OFFICE VISIT (OUTPATIENT)
Dept: GASTROENTEROLOGY | Facility: CLINIC | Age: 56
End: 2021-04-27
Payer: COMMERCIAL

## 2021-04-27 VITALS
SYSTOLIC BLOOD PRESSURE: 120 MMHG | HEART RATE: 81 BPM | DIASTOLIC BLOOD PRESSURE: 72 MMHG | HEIGHT: 68 IN | BODY MASS INDEX: 24.52 KG/M2 | WEIGHT: 161.8 LBS

## 2021-04-27 DIAGNOSIS — K86.3 PANCREATIC PSEUDOCYST: Primary | ICD-10-CM

## 2021-04-27 DIAGNOSIS — D13.5 AMPULLARY ADENOMA: ICD-10-CM

## 2021-04-27 PROCEDURE — 1036F TOBACCO NON-USER: CPT | Performed by: PHYSICIAN ASSISTANT

## 2021-04-27 PROCEDURE — 99214 OFFICE O/P EST MOD 30 MIN: CPT | Performed by: PHYSICIAN ASSISTANT

## 2021-04-27 NOTE — PROGRESS NOTES
[facility] - Outpatient Follow-up Note  Odell De Leon 48 y o  female MRN: [id number]  Encounter: [id number]          ASSESSMENT AND PLAN:      1  Pancreatic pseudocyst  2  Ampullary Adenoma  - S/P ERCP on 2/4 for stone and stent removal after her prior ERCP in November; this showed a polypoid ampulla and initially biopsies showed inflammatory changes and then repeat EGD/EUS was done with further biopsies which showed a tubular adenoma  - She is scheduled for ERCP with ampullectomy 5/20  - Discussed that the tubular adenoma is typically related to chronic changes so the stent placement is unlikely to have caused this though it could have led to inflammatory changes or some ampullary distortion potentially  - We are going to repeat her MRI/MRCP in the next 1-2 months to ensure her pancreatic pseudocyst has resolved; her MRI/MRCP in January showed a significant decrease in size from 4 0 x 5 7 cm to 1 4 x 1 0 x 1 4 cm  - Pending MRI/MRCP and ERCP/ampullectomy results, we will determine if she needs further follow up or if she can follow up as needed    ______________________________________________________________________    SUBJECTIVE:  Olya Velázquez is a 47 yo F Presenting for follow-up after she had an ERCP on February 4th for stent removal and retained common bile duct stone removal after she needs fully had an ERCP in November for choledocholithiasis  During the ERCP in February with Dr Eriberto Morales she was noticed to have a polypoid ampulla and biopsies initially showed some inflammatory changes  They repeated biopsies with the side-viewing scope an endoscopic ultrasound which showed the polypoid area at the ampulla without any other abnormal findings  The biopsies came back as a tubular adenoma of the ampulla  She is scheduled for ERCP with ampullectomy on May 20th with Dr Eriberto Morales  She is wondering if the tubular adenoma is a result of the prior stent placement or was ongoing prior to this   She otherwise reports some migratory abdominal cramps but no abdominal pain  She is eating well  She is having regular daily BMs after switching to a primarily plant based diet  REVIEW OF SYSTEMS IS OTHERWISE NEGATIVE        Historical Information   Past Medical History:   Diagnosis Date    GERD (gastroesophageal reflux disease)     Hypertension      Past Surgical History:   Procedure Laterality Date    BREAST BIOPSY Left 2006    core    CHOLECYSTECTOMY LAPAROSCOPIC N/A 12/4/2020    Procedure: CHOLECYSTECTOMY LAPAROSCOPIC, possible open;  Surgeon: Ling Muir MD;  Location: [facility];  Service: General    IR PICC PLACEMENT DOUBLE LUMEN  12/24/2020     Social History   Social History     Substance and Sexual Activity   Alcohol Use Yes    Frequency: 2-4 times a month    Drinks per session: 1 or 2    Binge frequency: Never    Comment: Social drinker     Social History     Substance and Sexual Activity   Drug Use No     Social History     Tobacco Use   Smoking Status Never Smoker   Smokeless Tobacco Never Used     Family History   Problem Relation Age of Onset    Arthritis Mother     Skin cancer Mother     Skin cancer Father     Arthritis Maternal Grandmother     Hiatal hernia Maternal Grandfather     Arthritis Family     Hiatal hernia Family     Skin cancer Family     Varicose Veins Family         Of lower extremities    Lung cancer Brother        Meds/Allergies       Current Outpatient Medications:     amLODIPine (NORVASC) 10 mg tablet    Calcium Carb-Cholecalciferol (CALTRATE 600+D) 600-800 MG-UNIT TABS    cyanocobalamin (VITAMIN B-12) 500 mcg tablet    estradiol (ESTRACE) 0 1 mg/g vaginal cream    fexofenadine-pseudoephedrine (ALLEGRA-D)  MG per tablet    fluticasone (FLONASE) 50 mcg/act nasal spray    meclizine (ANTIVERT) 25 mg tablet    Multiple Vitamins-Minerals (ONE DAILY FOR WOMEN PO)    omeprazole (PriLOSEC) 20 mg delayed release capsule    Allergies   Allergen Reactions  Lactose - Food Allergy Other (See Comments)     intolerance    Other      SEASONAL ALLERGIES           Objective     Blood pressure 120/72, pulse 81, height 5' 8" (1 727 m), weight 73 4 kg (161 lb 12 8 oz), last menstrual period 02/22/2019, not currently breastfeeding  Body mass index is 24 6 kg/m²  PHYSICAL EXAM:      General Appearance:   Alert, cooperative, no distress   HEENT:   Normocephalic, atraumatic, anicteric      Neck:  Supple, symmetrical, trachea midline   Lungs:   Clear to auscultation bilaterally; no rales, rhonchi or wheezing; respirations unlabored    Heart[de-identified]   Regular rate and rhythm; no murmur, rub, or gallop  Abdomen:   Soft, non-tender, non-distended; normal bowel sounds; no masses, no organomegaly    Genitalia:   Deferred    Rectal:   Deferred    Extremities:  No cyanosis, clubbing or edema    Pulses:  2+ and symmetric    Skin:  No jaundice, rashes, or lesions    Lymph nodes:  No palpable cervical lymphadenopathy        Lab Results:   No visits with results within 1 Day(s) from this visit  Latest known visit with results is:   Hospital Outpatient Visit on 04/12/2021   Component Date Value    Case Report 04/12/2021                      Value:Surgical Pathology Report                         Case: [id number]                                   Authorizing Provider:  Julee Gil MD            Collected:           04/12/2021 1334              Ordering Location:     [address] [place] [address]      Received:            04/12/2021 [place] [id number] [facility]                                                           Pathologist:           Lupillo Freeman MD                                                        Specimen:    Ampulla of Vater                                                                           Final Diagnosis 04/12/2021                      Value: This result contains rich text formatting which cannot be displayed here      Additional Information 04/12/2021                      Value: This result contains rich text formatting which cannot be displayed here   Synoptic Checklist 04/12/2021                      Value:  (COLON/RECTUM POLYP FORM - GI - All Specimens)                                                                                                                 : Adenoma(s)     Maggie Owens Description 04/12/2021                      Value: This result contains rich text formatting which cannot be displayed here  Radiology Results:   No results found

## 2021-05-05 ENCOUNTER — CLINICAL SUPPORT (OUTPATIENT)
Dept: INTERNAL MEDICINE CLINIC | Age: 56
End: 2021-05-05

## 2021-05-05 VITALS — SYSTOLIC BLOOD PRESSURE: 124 MMHG | DIASTOLIC BLOOD PRESSURE: 80 MMHG

## 2021-05-05 DIAGNOSIS — I10 HYPERTENSION, UNSPECIFIED TYPE: Primary | ICD-10-CM

## 2021-05-10 ENCOUNTER — HOSPITAL ENCOUNTER (OUTPATIENT)
Dept: RADIOLOGY | Age: 56
Discharge: HOME | End: 2021-05-10
Payer: COMMERCIAL

## 2021-05-10 VITALS — BODY MASS INDEX: 24.25 KG/M2 | WEIGHT: 160 LBS | HEIGHT: 68 IN

## 2021-05-10 DIAGNOSIS — Z12.31 SCREENING MAMMOGRAM, ENCOUNTER FOR: ICD-10-CM

## 2021-05-10 PROCEDURE — 77063 BREAST TOMOSYNTHESIS BI: CPT

## 2021-05-10 PROCEDURE — 77067 SCR MAMMO BI INCL CAD: CPT

## 2021-05-20 ENCOUNTER — ANESTHESIA (OUTPATIENT)
Dept: GASTROENTEROLOGY | Facility: HOSPITAL | Age: 56
End: 2021-05-20

## 2021-05-20 ENCOUNTER — PREP FOR PROCEDURE (OUTPATIENT)
Dept: GASTROENTEROLOGY | Facility: CLINIC | Age: 56
End: 2021-05-20

## 2021-05-20 ENCOUNTER — HOSPITAL ENCOUNTER (OUTPATIENT)
Dept: RADIOLOGY | Facility: HOSPITAL | Age: 56
Discharge: HOME | End: 2021-05-20
Payer: COMMERCIAL

## 2021-05-20 ENCOUNTER — HOSPITAL ENCOUNTER (OUTPATIENT)
Dept: GASTROENTEROLOGY | Facility: HOSPITAL | Age: 56
Setting detail: OUTPATIENT SURGERY
Discharge: HOME | End: 2021-05-20
Attending: INTERNAL MEDICINE
Payer: COMMERCIAL

## 2021-05-20 ENCOUNTER — ANESTHESIA EVENT (OUTPATIENT)
Dept: GASTROENTEROLOGY | Facility: HOSPITAL | Age: 56
End: 2021-05-20

## 2021-05-20 VITALS
HEART RATE: 68 BPM | TEMPERATURE: 97.1 F | OXYGEN SATURATION: 100 % | SYSTOLIC BLOOD PRESSURE: 136 MMHG | RESPIRATION RATE: 16 BRPM | DIASTOLIC BLOOD PRESSURE: 85 MMHG

## 2021-05-20 DIAGNOSIS — D13.5 AMPULLARY ADENOMA: ICD-10-CM

## 2021-05-20 DIAGNOSIS — D13.5 AMPULLARY ADENOMA: Primary | ICD-10-CM

## 2021-05-20 PROCEDURE — 74018 RADEX ABDOMEN 1 VIEW: CPT

## 2021-05-20 PROCEDURE — C2617 STENT, NON-COR, TEM W/O DEL: HCPCS

## 2021-05-20 PROCEDURE — 43261 ENDO CHOLANGIOPANCREATOGRAPH: CPT | Performed by: INTERNAL MEDICINE

## 2021-05-20 PROCEDURE — 43274 ERCP DUCT STENT PLACEMENT: CPT | Performed by: INTERNAL MEDICINE

## 2021-05-20 PROCEDURE — 88305 TISSUE EXAM BY PATHOLOGIST: CPT | Performed by: PATHOLOGY

## 2021-05-20 PROCEDURE — C1769 GUIDE WIRE: HCPCS

## 2021-05-20 RX ORDER — SUCCINYLCHOLINE/SOD CL,ISO/PF 100 MG/5ML
SYRINGE (ML) INTRAVENOUS AS NEEDED
Status: DISCONTINUED | OUTPATIENT
Start: 2021-05-20 | End: 2021-05-20

## 2021-05-20 RX ORDER — PROPOFOL 10 MG/ML
INJECTION, EMULSION INTRAVENOUS AS NEEDED
Status: DISCONTINUED | OUTPATIENT
Start: 2021-05-20 | End: 2021-05-20

## 2021-05-20 RX ORDER — ONDANSETRON 2 MG/ML
INJECTION INTRAMUSCULAR; INTRAVENOUS AS NEEDED
Status: DISCONTINUED | OUTPATIENT
Start: 2021-05-20 | End: 2021-05-20

## 2021-05-20 RX ORDER — FENTANYL CITRATE 50 UG/ML
INJECTION, SOLUTION INTRAMUSCULAR; INTRAVENOUS AS NEEDED
Status: DISCONTINUED | OUTPATIENT
Start: 2021-05-20 | End: 2021-05-20

## 2021-05-20 RX ORDER — SODIUM CHLORIDE, SODIUM LACTATE, POTASSIUM CHLORIDE, CALCIUM CHLORIDE 600; 310; 30; 20 MG/100ML; MG/100ML; MG/100ML; MG/100ML
INJECTION, SOLUTION INTRAVENOUS CONTINUOUS PRN
Status: DISCONTINUED | OUTPATIENT
Start: 2021-05-20 | End: 2021-05-20

## 2021-05-20 RX ORDER — DEXAMETHASONE SODIUM PHOSPHATE 10 MG/ML
INJECTION, SOLUTION INTRAMUSCULAR; INTRAVENOUS AS NEEDED
Status: DISCONTINUED | OUTPATIENT
Start: 2021-05-20 | End: 2021-05-20

## 2021-05-20 RX ORDER — LIDOCAINE HYDROCHLORIDE 10 MG/ML
INJECTION, SOLUTION EPIDURAL; INFILTRATION; INTRACAUDAL; PERINEURAL AS NEEDED
Status: DISCONTINUED | OUTPATIENT
Start: 2021-05-20 | End: 2021-05-20

## 2021-05-20 RX ADMIN — Medication 100 MG: at 12:27

## 2021-05-20 RX ADMIN — INDOMETHACIN 100 MG: 50 SUPPOSITORY RECTAL at 12:43

## 2021-05-20 RX ADMIN — ONDANSETRON 4 MG: 2 INJECTION INTRAMUSCULAR; INTRAVENOUS at 12:29

## 2021-05-20 RX ADMIN — SODIUM CHLORIDE, SODIUM LACTATE, POTASSIUM CHLORIDE, AND CALCIUM CHLORIDE: .6; .31; .03; .02 INJECTION, SOLUTION INTRAVENOUS at 12:19

## 2021-05-20 RX ADMIN — PROPOFOL 50 MG: 10 INJECTION, EMULSION INTRAVENOUS at 12:29

## 2021-05-20 RX ADMIN — LIDOCAINE HYDROCHLORIDE 50 MG: 10 INJECTION, SOLUTION EPIDURAL; INFILTRATION; INTRACAUDAL; PERINEURAL at 12:27

## 2021-05-20 RX ADMIN — GLUCAGON HYDROCHLORIDE 0.5 MG: KIT at 13:09

## 2021-05-20 RX ADMIN — PROPOFOL 150 MG: 10 INJECTION, EMULSION INTRAVENOUS at 12:27

## 2021-05-20 RX ADMIN — FENTANYL CITRATE 25 MCG: 50 INJECTION INTRAMUSCULAR; INTRAVENOUS at 12:44

## 2021-05-20 RX ADMIN — DEXAMETHASONE SODIUM PHOSPHATE 10 MG: 10 INJECTION, SOLUTION INTRAMUSCULAR; INTRAVENOUS at 12:29

## 2021-05-20 NOTE — ANESTHESIA PREPROCEDURE EVALUATION
Procedure:  ERCP    Relevant Problems   CARDIO   (+) Essential hypertension      GI/HEPATIC   (+) Acute biliary pancreatitis without infection or necrosis   (+) Post-ERCP acute pancreatitis   (+) Pseudocyst of pancreas      Other   (+) Choledocholithiasis        Physical Exam    Airway    Mallampati score: II  TM Distance: >3 FB  Neck ROM: full     Dental   No notable dental hx     Cardiovascular  Cardiovascular exam normal    Pulmonary  Pulmonary exam normal     Other Findings        Anesthesia Plan  ASA Score- 2     Anesthesia Type- general with ASA Monitors  Additional Monitors:   Airway Plan: ETT  Plan Factors-Exercise tolerance (METS): >4 METS  Chart reviewed  Patient summary reviewed  Patient is not a current smoker  Patient did not smoke on day of surgery  Induction- intravenous  Postoperative Plan-     Informed Consent- Anesthetic plan and risks discussed with patient  I personally reviewed this patient with the CRNA  Discussed and agreed on the Anesthesia Plan with the CRNA  Michelle Limon

## 2021-05-20 NOTE — ANESTHESIA POSTPROCEDURE EVALUATION
Post-Op Assessment Note    CV Status:  Stable  Pain Score: 0    Pain management: adequate     Mental Status:  Arousable and awake   Hydration Status:  Stable   PONV Controlled:  None   Airway Patency:  Patent      Post Op Vitals Reviewed: Yes      Staff: CRNA         No complications documented      /75 (05/20/21 1339)    Temp (!) 97 1 °F (36 2 °C) (05/20/21 1339)    Pulse 78 (05/20/21 1339)   Resp 18 (05/20/21 1339)    SpO2 100 % (05/20/21 1339)

## 2021-05-20 NOTE — DISCHARGE INSTRUCTIONS
ERCP (Endoscopic Retrograde Cholangiopancreatography)   WHAT YOU NEED TO KNOW:   Endoscopic retrograde cholangiopancreatography (ERCP) is a procedure to examine the ducts of your pancreas or gallbladder  ERCP may also be used to open blocked ducts, or to diagnose problems with your pancreas or gallbladder  An endoscope (thin, flexible tube with a light) will be used for the procedure  DISCHARGE INSTRUCTIONS:   Call your doctor if:   · You have sudden, severe abdominal pain  · You have problems swallowing  · Your heart beats faster than normal for you  · You have black, sticky bowel movements  · You feel weak, lightheaded, or faint  · You have a fever and chills  · You have nausea or are vomiting  · Your abdomen is bloated or feels full and hard  · You have abdominal pain  · You lose your appetite, your skin feels itchy, and your skin turns yellow  · You have questions or concerns about your procedure  Self-care:   · Rest when you feel it is needed  You may be drowsy for up to 24 hours after your procedure  Return to your daily activities as directed  · Ask when you can eat regular foods  Healthy foods include fruits, vegetables, whole-grain breads, low-fat dairy products, beans, lean meats, and fish  · Relieve a sore throat  with ice chips, liquids, or lozenges as directed  · Do not take aspirin after your procedure unless your healthcare provider says it is okay  Aspirin may increase your risk for bleeding  Follow up with your healthcare provider as directed:  Write down your questions so you remember to ask them during your visits  © Copyright [address] Information is for End User's use only and may not be sold, redistributed or otherwise used for commercial purposes  All illustrations and images included in CareNotes® are the copyrighted property of [employer]  or [address] [place], [place] [address]   The above information is an  only   It is not intended as medical advice for individual conditions or treatments  Talk to your doctor, nurse or pharmacist before following any medical regimen to see if it is safe and effective for you

## 2021-05-20 NOTE — H&P
History and Physical - [facility]  Danielle Land 48 y o  female MRN: [id number]                  HPI: Danielle Land is a 48y o  year old female who presents for ERCP for ampullectomy  REVIEW OF SYSTEMS: Per the HPI, and otherwise unremarkable  Historical Information   Past Medical History:   Diagnosis Date    GERD (gastroesophageal reflux disease)     Hypertension      Past Surgical History:   Procedure Laterality Date    BREAST BIOPSY Left 2006    core    CHOLECYSTECTOMY      CHOLECYSTECTOMY LAPAROSCOPIC N/A 12/4/2020    Procedure: CHOLECYSTECTOMY LAPAROSCOPIC, possible open;  Surgeon: Mohsen Christian MD;  Location: [facility];  Service: General    IR PICC PLACEMENT DOUBLE LUMEN  12/24/2020     Social History   Social History     Substance and Sexual Activity   Alcohol Use Yes    Frequency: 2-4 times a month    Drinks per session: 1 or 2    Binge frequency: Never    Comment: Social drinker     Social History     Substance and Sexual Activity   Drug Use No     Social History     Tobacco Use   Smoking Status Never Smoker   Smokeless Tobacco Never Used     Family History   Problem Relation Age of Onset    Arthritis Mother     Skin cancer Mother     Skin cancer Father     Arthritis Maternal Grandmother     Hiatal hernia Maternal Grandfather     Arthritis Family     Hiatal hernia Family     Skin cancer Family     Varicose Veins Family         Of lower extremities    Lung cancer Brother     No Known Problems Paternal Grandmother     No Known Problems Paternal Grandfather     No Known Problems Maternal Aunt     No Known Problems Paternal Aunt        Meds/Allergies     (Not in a hospital admission)      Allergies   Allergen Reactions    Lactose - Food Allergy Other (See Comments)     intolerance    Other      SEASONAL ALLERGIES       Objective     Blood pressure 135/75, pulse 80, temperature 99 4 °F (37 4 °C), temperature source Tympanic, resp   rate 16, last menstrual period 02/22/2019, SpO2 100 %, not currently breastfeeding  PHYSICAL EXAM    Gen: NAD  CV: RRR  CHEST: Clear  ABD: soft, NT/ND  EXT: no edema      ASSESSMENT/PLAN:  Dayday Davis is a 48y o  year old female who presents for ERCP for ampullectomy  The patient is stable and optimized for the procedure, we reviewed risk and benefits  Risk include but not limited to infection, bleeding, perforation and missing a lesion

## 2021-05-27 ENCOUNTER — TELEPHONE (OUTPATIENT)
Dept: GASTROENTEROLOGY | Facility: CLINIC | Age: 56
End: 2021-05-27

## 2021-05-27 NOTE — TELEPHONE ENCOUNTER
----- Message from Francisco Pereira MD sent at 5/20/2021  2:39 PM EDT -----  Please schedule ERCP in 4 weeks from today's date along with EGD  Please get order a KUB to patient and let them know to get the KUB done in 2 weeks  The KUB is being done to make sure that the pancreatic duct stent has passed out  Thank you

## 2021-06-01 ENCOUNTER — TELEPHONE (OUTPATIENT)
Dept: GASTROENTEROLOGY | Facility: MEDICAL CENTER | Age: 56
End: 2021-06-01

## 2021-06-01 NOTE — TELEPHONE ENCOUNTER
----- Message from Destin Ragsdale MD sent at 5/31/2021 12:58 PM EDT -----  Please call patient :    Biopsies were unremarkable  Significant inflammation was seen  Repeat endoscopy in 4-6  Weeks

## 2021-06-03 NOTE — TELEPHONE ENCOUNTER
Ercp/egd on 6/23/21 with Dr Sam Meckel at [facility]  Prep instructions gone over verbally and mailed to the patient

## 2021-06-03 NOTE — TELEPHONE ENCOUNTER
Patient returned my phone call and left a message about scheduling ercp/egd  Returned patient call and left a message with some available dates for procedures  Will monitor for a return call

## 2021-06-09 ENCOUNTER — HOSPITAL ENCOUNTER (OUTPATIENT)
Dept: MRI IMAGING | Facility: HOSPITAL | Age: 56
Discharge: HOME | End: 2021-06-09
Payer: COMMERCIAL

## 2021-06-09 DIAGNOSIS — K86.3 PANCREATIC PSEUDOCYST: ICD-10-CM

## 2021-06-09 PROCEDURE — 74183 MRI ABD W/O CNTR FLWD CNTR: CPT

## 2021-06-09 PROCEDURE — A9585 GADOBUTROL INJECTION: HCPCS | Performed by: PHYSICIAN ASSISTANT

## 2021-06-09 PROCEDURE — 76377 3D RENDER W/INTRP POSTPROCES: CPT

## 2021-06-09 PROCEDURE — G1004 CDSM NDSC: HCPCS

## 2021-06-09 RX ADMIN — GADOBUTROL 7 ML: 604.72 INJECTION INTRAVENOUS at 08:57

## 2021-06-22 ENCOUNTER — TELEPHONE (OUTPATIENT)
Dept: GASTROENTEROLOGY | Facility: HOSPITAL | Age: 56
End: 2021-06-22

## 2021-06-23 ENCOUNTER — APPOINTMENT (OUTPATIENT)
Dept: GASTROENTEROLOGY | Facility: HOSPITAL | Age: 56
End: 2021-06-23
Payer: COMMERCIAL

## 2021-06-23 ENCOUNTER — ANESTHESIA EVENT (OUTPATIENT)
Dept: GASTROENTEROLOGY | Facility: HOSPITAL | Age: 56
End: 2021-06-23

## 2021-06-23 ENCOUNTER — HOSPITAL ENCOUNTER (OUTPATIENT)
Dept: GASTROENTEROLOGY | Facility: HOSPITAL | Age: 56
Setting detail: OUTPATIENT SURGERY
Discharge: HOME | End: 2021-06-23
Attending: INTERNAL MEDICINE | Admitting: INTERNAL MEDICINE
Payer: COMMERCIAL

## 2021-06-23 ENCOUNTER — ANESTHESIA (OUTPATIENT)
Dept: GASTROENTEROLOGY | Facility: HOSPITAL | Age: 56
End: 2021-06-23

## 2021-06-23 VITALS
OXYGEN SATURATION: 99 % | BODY MASS INDEX: 24.25 KG/M2 | TEMPERATURE: 97.4 F | SYSTOLIC BLOOD PRESSURE: 123 MMHG | HEART RATE: 80 BPM | DIASTOLIC BLOOD PRESSURE: 64 MMHG | HEIGHT: 68 IN | WEIGHT: 160 LBS | RESPIRATION RATE: 15 BRPM

## 2021-06-23 DIAGNOSIS — D13.5 AMPULLARY ADENOMA: ICD-10-CM

## 2021-06-23 PROCEDURE — 43239 EGD BIOPSY SINGLE/MULTIPLE: CPT | Performed by: INTERNAL MEDICINE

## 2021-06-23 PROCEDURE — 88305 TISSUE EXAM BY PATHOLOGIST: CPT | Performed by: PATHOLOGY

## 2021-06-23 PROCEDURE — 43247 EGD REMOVE FOREIGN BODY: CPT | Performed by: INTERNAL MEDICINE

## 2021-06-23 RX ORDER — GLYCOPYRROLATE 0.2 MG/ML
INJECTION INTRAMUSCULAR; INTRAVENOUS AS NEEDED
Status: DISCONTINUED | OUTPATIENT
Start: 2021-06-23 | End: 2021-06-23

## 2021-06-23 RX ORDER — LIDOCAINE HYDROCHLORIDE 20 MG/ML
INJECTION, SOLUTION EPIDURAL; INFILTRATION; INTRACAUDAL; PERINEURAL AS NEEDED
Status: DISCONTINUED | OUTPATIENT
Start: 2021-06-23 | End: 2021-06-23

## 2021-06-23 RX ORDER — PROPOFOL 10 MG/ML
INJECTION, EMULSION INTRAVENOUS CONTINUOUS PRN
Status: DISCONTINUED | OUTPATIENT
Start: 2021-06-23 | End: 2021-06-23

## 2021-06-23 RX ORDER — SODIUM CHLORIDE 9 MG/ML
INJECTION, SOLUTION INTRAVENOUS CONTINUOUS PRN
Status: DISCONTINUED | OUTPATIENT
Start: 2021-06-23 | End: 2021-06-23

## 2021-06-23 RX ORDER — PROPOFOL 10 MG/ML
INJECTION, EMULSION INTRAVENOUS AS NEEDED
Status: DISCONTINUED | OUTPATIENT
Start: 2021-06-23 | End: 2021-06-23

## 2021-06-23 RX ADMIN — GLYCOPYRROLATE 0.1 MG: 0.2 INJECTION, SOLUTION INTRAMUSCULAR; INTRAVENOUS at 13:31

## 2021-06-23 RX ADMIN — SODIUM CHLORIDE: 0.9 INJECTION, SOLUTION INTRAVENOUS at 13:08

## 2021-06-23 RX ADMIN — LIDOCAINE HYDROCHLORIDE 80 MG: 20 INJECTION, SOLUTION EPIDURAL; INFILTRATION; INTRACAUDAL; PERINEURAL at 13:34

## 2021-06-23 RX ADMIN — PROPOFOL 120 MG: 10 INJECTION, EMULSION INTRAVENOUS at 13:34

## 2021-06-23 RX ADMIN — PROPOFOL 100 MCG/KG/MIN: 10 INJECTION, EMULSION INTRAVENOUS at 13:34

## 2021-06-23 NOTE — H&P
History and Physical - [facility]  Trisha Blanton 48 y o  female MRN: [id number]                  HPI: Trisha Blanton is a 48y o  year old female who presents for EGD      REVIEW OF SYSTEMS: Per the HPI, and otherwise unremarkable      Historical Information   Past Medical History:   Diagnosis Date    GERD (gastroesophageal reflux disease)     Hypertension      Past Surgical History:   Procedure Laterality Date    BREAST BIOPSY Left 2006    core    CHOLECYSTECTOMY      CHOLECYSTECTOMY LAPAROSCOPIC N/A 12/4/2020    Procedure: CHOLECYSTECTOMY LAPAROSCOPIC, possible open;  Surgeon: Ashly Mckay MD;  Location: [facility];  Service: General    IR PICC PLACEMENT DOUBLE LUMEN  12/24/2020     Social History   Social History     Substance and Sexual Activity   Alcohol Use Yes    Comment: Social drinker     Social History     Substance and Sexual Activity   Drug Use No     Social History     Tobacco Use   Smoking Status Never Smoker   Smokeless Tobacco Never Used     Family History   Problem Relation Age of Onset    Arthritis Mother     Skin cancer Mother     Skin cancer Father     Arthritis Maternal Grandmother     Hiatal hernia Maternal Grandfather     Arthritis Family     Hiatal hernia Family     Skin cancer Family     Varicose Veins Family         Of lower extremities    Lung cancer Brother     No Known Problems Paternal Grandmother     No Known Problems Paternal Grandfather     No Known Problems Maternal Aunt     No Known Problems Paternal Aunt        Meds/Allergies       Current Outpatient Medications:     amLODIPine (NORVASC) 10 mg tablet    Calcium Carb-Cholecalciferol (CALTRATE 600+D) 600-800 MG-UNIT TABS    cyanocobalamin (VITAMIN B-12) 500 mcg tablet    fexofenadine-pseudoephedrine (ALLEGRA-D)  MG per tablet    fluticasone (FLONASE) 50 mcg/act nasal spray    Multiple Vitamins-Minerals (ONE DAILY FOR WOMEN PO)    omeprazole (PriLOSEC) 20 mg delayed release capsule    estradiol (ESTRACE) 0 1 mg/g vaginal cream    meclizine (ANTIVERT) 25 mg tablet  No current facility-administered medications for this encounter  Facility-Administered Medications Ordered in Other Encounters:     sodium chloride 0 9 % infusion, , Intravenous, Continuous PRN, New Bag at 06/23/21 1308    Allergies   Allergen Reactions    Lactose - Food Allergy Other (See Comments)     intolerance    Other      SEASONAL ALLERGIES       Objective     /93   Pulse 70   Temp 99 2 °F (37 3 °C) (Tympanic)   Resp 18   Ht 5' 8" (1 727 m)   Wt 72 6 kg (160 lb)   LMP 02/22/2019   SpO2 99%   BMI 24 33 kg/m²       PHYSICAL EXAM    Gen: NAD  Head: NCAT  CV: RRR  CHEST: Clear  ABD: soft, NT/ND  EXT: no edema      ASSESSMENT/PLAN:  This is a 48y o  year old female here for EGD, and she is stable and optimized for her procedure

## 2021-06-23 NOTE — ANESTHESIA PREPROCEDURE EVALUATION
Procedure:  EGD    Relevant Problems   CARDIO   (+) Essential hypertension      GI/HEPATIC   (+) Acute biliary pancreatitis without infection or necrosis   (+) Post-ERCP acute pancreatitis   (+) Pseudocyst of pancreas      Other   (+) Choledocholithiasis   (+) Transaminitis        Physical Exam    Airway    Mallampati score: II  TM Distance: >3 FB  Neck ROM: full     Dental   No notable dental hx     Cardiovascular  Cardiovascular exam normal    Pulmonary  Pulmonary exam normal     Other Findings        Anesthesia Plan  ASA Score- 2     Anesthesia Type- IV sedation with anesthesia with ASA Monitors  Additional Monitors:   Airway Plan:           Plan Factors-Exercise tolerance (METS): >4 METS  Chart reviewed  Patient summary reviewed  Patient is not a current smoker  Patient did not smoke on day of surgery  Induction- intravenous  Postoperative Plan-     Informed Consent- Anesthetic plan and risks discussed with patient  I personally reviewed this patient with the CRNA  Discussed and agreed on the Anesthesia Plan with the CRNA  Niurka Montague

## 2021-06-23 NOTE — ANESTHESIA POSTPROCEDURE EVALUATION
Post-Op Assessment Note    CV Status:  Stable  Pain Score: 0    Pain management: adequate     Mental Status:  Alert and awake   Hydration Status:  Euvolemic   PONV Controlled:  Controlled   Airway Patency:  Patent      Post Op Vitals Reviewed: Yes      Staff: CRNA, Anesthesiologist         No complications documented      BP   128/74   Temp      Pulse  77   Resp   16   SpO2   100

## 2021-06-29 ENCOUNTER — TELEPHONE (OUTPATIENT)
Dept: GASTROENTEROLOGY | Facility: CLINIC | Age: 56
End: 2021-06-29

## 2021-06-29 ENCOUNTER — APPOINTMENT (OUTPATIENT)
Dept: LAB | Facility: MEDICAL CENTER | Age: 56
End: 2021-06-29
Payer: COMMERCIAL

## 2021-06-29 DIAGNOSIS — K85.90 POST-ERCP ACUTE PANCREATITIS: ICD-10-CM

## 2021-06-29 DIAGNOSIS — I10 ESSENTIAL HYPERTENSION: ICD-10-CM

## 2021-06-29 DIAGNOSIS — K91.89 POST-ERCP ACUTE PANCREATITIS: ICD-10-CM

## 2021-06-29 LAB
ALBUMIN SERPL BCP-MCNC: 3.4 G/DL (ref 3.5–5)
ALP SERPL-CCNC: 119 U/L (ref 46–116)
ALT SERPL W P-5'-P-CCNC: 22 U/L (ref 12–78)
ANION GAP SERPL CALCULATED.3IONS-SCNC: 3 MMOL/L (ref 4–13)
AST SERPL W P-5'-P-CCNC: 17 U/L (ref 5–45)
BILIRUB DIRECT SERPL-MCNC: 0.1 MG/DL (ref 0–0.2)
BILIRUB SERPL-MCNC: 0.39 MG/DL (ref 0.2–1)
BUN SERPL-MCNC: 14 MG/DL (ref 5–25)
CALCIUM SERPL-MCNC: 9.4 MG/DL (ref 8.3–10.1)
CHLORIDE SERPL-SCNC: 107 MMOL/L (ref 100–108)
CO2 SERPL-SCNC: 27 MMOL/L (ref 21–32)
CREAT SERPL-MCNC: 0.81 MG/DL (ref 0.6–1.3)
GFR SERPL CREATININE-BSD FRML MDRD: 83 ML/MIN/1.73SQ M
GLUCOSE P FAST SERPL-MCNC: 88 MG/DL (ref 65–99)
POTASSIUM SERPL-SCNC: 4.5 MMOL/L (ref 3.5–5.3)
PROT SERPL-MCNC: 7.4 G/DL (ref 6.4–8.2)
SODIUM SERPL-SCNC: 137 MMOL/L (ref 136–145)

## 2021-06-29 PROCEDURE — 80048 BASIC METABOLIC PNL TOTAL CA: CPT

## 2021-06-29 PROCEDURE — 80076 HEPATIC FUNCTION PANEL: CPT

## 2021-06-29 PROCEDURE — 36415 COLL VENOUS BLD VENIPUNCTURE: CPT

## 2021-07-06 ENCOUNTER — OFFICE VISIT (OUTPATIENT)
Dept: INTERNAL MEDICINE CLINIC | Age: 56
End: 2021-07-06
Payer: COMMERCIAL

## 2021-07-06 VITALS
HEIGHT: 68 IN | SYSTOLIC BLOOD PRESSURE: 110 MMHG | OXYGEN SATURATION: 99 % | HEART RATE: 80 BPM | WEIGHT: 160.9 LBS | BODY MASS INDEX: 24.38 KG/M2 | TEMPERATURE: 98.9 F | DIASTOLIC BLOOD PRESSURE: 76 MMHG

## 2021-07-06 DIAGNOSIS — I10 ESSENTIAL HYPERTENSION: Primary | ICD-10-CM

## 2021-07-06 DIAGNOSIS — R79.89 LFT ELEVATION: ICD-10-CM

## 2021-07-06 DIAGNOSIS — J30.2 SEASONAL ALLERGIC RHINITIS, UNSPECIFIED TRIGGER: ICD-10-CM

## 2021-07-06 PROBLEM — K91.89 POST-ERCP ACUTE PANCREATITIS: Status: RESOLVED | Noted: 2020-12-02 | Resolved: 2021-07-06

## 2021-07-06 PROBLEM — K80.50 CHOLEDOCHOLITHIASIS: Chronic | Status: RESOLVED | Noted: 2020-12-04 | Resolved: 2021-07-06

## 2021-07-06 PROBLEM — R74.01 TRANSAMINITIS: Status: RESOLVED | Noted: 2020-11-29 | Resolved: 2021-07-06

## 2021-07-06 PROBLEM — K85.90 POST-ERCP ACUTE PANCREATITIS: Status: RESOLVED | Noted: 2020-12-02 | Resolved: 2021-07-06

## 2021-07-06 PROBLEM — K85.10 ACUTE BILIARY PANCREATITIS WITHOUT INFECTION OR NECROSIS: Status: RESOLVED | Noted: 2020-12-16 | Resolved: 2021-07-06

## 2021-07-06 PROBLEM — K86.3 PSEUDOCYST OF PANCREAS: Status: RESOLVED | Noted: 2020-12-23 | Resolved: 2021-07-06

## 2021-07-06 PROCEDURE — 1036F TOBACCO NON-USER: CPT | Performed by: INTERNAL MEDICINE

## 2021-07-06 PROCEDURE — 3008F BODY MASS INDEX DOCD: CPT | Performed by: INTERNAL MEDICINE

## 2021-07-06 PROCEDURE — 3078F DIAST BP <80 MM HG: CPT | Performed by: INTERNAL MEDICINE

## 2021-07-06 PROCEDURE — 3074F SYST BP LT 130 MM HG: CPT | Performed by: INTERNAL MEDICINE

## 2021-07-06 PROCEDURE — 99214 OFFICE O/P EST MOD 30 MIN: CPT | Performed by: INTERNAL MEDICINE

## 2021-07-06 RX ORDER — FLUTICASONE PROPIONATE 50 MCG
1 SPRAY, SUSPENSION (ML) NASAL DAILY
Qty: 16 G | Refills: 1 | Status: SHIPPED | OUTPATIENT
Start: 2021-07-06 | End: 2021-07-29 | Stop reason: SDUPTHER

## 2021-07-06 RX ORDER — AMLODIPINE BESYLATE 5 MG/1
5 TABLET ORAL DAILY
Qty: 90 TABLET | Refills: 1 | Status: SHIPPED | OUTPATIENT
Start: 2021-07-06 | End: 2022-02-10 | Stop reason: SDUPTHER

## 2021-07-06 NOTE — PROGRESS NOTES
Assessment/Plan:     Diagnoses and all orders for this visit:    Essential hypertension  -     amLODIPine (NORVASC) 5 mg tablet; Take 1 tablet (5 mg total) by mouth daily  -     CBC and differential; Future  -     Comprehensive metabolic panel; Future  -     Lipid panel; Future  -     TSH, 3rd generation with Free T4 reflex; Future  -     UA w Reflex to Microscopic w Reflex to Culture    Seasonal allergic rhinitis, unspecified trigger  -     fluticasone (FLONASE) 50 mcg/act nasal spray; 1 spray into each nostril daily    LFT elevation  -     Comprehensive metabolic panel; Future             Subjective:   Chief Complaint   Patient presents with    Follow-up     3 month follow up for HTN  Review lab s        Patient ID: Leonor Beaulieu is a 48 y o  female  HPI  This is a very pleasant 48 years young lady who is here today for the regular follow-up she is doing very well no new complaint    She was recently diagnosed with the biliary pancreatitis now she is status post cholecystectomy after ERCP she developed pancreatitis and pancreatic pseudocyst which is also resolved she is doing well no new complaints except for some diarrhea associated since surgery I discussed with her regarding possibility secondary to bile salts I recommended cholestyramine but patient says that she does not have that much problem that she need any medication no weight loss no blood in the stools  Hypertension is very well controlled continue with the same medication no changes  I reviewed the blood workup slightly increased alkaline phosphatase otherwise no other LFTs abnormalities all the LFTs abnormalities are resolved I will repeat the LFTs in about 6 months and follow-up also I told the patient if she get more problem she should let me know  The following portions of the patient's history were reviewed and updated as appropriate: allergies, current medications, past family history, past medical history, past social history, past surgical history and problem list     Review of Systems   Constitutional: Negative for chills and fatigue  HENT: Negative for congestion, ear pain, hearing loss, postnasal drip, sinus pressure, sore throat and voice change  Eyes: Negative for pain, discharge and visual disturbance  Respiratory: Negative for cough, chest tightness and shortness of breath  Cardiovascular: Negative for chest pain, palpitations and leg swelling  Gastrointestinal: Positive for diarrhea  Negative for abdominal pain, blood in stool, nausea and rectal pain  Genitourinary: Negative for difficulty urinating, dysuria and urgency  Musculoskeletal: Negative for arthralgias and joint swelling  Skin: Negative for rash  Allergic/Immunologic: Negative for environmental allergies and food allergies  Neurological: Negative for dizziness, tremors, weakness, numbness and headaches  Hematological: Negative for adenopathy  Psychiatric/Behavioral: Negative for behavioral problems and hallucinations           Past Medical History:   Diagnosis Date    GERD (gastroesophageal reflux disease)     Hypertension          Current Outpatient Medications:     amLODIPine (NORVASC) 10 mg tablet, Take 1 tablet (10 mg total) by mouth daily (Patient taking differently: Take 5 mg by mouth daily ), Disp: 90 tablet, Rfl: 1    Calcium Carb-Cholecalciferol (CALTRATE 600+D) 600-800 MG-UNIT TABS, Take 1 tablet by mouth daily, Disp: , Rfl:     cyanocobalamin (VITAMIN B-12) 500 mcg tablet, Take 1 tablet by mouth daily, Disp: , Rfl:     estradiol (ESTRACE) 0 1 mg/g vaginal cream, Use 0 5 gms nightly x 7 days, Then taper to 0 5 gms 2-3 times per week, Disp: 42 5 g, Rfl: 1    fexofenadine-pseudoephedrine (ALLEGRA-D)  MG per tablet, as needed , Disp: , Rfl:     fluticasone (FLONASE) 50 mcg/act nasal spray, 1 spray into each nostril daily, Disp: 16 g, Rfl: 1    meclizine (ANTIVERT) 25 mg tablet, Take 1 tablet (25 mg total) by mouth 3 (three) times a day as needed for dizziness, Disp: 30 tablet, Rfl: 0    Multiple Vitamins-Minerals (ONE DAILY FOR WOMEN PO), daily , Disp: , Rfl:     omeprazole (PriLOSEC) 20 mg delayed release capsule, Take 20 mg by mouth daily (Patient not taking: Reported on 7/6/2021), Disp: , Rfl:     Allergies   Allergen Reactions    Lactose - Food Allergy Other (See Comments)     intolerance    Other      SEASONAL ALLERGIES       Social History   Past Surgical History:   Procedure Laterality Date    BREAST BIOPSY Left 2006    core    CHOLECYSTECTOMY      CHOLECYSTECTOMY LAPAROSCOPIC N/A 12/4/2020    Procedure: CHOLECYSTECTOMY LAPAROSCOPIC, possible open;  Surgeon: Aaron Coley MD;  Location: [facility];  Service: General    IR PICC PLACEMENT DOUBLE LUMEN  12/24/2020     Family History   Problem Relation Age of Onset    Arthritis Mother     Skin cancer Mother     Skin cancer Father     Arthritis Maternal Grandmother     Hiatal hernia Maternal Grandfather     Arthritis Family     Hiatal hernia Family     Skin cancer Family     Varicose Veins Family         Of lower extremities    Lung cancer Brother     No Known Problems Paternal Grandmother     No Known Problems Paternal Grandfather     No Known Problems Maternal Aunt     No Known Problems Paternal Aunt        Objective:  /76 (BP Location: Left arm, Patient Position: Sitting, Cuff Size: Standard)   Pulse 80   Temp 98 9 °F (37 2 °C) (Temporal)   Ht 5' 8" (1 727 m)   Wt 73 kg (160 lb 14 4 oz)   LMP 02/22/2019   SpO2 99%   BMI 24 46 kg/m²        Physical Exam  Constitutional:       Appearance: She is well-developed  HENT:      Right Ear: External ear normal    Eyes:      Conjunctiva/sclera: Conjunctivae normal       Pupils: Pupils are equal, round, and reactive to light  Neck:      Thyroid: No thyromegaly  Vascular: No JVD  Cardiovascular:      Rate and Rhythm: Normal rate and regular rhythm  Heart sounds: Normal heart sounds  Pulmonary:      Breath sounds: Normal breath sounds  Abdominal:      General: Bowel sounds are normal       Palpations: Abdomen is soft  Musculoskeletal:         General: Normal range of motion  Cervical back: Normal range of motion  Lymphadenopathy:      Cervical: No cervical adenopathy  Skin:     General: Skin is dry  Neurological:      Mental Status: She is alert and oriented to person, place, and time  Deep Tendon Reflexes: Reflexes are normal and symmetric     Psychiatric:         Behavior: Behavior normal

## 2021-07-29 DIAGNOSIS — J30.2 SEASONAL ALLERGIC RHINITIS, UNSPECIFIED TRIGGER: ICD-10-CM

## 2021-07-30 RX ORDER — FLUTICASONE PROPIONATE 50 MCG
1 SPRAY, SUSPENSION (ML) NASAL DAILY
Qty: 16 G | Refills: 1 | Status: SHIPPED | OUTPATIENT
Start: 2021-07-30 | End: 2022-07-19 | Stop reason: SDUPTHER

## 2021-12-07 ENCOUNTER — IMMUNIZATIONS (OUTPATIENT)
Dept: INTERNAL MEDICINE CLINIC | Age: 56
End: 2021-12-07
Payer: COMMERCIAL

## 2021-12-07 DIAGNOSIS — Z23 NEED FOR INFLUENZA VACCINATION: Primary | ICD-10-CM

## 2021-12-07 PROCEDURE — 90682 RIV4 VACC RECOMBINANT DNA IM: CPT

## 2021-12-07 PROCEDURE — 90471 IMMUNIZATION ADMIN: CPT

## 2021-12-30 ENCOUNTER — APPOINTMENT (OUTPATIENT)
Dept: LAB | Facility: MEDICAL CENTER | Age: 56
End: 2021-12-30
Payer: COMMERCIAL

## 2021-12-30 DIAGNOSIS — I10 ESSENTIAL HYPERTENSION: ICD-10-CM

## 2021-12-30 LAB
BACTERIA UR QL AUTO: NORMAL /HPF
BASOPHILS # BLD AUTO: 0.03 THOUSANDS/ΜL (ref 0–0.1)
BASOPHILS NFR BLD AUTO: 1 % (ref 0–1)
BILIRUB UR QL STRIP: NEGATIVE
CLARITY UR: CLEAR
COLOR UR: ABNORMAL
EOSINOPHIL # BLD AUTO: 0.05 THOUSAND/ΜL (ref 0–0.61)
EOSINOPHIL NFR BLD AUTO: 1 % (ref 0–6)
ERYTHROCYTE [DISTWIDTH] IN BLOOD BY AUTOMATED COUNT: 11.9 % (ref 11.6–15.1)
GLUCOSE UR STRIP-MCNC: NEGATIVE MG/DL
HCT VFR BLD AUTO: 40.3 % (ref 34.8–46.1)
HGB BLD-MCNC: 13.3 G/DL (ref 11.5–15.4)
HGB UR QL STRIP.AUTO: NEGATIVE
HYALINE CASTS #/AREA URNS LPF: NORMAL /LPF
IMM GRANULOCYTES # BLD AUTO: 0.01 THOUSAND/UL (ref 0–0.2)
IMM GRANULOCYTES NFR BLD AUTO: 0 % (ref 0–2)
KETONES UR STRIP-MCNC: NEGATIVE MG/DL
LEUKOCYTE ESTERASE UR QL STRIP: ABNORMAL
LYMPHOCYTES # BLD AUTO: 1.32 THOUSANDS/ΜL (ref 0.6–4.47)
LYMPHOCYTES NFR BLD AUTO: 26 % (ref 14–44)
MCH RBC QN AUTO: 32 PG (ref 26.8–34.3)
MCHC RBC AUTO-ENTMCNC: 33 G/DL (ref 31.4–37.4)
MCV RBC AUTO: 97 FL (ref 82–98)
MONOCYTES # BLD AUTO: 0.41 THOUSAND/ΜL (ref 0.17–1.22)
MONOCYTES NFR BLD AUTO: 8 % (ref 4–12)
NEUTROPHILS # BLD AUTO: 3.31 THOUSANDS/ΜL (ref 1.85–7.62)
NEUTS SEG NFR BLD AUTO: 64 % (ref 43–75)
NITRITE UR QL STRIP: NEGATIVE
NON-SQ EPI CELLS URNS QL MICRO: NORMAL /HPF
NRBC BLD AUTO-RTO: 0 /100 WBCS
PH UR STRIP.AUTO: 6.5 [PH]
PLATELET # BLD AUTO: 277 THOUSANDS/UL (ref 149–390)
PMV BLD AUTO: 11.3 FL (ref 8.9–12.7)
PROT UR STRIP-MCNC: ABNORMAL MG/DL
RBC # BLD AUTO: 4.15 MILLION/UL (ref 3.81–5.12)
RBC #/AREA URNS AUTO: NORMAL /HPF
SP GR UR STRIP.AUTO: 1.01 (ref 1–1.03)
TSH SERPL DL<=0.05 MIU/L-ACNC: 1.39 UIU/ML (ref 0.36–3.74)
UROBILINOGEN UR QL STRIP.AUTO: 0.2 E.U./DL
WBC # BLD AUTO: 5.13 THOUSAND/UL (ref 4.31–10.16)
WBC #/AREA URNS AUTO: NORMAL /HPF

## 2021-12-30 PROCEDURE — 85025 COMPLETE CBC W/AUTO DIFF WBC: CPT

## 2021-12-30 PROCEDURE — 81001 URINALYSIS AUTO W/SCOPE: CPT | Performed by: INTERNAL MEDICINE

## 2021-12-30 PROCEDURE — 84443 ASSAY THYROID STIM HORMONE: CPT

## 2022-01-11 ENCOUNTER — TELEMEDICINE (OUTPATIENT)
Dept: INTERNAL MEDICINE CLINIC | Age: 57
End: 2022-01-11
Payer: COMMERCIAL

## 2022-01-11 VITALS — BODY MASS INDEX: 25.76 KG/M2 | WEIGHT: 170 LBS | HEIGHT: 68 IN

## 2022-01-11 DIAGNOSIS — R79.89 LFT ELEVATION: Primary | ICD-10-CM

## 2022-01-11 DIAGNOSIS — I10 ESSENTIAL HYPERTENSION: ICD-10-CM

## 2022-01-11 PROCEDURE — 3008F BODY MASS INDEX DOCD: CPT | Performed by: INTERNAL MEDICINE

## 2022-01-11 PROCEDURE — 3725F SCREEN DEPRESSION PERFORMED: CPT | Performed by: INTERNAL MEDICINE

## 2022-01-11 PROCEDURE — 99213 OFFICE O/P EST LOW 20 MIN: CPT | Performed by: INTERNAL MEDICINE

## 2022-01-11 PROCEDURE — 1036F TOBACCO NON-USER: CPT | Performed by: INTERNAL MEDICINE

## 2022-01-11 NOTE — PROGRESS NOTES
Virtual Regular Visit    Verification of patient location:    Patient is located in the following state in which I hold an active license [place]      Assessment/Plan:    Problem List Items Addressed This Visit        Cardiovascular and Mediastinum    Essential hypertension       Other    LFT elevation - Primary          BMI Counseling: Body mass index is 25 85 kg/m²  The BMI is above normal  Nutrition recommendations include decreasing portion sizes, encouraging healthy choices of fruits and vegetables and moderation in carbohydrate intake  Exercise recommendations include moderate physical activity 150 minutes/week  Rationale for BMI follow-up plan is due to patient being overweight or obese  Depression Screening and Follow-up Plan: Patient was screened for depression during today's encounter  They screened negative with a PHQ-2 score of 0  Reason for visit is   Chief Complaint   Patient presents with    Follow-up     review labs     Hypertension    BMI: followup plan        Encounter provider Konstantin Mendez MD    Provider located at [address] [place] [id number]      Recent Visits  No visits were found meeting these conditions  Showing recent visits within past 7 days and meeting all other requirements  Today's Visits  Date Type Provider Dept   01/11/22 Telemedicine Konstantin Mendez MD [facility]   Showing today's visits and meeting all other requirements  Future Appointments  No visits were found meeting these conditions  Showing future appointments within next 150 days and meeting all other requirements       The patient was identified by name and date of birth  Pineda Sheridan was informed that this is a telemedicine visit and that the visit is being conducted through [employer] and patient was informed that this is not a secure, HIPAA-compliant platform  She agrees to proceed     My office door was closed  No one else was in the room  She acknowledged consent and understanding of privacy and security of the video platform  The patient has agreed to participate and understands they can discontinue the visit at any time  Patient is aware this is a billable service  Vandana Gallegos is a 55 y o  female  Follow-up visit after having problems with anemia and increased LFTs          Patient with the previous history of cholecystitis pancreatitis has a problems with the anemia postoperative and also increased LFTs LFTs are back to normal except for the alkaline phosphatase is slightly high patient does not have any complaints she is doing well virtual visit was done while she was in the office no recent problems with the COVID-19 no abdominal pain nausea vomiting her hemoglobin hematocrit back to normal   Her blood pressure at home is good she is taking amlodipine for the blood pressure no other medication except Flonase plan is to continue with the present management if there is any problem she can call the office otherwise I will see her back in about 6 month       Past Medical History:   Diagnosis Date    GERD (gastroesophageal reflux disease)     Hypertension        Past Surgical History:   Procedure Laterality Date    BREAST BIOPSY Left 2006    core    CHOLECYSTECTOMY      CHOLECYSTECTOMY LAPAROSCOPIC N/A 12/4/2020    Procedure: CHOLECYSTECTOMY LAPAROSCOPIC, possible open;  Surgeon: Abel Crump MD;  Location: [facility];  Service: General    IR PICC PLACEMENT DOUBLE LUMEN  12/24/2020       Current Outpatient Medications   Medication Sig Dispense Refill    Calcium Carb-Cholecalciferol (CALTRATE 600+D) 600-800 MG-UNIT TABS Take 1 tablet by mouth daily      cyanocobalamin (VITAMIN B-12) 500 mcg tablet Take 1 tablet by mouth daily      estradiol (ESTRACE) 0 1 mg/g vaginal cream Use 0 5 gms nightly x 7 days, Then taper to 0 5 gms 2-3 times per week 42 5 g 1    fexofenadine-pseudoephedrine (ALLEGRA-D)  MG per tablet as needed       fluticasone (FLONASE) 50 mcg/act nasal spray 1 spray into each nostril daily 16 g 1    Multiple Vitamins-Minerals (ONE DAILY FOR WOMEN PO) daily       amLODIPine (NORVASC) 5 mg tablet Take 1 tablet (5 mg total) by mouth daily 90 tablet 1     No current facility-administered medications for this visit  Allergies   Allergen Reactions    Lactose - Food Allergy Other (See Comments)     intolerance    Other      SEASONAL ALLERGIES       Review of Systems   Constitutional: Negative for chills and fatigue  HENT: Negative for congestion, ear pain, hearing loss, postnasal drip, sinus pressure, sore throat and voice change  Eyes: Negative for pain, discharge and visual disturbance  Respiratory: Negative for cough, chest tightness and shortness of breath  Cardiovascular: Negative for chest pain, palpitations and leg swelling  Gastrointestinal: Negative for abdominal pain, blood in stool, diarrhea, nausea and rectal pain  Genitourinary: Negative for difficulty urinating, dysuria and urgency  Musculoskeletal: Negative for arthralgias and joint swelling  Skin: Negative for rash  Allergic/Immunologic: Negative for environmental allergies and food allergies  Neurological: Negative for dizziness, tremors, weakness, numbness and headaches  Hematological: Negative for adenopathy  Psychiatric/Behavioral: Negative for behavioral problems and hallucinations  Video Exam    Vitals:    01/11/22 0935   Weight: 77 1 kg (170 lb)   Height: 5' 8" (1 727 m)       Physical Exam  Constitutional:       Appearance: She is well-developed  HENT:      Head: Normocephalic and atraumatic  Nose: Nose normal    Eyes:      Extraocular Movements: Extraocular movements intact  Pupils: Pupils are equal, round, and reactive to light  Musculoskeletal:      Cervical back: Normal range of motion     Neurological:      Mental Status: She is alert  Psychiatric:         Behavior: Behavior normal           I spent 10 minutes directly with the patient during this visit    VIRTUAL VISIT DISCLAIMER      Pretty Paulino verbally agrees to participate in [employer]  Pt is aware that [employer] could be limited without vital signs or the ability to perform a full hands-on physical Marten Drop understands she or the provider may request at any time to terminate the video visit and request the patient to seek care or treatment in person

## 2022-02-10 DIAGNOSIS — I10 ESSENTIAL HYPERTENSION: ICD-10-CM

## 2022-02-10 RX ORDER — AMLODIPINE BESYLATE 5 MG/1
5 TABLET ORAL DAILY
Qty: 90 TABLET | Refills: 1 | Status: SHIPPED | OUTPATIENT
Start: 2022-02-10 | End: 2022-07-19

## 2022-04-11 NOTE — PATIENT INSTRUCTIONS
Breast Self Exam for Women   AMBULATORY CARE:   A breast self-exam (BSE)  is a way to check your breasts for lumps and other changes  Regular BSEs can help you know how your breasts normally look and feel  Most breast lumps or changes are not cancer, but you should always have them checked by a healthcare provider  Why you should do a BSE:  Breast cancer is the most common type of cancer in women  Even if you have mammograms, you may still want to do a BSE regularly  If you know how your breasts normally feel and look, it may help you know when to contact your healthcare provider  Mammograms can miss some cancers  You may find a lump during a BSE that did not show up on a mammogram   When you should do a BSE:  If you have periods, you may want to do your BSE 1 week after your period ends  This is the time when your breasts may be the least swollen, lumpy, or tender  You can do regular BSEs even if you are breastfeeding or have breast implants  Call your doctor if:   · You find any lumps or changes in your breasts  · You have breast pain or fluid coming from your nipples  · You have questions or concerns about your condition or care  How to do a BSE:       · Look at your breasts in a mirror  Look at the size and shape of each breast and nipple  Check for swelling, lumps, dimpling, scaly skin, or other skin changes  Look for nipple changes, such as a nipple that is painful or beginning to pull inward  Gently squeeze both nipples and check to see if fluid (that is not breast milk) comes out of them  If you find any of these or other breast changes, contact your healthcare provider  Check your breasts while you sit or  the following 3 positions:    ? Hang your arms down at your sides  ? Raise your hands and join them behind your head  ? Put firm pressure with your hands on your hips  Bend slightly forward while you look at your breasts in the mirror  · Lie down and feel your breasts    When you lie down, your breast tissue spreads out evenly over your chest  This makes it easier for you to feel for lumps and anything that may not be normal for your breasts  Do a BSE on one breast at a time  ? Place a small pillow or towel under your left shoulder  Put your left arm behind your head  ? Use the 3 middle fingers of your right hand  Use your fingertip pads, on the top of your fingers  Your fingertip pad is the most sensitive part of your finger  ? Use small circles to feel your breast tissue  Use your fingertip pads to make dime-sized, overlapping circles on your breast and armpits  Use light, medium, and firm pressure  First, press lightly  Second, press with medium pressure to feel a little deeper into the breast  Last, use firm pressure to feel deep within your breast     ? Examine your entire breast area  Examine the breast area from above the breast to below the breast where you feel only ribs  Make small circles with your fingertips, starting in the middle of your armpit  Make circles going up and down the breast area  Continue toward your breast and all the way across it  Examine the area from your armpit all the way over to the middle of your chest (breastbone)  Stop at the middle of your chest     ? Move the pillow or towel to your right shoulder, and put your right arm behind your head  Use the 3 fingertip pads of your left hand, and repeat the above steps to do a BSE on your right breast     What else you can do to check for breast problems or cancer:  Talk to your healthcare provider about mammograms  A mammogram is an x-ray of your breasts to screen for breast cancer or other problems  Your provider can tell you the benefits and risks of mammograms  The first mammogram is usually at age 39 or 48  Your provider may recommend you start at 36 or younger if your risk for breast cancer is high  Mammograms usually continue every 1 to 2 years until age 76         Follow up with your doctor as directed:  Write down your questions so you remember to ask them during your visits  © Copyright [employer] 2022 Information is for End User's use only and may not be sold, redistributed or otherwise used for commercial purposes  All illustrations and images included in CareNotes® are the copyrighted property of [employer]  or Latosha Lee  The above information is an  only  It is not intended as medical advice for individual conditions or treatments  Talk to your doctor, nurse or pharmacist before following any medical regimen to see if it is safe and effective for you  Wellness Visit for Adults   AMBULATORY CARE:   A wellness visit  is when you see your healthcare provider to get screened for health problems  Your healthcare provider will also give you advice on how to stay healthy  Write down your questions so you remember to ask them  Ask your healthcare provider how often you should have a wellness visit  What happens at a wellness visit:  Your healthcare provider will ask about your health, and your family history of health problems  This includes high blood pressure, heart disease, and cancer  He or she will ask if you have symptoms that concern you, if you smoke, and about your mood  You may also be asked about your intake of medicines, supplements, food, and alcohol  Any of the following may be done:  · Your weight  will be checked  Your height may also be checked so your body mass index (BMI) can be calculated  Your BMI shows if you are at a healthy weight  · Your blood pressure  and heart rate will be checked  Your temperature may also be checked  · Blood and urine tests  may be done  Blood tests may be done to check your cholesterol levels  Abnormal cholesterol levels increase your risk for heart disease and stroke  You may also need a blood or urine test to check for diabetes if you are at increased risk   Urine tests may be done to look for signs of an infection or kidney disease  · A physical exam  includes checking your heartbeat and lungs with a stethoscope  Your healthcare provider may also check your skin to look for sun damage  · Screening tests  may be recommended  A screening test is done to check for diseases that may not cause symptoms  The screening tests you may need depend on your age, gender, family history, and lifestyle habits  For example, colorectal screening may be recommended if you are 48years old or older  Screening tests you need if you are a woman:   · A Pap smear  is used to screen for cervical cancer  Pap smears are usually done every 3 to 5 years depending on your age  You may need them more often if you have had abnormal Pap smear test results in the past  Ask your healthcare provider how often you should have a Pap smear  · A mammogram  is an x-ray of your breasts to screen for breast cancer  Experts recommend mammograms every 2 years starting at age 48 years  You may need a mammogram at age 52 years or younger if you have an increased risk for breast cancer  Talk to your healthcare provider about when you should start having mammograms and how often you need them  Vaccines you may need:   · Get an influenza vaccine  every year  The influenza vaccine protects you from the flu  Several types of viruses cause the flu  The viruses change over time, so new vaccines are made each year  · Get a tetanus-diphtheria (Td) booster vaccine  every 10 years  This vaccine protects you against tetanus and diphtheria  Tetanus is a severe infection that may cause painful muscle spasms and lockjaw  Diphtheria is a severe bacterial infection that causes a thick covering in the back of your mouth and throat  · Get a human papillomavirus (HPV) vaccine  if you are female and aged 23 to 32 or male 23 to 24 and never received it  This vaccine protects you from HPV infection  HPV is the most common infection spread by sexual contact   HPV may also cause vaginal, penile, and anal cancers  · Get a pneumococcal vaccine  if you are aged 72 years or older  The pneumococcal vaccine is an injection given to protect you from pneumococcal disease  Pneumococcal disease is an infection caused by pneumococcal bacteria  The infection may cause pneumonia, meningitis, or an ear infection  · Get a shingles vaccine  if you are 60 or older, even if you have had shingles before  The shingles vaccine is an injection to protect you from the varicella-zoster virus  This is the same virus that causes chickenpox  Shingles is a painful rash that develops in people who had chickenpox or have been exposed to the virus  How to eat healthy:  My Plate is a model for planning healthy meals  It shows the types and amounts of foods that should go on your plate  Fruits and vegetables make up about half of your plate, and grains and protein make up the other half  A serving of dairy is included on the side of your plate  The amount of calories and serving sizes you need depends on your age, gender, weight, and height  Examples of healthy foods are listed below:  · Eat a variety of vegetables  such as dark green, red, and orange vegetables  You can also include canned vegetables low in sodium (salt) and frozen vegetables without added butter or sauces  · Eat a variety of fresh fruits , canned fruit in 100% juice, frozen fruit, and dried fruit  · Include whole grains  At least half of the grains you eat should be whole grains  Examples include whole-wheat bread, wheat pasta, brown rice, and whole-grain cereals such as oatmeal     · Eat a variety of protein foods such as seafood (fish and shellfish), lean meat, and poultry without skin (turkey and chicken)  Examples of lean meats include pork leg, shoulder, or tenderloin, and beef round, sirloin, tenderloin, and extra lean ground beef   Other protein foods include eggs and egg substitutes, beans, peas, soy products, nuts, and seeds  · Choose low-fat dairy products such as skim or 1% milk or low-fat yogurt, cheese, and cottage cheese  · Limit unhealthy fats  such as butter, hard margarine, and shortening  Exercise:  Exercise at least 30 minutes per day on most days of the week  Some examples of exercise include walking, biking, dancing, and swimming  You can also fit in more physical activity by taking the stairs instead of the elevator or parking farther away from stores  Include muscle strengthening activities 2 days each week  Regular exercise provides many health benefits  It helps you manage your weight, and decreases your risk for type 2 diabetes, heart disease, stroke, and high blood pressure  Exercise can also help improve your mood  Ask your healthcare provider about the best exercise plan for you  General health and safety guidelines:   · Do not smoke  Nicotine and other chemicals in cigarettes and cigars can cause lung damage  Ask your healthcare provider for information if you currently smoke and need help to quit  E-cigarettes or smokeless tobacco still contain nicotine  Talk to your healthcare provider before you use these products  · Limit alcohol  A drink of alcohol is 12 ounces of beer, 5 ounces of wine, or 1½ ounces of liquor  · Lose weight, if needed  Being overweight increases your risk of certain health conditions  These include heart disease, high blood pressure, type 2 diabetes, and certain types of cancer  · Protect your skin  Do not sunbathe or use tanning beds  Use sunscreen with a SPF 15 or higher  Apply sunscreen at least 15 minutes before you go outside  Reapply sunscreen every 2 hours  Wear protective clothing, hats, and sunglasses when you are outside  · Drive safely  Always wear your seatbelt  Make sure everyone in your car wears a seatbelt  A seatbelt can save your life if you are in an accident  Do not use your cell phone when you are driving   This could distract you and cause an accident  Pull over if you need to make a call or send a text message  · Practice safe sex  Use latex condoms if are sexually active and have more than one partner  Your healthcare provider may recommend screening tests for sexually transmitted infections (STIs)  · Wear helmets, lifejackets, and protective gear  Always wear a helmet when you ride a bike or motorcycle, go skiing, or play sports that could cause a head injury  Wear protective equipment when you play sports  Wear a lifejacket when you are on a boat or doing water sports  © Copyright [employer] 2022 Information is for End User's use only and may not be sold, redistributed or otherwise used for commercial purposes  All illustrations and images included in CareNotes® are the copyrighted property of [employer] Inc  or Latosha Man   The above information is an  only  It is not intended as medical advice for individual conditions or treatments  Talk to your doctor, nurse or pharmacist before following any medical regimen to see if it is safe and effective for you  Perineal Hygiene     No soaps or feminine wash to the vulva  Use only water to cleanse, or water with Dove or eduplanet [employer] if necessary  No lotion to the area  Use only coconut oil for moisture if needed   No douching     Cotton underware, loose fitting clothing  Only perfume-free, dye-free laundry detergent, use a second rinse cycle   Avoid fabric softeners/dryer sheets  Coconut oil as a lubricant (if not using condoms) or another scent-free lubricant (Astroglide, Uberlube) if needed  Partner to avoid the same products as well  Over the counter probiotic to restore vaginal mulugeta may be helpful as well     You may also look into Boric Acid vaginal suppositories to restore vaginal PH balance for up to 2 weeks as directed on the box   You may not use these if you are pregnant    Kegel Exercises for Women AMBULATORY CARE:   Kegel exercises  help strengthen your pelvic muscles  Pelvic muscles hold your pelvic organs, such as your bladder and uterus, in place  Kegel exercises help prevent or control problems with urine incontinence (leakage)  Incontinence may be caused by pregnancy, childbirth, or menopause  Contact your healthcare provider if:   · You cannot feel your muscles tighten or relax  · You continue to leak urine  · You have questions or concerns about your condition or care  Use the correct muscles:  Pelvic muscles are the muscles you use to control urine flow  To target these muscles, stop and start the flow of urine several times  This will help you become familiar with how it feels to tighten and relax these muscles  How to do Kegel exercises:   · Empty your bladder  You may lie down, stand up, or sit down to do these exercises  When you first try to do these exercises, it may be easier if you lie down  Tighten or squeeze your pelvic muscles slowly  It may feel like you are trying to hold back urine or gas  Hold this position for 3 seconds  Relax for 3 seconds  Repeat this cycle 10 times  · Do 10 sets of Kegel exercises, at least 3 times a day  Do not hold your breath when you do Kegel exercises  Keep your stomach, back, and leg muscles relaxed  · As your muscles get stronger, you will be able to hold the squeeze longer  Your healthcare provider may ask that you increase your pelvic muscle squeeze to 10 seconds  After you squeeze for 10 seconds, relax for 10 seconds  What else you should know:   · Once you know how to do Kegel exercises, use different positions  You can do these exercises while you lie on the floor, sit at your desk or watch TV, and while you stand  · You may notice improved bladder control within about 6 weeks  · Tighten your pelvic muscles before you sneeze, cough, or lift to prevent urine leakage      Follow up with your doctor as directed:  Write down your questions so you remember to ask them during your visits  © Copyright [employer] 2022 Information is for End User's use only and may not be sold, redistributed or otherwise used for commercial purposes  All illustrations and images included in CareNotes® are the copyrighted property of [employer]  or Latosha Lee  The above information is an  only  It is not intended as medical advice for individual conditions or treatments  Talk to your doctor, nurse or pharmacist before following any medical regimen to see if it is safe and effective for you  For vaginal dryness: You may use:     Coconut oil (organic, pure, unscented) as needed for moisture or lubrication  ( Do not use if allergic)       Replens moisture restore external comfort gel daily ( use as directed on the box)        Replens long lasting vaginal moisturizer  ( use as directed on the box)       For Vaginal Lubrication:        You may use:     Coconut oil (organic, pure, unscented) as needed for lubrication during intercourse  (Do not use if allergic)               Replens silky smooth lubricant, premium silicone based lubricant for intercourse  ( use as directed, a small amount will provide an enhanced natural feeling)     Any premium over the counter vaginal lubricant water or silicone based  Silicone based will have more staying power  For Vulvar hygiene:     No soaps or feminine wash to the vulva with the exception of Dove or Dove Sensitive Skin bar soap if necessary  Only perfume-free, dye-free laundry detergent, use a second rinse cycle  Avoid fabric softeners/dryer sheets  No lotion to the area  No Douching   Coconut oil as a lubricant (if not using condoms) or another scent-free premium lubricant  Loose fitting cotton underwear and loose fitting outer clothing   Partner to avoid the same products as well  Over the counter probiotic taken orally may help to restore vaginal mulugeta  Menopause   WHAT YOU NEED TO KNOW:   What is menopause? Menopause is a normal stage in a woman's life when her monthly periods stop  You are considered to be in menopause when you have not had a period for a full year after the age of 36  Menopause usually occurs between ages 52 to 48  Perimenopause is a stage before menopause that may cause signs and symptoms similar to menopause  Perimenopause may start about 4 years before menopause  What causes menopause? Menopause starts when the ovaries stop making the female hormones estrogen and progesterone  After menopause, you are no longer able to become pregnant  Any of the following may trigger menopause or early menopause:  · Surgery, including a hysterectomy or oophorectomy    · Family history of early menopause    · Smoking    · Chemotherapy or pelvic radiation    · Chromosome abnormalities, including Mckenna syndrome and Fragile X syndrome    · Premature ovarian insufficiency (the ovaries stop producing eggs before age 36)    What are the signs and symptoms of menopause? You may have any of the following:  · Irregular menstrual cycles with heavy vaginal bleeding followed by decreased bleeding until it stops    · Hot flashes (feeling warm, flushed, and sweaty)    · Vaginal changes such as increased dryness    · Mood changes such as anxiety, depression, or decreased desire to have sex    · Trouble sleeping, joint pain, headaches    · Brittle nails, hair on chin or chest where it is normally absent    · Decrease in breast size and change in skin texture    · Weight gain    How is menopause treated or managed? · Hormone replacement therapy (HRT) is medicine that replaces your low hormone levels  HRT contains estrogen and sometimes progestin  ? HRT has several benefits  HRT helps prevent osteoporosis, which decreases your risk for bone fractures  HRT also protects you from colorectal cancer  ? HRT also has some risks    HRT increases your risk for breast cancer, blood clots, heart disease, a heart attack, or a stroke  If you are 72 years or older, HRT can also increase your risk for dementia  Your risk for uterine or endometrial cancer is higher if you take estrogen-only HRT  · Manage hot flashes  Hot flashes are brief periods of feeling very warm, flushed, and sweaty  Hot flashes can last from a few seconds to several minutes  They may happen many times during the day, and are common at night  Layer your clothing so that you can easily remove some clothing and cool yourself during a hot flash  Cold drinks may also be helpful  Non-hormone medicines can help relieve or prevent hot flashes  Examples include certain antidepressants, nerve medicines, and high blood pressure medicines  · Reduce vaginal dryness by using over-the-counter vaginal creams  Vaginal dryness may cause you to have pain or discomfort during sex  Only use creams that are made for vaginal use  Do  not  use petroleum jelly  You may put an estrogen cream in and around your vagina  Estrogen cream may help decrease vaginal dryness and lower your risk of vaginal infections  · Continue to use birth control during perimenopause if you do not want to get pregnant  You may need to use birth control until it has been 1 year since your periods stopped  Ask your healthcare provider when you can stop using birth control to prevent pregnancy  How can I live a healthy lifestyle during and after menopause? After menopause, your risk for heart disease and bone loss increases  Ask about these and other ways to stay healthy:  · Exercise regularly  Exercise helps you maintain a healthy weight  Exercise can also help to control your blood pressure and cholesterol levels  Include weight-bearing exercise for strong bones  Weight bearing exercise is recommended for at least 30 minutes, 3 times a week  Ask your healthcare provider about the best exercise plan for you           · Eat a variety of healthy foods  Include fruits, vegetables, whole grains (whole-wheat bread, pasta, and cereals), low-fat dairy, and lean protein foods (beans, poultry, and fish)  Limit foods high in sodium (salt)  Ask your healthcare provider for more information about a meal plan that is right for you  · Do not smoke  If you smoke, it is never too late to quit  You are more likely to have a heart attack, lung disease, blood clots, and cancer if you smoke  Ask your healthcare provider for information if you need help quitting  · Take supplements as directed  You may need extra calcium and vitamin D to help prevent osteoporosis  · Limit alcohol and caffeine  Alcohol and caffeine may worsen your symptoms  When should I call my doctor? · You have vaginal bleeding after menopause  · You have questions or concerns about your condition or care  CARE AGREEMENT:   You have the right to help plan your care  Learn about your health condition and how it may be treated  Discuss treatment options with your healthcare providers to decide what care you want to receive  You always have the right to refuse treatment  The above information is an  only  It is not intended as medical advice for individual conditions or treatments  Talk to your doctor, nurse or pharmacist before following any medical regimen to see if it is safe and effective for you  © Copyright [employer] 2022 Information is for End User's use only and may not be sold, redistributed or otherwise used for commercial purposes  All illustrations and images included in CareNotes® are the copyrighted property of [employer]  or Latosha Man       Vaginal Atrophy   AMBULATORY CARE:   Vaginal atrophy  is a condition that causes thinning, drying, and inflammation of vaginal tissue  This condition is caused by decreased levels of estrogen (a female sex hormone)   Vaginal atrophy can increase your risk for vaginal and urinary tract infections  Vaginal atrophy can worsen over time if not treated  Common signs and symptoms include the following:   · Vaginal dryness, itching, and burning    · Vaginal discharge    · Pain or discomfort during sex    · Light bleeding after sex    · Burning during urination    · Frequent, sudden, strong urges to urinate    · Urinary incontinence (loss of control of your bladder)    Contact your healthcare provider if:   · You have a foul-smelling odor coming from your vagina  · You have a thick, cheese-like discharge from your vagina  · You have itching, swelling, or redness in your vagina  · You have pain or burning when you urinate  · Your urine smells bad  · Your symptoms do not improve, or they get worse  · You have questions or concerns about your condition or care  Treatment:   · Over-the counter vaginal moisturizers  can help reduce dryness  Your healthcare provider may recommend that you use a vaginal moisturizer several times each week and during sex  Only use creams that are made for vaginal use  Do  not  use petroleum jelly  Lubricants can be used during sex to decrease pain and discomfort  · Estrogen  may help decrease dryness  It may also lower your risk of vaginal infections if you are going through menopause  It can also help to relieve urinary symptoms  Estrogen may be prescribed in the form of a cream, tablet, or ring  These medicines can be applied or inserted into the vagina  Estrogen can also be prescribed in the form of a pill  Follow up with your doctor as directed:  Write down your questions so you remember to ask them during your visits  © [employer] 2022 Information is for End User's use only and may not be sold, redistributed or otherwise used for commercial purposes  All illustrations and images included in CareNotes® are the copyrighted property of [employer]  or [facility] Roberto Man   The above information is an  only   It is not intended as medical advice for individual conditions or treatments  Talk to your doctor, nurse or pharmacist before following any medical regimen to see if it is safe and effective for you

## 2022-04-11 NOTE — PROGRESS NOTES
Diagnoses and all orders for this visit:    Encounter for gynecological examination without abnormal finding        Health Maintenance:    Last PAP: 11/18/2019  Next PAP Due:    Last Mammogram: 05/10/2021  advised age 37  Next Mammogram:    Last Colonoscopy: 06/04/2019    advised age 40    Gardisil: Completed / Not completed     Subjective    CC: Yearly Exam      Gisele Nieto is a 55 y o  female here for an annual exam  Sue Quispe  GYN hx includes:  No personal Hx of breast, cervical, ovarian or colon CA  Family hx of: *** No GYN cancers  Medically stable, reports no changes in medical Hx, follows with PMD    GYN  Complaints: {Actions; denies-reports:616357}  Denies {ROS GYN SYMPTOMS PEDS:51656}  Menarche at age ***  Periods are {gyn period regularity:715}, lasting {numbers; 0-10:46895} days  Dysmenorrhea:{gyn dysmenorrhea:716}  Cyclic symptoms include {sys gyn cyclic UP:77610}  Sexually active: {yes/no for sexual activity:69985}  Hx STI: {Actions; denies-reports:583105}     Her menstrual cycles are {Frequencies; period menses:715}  She {Actions; denies-reports:590887} issues with bleeding or her menses  {Actions; denies-reports:65500} history of abnormal pap smear  She denies breast concerns, abnormal vaginal discharge, vaginal itching, odor, irritation, bowel/bladder dysfunction, urinary symptoms, pelvic pain, or dyspareunia today  She {IS/ IS NOT:90601} sexually active  Monogamous relationship  Her current method of contraception includes {Contraception Options:93830}  Denies any issues with her BCM  Gay Zepeda She {ACTIONS; DOES/DOES YAL:08021} want STD testing today    Denies intimate partner violence    Past Medical History:   Diagnosis Date    GERD (gastroesophageal reflux disease)     Hypertension      Past Surgical History:   Procedure Laterality Date    BREAST BIOPSY Left 2006    core    CHOLECYSTECTOMY      CHOLECYSTECTOMY LAPAROSCOPIC N/A 12/4/2020    Procedure: CHOLECYSTECTOMY LAPAROSCOPIC, possible open;  Surgeon: Isa Siemens, MD;  Location: [facility];  Service: General    IR PICC PLACEMENT DOUBLE LUMEN  12/24/2020       Immunization History   Administered Date(s) Administered    COVID-19 PFIZER VACCINE 0 3 ML IM 09/06/2021, 09/26/2021    INFLUENZA 10/27/2014, 12/08/2015, 12/01/2016, 10/25/2017, 11/18/2018, 12/08/2019, 11/17/2020    Influenza Quadrivalent Preservative Free 3 years and older IM 12/01/2016, 10/25/2017    Influenza Quadrivalent Recombinant Preservative Free IM 11/17/2020    Influenza, injectable, quadrivalent, preservative free 0 5 mL 12/08/2019    Influenza, recombinant, quadrivalent,injectable, preservative free 12/07/2021    Influenza, seasonal, injectable 11/01/2015    Tdap 04/21/2016       Family History   Problem Relation Age of Onset    Arthritis Mother     Skin cancer Mother     Skin cancer Father     Arthritis Maternal Grandmother     Hiatal hernia Maternal Grandfather     Arthritis Family     Hiatal hernia Family     Skin cancer Family     Varicose Veins Family         Of lower extremities    Lung cancer Brother     No Known Problems Paternal Grandmother     No Known Problems Paternal Grandfather     No Known Problems Maternal Aunt     No Known Problems Paternal Aunt      Social History     Tobacco Use    Smoking status: Never Smoker    Smokeless tobacco: Never Used   Vaping Use    Vaping Use: Never used   Substance Use Topics    Alcohol use: Yes     Comment: Social drinker    Drug use: No       Current Outpatient Medications:     amLODIPine (NORVASC) 5 mg tablet, Take 1 tablet (5 mg total) by mouth daily, Disp: 90 tablet, Rfl: 1    Calcium Carb-Cholecalciferol (CALTRATE 600+D) 600-800 MG-UNIT TABS, Take 1 tablet by mouth daily, Disp: , Rfl:     cyanocobalamin (VITAMIN B-12) 500 mcg tablet, Take 1 tablet by mouth daily, Disp: , Rfl:     estradiol (ESTRACE) 0 1 mg/g vaginal cream, Use 0 5 gms nightly x 7 days, Then taper to 0 5 gms 2-3 times per week, Disp: 42 5 g, Rfl: 1    fexofenadine-pseudoephedrine (ALLEGRA-D)  MG per tablet, as needed , Disp: , Rfl:     fluticasone (FLONASE) 50 mcg/act nasal spray, 1 spray into each nostril daily, Disp: 16 g, Rfl: 1    Multiple Vitamins-Minerals (ONE DAILY FOR WOMEN PO), daily , Disp: , Rfl:   Patient Active Problem List    Diagnosis Date Noted    LFT elevation 2021    Vaginal atrophy 2021    Breast cancer screening by mammogram 2021   Kaleb 75 maintenance 2021    Encounter for gynecological examination without abnormal finding 2021    Essential hypertension 2020       Allergies   Allergen Reactions    Lactose - Food Allergy Other (See Comments)     intolerance    Other      SEASONAL ALLERGIES       OB History    Para Term  AB Living   0 0 0 0 0 0   SAB IAB Ectopic Multiple Live Births   0 0 0 0 0       There were no vitals filed for this visit  There is no height or weight on file to calculate BMI  Review of Systems     Constitutional: Negative for chills, fatigue, fever, headaches, visual disturbances, and unexpected weight change  Respiratory: Negative for cough, & shortness of breath  Cardiovascular: Negative for chest pain       Gastrointestinal: Negative for Abd pain, nausea & vomiting, constipation and diarrhea  Genitourinary: Negative for difficulty urinating, dysuria, hematuria, dyspareunia, unusual vaginal bleeding or discharge  Skin: Negative skin changes    Physical Exam     Constitutional: Alert & Oriented x3, well-developed and well-nourished  No distress  HENT: Atraumatic, Normocephalic, Conjunctivae clear  Neck: Normal range of motion  Neck supple  No thyromegaly, mass, nodules or tenderness  Pulmonary: Effort normal  Lungs clear to ascultation bilateral  Cardiac: RRR, no murmur   Abdominal: Soft   No tenderness or masses  Musculoskeletal: Normal ROM  Skin: Warm & Dry  Psychological: Normal mood, thought content, behavior & judgement     Breasts:   Right: tissue soft without masses, tenderness, skin changes or nipple discharge  No areas of erythema or pain  No subclavicular, axillary, pectoral adenopathy  Left:  tissue soft without masses, tenderness, skin changes or nipple discharge  No areas of erythema or pain  No subclavicular, axillary, pectoral adenopathy    Pelvic exam was performed with patient supine, lithotomy position  Labia: Negative rash, tenderness, lesion or injury on the right labia  Negative rash, tenderness, lesion or injury on the left labia  Urethral meatus:  Negative for  tenderness, inflammation or discharge  Uterus: not deviated, enlarged, fixed or tender  Cervix: No CMT, no discharge or friability  Right adnexa: no mass, no tenderness and no fullness  Left adnexa: no mass, no tenderness and no fullness  Vagina: No erythema, tenderness, masses, or foreign body in the vagina  No signs of injury around the vagina  No unusual vaginal discharge   Perineum without lesions, signs of injury, erythema or swelling  Inguinal Canal:        Right: No inguinal adenopathy or hernia present  Left: No inguinal adenopathy or hernia present  OBGyn Exam    Perineal hygiene reviewed   Weight bearing exercises minium of 150 mins/weekly advised  Kegel exercises recommended  SBE encouraged, ASCCP guidelines reviewed  Condoms encouraged with all sexual activity to prevent STI's  Gardisil vaccines recommended up to age 39  Calcium/ Vit D dietary requirements discussed,   Advised to call with any issues,  all concerns & questions addressed     See provided information in your after visit summary     F/U Annually and PRN

## 2022-04-14 ENCOUNTER — ANNUAL EXAM (OUTPATIENT)
Dept: OBGYN CLINIC | Facility: MEDICAL CENTER | Age: 57
End: 2022-04-14
Payer: COMMERCIAL

## 2022-04-14 VITALS
WEIGHT: 175.4 LBS | SYSTOLIC BLOOD PRESSURE: 124 MMHG | BODY MASS INDEX: 26.58 KG/M2 | HEIGHT: 68 IN | DIASTOLIC BLOOD PRESSURE: 74 MMHG

## 2022-04-14 DIAGNOSIS — N95.2 VAGINAL ATROPHY: ICD-10-CM

## 2022-04-14 DIAGNOSIS — Z12.31 ENCOUNTER FOR SCREENING MAMMOGRAM FOR MALIGNANT NEOPLASM OF BREAST: ICD-10-CM

## 2022-04-14 DIAGNOSIS — Z01.419 ENCOUNTER FOR GYNECOLOGICAL EXAMINATION WITHOUT ABNORMAL FINDING: Primary | ICD-10-CM

## 2022-04-14 PROCEDURE — G0145 SCR C/V CYTO,THINLAYER,RESCR: HCPCS | Performed by: OBSTETRICS & GYNECOLOGY

## 2022-04-14 PROCEDURE — G0476 HPV COMBO ASSAY CA SCREEN: HCPCS | Performed by: OBSTETRICS & GYNECOLOGY

## 2022-04-14 PROCEDURE — 99396 PREV VISIT EST AGE 40-64: CPT | Performed by: OBSTETRICS & GYNECOLOGY

## 2022-04-14 PROCEDURE — 3008F BODY MASS INDEX DOCD: CPT | Performed by: OBSTETRICS & GYNECOLOGY

## 2022-04-14 PROCEDURE — 1036F TOBACCO NON-USER: CPT | Performed by: OBSTETRICS & GYNECOLOGY

## 2022-04-14 RX ORDER — ESTRADIOL 0.1 MG/G
0.5 CREAM VAGINAL 2 TIMES WEEKLY
Qty: 42.5 G | Refills: 0 | Status: SHIPPED | OUTPATIENT
Start: 2022-04-14 | End: 2022-07-19

## 2022-04-14 RX ORDER — ESTRADIOL 0.1 MG/G
CREAM VAGINAL
Qty: 42.5 G | Refills: 3 | Status: SHIPPED | OUTPATIENT
Start: 2022-04-14 | End: 2022-04-14

## 2022-04-14 NOTE — PROGRESS NOTES
Diagnoses and all orders for this visit:    Encounter for gynecological examination without abnormal finding  -     Liquid-based pap, screening    Encounter for screening mammogram for malignant neoplasm of breast  -     Mammo screening bilateral w 3d & cad; Future    Vaginal atrophy  -     estradiol (ESTRACE) 0 1 mg/g vaginal cream; Use 0 5 gms 2 x weekly       Health Maintenance:    Last PAP: 2022  Next PAP Due:collected today     Last Mammogram:05/10/2021  Next Mammogram:order given    Last Colonoscopy:2019    Last DEXA: Not on file    Pleasant 55 y o  , female   postmenopausal x 6 years here for annual exam    No personal hx of breast, cervical, ovarian or colon CA  GYN hx includes: Nullparous  Family Hx:No GYN cancers   She reports no new changes in her health  Medically stable  Follows with family MD,   Reports over the past year she feels cold all the time, wears a coat at work often, advised to follow up with family MD with continued concerns     Denies history of abnormal pap smears  Denies any breast concerns  Reports abnormal Mammograms with Biopsy of left breast: Neg findings  Denies postmenopausal bleeding or issues with vasomotor symptoms  Denies vaginal issues  Dryness & atrophy much improved  Denies pelvic pain   Denies dyspareunia   Denies symptoms of pelvic organ prolapse, urinary, or fecal incontinence  is sexually active  Monogamous relationship  Denies STI concerns   declines STD testing   Denies intimate partner violence    Past Medical History:   Diagnosis Date    GERD (gastroesophageal reflux disease)     Hypertension      Past Surgical History:   Procedure Laterality Date    BREAST BIOPSY Left 2006    core    CHOLECYSTECTOMY      CHOLECYSTECTOMY LAPAROSCOPIC N/A 2020    Procedure: CHOLECYSTECTOMY LAPAROSCOPIC, possible open;  Surgeon: Kalin Stuart MD;  Location: [facility];  Service: General    IR PICC PLACEMENT DOUBLE LUMEN  2020      Family History   Problem Relation Age of Onset    Arthritis Mother     Skin cancer Mother     Skin cancer Father     Arthritis Maternal Grandmother     Hiatal hernia Maternal Grandfather     Arthritis Family     Hiatal hernia Family     Skin cancer Family     Varicose Veins Family         Of lower extremities    Lung cancer Brother     No Known Problems Paternal Grandmother     No Known Problems Paternal Grandfather     No Known Problems Maternal Aunt     No Known Problems Paternal Aunt      Social History     Tobacco Use    Smoking status: Never Smoker    Smokeless tobacco: Never Used   Vaping Use    Vaping Use: Never used   Substance Use Topics    Alcohol use: Yes     Comment: Social drinker    Drug use: No       Current Outpatient Medications:     Calcium Carb-Cholecalciferol (CALTRATE 600+D) 600-800 MG-UNIT TABS, Take 1 tablet by mouth daily, Disp: , Rfl:     cyanocobalamin (VITAMIN B-12) 500 mcg tablet, Take 1 tablet by mouth daily, Disp: , Rfl:     fexofenadine-pseudoephedrine (ALLEGRA-D)  MG per tablet, as needed , Disp: , Rfl:     fluticasone (FLONASE) 50 mcg/act nasal spray, 1 spray into each nostril daily, Disp: 16 g, Rfl: 1    Multiple Vitamins-Minerals (ONE DAILY FOR WOMEN PO), daily , Disp: , Rfl:     amLODIPine (NORVASC) 5 mg tablet, Take 1 tablet (5 mg total) by mouth daily, Disp: 90 tablet, Rfl: 1    estradiol (ESTRACE) 0 1 mg/g vaginal cream, Insert 0 5 g into the vagina 2 (two) times a week Apply a pea sized amount to external vaginal opening and urethra area twice weekly, Disp: 42 5 g, Rfl: 0  Patient Active Problem List    Diagnosis Date Noted    LFT elevation 07/06/2021    Vaginal atrophy 04/08/2021    Breast cancer screening by mammogram 04/08/2021   Kaleb 75 maintenance 04/08/2021    Encounter for gynecological examination without abnormal finding 04/07/2021    Essential hypertension 12/01/2020       Allergies   Allergen Reactions    Lactose - Food Allergy Other (See Comments)     intolerance    Other      SEASONAL ALLERGIES       OB History    Para Term  AB Living   0 0 0 0 0 0   SAB IAB Ectopic Multiple Live Births   0 0 0 0 0       Vitals:    22 0657   BP: 124/74   BP Location: Left arm   Patient Position: Sitting   Cuff Size: Large   Weight: 79 6 kg (175 lb 6 4 oz)   Height: 5' 8" (1 727 m)     Body mass index is 26 67 kg/m²  Review of Systems     Constitutional: Negative for chills, fatigue, fever, headaches, visual disturbances, and unexpected weight change  Respiratory: Negative for cough, & shortness of breath  Cardiovascular: Negative for chest pain       Gastrointestinal: Negative for Abd pain, nausea & vomiting, constipation and diarrhea  Genitourinary: Negative for difficulty urinating, dysuria, hematuria, , unusual vaginal bleeding or discharge  dyspareunia  Skin: Negative skin changes    Physical Exam     Constitutional: Alert & Oriented x3, well-developed and well-nourished  No distress  HENT: Atraumatic, Normocephalic, Conjunctivae clear  Neck: Normal range of motion  Neck supple  No thyromegaly, mass, nodules or tenderness  Pulmonary: Effort normal  Lungs clear to ascultation bilateral  Cardiac: RRR, no murmur   Abdominal: Soft  No tenderness or masses  Musculoskeletal: Normal ROM  Skin: Warm & Dry  Psychological: Normal mood, thought content, behavior & judgement     Breasts:   Right: tissue soft without masses, tenderness, skin changes or nipple discharge  No areas of erythema or pain  No subclavicular, axillary, pectoral adenopathy  Left:  tissue soft without masses, tenderness, skin changes or nipple discharge  No areas of erythema or pain  No subclavicular, axillary, pectoral adenopathy  Scar from biopsy     Pelvic exam was performed with patient supine, lithotomy position  Exam is consistent with mild  atrophic changes   Improved form last years exam  Vulva/ Vestibule: Right: Negative rash, tenderness, lesion or injury                               Left: Negative rash, tenderness, lesion or injury   Urethral meatus: Negative for  tenderness, inflammation or discharge  Uterus: not deviated, enlarged, fixed or tender  Cervix: No CMT, no discharge or friability  Right adnexa: no mass, no tenderness and no fullness  Left adnexa: no mass, no tenderness and no fullness  Vagina: Consistent with mild  atrophic changes  No erythema, tenderness, masses, or foreign body in the vagina  No signs of injury around the vagina  No unusual vaginal discharge   Perineum without lesions, signs of injury, erythema or swelling  Inguinal Canal:        Right: No inguinal adenopathy or hernia present  Left: No inguinal adenopathy or hernia present  Advised to call with any Post Menopausal bleeding  Calcium/ Vit D dietary requirements discussed,   Weight bearing exercises minium of 150 mins/weekly advised  Kegel exercises advised   Reviewed perineal hygiene and vaginal dryness post menopause  SBE and yearly mammography advised  ASCCP guidelines reviewed  Will discontinue PAP's according to recommendations  Condoms encouraged with all sexual activity to prevent STI's  Health maintenance encouraged with PMD, remain current with Colonoscopy, Dexa scan, and recommended vaccines  Advised to call with any issues, all concerns & questions addressed     See after visit summary for further information    F/U annually or Biannual if Medicare

## 2022-04-19 ENCOUNTER — TELEPHONE (OUTPATIENT)
Dept: GASTROENTEROLOGY | Facility: CLINIC | Age: 57
End: 2022-04-19

## 2022-04-19 NOTE — TELEPHONE ENCOUNTER
Patients GI provider:  Dr Jaime Del Rosario    Number to return call: ([phone])    Reason for call: Pt calling to schedule an EGD she was due for in December of 2021  Had in June of 2021 and was to repeat in 6 months  States she wasn't notified  Please call, thank you!     Scheduled procedure/appointment date if applicable: Apt/procedure  Last seen in June of 2021

## 2022-04-22 LAB
LAB AP GYN PRIMARY INTERPRETATION: NORMAL
Lab: NORMAL

## 2022-04-26 NOTE — TELEPHONE ENCOUNTER
LMOM for pt to callback to schedule repeat EGD with Dr Gifty Nesbitt  Offered 5/5 at [facility]

## 2022-04-27 ENCOUNTER — PREP FOR PROCEDURE (OUTPATIENT)
Dept: GASTROENTEROLOGY | Facility: CLINIC | Age: 57
End: 2022-04-27

## 2022-04-27 DIAGNOSIS — K80.50 CHOLEDOCHOLITHIASIS: Primary | ICD-10-CM

## 2022-04-27 NOTE — TELEPHONE ENCOUNTER
TC to pt to schedule repeat EGD      Scheduled date of EGD(as of today):  5/9/22  Physician performing EGD:  Dr Bridget Velez  Location of EGD:  [facility]  Instructions reviewed with patient by:  Zaira Harris - mailed/emailed to pt  Clearances: n/a

## 2022-04-29 ENCOUNTER — TELEPHONE (OUTPATIENT)
Dept: OBGYN CLINIC | Facility: CLINIC | Age: 57
End: 2022-04-29

## 2022-04-29 NOTE — TELEPHONE ENCOUNTER
Left message to patient on 4/29/2022 at 5:19 pm on the number on file to inform her that her pap smear and HPV result came out all negative

## 2022-05-05 ENCOUNTER — ANESTHESIA EVENT (OUTPATIENT)
Dept: ANESTHESIOLOGY | Facility: HOSPITAL | Age: 57
End: 2022-05-05

## 2022-05-05 ENCOUNTER — ANESTHESIA (OUTPATIENT)
Dept: ANESTHESIOLOGY | Facility: HOSPITAL | Age: 57
End: 2022-05-05

## 2022-05-09 ENCOUNTER — ANESTHESIA EVENT (OUTPATIENT)
Dept: GASTROENTEROLOGY | Facility: AMBULARY SURGERY CENTER | Age: 57
End: 2022-05-09

## 2022-05-09 ENCOUNTER — HOSPITAL ENCOUNTER (OUTPATIENT)
Dept: GASTROENTEROLOGY | Facility: AMBULARY SURGERY CENTER | Age: 57
Setting detail: OUTPATIENT SURGERY
Discharge: HOME | End: 2022-05-09
Attending: INTERNAL MEDICINE
Payer: COMMERCIAL

## 2022-05-09 ENCOUNTER — ANESTHESIA (OUTPATIENT)
Dept: GASTROENTEROLOGY | Facility: AMBULARY SURGERY CENTER | Age: 57
End: 2022-05-09

## 2022-05-09 VITALS
WEIGHT: 170 LBS | HEIGHT: 68 IN | SYSTOLIC BLOOD PRESSURE: 138 MMHG | OXYGEN SATURATION: 99 % | TEMPERATURE: 97.1 F | DIASTOLIC BLOOD PRESSURE: 69 MMHG | BODY MASS INDEX: 25.76 KG/M2 | RESPIRATION RATE: 18 BRPM | HEART RATE: 63 BPM

## 2022-05-09 DIAGNOSIS — K80.50 CHOLEDOCHOLITHIASIS: ICD-10-CM

## 2022-05-09 PROBLEM — K21.9 GASTROESOPHAGEAL REFLUX DISEASE: Status: ACTIVE | Noted: 2022-05-09

## 2022-05-09 PROCEDURE — 88305 TISSUE EXAM BY PATHOLOGIST: CPT | Performed by: PATHOLOGY

## 2022-05-09 PROCEDURE — 43239 EGD BIOPSY SINGLE/MULTIPLE: CPT | Performed by: INTERNAL MEDICINE

## 2022-05-09 RX ORDER — PROPOFOL 10 MG/ML
INJECTION, EMULSION INTRAVENOUS AS NEEDED
Status: DISCONTINUED | OUTPATIENT
Start: 2022-05-09 | End: 2022-05-09

## 2022-05-09 RX ORDER — SODIUM CHLORIDE, SODIUM LACTATE, POTASSIUM CHLORIDE, CALCIUM CHLORIDE 600; 310; 30; 20 MG/100ML; MG/100ML; MG/100ML; MG/100ML
INJECTION, SOLUTION INTRAVENOUS CONTINUOUS PRN
Status: DISCONTINUED | OUTPATIENT
Start: 2022-05-09 | End: 2022-05-09

## 2022-05-09 RX ORDER — FAMOTIDINE 20 MG/1
20 TABLET, FILM COATED ORAL DAILY
COMMUNITY

## 2022-05-09 RX ORDER — LIDOCAINE HYDROCHLORIDE 20 MG/ML
INJECTION, SOLUTION EPIDURAL; INFILTRATION; INTRACAUDAL; PERINEURAL AS NEEDED
Status: DISCONTINUED | OUTPATIENT
Start: 2022-05-09 | End: 2022-05-09

## 2022-05-09 RX ADMIN — SODIUM CHLORIDE, SODIUM LACTATE, POTASSIUM CHLORIDE, AND CALCIUM CHLORIDE: .6; .31; .03; .02 INJECTION, SOLUTION INTRAVENOUS at 13:25

## 2022-05-09 RX ADMIN — PROPOFOL 100 MG: 10 INJECTION, EMULSION INTRAVENOUS at 13:32

## 2022-05-09 RX ADMIN — LIDOCAINE HYDROCHLORIDE 80 MG: 20 INJECTION, SOLUTION EPIDURAL; INFILTRATION; INTRACAUDAL at 13:32

## 2022-05-09 RX ADMIN — PROPOFOL 30 MG: 10 INJECTION, EMULSION INTRAVENOUS at 13:33

## 2022-05-09 RX ADMIN — PROPOFOL 30 MG: 10 INJECTION, EMULSION INTRAVENOUS at 13:34

## 2022-05-09 NOTE — H&P
History and Physical - [facility]  Velvet Leon 55 y o  female MRN: [id number]                  HPI: Velvet Leon is a 55y o  year old female who presents for EGD      REVIEW OF SYSTEMS: Per the HPI, and otherwise unremarkable      Historical Information   Past Medical History:   Diagnosis Date    GERD (gastroesophageal reflux disease)     Hypertension      Past Surgical History:   Procedure Laterality Date    BREAST BIOPSY Left 2006    core    CHOLECYSTECTOMY      CHOLECYSTECTOMY LAPAROSCOPIC N/A 12/4/2020    Procedure: CHOLECYSTECTOMY LAPAROSCOPIC, possible open;  Surgeon: Aurelio Hayes MD;  Location: [facility];  Service: General    ERCP W/ PLASTIC STENT PLACEMENT  2021    IR PICC PLACEMENT DOUBLE LUMEN  12/24/2020     Social History   Social History     Substance and Sexual Activity   Alcohol Use Yes    Comment: Social drinker     Social History     Substance and Sexual Activity   Drug Use No     Social History     Tobacco Use   Smoking Status Never Smoker   Smokeless Tobacco Never Used     Family History   Problem Relation Age of Onset    Arthritis Mother     Skin cancer Mother     Skin cancer Father     Arthritis Maternal Grandmother     Hiatal hernia Maternal Grandfather     Arthritis Family     Hiatal hernia Family     Skin cancer Family     Varicose Veins Family         Of lower extremities    Lung cancer Brother     No Known Problems Paternal Grandmother     No Known Problems Paternal Grandfather     No Known Problems Maternal Aunt     No Known Problems Paternal Aunt        Meds/Allergies       Current Outpatient Medications:     amLODIPine (NORVASC) 5 mg tablet    Calcium Carb-Cholecalciferol (CALTRATE 600+D) 600-800 MG-UNIT TABS    cyanocobalamin (VITAMIN B-12) 500 mcg tablet    estradiol (ESTRACE) 0 1 mg/g vaginal cream    famotidine (PEPCID) 20 mg tablet    fexofenadine-pseudoephedrine (ALLEGRA-D)  MG per tablet    fluticasone (FLONASE) 50 mcg/act nasal spray    Multiple Vitamins-Minerals (ONE DAILY FOR WOMEN PO)    Allergies   Allergen Reactions    Lactose - Food Allergy Other (See Comments)     intolerance    Other      SEASONAL ALLERGIES       Objective     /80   Pulse 72   Temp (!) 96 8 °F (36 °C) (Temporal)   Resp 18   Ht 5' 8" (1 727 m)   Wt 77 1 kg (170 lb)   LMP 02/22/2019   SpO2 100%   BMI 25 85 kg/m²       PHYSICAL EXAM    Gen: NAD  Head: NCAT  CV: RRR  CHEST: Clear  ABD: soft, NT/ND  EXT: no edema      ASSESSMENT/PLAN:  This is a 55y o  year old female here for EGD, and she is stable and optimized for her procedure

## 2022-05-09 NOTE — ANESTHESIA PREPROCEDURE EVALUATION
Procedure:  EGD    Relevant Problems   CARDIO   (+) Essential hypertension      GI/HEPATIC   (+) Gastroesophageal reflux disease      Acute pancreatitis - now resolved    Physical Exam    Airway    Mallampati score: II  TM Distance: >3 FB  Neck ROM: full     Dental   No notable dental hx     Cardiovascular  Cardiovascular exam normal    Pulmonary  Pulmonary exam normal     Other Findings        Anesthesia Plan  ASA Score- 2     Anesthesia Type- IV sedation with anesthesia with ASA Monitors  Additional Monitors:   Airway Plan:           Plan Factors-Exercise tolerance (METS): >4 METS  Chart reviewed  Patient summary reviewed  Patient is not a current smoker  Induction- intravenous  Postoperative Plan-     Informed Consent- Anesthetic plan and risks discussed with patient  I personally reviewed this patient with the CRNA  Discussed and agreed on the Anesthesia Plan with the CRNA  Emili Bills

## 2022-05-09 NOTE — ANESTHESIA POSTPROCEDURE EVALUATION
Post-Op Assessment Note    CV Status:  Stable    Pain management: adequate     Mental Status:  Arousable and sleepy   Hydration Status:  Stable   PONV Controlled:  None   Airway Patency:  Patent      Post Op Vitals Reviewed: Yes      Staff: CRNA         No complications documented      BP   140/79   Temp     Pulse  84   Resp   15   SpO2   100

## 2022-05-17 ENCOUNTER — HOSPITAL ENCOUNTER (OUTPATIENT)
Dept: RADIOLOGY | Age: 57
Discharge: HOME | End: 2022-05-17
Payer: COMMERCIAL

## 2022-05-17 VITALS — HEIGHT: 68 IN | BODY MASS INDEX: 25.76 KG/M2 | WEIGHT: 170 LBS

## 2022-05-17 DIAGNOSIS — Z12.31 ENCOUNTER FOR SCREENING MAMMOGRAM FOR MALIGNANT NEOPLASM OF BREAST: ICD-10-CM

## 2022-05-17 PROCEDURE — 77063 BREAST TOMOSYNTHESIS BI: CPT

## 2022-05-17 PROCEDURE — 77067 SCR MAMMO BI INCL CAD: CPT

## 2022-05-31 ENCOUNTER — TELEPHONE (OUTPATIENT)
Dept: GASTROENTEROLOGY | Facility: CLINIC | Age: 57
End: 2022-05-31

## 2022-05-31 ENCOUNTER — PREP FOR PROCEDURE (OUTPATIENT)
Dept: GASTROENTEROLOGY | Facility: CLINIC | Age: 57
End: 2022-05-31

## 2022-05-31 DIAGNOSIS — K80.50 CHOLEDOCHOLITHIASIS: Primary | ICD-10-CM

## 2022-05-31 NOTE — TELEPHONE ENCOUNTER
Scheduled date of EGD(as of today): 11/3/22  Physician performing EGD: Dr Mathias  Location of EGD: [facility]  Instructions reviewed with patient by:  Debbie/jacqueline  Clearances: N/A

## 2022-05-31 NOTE — TELEPHONE ENCOUNTER
RAVINDERM to contact my direct ext to schedule EGD with side viewing scope in 6 months with Dr Mathias

## 2022-06-24 ENCOUNTER — TELEMEDICINE (OUTPATIENT)
Dept: INTERNAL MEDICINE CLINIC | Age: 57
End: 2022-06-24
Payer: COMMERCIAL

## 2022-06-24 VITALS — TEMPERATURE: 99.3 F | WEIGHT: 170 LBS | BODY MASS INDEX: 25.85 KG/M2

## 2022-06-24 DIAGNOSIS — U07.1 COVID-19: Primary | ICD-10-CM

## 2022-06-24 PROCEDURE — 99213 OFFICE O/P EST LOW 20 MIN: CPT | Performed by: PHYSICIAN ASSISTANT

## 2022-06-24 PROCEDURE — 1036F TOBACCO NON-USER: CPT | Performed by: PHYSICIAN ASSISTANT

## 2022-06-24 NOTE — PROGRESS NOTES
COVID-19 Outpatient Progress Note    Assessment/Plan:    Problem List Items Addressed This Visit    None     Visit Diagnoses     COVID-19    -  Primary    may use robutussin prn      Relevant Medications    nirmatrelvir & ritonavir (Paxlovid) tablet therapy pack      pt to report any SE noted with starting paxlovid  F/u Monday if sx not improving  Declines albuterol inhaler at this time     wash hands liberally with soap and water for at least 20 seconds at a time and wear a face mask when in the presence of any other person  if symptoms persist or worsen pt should call the office or go to the ED for worsening SOB, chest pain, fever or an inability to tolerate oral intake    Disposition:     Discussed symptom directed medication options with patient  Discussed vitamin D, vitamin C, and/or zinc supplementation with patient  Patient meets criteria for PAXLOVID and they have been counseled appropriately according to EUA documentation released by the FDA  After discussion, patient agrees to treatment  [address] is an investigational medicine used to treat mild-to-moderate COVID-19 in adults and children (15years of age and older weighing at least 80 pounds (40 kg)) with positive results of direct SARS-CoV-2 viral testing, and who are at high risk for progression to severe COVID-19, including hospitalization or death  PAXLOVID is investigational because it is still being studied  There is limited information about the safety and effectiveness of using PAXLOVID to treat people with mild-to-moderate COVID-19  The FDA has authorized the emergency use of PAXLOVID for the treatment of mild-tomoderate COVID-19 in adults and children (15years of age and older weighing at least 80 pounds (40 kg)) with a positive test for the virus that causes COVID-19, and who are at high risk for progression to severe COVID-19, including hospitalization or death, under an EUA       What should I tell my healthcare provider before I take PAXLOVID? Tell your healthcare provider if you:  - Have any allergies  - Have liver or kidney disease  - Are pregnant or plan to become pregnant  - Are breastfeeding a child  - Have any serious illnesses    Tell your healthcare provider about all the medicines you take, including prescription and over-the-counter medicines, vitamins, and herbal supplements  Some medicines may interact with PAXLOVID and may cause serious side effects  Keep a list of your medicines to show your healthcare provider and pharmacist when you get a new medicine  You can ask your healthcare provider or pharmacist for a list of medicines that interact with PAXLOVID  Do not start taking a new medicine without telling your healthcare provider  Your healthcare provider can tell you if it is safe to take PAXLOVID with other medicines  Tell your healthcare provider if you are taking combined hormonal contraceptive  PAXLOVID may affect how your birth control pills work  Females who are able to become pregnant should use another effective alternative form of contraception or an additional barrier method of contraception  Talk to your healthcare provider if you have any questions about contraceptive methods that might be right for you  How do I take PAXLOVID? PAXLOVID consists of 2 medicines: nirmatrelvir and ritonavir  - Take 2 pink tablets of nirmatrelvir with 1 white tablet of ritonavir by mouth 2 times each day (in the morning and in the evening) for 5 days  For each dose, take all 3 tablets at the same time  - If you have kidney disease, talk to your healthcare provider  You may need a different dose  - Swallow the tablets whole  Do not chew, break, or crush the tablets  - Take PAXLOVID with or without food  - Do not stop taking PAXLOVID without talking to your healthcare provider, even if you feel better  - If you miss a dose of PAXLOVID within 8 hours of the time it is usually taken, take it as soon as you remember  If you miss a dose by more than 8 hours, skip the missed dose and take the next dose at your regular time  Do not take 2 doses of PAXLOVID at the same time  - If you take too much PAXLOVID, call your healthcare provider or go to the nearest hospital emergency room right away  - If you are taking a ritonavir- or cobicistat-containing medicine to treat hepatitis C or Human Immunodeficiency Virus (HIV), you should continue to take your medicine as prescribed by your healthcare provider   - Talk to your healthcare provider if you do not feel better or if you feel worse after 5 days  Who should generally not take PAXLOVID? Do not take PAXLOVID if:  You are allergic to nirmatrelvir, ritonavir, or any of the ingredients in PAXLOVID  You are taking any of the following medicines:  - Alfuzosin  - Pethidine, piroxicam, propoxyphene  - Ranolazine  - Amiodarone, dronedarone, flecainide, propafenone, quinidine  - Colchicine  - Lurasidone, pimozide, clozapine  - Dihydroergotamine, ergotamine, methylergonovine  - Lovastatin, simvastatin  - Sildenafil (Revatio®) for pulmonary arterial hypertension (PAH)  - Triazolam, oral midazolam  - Apalutamide  - Carbamazepine, phenobarbital, phenytoin  - Rifampin  - St  Raheels Wort (hypericum perforatum)    What are the important possible side effects of PAXLOVID? Possible side effects of PAXLOVID are:  - Liver Problems  Tell your healthcare provider right away if you have any of these signs and symptoms of liver problems: loss of appetite, yellowing of your skin and the whites of eyes (jaundice), dark-colored urine, pale colored stools and itchy skin, stomach area (abdominal) pain  - Resistance to HIV Medicines  If you have untreated HIV infection, PAXLOVID may lead to some HIV medicines not working as well in the future    - Other possible side effects include: altered sense of taste, diarrhea, high blood pressure, or muscle aches    These are not all the possible side effects of PAXLOVID  Not many people have taken PAXLOVID  Serious and unexpected side effects may happen  [employer] is still being studied, so it is possible that all of the risks are not known at this time  What other treatment choices are there? Like [employer] may allow for the emergency use of other medicines to treat people with COVID-19  Go to https://[employer]/ for information on the emergency use of other medicines that are authorized by FDA to treat people with COVID-19  Your healthcare provider may talk with you about clinical trials for which you may be eligible  It is your choice to be treated or not to be treated with PAXLOVID  Should you decide not to receive it or for your child not to receive it, it will not change your standard medical care  What if I am pregnant or breastfeeding? There is no experience treating pregnant women or breastfeeding mothers with PAXLOVID  For a mother and unborn baby, the benefit of taking PAXLOVID may be greater than the risk from the treatment  If you are pregnant, discuss your options and specific situation with your healthcare provider  It is recommended that you use effective barrier contraception or do not have sexual activity while taking PAXLOVID  If you are breastfeeding, discuss your options and specific situation with your healthcare provider  How do I report side effects with PAXLOVID? Contact your healthcare provider if you have any side effects that bother you or do not go away  Report side effects to FDA MedWatch at www fda gov/medwatch or call 0-148-XDW0963 or you can report side effects to [facility]  at the contact information provided below  Website Fax number Telephone number   [employer] [phone]     How should I store Lady Cooney?     Store PAXLOVID tablets at room temperature between 68°F to 77°F (20°C to 25°C)  Full fact sheet for patients, parents, and caregivers can be found at: [employer]    I have spent 15 minutes directly with the patient  Greater than 50% of this time was spent in counseling/coordination of care regarding: instructions for management and patient and family education  Encounter provider Shayla Solitario PA-C    Provider located at [address] [id number]    Recent Visits  No visits were found meeting these conditions  Showing recent visits within past 7 days and meeting all other requirements  Today's Visits  Date Type Provider Dept   06/24/22 Telemedicine Shayla Solitario PA-C [facility]   Showing today's visits and meeting all other requirements  Future Appointments  No visits were found meeting these conditions  Showing future appointments within next 150 days and meeting all other requirements     This virtual check-in was done via [facility] and patient was informed that this is a secure, HIPAA-compliant platform  She agrees to proceed  Patient agrees to participate in a virtual check in via telephone or video visit instead of presenting to the office to address urgent/immediate medical needs  Patient is aware this is a billable service  After connecting through [facility], the patient was identified by name and date of birth  Yessica Linton was informed that this was a telemedicine visit and that the exam was being conducted confidentially over secure lines  My office door was closed  No one else was in the room  Yessica Linton acknowledged consent and understanding of privacy and security of the telemedicine visit  I informed the patient that I have reviewed her record in Epic and presented the opportunity for her to ask any questions regarding the visit today   The patient agreed to participate  Verification of patient location:  Patient is located in the following state in which I hold an active license: [place]    Subjective:   Britany Kidd is a 55 y o  female who is concerned about COVID-19  Patient's symptoms include fever, chills, fatigue, malaise (improved since wedneday), nasal congestion, rhinorrhea, sore throat, cough, diarrhea, myalgias and headache   Patient denies anosmia, loss of taste, shortness of breath, chest tightness, abdominal pain, nausea and vomiting      - Date of symptom onset: 6/21/2022      COVID-19 vaccination status: Fully vaccinated (primary series)    Exposure:   Contact with a person who is under investigation (PUI) for or who is positive for COVID-19 within the last 14 days?: No    Hospitalized recently for fever and/or lower respiratory symptoms?: No      Currently a healthcare worker that is involved in direct patient care?: No      Works in a special setting where the risk of COVID-19 transmission may be high? (this may include long-term care, correctional and longterm facilities; homeless shelters; assisted-living facilities and group homes ): No      Resident in a special setting where the risk of COVID-19 transmission may be high? (this may include long-term care, correctional and longterm facilities; homeless shelters; assisted-living facilities and group homes ): No      tmax 100 8 (Wednesday)  Pt using mucinex - not helpful       Lab Results   Component Value Date    SARSCOV2 Negative 11/29/2020     Past Medical History:   Diagnosis Date    GERD (gastroesophageal reflux disease)     Hypertension      Past Surgical History:   Procedure Laterality Date    BREAST BIOPSY Left 2006    core    CHOLECYSTECTOMY      CHOLECYSTECTOMY LAPAROSCOPIC N/A 12/4/2020    Procedure: CHOLECYSTECTOMY LAPAROSCOPIC, possible open;  Surgeon: Lorenzo Ko MD;  Location: [facility];  Service: General    ERCP W/ PLASTIC STENT PLACEMENT  2021    IR PICC PLACEMENT DOUBLE LUMEN  12/24/2020     Current Outpatient Medications   Medication Sig Dispense Refill    amLODIPine (NORVASC) 5 mg tablet Take 1 tablet (5 mg total) by mouth daily 90 tablet 1    Calcium Carb-Cholecalciferol 600-800 MG-UNIT TABS Take 1 tablet by mouth daily      cyanocobalamin (VITAMIN B-12) 500 mcg tablet Take 1 tablet by mouth daily      estradiol (ESTRACE) 0 1 mg/g vaginal cream Insert 0 5 g into the vagina 2 (two) times a week Apply a pea sized amount to external vaginal opening and urethra area twice weekly 42 5 g 0    famotidine (PEPCID) 20 mg tablet Take 20 mg by mouth daily      fexofenadine-pseudoephedrine (ALLEGRA-D)  MG per tablet as needed       fluticasone (FLONASE) 50 mcg/act nasal spray 1 spray into each nostril daily 16 g 1    Multiple Vitamins-Minerals (ONE DAILY FOR WOMEN PO) daily       nirmatrelvir & ritonavir (Paxlovid) tablet therapy pack Take 3 tablets by mouth 2 (two) times a day for 5 days Take 2 nirmatrelvir tablets + 1 ritonavir tablet together per dose 30 tablet 0     No current facility-administered medications for this visit  Allergies   Allergen Reactions    Lactose - Food Allergy Other (See Comments)     intolerance    Other      SEASONAL ALLERGIES       Review of Systems   Constitutional: Positive for chills, fatigue and fever  HENT: Positive for congestion, rhinorrhea and sore throat  Respiratory: Positive for cough  Negative for chest tightness and shortness of breath  Gastrointestinal: Positive for diarrhea  Negative for abdominal pain, nausea and vomiting  Musculoskeletal: Positive for myalgias  Neurological: Positive for headaches  Objective:    Vitals:    06/24/22 1048   Temp: 99 3 °F (37 4 °C)   TempSrc: Oral   Weight: 77 1 kg (170 lb)       Physical Exam  Vitals reviewed  Constitutional:       General: She is not in acute distress  Pulmonary:      Effort: Pulmonary effort is normal  No respiratory distress     Neurological:      General: No focal deficit present  Mental Status: She is alert  VIRTUAL VISIT DISCLAIMER    Velvet Leon verbally agrees to participate in [employer]  Pt is aware that [employer] could be limited without vital signs or the ability to perform a full hands-on physical exam  Isabel Hillman understands she or the provider may request at any time to terminate the video visit and request the patient to seek care or treatment in person

## 2022-06-24 NOTE — LETTER
June 24, 2022     Patient: Kacy Stallings  YOB: 1965  Date of Visit: 6/24/2022      To Whom it May Concern:    [place] is under my professional care  Jarrod was seen in my office on 6/24/2022  Jarrod may return to work on 6/27/22  If you have any questions or concerns, please don't hesitate to call           Sincerely,          Vi Yanes PA-C        CC: No Recipients

## 2022-07-19 ENCOUNTER — OFFICE VISIT (OUTPATIENT)
Dept: INTERNAL MEDICINE CLINIC | Age: 57
End: 2022-07-19
Payer: COMMERCIAL

## 2022-07-19 VITALS
WEIGHT: 172.2 LBS | OXYGEN SATURATION: 99 % | BODY MASS INDEX: 26.1 KG/M2 | SYSTOLIC BLOOD PRESSURE: 118 MMHG | HEIGHT: 68 IN | TEMPERATURE: 98.3 F | DIASTOLIC BLOOD PRESSURE: 82 MMHG | HEART RATE: 73 BPM

## 2022-07-19 DIAGNOSIS — K21.9 GASTROESOPHAGEAL REFLUX DISEASE WITHOUT ESOPHAGITIS: ICD-10-CM

## 2022-07-19 DIAGNOSIS — J30.2 SEASONAL ALLERGIC RHINITIS, UNSPECIFIED TRIGGER: ICD-10-CM

## 2022-07-19 DIAGNOSIS — R79.89 LFT ELEVATION: Primary | ICD-10-CM

## 2022-07-19 DIAGNOSIS — I10 ESSENTIAL HYPERTENSION: ICD-10-CM

## 2022-07-19 PROCEDURE — 99214 OFFICE O/P EST MOD 30 MIN: CPT | Performed by: INTERNAL MEDICINE

## 2022-07-19 PROCEDURE — 3725F SCREEN DEPRESSION PERFORMED: CPT | Performed by: INTERNAL MEDICINE

## 2022-07-19 RX ORDER — FLUTICASONE PROPIONATE 50 MCG
1 SPRAY, SUSPENSION (ML) NASAL DAILY
Qty: 48 G | Refills: 1 | Status: SHIPPED | OUTPATIENT
Start: 2022-07-19

## 2022-07-19 NOTE — PROGRESS NOTES
Assessment/Plan:     Diagnoses and all orders for this visit:    LFT elevation  Is followed up by the GI MRI was done in June everything was resolved it looked good she will have 1 more MRI and will be followed up by the  Seasonal allergic rhinitis, unspecified trigger  -     fluticasone (FLONASE) 50 mcg/act nasal spray; 1 spray into each nostril daily    Gastroesophageal reflux disease without esophagitis    stable gastroesophageal reflux disease  Hypertension is very well controlled    Depression Screening and Follow-up Plan: Patient was screened for depression during today's encounter  They screened negative with a PHQ-2 score of 0  [employer] software was used to dictate this note  It may contain errors with dictating incorrect words or incorrect spelling  Please contact the provider directly with any questions  Subjective:   Chief Complaint   Patient presents with    Follow-up     6  month-   BMI FOLLOW UP-    Sinus Problem     Pt states she had covid last month and still feels stuffy since last month        Patient ID: Domo Terrazas is a 55 y o  female  HPI  This is a very pleasant 55 years young lady who is here today for the regular follow-up she is doing well no new complaints review of system is essentially unremarkable she recently was diagnosed with the COVID-19 infection since then she had some congestion in her nose and also some sinus pressure no fever or chills no yellow color mucous I recommend her to continue the conservative treatment if symptoms does not get better then we will see her again or start her on any antibiotic    Followed up by the GI status post cholecystectomy common bile duct stones status post ampullectomy recent MRI shows no problem but since them fill out was in large and the liver enzymes were elevated GI wanted to follow it up LFTs are normal except slightly increase alkaline phosphatase  The following portions of the patient's history were reviewed and updated as appropriate: allergies, current medications, past family history, past medical history, past social history, past surgical history and problem list     Review of Systems   Constitutional: Negative for chills and fatigue  HENT: Positive for postnasal drip, sinus pressure and sinus pain  Negative for congestion, ear pain, hearing loss, sore throat and voice change  Eyes: Negative for pain, discharge and visual disturbance  Respiratory: Negative for cough, chest tightness and shortness of breath  Cardiovascular: Negative for chest pain, palpitations and leg swelling  Gastrointestinal: Negative for abdominal pain, blood in stool, diarrhea, nausea and rectal pain  Genitourinary: Negative for difficulty urinating, dysuria and urgency  Musculoskeletal: Negative for arthralgias and joint swelling  Skin: Negative for rash  Allergic/Immunologic: Negative for environmental allergies and food allergies  Neurological: Negative for dizziness, tremors, weakness, numbness and headaches  Hematological: Negative for adenopathy  Psychiatric/Behavioral: Negative for behavioral problems and hallucinations           Past Medical History:   Diagnosis Date    Allergic     GERD (gastroesophageal reflux disease)     Hypertension          Current Outpatient Medications:     amLODIPine (NORVASC) 5 mg tablet, Take 1 tablet (5 mg total) by mouth daily, Disp: 90 tablet, Rfl: 1    Calcium Carb-Cholecalciferol 600-800 MG-UNIT TABS, Take 1 tablet by mouth daily, Disp: , Rfl:     cyanocobalamin (VITAMIN B-12) 500 mcg tablet, Take 1 tablet by mouth daily, Disp: , Rfl:     estradiol (ESTRACE) 0 1 mg/g vaginal cream, Insert 0 5 g into the vagina 2 (two) times a week Apply a pea sized amount to external vaginal opening and urethra area twice weekly, Disp: 42 5 g, Rfl: 0    famotidine (PEPCID) 20 mg tablet, Take 20 mg by mouth daily, Disp: , Rfl:     fexofenadine-pseudoephedrine (ALLEGRA-D)  MG per tablet, as needed , Disp: , Rfl:     fluticasone (FLONASE) 50 mcg/act nasal spray, 1 spray into each nostril daily, Disp: 48 g, Rfl: 1    Multiple Vitamins-Minerals (ONE DAILY FOR WOMEN PO), daily , Disp: , Rfl:     Allergies   Allergen Reactions    Lactose - Food Allergy Other (See Comments)     intolerance    Other      SEASONAL ALLERGIES       Social History   Past Surgical History:   Procedure Laterality Date    BREAST BIOPSY Left 2006    core    CHOLECYSTECTOMY      CHOLECYSTECTOMY LAPAROSCOPIC N/A 12/4/2020    Procedure: CHOLECYSTECTOMY LAPAROSCOPIC, possible open;  Surgeon: Edmund Crooks MD;  Location: [facility];  Service: General    ERCP W/ PLASTIC STENT PLACEMENT  2021    IR PICC PLACEMENT DOUBLE LUMEN  12/24/2020     Family History   Problem Relation Age of Onset    Arthritis Mother     Skin cancer Mother     Skin cancer Father     Arthritis Maternal Grandmother     Hiatal hernia Maternal Grandfather     Arthritis Family     Hiatal hernia Family     Skin cancer Family     Varicose Veins Family         Of lower extremities    Lung cancer Brother     No Known Problems Paternal Grandmother     No Known Problems Paternal Grandfather     No Known Problems Maternal Aunt     No Known Problems Paternal Aunt        Objective:  /82 (BP Location: Left arm, Patient Position: Sitting, Cuff Size: Standard)   Pulse 73   Temp 98 3 °F (36 8 °C) (Temporal)   Ht 5' 8" (1 727 m)   Wt 78 1 kg (172 lb 3 2 oz)   LMP 02/22/2019   SpO2 99% Comment: room air  BMI 26 18 kg/m²        Physical Exam  Constitutional:       Appearance: She is well-developed  HENT:      Right Ear: External ear normal    Eyes:      Conjunctiva/sclera: Conjunctivae normal       Pupils: Pupils are equal, round, and reactive to light  Neck:      Thyroid: No thyromegaly  Vascular: No JVD  Cardiovascular:      Rate and Rhythm: Normal rate and regular rhythm  Heart sounds: Normal heart sounds     Pulmonary:      Breath sounds: Normal breath sounds  Abdominal:      General: Bowel sounds are normal       Palpations: Abdomen is soft  Musculoskeletal:         General: Normal range of motion  Cervical back: Normal range of motion  Lymphadenopathy:      Cervical: No cervical adenopathy  Skin:     General: Skin is dry  Neurological:      Mental Status: She is alert and oriented to person, place, and time  Deep Tendon Reflexes: Reflexes are normal and symmetric     Psychiatric:         Behavior: Behavior normal

## 2022-10-10 DIAGNOSIS — I10 ESSENTIAL HYPERTENSION: ICD-10-CM

## 2022-10-10 RX ORDER — AMLODIPINE BESYLATE 5 MG/1
5 TABLET ORAL DAILY
Qty: 90 TABLET | Refills: 1 | Status: SHIPPED | OUTPATIENT
Start: 2022-10-10 | End: 2023-04-08

## 2022-10-12 PROBLEM — Z00.00 HEALTHCARE MAINTENANCE: Status: RESOLVED | Noted: 2021-04-08 | Resolved: 2022-10-12

## 2022-11-03 ENCOUNTER — HOSPITAL ENCOUNTER (OUTPATIENT)
Dept: GASTROENTEROLOGY | Facility: HOSPITAL | Age: 57
Setting detail: OUTPATIENT SURGERY
End: 2022-11-03
Attending: INTERNAL MEDICINE

## 2022-11-03 ENCOUNTER — ANESTHESIA EVENT (OUTPATIENT)
Dept: GASTROENTEROLOGY | Facility: HOSPITAL | Age: 57
End: 2022-11-03

## 2022-11-03 ENCOUNTER — ANESTHESIA (OUTPATIENT)
Dept: GASTROENTEROLOGY | Facility: HOSPITAL | Age: 57
End: 2022-11-03

## 2022-11-03 VITALS
OXYGEN SATURATION: 97 % | SYSTOLIC BLOOD PRESSURE: 124 MMHG | BODY MASS INDEX: 26.52 KG/M2 | WEIGHT: 175 LBS | DIASTOLIC BLOOD PRESSURE: 72 MMHG | HEIGHT: 68 IN | TEMPERATURE: 96.6 F | RESPIRATION RATE: 18 BRPM | HEART RATE: 96 BPM

## 2022-11-03 DIAGNOSIS — K80.50 CHOLEDOCHOLITHIASIS: ICD-10-CM

## 2022-11-03 RX ORDER — LIDOCAINE HYDROCHLORIDE 20 MG/ML
INJECTION, SOLUTION EPIDURAL; INFILTRATION; INTRACAUDAL; PERINEURAL AS NEEDED
Status: DISCONTINUED | OUTPATIENT
Start: 2022-11-03 | End: 2022-11-03

## 2022-11-03 RX ORDER — GLYCOPYRROLATE 0.2 MG/ML
INJECTION INTRAMUSCULAR; INTRAVENOUS AS NEEDED
Status: DISCONTINUED | OUTPATIENT
Start: 2022-11-03 | End: 2022-11-03

## 2022-11-03 RX ORDER — SODIUM CHLORIDE 9 MG/ML
INJECTION, SOLUTION INTRAVENOUS CONTINUOUS PRN
Status: DISCONTINUED | OUTPATIENT
Start: 2022-11-03 | End: 2022-11-03

## 2022-11-03 RX ORDER — PROPOFOL 10 MG/ML
INJECTION, EMULSION INTRAVENOUS AS NEEDED
Status: DISCONTINUED | OUTPATIENT
Start: 2022-11-03 | End: 2022-11-03

## 2022-11-03 RX ORDER — SODIUM CHLORIDE 9 MG/ML
125 INJECTION, SOLUTION INTRAVENOUS CONTINUOUS
Status: DISCONTINUED | OUTPATIENT
Start: 2022-11-03 | End: 2022-11-07 | Stop reason: HOSPADM

## 2022-11-03 RX ADMIN — PROPOFOL 50 MG: 10 INJECTION, EMULSION INTRAVENOUS at 16:09

## 2022-11-03 RX ADMIN — SODIUM CHLORIDE 125 ML/HR: 0.9 INJECTION, SOLUTION INTRAVENOUS at 15:18

## 2022-11-03 RX ADMIN — LIDOCAINE HYDROCHLORIDE 80 MG: 20 INJECTION, SOLUTION EPIDURAL; INFILTRATION; INTRACAUDAL; PERINEURAL at 16:07

## 2022-11-03 RX ADMIN — PROPOFOL 50 MG: 10 INJECTION, EMULSION INTRAVENOUS at 16:08

## 2022-11-03 RX ADMIN — PROPOFOL 150 MG: 10 INJECTION, EMULSION INTRAVENOUS at 16:07

## 2022-11-03 RX ADMIN — GLYCOPYRROLATE 0.1 MG: 0.2 INJECTION, SOLUTION INTRAMUSCULAR; INTRAVENOUS at 15:59

## 2022-11-03 RX ADMIN — SODIUM CHLORIDE: 0.9 INJECTION, SOLUTION INTRAVENOUS at 15:59

## 2022-11-03 NOTE — ANESTHESIA PREPROCEDURE EVALUATION
Procedure:  EGD    Relevant Problems   CARDIO   (+) Essential hypertension      GI/HEPATIC   (+) Gastroesophageal reflux disease        Physical Exam    Airway    Mallampati score: III  TM Distance: >3 FB  Neck ROM: full     Dental   No notable dental hx     Cardiovascular  Cardiovascular exam normal    Pulmonary  Pulmonary exam normal     Other Findings        Anesthesia Plan  ASA Score- 2     Anesthesia Type- IV sedation with anesthesia with ASA Monitors  Additional Monitors:   Airway Plan:           Plan Factors-Exercise tolerance (METS): >4 METS  Chart reviewed  Patient summary reviewed  Patient is not a current smoker  Induction- intravenous  Postoperative Plan-     Informed Consent- Anesthetic plan and risks discussed with patient

## 2022-11-03 NOTE — ANESTHESIA POSTPROCEDURE EVALUATION
Post-Op Assessment Note    CV Status:  Stable    Pain management: adequate     Mental Status:  Alert and awake   Hydration Status:  Euvolemic   PONV Controlled:  Controlled   Airway Patency:  Patent      Post Op Vitals Reviewed: Yes            No complications documented      BP      Temp      Pulse     Resp   16   SpO2   96

## 2023-02-23 DIAGNOSIS — N95.2 VAGINAL ATROPHY: ICD-10-CM

## 2023-02-23 RX ORDER — ESTRADIOL 0.1 MG/G
0.5 CREAM VAGINAL 2 TIMES WEEKLY
Qty: 42.5 G | Refills: 0 | Status: SHIPPED | OUTPATIENT
Start: 2023-02-23 | End: 2023-03-25

## 2023-02-23 NOTE — TELEPHONE ENCOUNTER
Pt is calling for a refill on the estradiol cream   Please advise    [address], [facility]

## 2023-04-06 ENCOUNTER — APPOINTMENT (OUTPATIENT)
Dept: LAB | Facility: MEDICAL CENTER | Age: 58
End: 2023-04-06

## 2023-04-06 DIAGNOSIS — R79.89 LFT ELEVATION: ICD-10-CM

## 2023-04-06 DIAGNOSIS — I10 ESSENTIAL HYPERTENSION: ICD-10-CM

## 2023-04-06 LAB
ALBUMIN SERPL BCP-MCNC: 3.7 G/DL (ref 3.5–5)
ALP SERPL-CCNC: 106 U/L (ref 46–116)
ALT SERPL W P-5'-P-CCNC: 22 U/L (ref 12–78)
ANION GAP SERPL CALCULATED.3IONS-SCNC: 1 MMOL/L (ref 4–13)
AST SERPL W P-5'-P-CCNC: 20 U/L (ref 5–45)
BACTERIA UR QL AUTO: ABNORMAL /HPF
BASOPHILS # BLD AUTO: 0.02 THOUSANDS/ÂΜL (ref 0–0.1)
BASOPHILS NFR BLD AUTO: 0 % (ref 0–1)
BILIRUB SERPL-MCNC: 0.38 MG/DL (ref 0.2–1)
BILIRUB UR QL STRIP: NEGATIVE
BUN SERPL-MCNC: 8 MG/DL (ref 5–25)
CALCIUM SERPL-MCNC: 9.6 MG/DL (ref 8.3–10.1)
CHLORIDE SERPL-SCNC: 105 MMOL/L (ref 96–108)
CHOLEST SERPL-MCNC: 211 MG/DL
CLARITY UR: CLEAR
CO2 SERPL-SCNC: 30 MMOL/L (ref 21–32)
COLOR UR: ABNORMAL
CREAT SERPL-MCNC: 0.9 MG/DL (ref 0.6–1.3)
EOSINOPHIL # BLD AUTO: 0.05 THOUSAND/ÂΜL (ref 0–0.61)
EOSINOPHIL NFR BLD AUTO: 1 % (ref 0–6)
ERYTHROCYTE [DISTWIDTH] IN BLOOD BY AUTOMATED COUNT: 11.9 % (ref 11.6–15.1)
GFR SERPL CREATININE-BSD FRML MDRD: 71 ML/MIN/1.73SQ M
GLUCOSE P FAST SERPL-MCNC: 87 MG/DL (ref 65–99)
GLUCOSE UR STRIP-MCNC: NEGATIVE MG/DL
HCT VFR BLD AUTO: 39.3 % (ref 34.8–46.1)
HDLC SERPL-MCNC: 50 MG/DL
HGB BLD-MCNC: 12.7 G/DL (ref 11.5–15.4)
HGB UR QL STRIP.AUTO: NEGATIVE
IMM GRANULOCYTES # BLD AUTO: 0.02 THOUSAND/UL (ref 0–0.2)
IMM GRANULOCYTES NFR BLD AUTO: 0 % (ref 0–2)
KETONES UR STRIP-MCNC: NEGATIVE MG/DL
LDLC SERPL CALC-MCNC: 128 MG/DL (ref 0–100)
LEUKOCYTE ESTERASE UR QL STRIP: ABNORMAL
LYMPHOCYTES # BLD AUTO: 1.3 THOUSANDS/ÂΜL (ref 0.6–4.47)
LYMPHOCYTES NFR BLD AUTO: 23 % (ref 14–44)
MCH RBC QN AUTO: 31.3 PG (ref 26.8–34.3)
MCHC RBC AUTO-ENTMCNC: 32.3 G/DL (ref 31.4–37.4)
MCV RBC AUTO: 97 FL (ref 82–98)
MONOCYTES # BLD AUTO: 0.4 THOUSAND/ÂΜL (ref 0.17–1.22)
MONOCYTES NFR BLD AUTO: 7 % (ref 4–12)
MUCOUS THREADS UR QL AUTO: ABNORMAL
NEUTROPHILS # BLD AUTO: 3.85 THOUSANDS/ÂΜL (ref 1.85–7.62)
NEUTS SEG NFR BLD AUTO: 69 % (ref 43–75)
NITRITE UR QL STRIP: NEGATIVE
NON-SQ EPI CELLS URNS QL MICRO: ABNORMAL /HPF
NONHDLC SERPL-MCNC: 161 MG/DL
NRBC BLD AUTO-RTO: 0 /100 WBCS
PH UR STRIP.AUTO: 6.5 [PH]
PLATELET # BLD AUTO: 309 THOUSANDS/UL (ref 149–390)
PMV BLD AUTO: 11 FL (ref 8.9–12.7)
POTASSIUM SERPL-SCNC: 3.8 MMOL/L (ref 3.5–5.3)
PROT SERPL-MCNC: 7.4 G/DL (ref 6.4–8.4)
PROT UR STRIP-MCNC: ABNORMAL MG/DL
RBC # BLD AUTO: 4.06 MILLION/UL (ref 3.81–5.12)
RBC #/AREA URNS AUTO: ABNORMAL /HPF
SODIUM SERPL-SCNC: 136 MMOL/L (ref 135–147)
SP GR UR STRIP.AUTO: 1.01 (ref 1–1.03)
TRIGL SERPL-MCNC: 163 MG/DL
UROBILINOGEN UR STRIP-ACNC: <2 MG/DL
WBC # BLD AUTO: 5.64 THOUSAND/UL (ref 4.31–10.16)
WBC #/AREA URNS AUTO: ABNORMAL /HPF

## 2023-04-26 ENCOUNTER — APPOINTMENT (OUTPATIENT)
Dept: LAB | Facility: MEDICAL CENTER | Age: 58
End: 2023-04-26

## 2023-04-26 DIAGNOSIS — R68.89 COLD INTOLERANCE: ICD-10-CM

## 2023-04-26 LAB — TSH SERPL DL<=0.05 MIU/L-ACNC: 1.72 UIU/ML (ref 0.45–4.5)

## 2023-05-17 DIAGNOSIS — N95.2 VAGINAL ATROPHY: ICD-10-CM

## 2023-05-17 RX ORDER — ESTRADIOL 0.1 MG/G
CREAM VAGINAL
Qty: 42.5 G | Refills: 0 | Status: SHIPPED | OUTPATIENT
Start: 2023-05-17

## 2023-06-26 ENCOUNTER — HOSPITAL ENCOUNTER (OUTPATIENT)
Dept: RADIOLOGY | Age: 58
Discharge: HOME | End: 2023-06-26
Payer: COMMERCIAL

## 2023-06-26 VITALS — BODY MASS INDEX: 27.74 KG/M2 | WEIGHT: 183 LBS | HEIGHT: 68 IN

## 2023-06-26 DIAGNOSIS — Z12.31 ENCOUNTER FOR SCREENING MAMMOGRAM FOR MALIGNANT NEOPLASM OF BREAST: ICD-10-CM

## 2023-06-26 PROCEDURE — 77063 BREAST TOMOSYNTHESIS BI: CPT

## 2023-06-26 PROCEDURE — 77067 SCR MAMMO BI INCL CAD: CPT

## 2023-10-02 ENCOUNTER — APPOINTMENT (OUTPATIENT)
Dept: LAB | Facility: MEDICAL CENTER | Age: 58
End: 2023-10-02
Payer: COMMERCIAL

## 2023-10-02 DIAGNOSIS — Z11.4 ENCOUNTER FOR SCREENING FOR HIV: ICD-10-CM

## 2023-10-02 DIAGNOSIS — E78.00 HYPERCHOLESTEREMIA: ICD-10-CM

## 2023-10-02 DIAGNOSIS — R79.89 LFT ELEVATION: ICD-10-CM

## 2023-10-02 LAB
ALBUMIN SERPL BCP-MCNC: 4 G/DL (ref 3.5–5)
ALP SERPL-CCNC: 82 U/L (ref 34–104)
ALT SERPL W P-5'-P-CCNC: 16 U/L (ref 7–52)
AST SERPL W P-5'-P-CCNC: 16 U/L (ref 13–39)
BILIRUB DIRECT SERPL-MCNC: 0.1 MG/DL (ref 0–0.2)
BILIRUB SERPL-MCNC: 0.42 MG/DL (ref 0.2–1)
CHOLEST SERPL-MCNC: 186 MG/DL
HDLC SERPL-MCNC: 39 MG/DL
HIV 1+2 AB+HIV1 P24 AG SERPL QL IA: NORMAL
HIV 2 AB SERPL QL IA: NORMAL
HIV1 AB SERPL QL IA: NORMAL
HIV1 P24 AG SERPL QL IA: NORMAL
LDLC SERPL CALC-MCNC: 124 MG/DL (ref 0–100)
NONHDLC SERPL-MCNC: 147 MG/DL
PROT SERPL-MCNC: 7.2 G/DL (ref 6.4–8.4)
TRIGL SERPL-MCNC: 114 MG/DL

## 2023-10-02 PROCEDURE — 36415 COLL VENOUS BLD VENIPUNCTURE: CPT

## 2023-10-02 PROCEDURE — 80061 LIPID PANEL: CPT

## 2023-10-02 PROCEDURE — 87389 HIV-1 AG W/HIV-1&-2 AB AG IA: CPT

## 2023-10-02 PROCEDURE — 80076 HEPATIC FUNCTION PANEL: CPT

## 2023-10-09 ENCOUNTER — OFFICE VISIT (OUTPATIENT)
Dept: INTERNAL MEDICINE CLINIC | Age: 58
End: 2023-10-09
Payer: COMMERCIAL

## 2023-10-09 VITALS
TEMPERATURE: 97 F | HEIGHT: 68 IN | WEIGHT: 180 LBS | HEART RATE: 64 BPM | OXYGEN SATURATION: 99 % | SYSTOLIC BLOOD PRESSURE: 128 MMHG | DIASTOLIC BLOOD PRESSURE: 74 MMHG | BODY MASS INDEX: 27.28 KG/M2

## 2023-10-09 DIAGNOSIS — N95.2 VAGINAL ATROPHY: ICD-10-CM

## 2023-10-09 DIAGNOSIS — J30.2 SEASONAL ALLERGIC RHINITIS, UNSPECIFIED TRIGGER: ICD-10-CM

## 2023-10-09 DIAGNOSIS — N95.1 MENOPAUSAL SYMPTOMS: ICD-10-CM

## 2023-10-09 DIAGNOSIS — K21.9 GASTROESOPHAGEAL REFLUX DISEASE WITHOUT ESOPHAGITIS: ICD-10-CM

## 2023-10-09 DIAGNOSIS — R79.89 LFT ELEVATION: Primary | ICD-10-CM

## 2023-10-09 DIAGNOSIS — I10 ESSENTIAL HYPERTENSION: ICD-10-CM

## 2023-10-09 PROBLEM — Z12.31 BREAST CANCER SCREENING BY MAMMOGRAM: Status: RESOLVED | Noted: 2021-04-08 | Resolved: 2023-10-09

## 2023-10-09 PROCEDURE — 99214 OFFICE O/P EST MOD 30 MIN: CPT | Performed by: INTERNAL MEDICINE

## 2023-10-09 RX ORDER — VENLAFAXINE HYDROCHLORIDE 37.5 MG/1
37.5 CAPSULE, EXTENDED RELEASE ORAL
Qty: 30 CAPSULE | Refills: 5 | Status: SHIPPED | OUTPATIENT
Start: 2023-10-09

## 2023-10-09 RX ORDER — VENLAFAXINE HYDROCHLORIDE 37.5 MG/1
37.5 CAPSULE, EXTENDED RELEASE ORAL
Qty: 30 CAPSULE | Refills: 5 | Status: SHIPPED | OUTPATIENT
Start: 2023-10-09 | End: 2023-10-09

## 2023-10-09 RX ORDER — FLUTICASONE PROPIONATE 50 MCG
1 SPRAY, SUSPENSION (ML) NASAL DAILY
Qty: 48 G | Refills: 1 | Status: SHIPPED | OUTPATIENT
Start: 2023-10-09

## 2023-10-09 NOTE — PROGRESS NOTES
Assessment/Plan:     Diagnoses and all orders for this visit:    LFT elevation    Seasonal allergic rhinitis, unspecified trigger  -     fluticasone (FLONASE) 50 mcg/act nasal spray; 1 spray into each nostril daily    Essential hypertension    Vaginal atrophy    Gastroesophageal reflux disease without esophagitis    Menopausal symptoms  -     Discontinue: venlafaxine (EFFEXOR-XR) 37.5 mg 24 hr capsule; Take 1 capsule (37.5 mg total) by mouth daily with breakfast  -     venlafaxine (EFFEXOR-XR) 37.5 mg 24 hr capsule; Take 1 capsule (37.5 mg total) by mouth daily with breakfast           M*Carly[employer] software was used to dictate this note. It may contain errors with dictating incorrect words or incorrect spelling. Please contact the provider directly with any questions. Subjective:   Chief Complaint   Patient presents with    Follow-up     6 month- labs done 10/2-         Patient ID: Nuris Daniel is a 63 y.o. female. A very pleasant 63 years young lady who is here today for the regular follow-up    Increased LFTs resolved          The following portions of the patient's history were reviewed and updated as appropriate: allergies, current medications, past family history, past medical history, past social history, past surgical history, and problem list.    Review of Systems   Constitutional: Negative for chills and fatigue. HENT: Negative for congestion, ear pain, hearing loss, postnasal drip, sinus pressure, sore throat and voice change. Eyes: Negative for pain, discharge and visual disturbance. Respiratory: Negative for cough, chest tightness and shortness of breath. Cardiovascular: Negative for chest pain, palpitations and leg swelling. Gastrointestinal: Negative for abdominal pain, blood in stool, diarrhea, nausea and rectal pain. Genitourinary: Negative for difficulty urinating, dysuria and urgency. Musculoskeletal: Positive for arthralgias (Occasional knee pain).  Negative for joint swelling. Skin: Negative for rash. Allergic/Immunologic: Negative for environmental allergies and food allergies. Neurological: Negative for dizziness, tremors, weakness, numbness and headaches. Hematological: Negative for adenopathy. Psychiatric/Behavioral: Negative for behavioral problems and hallucinations.          Past Medical History:   Diagnosis Date    Allergic     GERD (gastroesophageal reflux disease)     Hypertension          Current Outpatient Medications:     amLODIPine (NORVASC) 5 mg tablet, Take 1 tablet (5 mg total) by mouth daily, Disp: 90 tablet, Rfl: 3    Calcium Carb-Cholecalciferol 600-800 MG-UNIT TABS, Take 1 tablet by mouth daily, Disp: , Rfl:     estradiol (ESTRACE) 0.1 mg/g vaginal cream, insert 0.5 grams vaginally two times a week (APPLY A PEA SIZED AMOUNT TO EXTERNAL VAGINAL OPENING AND URETHRA AREA TWICE WEEKLY), Disp: 42.5 g, Rfl: 0    famotidine (PEPCID) 20 mg tablet, Take 20 mg by mouth daily, Disp: , Rfl:     fexofenadine-pseudoephedrine (ALLEGRA-D)  MG per tablet, as needed , Disp: , Rfl:     fluticasone (FLONASE) 50 mcg/act nasal spray, 1 spray into each nostril daily, Disp: 48 g, Rfl: 1    Multiple Vitamins-Minerals (ONE DAILY FOR WOMEN PO), daily , Disp: , Rfl:     Allergies   Allergen Reactions    Lactose - Food Allergy Other (See Comments)     intolerance    Other      SEASONAL ALLERGIES       Social History   Past Surgical History:   Procedure Laterality Date    BREAST BIOPSY Left 2006    core    CHOLECYSTECTOMY      CHOLECYSTECTOMY LAPAROSCOPIC N/A 12/4/2020    Procedure: CHOLECYSTECTOMY LAPAROSCOPIC, possible open;  Surgeon: Sehela Obrien MD;  Location: [facility];  Service: General    ERCP W/ PLASTIC STENT PLACEMENT  2021    IR PICC PLACEMENT DOUBLE LUMEN  12/24/2020     Family History   Problem Relation Age of Onset    Arthritis Mother     Skin cancer Mother     Hypertension Mother     Skin cancer Father     Arthritis Maternal Grandmother     Hiatal hernia Maternal Grandfather     No Known Problems Paternal Grandmother     No Known Problems Paternal Grandfather     Lung cancer Brother 48    No Known Problems Maternal Aunt     No Known Problems Paternal Aunt     Arthritis Family     Hiatal hernia Family     Skin cancer Family     Varicose Veins Family         Of lower extremities       Objective:  /74 (BP Location: Left arm, Patient Position: Sitting, Cuff Size: Standard)   Pulse 64   Temp (!) 97 °F (36.1 °C) (Temporal)   Ht 5' 8" (1.727 m)   Wt 81.6 kg (180 lb)   LMP 02/22/2019   SpO2 99% Comment: room air  BMI 27.37 kg/m²        Physical Exam  Constitutional:       Appearance: She is well-developed. HENT:      Right Ear: External ear normal.   Eyes:      Conjunctiva/sclera: Conjunctivae normal.      Pupils: Pupils are equal, round, and reactive to light. Neck:      Thyroid: No thyromegaly. Vascular: No JVD. Cardiovascular:      Rate and Rhythm: Normal rate and regular rhythm. Heart sounds: Normal heart sounds. Pulmonary:      Breath sounds: Normal breath sounds. Abdominal:      General: Bowel sounds are normal.      Palpations: Abdomen is soft. Musculoskeletal:         General: Normal range of motion. Cervical back: Normal range of motion. Lymphadenopathy:      Cervical: No cervical adenopathy. Skin:     General: Skin is dry. Neurological:      Mental Status: She is alert and oriented to person, place, and time. Deep Tendon Reflexes: Reflexes are normal and symmetric.    Psychiatric:         Behavior: Behavior normal.

## 2023-10-31 ENCOUNTER — OFFICE VISIT (OUTPATIENT)
Dept: GASTROENTEROLOGY | Facility: CLINIC | Age: 58
End: 2023-10-31
Payer: COMMERCIAL

## 2023-10-31 VITALS
WEIGHT: 175 LBS | SYSTOLIC BLOOD PRESSURE: 122 MMHG | DIASTOLIC BLOOD PRESSURE: 77 MMHG | TEMPERATURE: 98.9 F | BODY MASS INDEX: 27.47 KG/M2 | HEIGHT: 67 IN

## 2023-10-31 DIAGNOSIS — R19.4 CHANGE IN BOWEL HABITS: ICD-10-CM

## 2023-10-31 DIAGNOSIS — R19.8 PROMINENT AMPULLA OF VATER: Primary | ICD-10-CM

## 2023-10-31 PROCEDURE — 99214 OFFICE O/P EST MOD 30 MIN: CPT | Performed by: INTERNAL MEDICINE

## 2023-10-31 RX ORDER — CHOLESTYRAMINE 4 G/9G
1 POWDER, FOR SUSPENSION ORAL 2 TIMES DAILY WITH MEALS
Qty: 60 PACKET | Refills: 2 | Status: SHIPPED | OUTPATIENT
Start: 2023-10-31

## 2023-10-31 RX ORDER — BISACODYL 5 MG/1
10 TABLET, DELAYED RELEASE ORAL ONCE
Qty: 2 TABLET | Refills: 0 | Status: SHIPPED | OUTPATIENT
Start: 2023-10-31 | End: 2023-10-31

## 2023-10-31 NOTE — PROGRESS NOTES
[facility] - Outpatient Follow-up Note  Katie Wood 63 y.o. female MRN: [id number]  Encounter: [id number]          ASSESSMENT AND PLAN:      1. Prominent ampulla of Vater  EGD      2. Change in bowel habits  Colonoscopy    polyethylene glycol (GOLYTELY) 4000 mL solution    bisacodyl (DULCOLAX) 5 mg EC tablet    cholestyramine (QUESTRAN) 4 g packet        Will provide script of cholestyramine to sequester bile acids if bile acid induced diarrhea is the cause (she has previously cholecystectomy and sphincterotomy making her higher risk). Perform colonoscopy for change in bowel habits. This could be secondary to menopause symptoms as well. Will perform EGD with side viewing duodenoscope to evaluate ampullary area.    _____________________________________________________    SUBJECTIVE:  Patient here for follow up visit. Underwent ampullectomy for prominent ampullary tissue in 2021, path negative for adenoma or carinoma. Had subsequent EGD for surveillence with no residual polyp tissue, last in 2022. Recommended repeat in 1 year with side viewing duodenoscope. Past couple months she has been having watery stool every day 3-5x a day. It stopped all of a sudden last week and then she went to the bathroom Saturday and noted solid stool initially and then diarrhea again. She hasn't had a bowel movement since Saturday. Gallbladder removed 2 years ago. She was on miralax before the diarrhea cause she was constipated all the time. Had colonoscopy in 2019 with normal exam, told to repeat in 5 years. She did start effexor 3 weeks ago for her hot flashes/night sweats. She started having symptoms of menopause 2 years ago but the night sweats are bad this year, along with insomnia. REVIEW OF SYSTEMS IS OTHERWISE NEGATIVE.       Historical Information   Past Medical History:   Diagnosis Date    Allergic     GERD (gastroesophageal reflux disease)     Hypertension      Past Surgical History:   Procedure Laterality Date    BREAST BIOPSY Left 2006    core    CHOLECYSTECTOMY      CHOLECYSTECTOMY LAPAROSCOPIC N/A 12/4/2020    Procedure: CHOLECYSTECTOMY LAPAROSCOPIC, possible open;  Surgeon: Fernando Robertson MD;  Location: [facility];  Service: General    ERCP W/ PLASTIC STENT PLACEMENT  2021    IR PICC PLACEMENT DOUBLE LUMEN  12/24/2020     Social History   Social History     Substance and Sexual Activity   Alcohol Use Yes    Alcohol/week: 4.0 standard drinks of alcohol    Types: 2 Glasses of wine, 2 Cans of beer per week    Comment: Social drinker     Social History     Substance and Sexual Activity   Drug Use No     Social History     Tobacco Use   Smoking Status Never   Smokeless Tobacco Never     Family History   Problem Relation Age of Onset    Arthritis Mother     Skin cancer Mother     Hypertension Mother     Skin cancer Father     Arthritis Maternal Grandmother     Hiatal hernia Maternal Grandfather     No Known Problems Paternal Grandmother     No Known Problems Paternal Grandfather     Lung cancer Brother 48    No Known Problems Maternal Aunt     No Known Problems Paternal Aunt     Arthritis Family     Hiatal hernia Family     Skin cancer Family     Varicose Veins Family         Of lower extremities       Meds/Allergies       Current Outpatient Medications:     Calcium Carb-Cholecalciferol 600-800 MG-UNIT TABS    estradiol (ESTRACE) 0.1 mg/g vaginal cream    famotidine (PEPCID) 20 mg tablet    fexofenadine-pseudoephedrine (ALLEGRA-D)  MG per tablet    fluticasone (FLONASE) 50 mcg/act nasal spray    Multiple Vitamins-Minerals (ONE DAILY FOR WOMEN PO)    venlafaxine (EFFEXOR-XR) 37.5 mg 24 hr capsule    amLODIPine (NORVASC) 5 mg tablet    Allergies   Allergen Reactions    Lactose - Food Allergy Other (See Comments)     intolerance    Other      SEASONAL ALLERGIES           Objective     Blood pressure 122/77, temperature 98.9 °F (37.2 °C), temperature source Tympanic, height 5' 7" (1.702 m), weight 79.4 kg (175 lb), last menstrual period 02/22/2019, not currently breastfeeding. Body mass index is 27.41 kg/m². PHYSICAL EXAM:      General Appearance:   Alert, cooperative, no distress   HEENT:   Normocephalic, atraumatic, anicteric. Lungs:   Equal chest rise and unlabored breathing, normal cough   Heart:   No visualized JVD   Abdomen:   Soft, non-tender, non-distended; no masses, no organomegaly    Genitalia:   Deferred    Rectal:   Deferred    Extremities:  No cyanosis, clubbing or edema    Pulses:  Musculoskeletal:  2+ and symmetric  Normal range of motion visualized    Skin:  Neuro:  No jaundice, rashes, or lesions   Alert and appropriate           Lab Results:   No visits with results within 1 Day(s) from this visit. Latest known visit with results is:   Appointment on 10/02/2023   Component Date Value    Total Bilirubin 10/02/2023 0.42     Bilirubin, Direct 10/02/2023 0.10     Alkaline Phosphatase 10/02/2023 82     AST 10/02/2023 16     ALT 10/02/2023 16     Total Protein 10/02/2023 7.2     Albumin 10/02/2023 4.0     Cholesterol 10/02/2023 186     Triglycerides 10/02/2023 114     HDL, Direct 10/02/2023 39 (L)     LDL Calculated 10/02/2023 124 (H)     Non-HDL-Chol (CHOL-HDL) 10/02/2023 147     HIV-1 p24 Antigen 10/02/2023 Non-Reactive     HIV-1 Antibody 10/02/2023 Non-Reactive     HIV-2 Antibody 10/02/2023 Non-Reactive     HIV Ag-Ab 5th Gen 10/02/2023 Non-Reactive          Radiology Results:   No results found.

## 2023-10-31 NOTE — PATIENT INSTRUCTIONS
Trial of benefiber  Will provide script of cholestyramine to sequester bile acids if bile acid induced diarrhea is the cause (she has previously cholecystectomy and sphincterotomy making her higher risk). Perform colonoscopy for change in bowel habits. This could be secondary to menopause symptoms as well. Will perform EGD with side viewing duodenoscope to evaluate ampullary area.     Scheduled date of EGD/colonoscopy (as of today):12/28/2023  Physician performing EGD/colonoscopy: Franky  Location of EGD/colonoscopy: [facility]  Desired bowel prep reviewed with patient: Golytely / Dulcolax  Instructions reviewed with patient by: Tyler Chavez  Clearances:   NONE    Patient will have EGD with Side Scope Viewing

## 2023-11-07 ENCOUNTER — APPOINTMENT (OUTPATIENT)
Dept: URGENT CARE | Age: 58
End: 2023-11-07

## 2023-12-28 ENCOUNTER — HOSPITAL ENCOUNTER (OUTPATIENT)
Dept: GASTROENTEROLOGY | Facility: HOSPITAL | Age: 58
Setting detail: OUTPATIENT SURGERY
End: 2023-12-28
Attending: INTERNAL MEDICINE
Payer: COMMERCIAL

## 2023-12-28 ENCOUNTER — ANESTHESIA EVENT (OUTPATIENT)
Dept: GASTROENTEROLOGY | Facility: HOSPITAL | Age: 58
End: 2023-12-28

## 2023-12-28 ENCOUNTER — ANESTHESIA (OUTPATIENT)
Dept: GASTROENTEROLOGY | Facility: HOSPITAL | Age: 58
End: 2023-12-28

## 2023-12-28 VITALS
OXYGEN SATURATION: 100 % | DIASTOLIC BLOOD PRESSURE: 58 MMHG | RESPIRATION RATE: 18 BRPM | SYSTOLIC BLOOD PRESSURE: 122 MMHG | HEART RATE: 68 BPM | TEMPERATURE: 96 F

## 2023-12-28 DIAGNOSIS — R19.8 PROMINENT AMPULLA OF VATER: ICD-10-CM

## 2023-12-28 DIAGNOSIS — R19.4 CHANGE IN BOWEL HABITS: ICD-10-CM

## 2023-12-28 PROCEDURE — 45380 COLONOSCOPY AND BIOPSY: CPT | Performed by: INTERNAL MEDICINE

## 2023-12-28 PROCEDURE — 43239 EGD BIOPSY SINGLE/MULTIPLE: CPT | Performed by: INTERNAL MEDICINE

## 2023-12-28 PROCEDURE — 88305 TISSUE EXAM BY PATHOLOGIST: CPT | Performed by: PATHOLOGY

## 2023-12-28 RX ORDER — LIDOCAINE HYDROCHLORIDE 10 MG/ML
INJECTION, SOLUTION EPIDURAL; INFILTRATION; INTRACAUDAL; PERINEURAL AS NEEDED
Status: DISCONTINUED | OUTPATIENT
Start: 2023-12-28 | End: 2023-12-28

## 2023-12-28 RX ORDER — SODIUM CHLORIDE 9 MG/ML
INJECTION, SOLUTION INTRAVENOUS CONTINUOUS PRN
Status: DISCONTINUED | OUTPATIENT
Start: 2023-12-28 | End: 2023-12-28

## 2023-12-28 RX ORDER — PROPOFOL 10 MG/ML
INJECTION, EMULSION INTRAVENOUS AS NEEDED
Status: DISCONTINUED | OUTPATIENT
Start: 2023-12-28 | End: 2023-12-28

## 2023-12-28 RX ADMIN — SODIUM CHLORIDE: 0.9 INJECTION, SOLUTION INTRAVENOUS at 11:32

## 2023-12-28 RX ADMIN — PROPOFOL 120 MG: 10 INJECTION, EMULSION INTRAVENOUS at 11:40

## 2023-12-28 RX ADMIN — LIDOCAINE HYDROCHLORIDE 100 MG: 10 INJECTION, SOLUTION EPIDURAL; INFILTRATION; INTRACAUDAL; PERINEURAL at 11:40

## 2023-12-28 RX ADMIN — PROPOFOL 150 MCG/KG/MIN: 10 INJECTION, EMULSION INTRAVENOUS at 11:41

## 2023-12-28 NOTE — ANESTHESIA PREPROCEDURE EVALUATION
Procedure:  COLONOSCOPY  EGD    Relevant Problems   CARDIO   (+) Essential hypertension      GI/HEPATIC   (+) Gastroesophageal reflux disease        Physical Exam    Airway       Dental       Cardiovascular      Pulmonary      Other Findings  post-pubertal.      Anesthesia Plan  ASA Score- 2     Anesthesia Type- IV sedation with anesthesia with ASA Monitors.         Additional Monitors:     Airway Plan:     Comment: IV sedation,  standard ASA monitors. Risks and benefits discussed with patient; patient consented and agrees to proceed.    I saw and evaluated the patient. If seen with CRNA, we have discussed the anesthetic plan and I am in agreement that the plan is appropriate for the patient.  .       Plan Factors-    Chart reviewed.   Existing labs reviewed.                   Induction- intravenous.    Postoperative Plan-     Informed Consent- Anesthetic plan and risks discussed with patient.  I personally reviewed this patient with the CRNA. Discussed and agreed on the Anesthesia Plan with the CRNA..

## 2023-12-28 NOTE — ANESTHESIA POSTPROCEDURE EVALUATION
Post-Op Assessment Note    CV Status:  Stable    Pain management: adequate       Mental Status:  Awake and sleepy   Hydration Status:  Euvolemic   PONV Controlled:  Controlled   Airway Patency:  Patent     Post Op Vitals Reviewed: Yes      Staff: CRNA           /72 (12/28/23 1219)    Temp (!) 96 °F (35.6 °C) (12/28/23 1219)    Pulse 78 (12/28/23 1219)   Resp 18 (12/28/23 1219)    SpO2 100 % (12/28/23 1219)    
Bobby Marrufo(Attending)

## 2023-12-28 NOTE — H&P
History and Physical - [facility]  Isabel Acosta 58 y.o. female MRN: [id number]                  HPI: Isabel Acosta is a 58 y.o. year old female who presents for EGD and colonoscopy      REVIEW OF SYSTEMS: Per the HPI, and otherwise unremarkable.    Historical Information   Past Medical History:   Diagnosis Date    Allergic     GERD (gastroesophageal reflux disease)     Hypertension      Past Surgical History:   Procedure Laterality Date    BREAST BIOPSY Left 2006    core    CHOLECYSTECTOMY      CHOLECYSTECTOMY LAPAROSCOPIC N/A 12/4/2020    Procedure: CHOLECYSTECTOMY LAPAROSCOPIC, possible open;  Surgeon: Wyatt Schneider MD;  Location: [facility];  Service: General    ERCP W/ PLASTIC STENT PLACEMENT  2021    IR PICC PLACEMENT DOUBLE LUMEN  12/24/2020     Social History   Social History     Substance and Sexual Activity   Alcohol Use Yes    Alcohol/week: 4.0 standard drinks of alcohol    Types: 2 Glasses of wine, 2 Cans of beer per week    Comment: Social drinker     Social History     Substance and Sexual Activity   Drug Use No     Social History     Tobacco Use   Smoking Status Never   Smokeless Tobacco Never     Family History   Problem Relation Age of Onset    Arthritis Mother     Skin cancer Mother     Hypertension Mother     Skin cancer Father     Arthritis Maternal Grandmother     Hiatal hernia Maternal Grandfather     No Known Problems Paternal Grandmother     No Known Problems Paternal Grandfather     Lung cancer Brother 50    No Known Problems Maternal Aunt     No Known Problems Paternal Aunt     Arthritis Family     Hiatal hernia Family     Skin cancer Family     Varicose Veins Family         Of lower extremities       Meds/Allergies     (Not in a hospital admission)      Allergies   Allergen Reactions    Lactose - Food Allergy Other (See Comments)     intolerance    Other      SEASONAL ALLERGIES       Objective     Blood pressure 148/80, pulse 72, temperature 98.4 °F (36.9 °C),  temperature source Tympanic, resp. rate 18, last menstrual period 02/22/2019, SpO2 100%, not currently breastfeeding.      PHYSICAL EXAM    Gen: NAD  CV: RRR  CHEST: Clear  ABD: soft, NT/ND  EXT: no edema      ASSESSMENT/PLAN:  This is a 58 y.o. year old female here for EGD and colonoscopy

## 2023-12-28 NOTE — DISCHARGE INSTRUCTIONS
Upper Endoscopy   WHAT YOU NEED TO KNOW:   An upper endoscopy is also called an upper gastrointestinal (GI) endoscopy, or an esophagogastroduodenoscopy (EGD). It is a procedure to examine the inside of your esophagus, stomach, and duodenum (first part of the small intestine) with a scope. You may feel bloated, gassy, or have some abdominal discomfort after your procedure. Your throat may be sore for 24 to 36 hours. You may burp or pass gas from air that is still inside your body.         DISCHARGE INSTRUCTIONS:   Seek care immediately if:   You have sudden, severe abdominal pain.     You have problems swallowing.     You have a large amount of black, sticky bowel movements or blood in your bowel movements.     You have sudden trouble breathing.     You feel weak, lightheaded, or faint or your heart beats faster than normal for you.     Contact your healthcare provider if:   You have a fever and chills.      You have nausea or are vomiting.      Your abdomen is bloated or feels full and hard.     You have abdominal pain.   You have black, sticky bowel movements or blood in your bowel movements.  You have not had a bowel movement for 3 days after your procedure.  You have rash or hives.  You have questions or concerns about your procedure.    Activity:   Do not lift, strain, or run for 24 hours after your procedure.     Rest after your procedure. You have been given medicine to relax you. Do not drive or make important decisions until the day after your procedure. Return to your normal activity as directed.     Relieve gas and discomfort from bloating by lying on your right side with a heating pad on your abdomen. You may need to take short walks to help the gas move out. Eat small meals until bloating is relieved.  Follow up with your healthcare provider as directed: Write down your questions so you remember to ask them during your visits.     If you take a “blood thinner”, please review the specific instructions  from your endoscopist about when you should resume it. These can be found in the “Recommendation” and “Your Medication list” sections of this After Visit Summary.  Colonoscopy   WHAT YOU NEED TO KNOW:   A colonoscopy is a procedure to examine the inside of your colon (intestine) with a scope. Polyps or tissue growths may have been removed during your colonoscopy. It is normal to feel bloated and to have some abdominal discomfort. You should be passing gas. If you have hemorrhoids or you had polyps removed, you may have a small amount of bleeding.        DISCHARGE INSTRUCTIONS:   Seek care immediately if:   You have sudden, severe abdominal pain.     You have problems swallowing.     You have a large amount of black, sticky bowel movements or blood in your bowel movements.     You have sudden trouble breathing.     You feel weak, lightheaded, or faint or your heart beats faster than normal for you.     Contact your healthcare provider if:   You have a fever and chills.      You have nausea or are vomiting.      Your abdomen is bloated or feels full and hard.     You have abdominal pain.   You have black, sticky bowel movements or blood in your bowel movements.  You have not had a bowel movement for 3 days after your procedure.  You have rash or hives.  You have questions or concerns about your procedure.    Activity:   Do not lift, strain, or run for 24 hours after your procedure.     Rest after your procedure. You have been given medicine to relax you. Do not drive or make important decisions until the day after your procedure. Return to your normal activity as directed.     Relieve gas and discomfort from bloating by lying on your right side with a heating pad on your abdomen. You may need to take short walks to help the gas move out. Eat small meals until bloating is relieved.  Follow up with your healthcare provider as directed: Write down your questions so you remember to ask them during your visits.     If you  take a “blood thinner”, please review the specific instructions from your endoscopist about when you should resume it. These can be found in the “Recommendation” and “Your Medication list” sections of this After Visit Summary.

## 2024-01-03 PROCEDURE — 88305 TISSUE EXAM BY PATHOLOGIST: CPT | Performed by: PATHOLOGY

## 2024-02-21 PROBLEM — Z01.419 ENCOUNTER FOR GYNECOLOGICAL EXAMINATION WITHOUT ABNORMAL FINDING: Status: RESOLVED | Noted: 2021-04-07 | Resolved: 2024-02-21

## 2024-04-04 DIAGNOSIS — N95.2 VAGINAL ATROPHY: ICD-10-CM

## 2024-04-04 RX ORDER — ESTRADIOL 0.1 MG/G
CREAM VAGINAL
Qty: 42.5 G | Refills: 0 | Status: SHIPPED | OUTPATIENT
Start: 2024-04-04

## 2024-04-04 NOTE — TELEPHONE ENCOUNTER
Reason for call:   [x] Refill   [] Prior Auth  [] Other:     Office:   [] PCP/Provider -   [x] Specialty/Provider - OBGYN    Medication: estradiol (ESTRACE) 0.1 mg/g vaginal cream     Dose/Frequency: insert 0.5 grams vaginally two times a week (APPLY A PEA SIZED AMOUNT TO EXTERNAL VAGINAL OPENING AND URETHRA AREA TWICE WEEKLY)     Quantity: 42.5g    Pharmacy:  [id number] - [place] - [facility] [address]     Does the patient have enough for 3 days?   [x] Yes   [] No - Send as HP to POD

## 2024-04-25 ENCOUNTER — ANNUAL EXAM (OUTPATIENT)
Dept: OBGYN CLINIC | Facility: MEDICAL CENTER | Age: 59
End: 2024-04-25
Payer: COMMERCIAL

## 2024-04-25 VITALS
HEIGHT: 67 IN | BODY MASS INDEX: 27.94 KG/M2 | WEIGHT: 178 LBS | SYSTOLIC BLOOD PRESSURE: 128 MMHG | DIASTOLIC BLOOD PRESSURE: 76 MMHG

## 2024-04-25 DIAGNOSIS — Z01.419 WELL WOMAN EXAM WITH ROUTINE GYNECOLOGICAL EXAM: Primary | ICD-10-CM

## 2024-04-25 DIAGNOSIS — R23.2 HOT FLASHES: ICD-10-CM

## 2024-04-25 PROCEDURE — S0612 ANNUAL GYNECOLOGICAL EXAMINA: HCPCS | Performed by: PHYSICIAN ASSISTANT

## 2024-04-25 NOTE — PROGRESS NOTES
Assessment   58 y.o.  presenting for annual exam.     Plan   Diagnoses and all orders for this visit:    Well woman exam with routine gynecological exam    Hot flashes  -     CBC and differential; Future  -     Comprehensive metabolic panel; Future  -     TSH, 3rd generation with Free T4 reflex; Future        Pap up to date  Mammo scheduled   Colonoscopy up to date   Hot flashes/night sweats- check TSH, CBC, CMP. Pt is interested in menopause consult with a menopause provider. She has failed black cohosh and effexor in the past. Hx of LFT elevation and HTN. LFTs last year wnl. Advised to complete updated labs and schedule menopause consult   Vaginal dryness- using estrace cream with great results. She does not need refills at this time. She will call office when in need of next refill    Perineal hygiene reviewed. Weight bearing exercises minium of 150 mins/weekly advised. Kegel exercises recommended.   SBE encouraged, A yearly mammogram is recommended for breast cancer screening starting at age 40. ASCCP guidelines reviewed. Calcium/ Vit D dietary requirements discussed.   Advised to call with any issues, all concerns & questions addressed.   See provided information in your after visit summary     F/U Annually and PRN    Results will be released to [employer], if abnormal will call or message to review and discuss treatment plan.     __________________________________________________________________    Subjective     Isabel Acosta is a 58 y.o.  presenting for annual exam.     She does report worsening hot flashes/night sweats over the past year.  Symptoms are present typically in the morning and then periodically throughout the night.  With the waves of heat and sweating which lasts for a few minutes and then reports chills.  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  Most recent testing negative.    She has tried black cohosh for hot flashes without relief.  PCP  is also trialed Effexor.  Patient states this made her symptoms worse.  She does have a history of hypertension and history of elevated LFTs.  Most recent LFTs from last year were within normal range.  She is interested in a menopause/HRT consult.    Using estrace cream twice weekly for dryness-she is having great results with this    SCREENING  Last Pap: 2022 neg/neg  Last Mammo: 2023 birads 1  Last Colonoscopy: 2023 10 year recall      GYN  , no VB    Sexually active: Yes - single partner - male    Hx Abnormal pap: denies  We reviewed ASCCP guidelines for Pap testing today.    Denies vaginal discharge, itching, odor, dyspareunia, pelvic pain and vulvar/vaginal symptoms      OB           Complaints: denies   Denies urgency, frequency, hematuria, leakage / change in stream, difficulty urinating.       BREAST  Complaints: denies   Denies: breast lump, breast tenderness, nipple discharge, skin color change, and skin lesion(s)  Personal hx: left breast biopsy in        Pertinent Family Hx:   Family hx of breast cancer: no  Family hx of ovarian cancer: no  Family hx of colon cancer: no      GENERAL  PMH reviewed/updated and is as below.   Patient does follow with a PCP.    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       Past Medical History:   Diagnosis Date    Allergic     GERD (gastroesophageal reflux disease)     Hypertension        Past Surgical History:   Procedure Laterality Date    BREAST BIOPSY Left     core    CHOLECYSTECTOMY      CHOLECYSTECTOMY LAPAROSCOPIC N/A 2020    Procedure: CHOLECYSTECTOMY LAPAROSCOPIC, possible open;  Surgeon: Wyatt Schneider MD;  Location: [facility];  Service: General    ERCP W/ PLASTIC STENT PLACEMENT      IR PICC PLACEMENT DOUBLE LUMEN  2020         Current Outpatient Medications:     Calcium Carb-Cholecalciferol 600-800 MG-UNIT TABS, Take 1 tablet by mouth daily, Disp: , Rfl:      cholestyramine (QUESTRAN) 4 g packet, Take 1 packet (4 g total) by mouth 2 (two) times a day with meals, Disp: 60 packet, Rfl: 2    estradiol (ESTRACE) 0.1 mg/g vaginal cream, Apply a pea sized amount to external vaginal opening and urethra twice weekly, Disp: 42.5 g, Rfl: 0    famotidine (PEPCID) 20 mg tablet, Take 20 mg by mouth daily, Disp: , Rfl:     fexofenadine-pseudoephedrine (ALLEGRA-D)  MG per tablet, as needed , Disp: , Rfl:     fluticasone (FLONASE) 50 mcg/act nasal spray, 1 spray into each nostril daily, Disp: 48 g, Rfl: 1    Multiple Vitamins-Minerals (ONE DAILY FOR WOMEN PO), daily , Disp: , Rfl:     amLODIPine (NORVASC) 5 mg tablet, Take 1 tablet (5 mg total) by mouth daily, Disp: 90 tablet, Rfl: 3    bisacodyl (DULCOLAX) 5 mg EC tablet, Take 2 tablets (10 mg total) by mouth once for 1 dose, Disp: 2 tablet, Rfl: 0    polyethylene glycol (GOLYTELY) 4000 mL solution, Take 4,000 mL by mouth once for 1 dose, Disp: 4000 mL, Rfl: 0    venlafaxine (EFFEXOR-XR) 37.5 mg 24 hr capsule, Take 1 capsule (37.5 mg total) by mouth daily with breakfast, Disp: 30 capsule, Rfl: 5    Allergies   Allergen Reactions    Lactose - Food Allergy Other (See Comments)     intolerance    Other      SEASONAL ALLERGIES       Social History     Socioeconomic History    Marital status: /Civil Union     Spouse name: Not on file    Number of children: Not on file    Years of education: Not on file    Highest education level: Not on file   Occupational History    Not on file   Tobacco Use    Smoking status: Never    Smokeless tobacco: Never   Vaping Use    Vaping status: Never Used   Substance and Sexual Activity    Alcohol use: Yes     Alcohol/week: 4.0 standard drinks of alcohol     Types: 2 Glasses of wine, 2 Cans of beer per week     Comment: Social drinker    Drug use: No    Sexual activity: Yes     Partners: Male     Birth control/protection: None     Comment: with    Other Topics Concern    Not on file  "  Social History Narrative    Daily coffee consumption (1 cups/day)    Exercising regularly     Social Determinants of Health     Financial Resource Strain: Not on file   Food Insecurity: Not on file   Transportation Needs: Not on file   Physical Activity: Not on file   Stress: Not on file   Social Connections: Not on file   Intimate Partner Violence: Not on file   Housing Stability: Not on file       Review of Systems     ROS:  Constitutional: Negative for fatigue and unexpected weight change.   Respiratory: Negative for cough and shortness of breath.    Cardiovascular: Negative for chest pain and palpitations.   Gastrointestinal: Negative for abdominal pain and change in bowel habits  Breasts:  Negative, other than as noted above.   Genitourinary: Negative, other than as noted above.   Psychiatric: Negative for mood difficulties.      Objective   Vitals:    04/25/24 0705   BP: 128/76        /76 (BP Location: Left arm, Patient Position: Sitting, Cuff Size: Standard)   Ht 5' 7\" (1.702 m)   Wt 80.7 kg (178 lb)   LMP 02/22/2019   BMI 27.88 kg/m²     Physical Examination:    Patient appears well and is not in distress  Neck is supple without masses, no cervical or supraclavicular lymphadenopathy  Cardiovascular: regular rate and rhythm; no murmurs  Lungs: clear to auscultation bilaterally; no wheezes  Breasts are symmetrical without mass, tenderness, nipple discharge, skin changes or adenopathy.   Abdomen is soft and nontender without masses.   External genitals are normal without lesions or rashes.  Urethral meatus and urethra are normal  Bladder is normal to palpation  Vagina is normal without discharge or bleeding.   Cervix is normal without discharge or lesion.   Uterus is normal, mobile, nontender without palpable mass.  Adnexa are normal, nontender, without palpable mass.                 "

## 2024-04-25 NOTE — PROGRESS NOTES
Patient presents for a routine annual visit  PMB - none  Last Pap Smear- 2022 -/-  Mammogram- 2023   Scheduled 2024  Colonoscopy- 2023  10 yr recall     nonsmoker  Currently sexually active - one partner   No family history of uterine, ovarian, cervical or breast cancer  Pt has been experiencing night sweats which have increased over the last year.   No concerns/questions for today's visit

## 2024-04-26 ENCOUNTER — APPOINTMENT (OUTPATIENT)
Dept: LAB | Facility: MEDICAL CENTER | Age: 59
End: 2024-04-26
Payer: COMMERCIAL

## 2024-04-26 DIAGNOSIS — R23.2 HOT FLASHES: ICD-10-CM

## 2024-04-26 LAB
ALBUMIN SERPL BCP-MCNC: 4.4 G/DL (ref 3.5–5)
ALP SERPL-CCNC: 92 U/L (ref 34–104)
ALT SERPL W P-5'-P-CCNC: 15 U/L (ref 7–52)
ANION GAP SERPL CALCULATED.3IONS-SCNC: 7 MMOL/L (ref 4–13)
AST SERPL W P-5'-P-CCNC: 18 U/L (ref 13–39)
BASOPHILS # BLD AUTO: 0.03 THOUSANDS/ÂΜL (ref 0–0.1)
BASOPHILS NFR BLD AUTO: 1 % (ref 0–1)
BILIRUB SERPL-MCNC: 0.53 MG/DL (ref 0.2–1)
BUN SERPL-MCNC: 12 MG/DL (ref 5–25)
CALCIUM SERPL-MCNC: 9.7 MG/DL (ref 8.4–10.2)
CHLORIDE SERPL-SCNC: 103 MMOL/L (ref 96–108)
CO2 SERPL-SCNC: 31 MMOL/L (ref 21–32)
CREAT SERPL-MCNC: 0.96 MG/DL (ref 0.6–1.3)
EOSINOPHIL # BLD AUTO: 0.16 THOUSAND/ÂΜL (ref 0–0.61)
EOSINOPHIL NFR BLD AUTO: 3 % (ref 0–6)
ERYTHROCYTE [DISTWIDTH] IN BLOOD BY AUTOMATED COUNT: 11.9 % (ref 11.6–15.1)
GFR SERPL CREATININE-BSD FRML MDRD: 65 ML/MIN/1.73SQ M
GLUCOSE P FAST SERPL-MCNC: 85 MG/DL (ref 65–99)
HCT VFR BLD AUTO: 39.8 % (ref 34.8–46.1)
HGB BLD-MCNC: 13 G/DL (ref 11.5–15.4)
IMM GRANULOCYTES # BLD AUTO: 0.01 THOUSAND/UL (ref 0–0.2)
IMM GRANULOCYTES NFR BLD AUTO: 0 % (ref 0–2)
LYMPHOCYTES # BLD AUTO: 1.29 THOUSANDS/ÂΜL (ref 0.6–4.47)
LYMPHOCYTES NFR BLD AUTO: 28 % (ref 14–44)
MCH RBC QN AUTO: 31.6 PG (ref 26.8–34.3)
MCHC RBC AUTO-ENTMCNC: 32.7 G/DL (ref 31.4–37.4)
MCV RBC AUTO: 97 FL (ref 82–98)
MONOCYTES # BLD AUTO: 0.32 THOUSAND/ÂΜL (ref 0.17–1.22)
MONOCYTES NFR BLD AUTO: 7 % (ref 4–12)
NEUTROPHILS # BLD AUTO: 2.87 THOUSANDS/ÂΜL (ref 1.85–7.62)
NEUTS SEG NFR BLD AUTO: 61 % (ref 43–75)
NRBC BLD AUTO-RTO: 0 /100 WBCS
PLATELET # BLD AUTO: 330 THOUSANDS/UL (ref 149–390)
PMV BLD AUTO: 11.2 FL (ref 8.9–12.7)
POTASSIUM SERPL-SCNC: 4.2 MMOL/L (ref 3.5–5.3)
PROT SERPL-MCNC: 7.6 G/DL (ref 6.4–8.4)
RBC # BLD AUTO: 4.11 MILLION/UL (ref 3.81–5.12)
SODIUM SERPL-SCNC: 141 MMOL/L (ref 135–147)
TSH SERPL DL<=0.05 MIU/L-ACNC: 1.27 UIU/ML (ref 0.45–4.5)
WBC # BLD AUTO: 4.68 THOUSAND/UL (ref 4.31–10.16)

## 2024-04-26 PROCEDURE — 84443 ASSAY THYROID STIM HORMONE: CPT

## 2024-04-26 PROCEDURE — 80053 COMPREHEN METABOLIC PANEL: CPT

## 2024-04-26 PROCEDURE — 36415 COLL VENOUS BLD VENIPUNCTURE: CPT

## 2024-04-26 PROCEDURE — 85025 COMPLETE CBC W/AUTO DIFF WBC: CPT

## 2024-05-02 ENCOUNTER — OFFICE VISIT (OUTPATIENT)
Dept: INTERNAL MEDICINE CLINIC | Age: 59
End: 2024-05-02
Payer: COMMERCIAL

## 2024-05-02 VITALS
OXYGEN SATURATION: 98 % | SYSTOLIC BLOOD PRESSURE: 114 MMHG | TEMPERATURE: 98.2 F | HEART RATE: 68 BPM | BODY MASS INDEX: 26.63 KG/M2 | WEIGHT: 179.8 LBS | DIASTOLIC BLOOD PRESSURE: 80 MMHG | HEIGHT: 69 IN

## 2024-05-02 DIAGNOSIS — I10 ESSENTIAL HYPERTENSION: ICD-10-CM

## 2024-05-02 DIAGNOSIS — Z86.19 HISTORY OF HERPES ZOSTER: ICD-10-CM

## 2024-05-02 DIAGNOSIS — N95.2 VAGINAL ATROPHY: Primary | ICD-10-CM

## 2024-05-02 DIAGNOSIS — K21.9 GASTROESOPHAGEAL REFLUX DISEASE WITHOUT ESOPHAGITIS: ICD-10-CM

## 2024-05-02 DIAGNOSIS — J30.2 SEASONAL ALLERGIC RHINITIS, UNSPECIFIED TRIGGER: ICD-10-CM

## 2024-05-02 PROCEDURE — 3725F SCREEN DEPRESSION PERFORMED: CPT | Performed by: INTERNAL MEDICINE

## 2024-05-02 PROCEDURE — 99214 OFFICE O/P EST MOD 30 MIN: CPT | Performed by: INTERNAL MEDICINE

## 2024-05-02 RX ORDER — FLUTICASONE PROPIONATE 50 MCG
1 SPRAY, SUSPENSION (ML) NASAL DAILY
Qty: 48 G | Refills: 1 | Status: SHIPPED | OUTPATIENT
Start: 2024-05-02

## 2024-05-02 RX ORDER — AMLODIPINE BESYLATE 5 MG/1
5 TABLET ORAL DAILY
Qty: 90 TABLET | Refills: 1 | Status: SHIPPED | OUTPATIENT
Start: 2024-05-02

## 2024-05-02 NOTE — PROGRESS NOTES
Assessment/Plan:     Diagnoses and all orders for this visit:    Vaginal atrophy  Patient use the Estrace vaginal cream  Seasonal allergic rhinitis, unspecified trigger  -     fluticasone (FLONASE) 50 mcg/act nasal spray; 1 spray into each nostril daily  Allergies are is stable  Essential hypertension  -     amLODIPine (NORVASC) 5 mg tablet; Take 1 tablet (5 mg total) by mouth daily  Blood pressure is very well-controlled  History of herpes zoster  2 weeks ago she had a rash in her right side of the mid back in the thoracic region she thought it is poison but looking at it she had a herpes zoster this is the typical of herpes zoster which I see the rash  Gastroesophageal reflux disease without esophagitis  She had an EGD and colonoscopy done I reviewed the results  Other orders  -     BLACK COHOSH PO; Take by mouth daily at bedtime           M*Modal software was used to dictate this note.  It may contain errors with dictating incorrect words or incorrect spelling. Please contact the provider directly with any questions.    Subjective:   Chief Complaint   Patient presents with    Follow-up     6 m f/u visit for hypertension.  Review labs from 4/26/24.        Rash     Patient c/o an itchy, painful rash on her back x 3-4 weeks.  She used Calamine lotion that helped the itching.    lump on finger     She c/o a hard lump on her left 4th finger x 2 months.        Patient ID: Isabel Acosta is a 58 y.o. female.    HPI  This is a very pleasant 58 years young lady who is here today for the regular follow-up she is doing very well no new complaints.  Patient is here today for the follow-up.   Hypertension. I reviewed antihypertensive medication, patient does not have any side effects of  medications, no signs or symptoms of hypertension ,hypotension or orthostatic hypotension.  Patient is compliant with medications.  Blood workup related to hypertensive diagnosis reviewed.  Plan is to continue with the present management.   Continued Stay Note  TEODORA Dobbs     Patient Name: Jared Govea  MRN: [id number]  Today's Date: 8/13/2020    Admit Date: 8/7/2020    Discharge Plan     Row Name 08/13/20 1049       Plan    Plan Comments  Karen with [facility] with return phone call and states that patient's insurance is requesting a peer to peer with the doctor. Dr. Peck made aware and is agreeable to do peer to peer. Provided phone number of [phone] with option 5 to be selected. He is also aware that the deadline for the peer to peer will is today by 1:30pm. CM will continue to follow for needs.    Row Name 08/13/20 0918       Plan    Plan  STR    Plan Comments  Followed up with Karen with [facility] in [place] to see if she has a decision on patient's authrization from insurance. Karen states they have not heard anything yet but will let me know as soon as they do. CM will continue to follow for needs.        Discharge Codes    No documentation.             Kerri Dutton, RN     We will follow-up as a scheduled and adjust the doses of the medication as indicated.  10 years cardiovascular risk is only 3% will continue with the same medication no change  Send episode of herpes zoster she did not come to the doctor it is healed very well she does not have any complaint I recommend her to take the 2 dose zoster vaccine  So esophageal reflux disease she is using famotidine EGD and the colonoscopy was essentially unremarkable  The following portions of the patient's history were reviewed and updated as appropriate: allergies, current medications, past family history, past medical history, past social history, past surgical history, and problem list.    Review of Systems   Constitutional:  Negative for chills and fatigue.   HENT:  Negative for congestion, ear pain, hearing loss, postnasal drip, sinus pressure, sore throat and voice change.    Eyes:  Negative for pain, discharge and visual disturbance.   Respiratory:  Negative for cough, chest tightness and shortness of breath.    Cardiovascular:  Negative for chest pain, palpitations and leg swelling.   Gastrointestinal:  Negative for abdominal pain, blood in stool, diarrhea, nausea and rectal pain.   Genitourinary:  Negative for difficulty urinating, dysuria and urgency.   Musculoskeletal:  Negative for arthralgias and joint swelling.   Skin:  Negative for rash.   Allergic/Immunologic: Negative for environmental allergies and food allergies.   Neurological:  Negative for dizziness, tremors, weakness, numbness and headaches.   Hematological:  Negative for adenopathy.   Psychiatric/Behavioral:  Negative for behavioral problems and hallucinations.          Past Medical History:   Diagnosis Date    Allergic     GERD (gastroesophageal reflux disease)     Hypertension          Current Outpatient Medications:     amLODIPine (NORVASC) 5 mg tablet, Take 1 tablet (5 mg total) by mouth daily, Disp: 90 tablet, Rfl: 1    BLACK COHOSH PO, Take by mouth daily  at bedtime, Disp: , Rfl:     Calcium Carb-Cholecalciferol 600-800 MG-UNIT TABS, Take 1 tablet by mouth daily, Disp: , Rfl:     cholestyramine (QUESTRAN) 4 g packet, Take 1 packet (4 g total) by mouth 2 (two) times a day with meals, Disp: 60 packet, Rfl: 2    estradiol (ESTRACE) 0.1 mg/g vaginal cream, Apply a pea sized amount to external vaginal opening and urethra twice weekly, Disp: 42.5 g, Rfl: 0    famotidine (PEPCID) 20 mg tablet, Take 20 mg by mouth daily, Disp: , Rfl:     fexofenadine-pseudoephedrine (ALLEGRA-D)  MG per tablet, as needed , Disp: , Rfl:     fluticasone (FLONASE) 50 mcg/act nasal spray, 1 spray into each nostril daily, Disp: 48 g, Rfl: 1    Multiple Vitamins-Minerals (ONE DAILY FOR WOMEN PO), daily , Disp: , Rfl:     Allergies   Allergen Reactions    Lactose - Food Allergy Other (See Comments)     intolerance    Other      SEASONAL ALLERGIES       Social History   Past Surgical History:   Procedure Laterality Date    BREAST BIOPSY Left 2006    core    CHOLECYSTECTOMY      CHOLECYSTECTOMY LAPAROSCOPIC N/A 12/4/2020    Procedure: CHOLECYSTECTOMY LAPAROSCOPIC, possible open;  Surgeon: Wyatt Schneider MD;  Location: [facility];  Service: General    ERCP W/ PLASTIC STENT PLACEMENT  2021    IR PICC PLACEMENT DOUBLE LUMEN  12/24/2020     Family History   Problem Relation Age of Onset    Arthritis Mother     Skin cancer Mother     Hypertension Mother     Skin cancer Father     Arthritis Maternal Grandmother     Hiatal hernia Maternal Grandfather     No Known Problems Paternal Grandmother     No Known Problems Paternal Grandfather     Lung cancer Brother 50    No Known Problems Maternal Aunt     No Known Problems Paternal Aunt     Arthritis Family     Hiatal hernia Family     Skin cancer Family     Varicose Veins Family         Of lower extremities       Objective:  /80 (BP Location: Left arm, Patient Position: Sitting, Cuff Size: Standard)   Pulse 68   Temp 98.2 °F (36.8 °C) (Temporal)    "Ht 5' 8.5\" (1.74 m)   Wt 81.6 kg (179 lb 12.8 oz)   LMP 02/22/2019   SpO2 98%   BMI 26.94 kg/m²        Physical Exam  Vitals and nursing note reviewed.   Constitutional:       Appearance: She is well-developed.   HENT:      Head: Normocephalic and atraumatic.      Nose: Nose normal.   Eyes:      Extraocular Movements: Extraocular movements intact.      Pupils: Pupils are equal, round, and reactive to light.   Cardiovascular:      Rate and Rhythm: Normal rate and regular rhythm.      Pulses: Normal pulses.      Heart sounds: Normal heart sounds. No murmur heard.  Pulmonary:      Effort: Pulmonary effort is normal.      Breath sounds: Normal breath sounds.   Abdominal:      General: Abdomen is protuberant. Bowel sounds are normal.      Palpations: Abdomen is soft.      Tenderness: There is no abdominal tenderness.      Hernia: No hernia is present.   Musculoskeletal:         General: No tenderness.      Cervical back: Normal range of motion.   Skin:     General: Skin is warm.   Neurological:      Mental Status: She is alert and oriented to person, place, and time.   Psychiatric:         Mood and Affect: Mood normal.         Behavior: Behavior normal.           "

## 2024-07-02 ENCOUNTER — HOSPITAL ENCOUNTER (OUTPATIENT)
Dept: RADIOLOGY | Age: 59
Discharge: HOME | End: 2024-07-02
Payer: COMMERCIAL

## 2024-07-02 DIAGNOSIS — Z12.31 VISIT FOR SCREENING MAMMOGRAM: ICD-10-CM

## 2024-07-02 PROCEDURE — 77067 SCR MAMMO BI INCL CAD: CPT

## 2024-07-02 PROCEDURE — 77063 BREAST TOMOSYNTHESIS BI: CPT

## 2024-07-10 ENCOUNTER — HOSPITAL ENCOUNTER (OUTPATIENT)
Dept: MAMMOGRAPHY | Facility: CLINIC | Age: 59
Discharge: HOME | End: 2024-07-10
Payer: COMMERCIAL

## 2024-07-10 VITALS — WEIGHT: 179 LBS | BODY MASS INDEX: 26.51 KG/M2 | HEIGHT: 69 IN

## 2024-07-10 DIAGNOSIS — R92.8 ABNORMAL MAMMOGRAM: ICD-10-CM

## 2024-07-10 PROCEDURE — 77065 DX MAMMO INCL CAD UNI: CPT

## 2024-07-29 ENCOUNTER — OFFICE VISIT (OUTPATIENT)
Dept: INTERNAL MEDICINE CLINIC | Age: 59
End: 2024-07-29
Payer: COMMERCIAL

## 2024-07-29 VITALS
BODY MASS INDEX: 26.81 KG/M2 | HEIGHT: 69 IN | WEIGHT: 181 LBS | OXYGEN SATURATION: 99 % | HEART RATE: 70 BPM | SYSTOLIC BLOOD PRESSURE: 128 MMHG | DIASTOLIC BLOOD PRESSURE: 82 MMHG | TEMPERATURE: 97.3 F

## 2024-07-29 DIAGNOSIS — I10 ESSENTIAL HYPERTENSION: Primary | ICD-10-CM

## 2024-07-29 DIAGNOSIS — R00.2 PALPITATIONS: ICD-10-CM

## 2024-07-29 DIAGNOSIS — K21.9 GASTROESOPHAGEAL REFLUX DISEASE WITHOUT ESOPHAGITIS: ICD-10-CM

## 2024-07-29 PROBLEM — R79.89 LFT ELEVATION: Status: RESOLVED | Noted: 2021-07-06 | Resolved: 2024-07-29

## 2024-07-29 PROCEDURE — 93000 ELECTROCARDIOGRAM COMPLETE: CPT | Performed by: INTERNAL MEDICINE

## 2024-07-29 PROCEDURE — 99214 OFFICE O/P EST MOD 30 MIN: CPT | Performed by: INTERNAL MEDICINE

## 2024-07-29 NOTE — PROGRESS NOTES
Assessment/Plan:     Diagnoses and all orders for this visit:    Essential hypertension  -     POCT ECG    Gastroesophageal reflux disease without esophagitis    Palpitations  -     POCT ECG           M*Modal software was used to dictate this note.  It may contain errors with dictating incorrect words or incorrect spelling. Please contact the provider directly with any questions.    Subjective:   Chief Complaint   Patient presents with    Dizziness     Patient states symptoms got worse about a month ago- Patient states she has episodes of lightheadedness and palpitation, Patient states episodes comes and go and episodes last less than 1 minute, random. Patient denies symptoms at this moment. Last episode was about a week ago        Patient ID: Isabel Acosta is a 58 y.o. female.    This is a very pleasant 58 years young lady who is here today for the regular follow-up    Planing of some fluttering in her heart drinks tea no coffee, social alcohol use, the fluttering is a not related to her lightheadedness she denying any chest pain shortness of breath any sweats associated with these fluttering the fluttering usually last for few seconds and it is usually asymptomatic except the palpitation part.    Occasional problems with the lightheadedness, it is not vertiginous it is not associated with any palpitation nausea vomiting.  Her EKG today is perfectly normal, I will get her echocardiogram for further evaluation of her palpitation otherwise I do not see any testing need to be done her thyroid levels were normal and her cholesterol was better than before.    Hypercholesterolemia as given above the lipid panel was good and her 10 years cardiovascular risk is about 3.2 she is not in a smoker.    And is to continue with the present medication it could be a possibility that she get orthostatic hypotension but I think we will continue with the same medication especially her Norvasc        The following portions of the  patient's history were reviewed and updated as appropriate: allergies, current medications, past family history, past medical history, past social history, past surgical history, and problem list.    Review of Systems   Constitutional:  Negative for chills and fatigue.   HENT:  Negative for congestion, ear pain, hearing loss, postnasal drip, sinus pressure, sore throat and voice change.    Eyes:  Negative for pain, discharge and visual disturbance.   Respiratory:  Negative for cough, chest tightness and shortness of breath.    Cardiovascular:  Positive for palpitations. Negative for chest pain and leg swelling.   Gastrointestinal:  Negative for abdominal pain, blood in stool, diarrhea, nausea and rectal pain.   Genitourinary:  Negative for difficulty urinating, dysuria and urgency.   Musculoskeletal:  Negative for arthralgias and joint swelling.   Skin:  Negative for rash.   Allergic/Immunologic: Negative for environmental allergies and food allergies.   Neurological:  Positive for light-headedness. Negative for dizziness, tremors, weakness, numbness and headaches.   Hematological:  Negative for adenopathy.   Psychiatric/Behavioral:  Negative for behavioral problems and hallucinations.          Past Medical History:   Diagnosis Date    Allergic     GERD (gastroesophageal reflux disease)     Hypertension          Current Outpatient Medications:     amLODIPine (NORVASC) 5 mg tablet, Take 1 tablet (5 mg total) by mouth daily, Disp: 90 tablet, Rfl: 1    BLACK COHOSH PO, Take by mouth daily at bedtime, Disp: , Rfl:     Calcium Carb-Cholecalciferol 600-800 MG-UNIT TABS, Take 1 tablet by mouth daily, Disp: , Rfl:     cholestyramine (QUESTRAN) 4 g packet, Take 1 packet (4 g total) by mouth 2 (two) times a day with meals, Disp: 60 packet, Rfl: 2    estradiol (ESTRACE) 0.1 mg/g vaginal cream, Apply a pea sized amount to external vaginal opening and urethra twice weekly, Disp: 42.5 g, Rfl: 0    famotidine (PEPCID) 20 mg  "tablet, Take 20 mg by mouth daily, Disp: , Rfl:     fluticasone (FLONASE) 50 mcg/act nasal spray, 1 spray into each nostril daily, Disp: 48 g, Rfl: 1    Multiple Vitamins-Minerals (ONE DAILY FOR WOMEN PO), daily , Disp: , Rfl:     Allergies   Allergen Reactions    Lactose - Food Allergy Other (See Comments)     intolerance    Other      SEASONAL ALLERGIES       Social History   Past Surgical History:   Procedure Laterality Date    BREAST BIOPSY Left 2006    core    CHOLECYSTECTOMY      CHOLECYSTECTOMY LAPAROSCOPIC N/A 12/4/2020    Procedure: CHOLECYSTECTOMY LAPAROSCOPIC, possible open;  Surgeon: Wyatt Schneider MD;  Location: [facility];  Service: General    ERCP W/ PLASTIC STENT PLACEMENT  2021    IR PICC PLACEMENT DOUBLE LUMEN  12/24/2020     Family History   Problem Relation Age of Onset    Arthritis Mother     Skin cancer Mother     Hypertension Mother     Skin cancer Father     Arthritis Maternal Grandmother     Hiatal hernia Maternal Grandfather     No Known Problems Paternal Grandmother     No Known Problems Paternal Grandfather     Lung cancer Brother 50    No Known Problems Maternal Aunt     No Known Problems Paternal Aunt     Arthritis Family     Hiatal hernia Family     Skin cancer Family     Varicose Veins Family         Of lower extremities       Objective:  /82 (BP Location: Left arm, Patient Position: Sitting, Cuff Size: Standard)   Pulse 70   Temp (!) 97.3 °F (36.3 °C) (Temporal)   Ht 5' 8.5\" (1.74 m)   Wt 82.1 kg (181 lb)   LMP 02/22/2019   SpO2 99% Comment: room air  BMI 27.12 kg/m²        Physical Exam  Constitutional:       Appearance: She is well-developed.   HENT:      Right Ear: Tympanic membrane and external ear normal.      Left Ear: Tympanic membrane normal.   Eyes:      Conjunctiva/sclera: Conjunctivae normal.      Pupils: Pupils are equal, round, and reactive to light.   Neck:      Thyroid: No thyromegaly.      Vascular: No JVD.   Cardiovascular:      Rate and Rhythm: Normal " rate and regular rhythm.      Heart sounds: Normal heart sounds.   Pulmonary:      Breath sounds: Normal breath sounds.   Abdominal:      General: Bowel sounds are normal.      Palpations: Abdomen is soft.   Musculoskeletal:         General: Normal range of motion.      Cervical back: Normal range of motion.   Lymphadenopathy:      Cervical: No cervical adenopathy.   Skin:     General: Skin is dry.   Neurological:      General: No focal deficit present.      Mental Status: She is alert and oriented to person, place, and time. Mental status is at baseline.      Deep Tendon Reflexes: Reflexes are normal and symmetric.   Psychiatric:         Mood and Affect: Mood normal.         Behavior: Behavior normal.

## 2024-08-06 ENCOUNTER — HOSPITAL ENCOUNTER (OUTPATIENT)
Dept: NON INVASIVE DIAGNOSTICS | Facility: CLINIC | Age: 59
Discharge: HOME | End: 2024-08-06
Payer: COMMERCIAL

## 2024-08-06 VITALS
DIASTOLIC BLOOD PRESSURE: 82 MMHG | HEIGHT: 69 IN | WEIGHT: 181 LBS | SYSTOLIC BLOOD PRESSURE: 128 MMHG | HEART RATE: 69 BPM | BODY MASS INDEX: 26.81 KG/M2

## 2024-08-06 DIAGNOSIS — R00.2 PALPITATIONS: ICD-10-CM

## 2024-08-06 LAB
AORTIC ROOT: 2.8 CM
APICAL FOUR CHAMBER EJECTION FRACTION: 67 %
BSA FOR ECHO PROCEDURE: 1.97 M2
E WAVE DECELERATION TIME: 181 MS
E/A RATIO: 0.81
FRACTIONAL SHORTENING: 38 (ref 28–44)
INTERVENTRICULAR SEPTUM IN DIASTOLE (PARASTERNAL SHORT AXIS VIEW): 0.8 CM
INTERVENTRICULAR SEPTUM: 0.8 CM (ref 0.6–1.1)
LAAS-AP2: 17.7 CM2
LAAS-AP4: 15.6 CM2
LEFT ATRIUM AREA SYSTOLE SINGLE PLANE A4C: 16 CM2
LEFT ATRIUM SIZE: 3.2 CM
LEFT ATRIUM VOLUME (MOD BIPLANE): 48 ML
LEFT ATRIUM VOLUME INDEX (MOD BIPLANE): 24.4 ML/M2
LEFT INTERNAL DIMENSION IN SYSTOLE: 2.8 CM (ref 2.1–4)
LEFT VENTRICULAR INTERNAL DIMENSION IN DIASTOLE: 4.5 CM (ref 3.5–6)
LEFT VENTRICULAR POSTERIOR WALL IN END DIASTOLE: 0.8 CM
LEFT VENTRICULAR STROKE VOLUME: 61 ML
LVSV (TEICH): 61 ML
MV E'TISSUE VEL-SEP: 8 CM/S
MV PEAK A VEL: 0.93 M/S
MV PEAK E VEL: 75 CM/S
MV STENOSIS PRESSURE HALF TIME: 52 MS
MV VALVE AREA P 1/2 METHOD: 4.23
RA PRESSURE ESTIMATED: 10 MMHG
RIGHT ATRIUM AREA SYSTOLE A4C: 11.4 CM2
RIGHT VENTRICLE ID DIMENSION: 3 CM
RV PSP: 27 MMHG
SL CV LEFT ATRIUM LENGTH A2C: 5.4 CM
SL CV LV EF: 65
SL CV PED ECHO LEFT VENTRICLE DIASTOLIC VOLUME (MOD BIPLANE) 2D: 91 ML
SL CV PED ECHO LEFT VENTRICLE SYSTOLIC VOLUME (MOD BIPLANE) 2D: 30 ML
TR MAX PG: 17 MMHG
TR PEAK VELOCITY: 2.1 M/S
TRICUSPID ANNULAR PLANE SYSTOLIC EXCURSION: 2.2 CM
TRICUSPID VALVE PEAK REGURGITATION VELOCITY: 2.06 M/S

## 2024-08-06 PROCEDURE — 93306 TTE W/DOPPLER COMPLETE: CPT | Performed by: INTERNAL MEDICINE

## 2024-08-06 PROCEDURE — 93306 TTE W/DOPPLER COMPLETE: CPT

## 2024-08-28 ENCOUNTER — OFFICE VISIT (OUTPATIENT)
Dept: OBGYN CLINIC | Facility: MEDICAL CENTER | Age: 59
End: 2024-08-28
Payer: COMMERCIAL

## 2024-08-28 VITALS — SYSTOLIC BLOOD PRESSURE: 130 MMHG | DIASTOLIC BLOOD PRESSURE: 90 MMHG | WEIGHT: 180 LBS | BODY MASS INDEX: 26.97 KG/M2

## 2024-08-28 DIAGNOSIS — Z79.899 MEDICATION MANAGEMENT: ICD-10-CM

## 2024-08-28 DIAGNOSIS — N95.1 HOT FLASHES DUE TO MENOPAUSE: Primary | ICD-10-CM

## 2024-08-28 PROCEDURE — 99214 OFFICE O/P EST MOD 30 MIN: CPT | Performed by: NURSE PRACTITIONER

## 2024-08-28 RX ORDER — ESTRADIOL 0.1 MG/D
1 PATCH TRANSDERMAL WEEKLY
Qty: 4 PATCH | Refills: 1 | Status: SHIPPED | OUTPATIENT
Start: 2024-08-28

## 2024-08-28 RX ORDER — PROGESTERONE 100 MG/1
1 CAPSULE ORAL
Qty: 30 CAPSULE | Refills: 1 | Status: SHIPPED | OUTPATIENT
Start: 2024-08-28 | End: 2024-10-27

## 2024-08-28 NOTE — PROGRESS NOTES
Assessment/Plan:  Discussed the natural course of menopause and associated vasomotor symptoms. Average age of menopause in [place] is 50 yo but varies based on genetic factors and tobacco use.   Hot flushes are the most common symptoms experienced by women during the menopausal transition but other symptoms may occur including insomnia, vaginal dryness, and cognitive changes.   There are a wide array of treatment options and with differing efficacies, including lifestyle management strategies (dressing in layers, keeping room temperature low, using a fan, avoiding triggers, weight loss, exercise), CBT, acupuncture, evening primrose oil, hypnosis, vitamin E, OTC Estroven, hormone replacement therapy, and/or SSRI/SNRI treatment.   All questions answered and patient expressed understanding. Assessed for contraindications to HRT - including HTN and hypercholesterolemia.   Reviewed ACHES precautions.   Reviewed that goal is for lowest dose to control symptoms for shortest duration of time.  I discussed the long term health benefits of E2/PG, including: reduction of CVD risk, reduction of hyperlipidemia, reduction of DM and GLP-1 agonist activity with mild appetite suppression; reduction of colon CA, osteoporosis and cognitive decline, Alzheimer's disease.  Use of SSRI/ SNRI declined today.     The patient was counseled extensively about the risk/benefits of hormonal replacement therapy. Due to the presence of her uterus, we will proceed with estrogen/progesterone combined therapy at the lowest effective dose for the shortest duration. The patient was counseled on the very small increase in risk of coronary heart disease, breast cancer, stroke and pulmonary embolism based on the Women's Health Initiative trial. She was also counseled that the WHI showed a decrease in risk of fractures and colon cancer with HRT. The patient's medical history was reviewed and she was found to be an appropriate candidate for these medications.    We discussed the  controversies surrounding estrogen based HRT, including the fear based off the WHI trials concerning Prempro. I will not be prescribing Prempro. One should not assume all therapies confer the same risk or benefit.   Rx for transdermal estrogen and oral progesterone ordered. Use as directed.   You may be able to discontinue your vaginal estrogen. We can discuss this at your next appt.     Self care encouraged:  Exercise 150-300  minutes per week including weight bearing exercises for bone health.  This is also a mood elevator and stress reliever. Regular exercise may improve your sleep.   Sleep hygiene-keep your sleep schedule consistent. Use your bed for sleep and sex only. Avoid blue light stimulation 2-3 hours before bed. Try stress relieving activities.  Remain adequately hydrated.Attempt to drink 64 oz of water from waking until dinner time and drink for mouth comfort after that point.   Food log (ie.) www.Kn[employer].com, sparkpeople.com, loseit.com, [employer].com, etc.  Recommend not eating 2 hours before bed. Discussed pausing before late night eating to determine if you are actually hungry or if it's just related to be thirsty, bored or just habitual.   Consider trying meditation.Free apps are available on your phone and will talk you through the process.   Return to office in one month as discussed for BP check and as needed.   Lipid panel to be done in 3 months.     I have spent a total time of 34 minutes in caring for this patient on the day of the visit/encounter including Risks and benefits of tx options, Instructions for management, Patient and family education, Importance of tx compliance, Risk factor reductions, Impressions, Counseling / Coordination of care, Documenting in the medical record, Obtaining or reviewing history  , and discussion of above listed topics .             1. Hot flashes due to menopause  -     estradiol (Climara) 0.1 mg/24 hr; Place 1 patch on the skin  over 7 days once a week  -     Progesterone 100 MG CAPS; Take 1 capsule by mouth daily at bedtime  2. Medication management  -     Lipid panel; Future; Expected date: 2024             Subjective:      Patient ID: Isabel Acosta is a 58 y.o. female.    HPI    Isabel Acosta is a 58 y.o.  female who is here today for a problem visit .   Last in office on 24 for annual exam with Angie Shafer PA-C.  BP of 130/90. Asymptomatic. Has not taken her norvac yest this morning. Compliant with medication daily at breakfast.   24  Evaluated by PCP for dizziness and fluttering  in her chest. Normal EKG. Echo ordered and completed on 24 with suspected normal results.   Stable hypercholesterolemia.   10/2/23 HDL 39, . Otherwise normal lipid panel.   24 CBC, CMP and TSH were all normal    Here today with c/o menopausal symptoms.     Most bothersome symptom of menopause is hot flashes that are  much worse during the night and causes disrupted sleep over the last 2 years. She tried black cohosh and Effexor which are no longer effective. She only tried the effexor x 1 month as she did not like how she felt on it. She slowly weaned off without issue.   Also feels fatigued, has brain fog, mood changes.   LMP at age 52.   No other concerns offered today. She would prefer to consider HRT vs other treatment options.     Walks daily at lunchtime and every Tuesday night. Does yoga once weekly.     The following portions of the patient's history were reviewed and updated as appropriate: allergies, current medications, past family history, past medical history, past social history, past surgical history, and problem list.    Review of Systems   Constitutional:  Positive for fatigue. Negative for activity change, appetite change, chills, diaphoresis, fever and unexpected weight change.        Hot flashes   Eyes:  Negative for visual disturbance.   Respiratory:  Negative for chest tightness and shortness of  breath.    Cardiovascular:  Negative for chest pain.   Gastrointestinal:  Negative for abdominal pain, constipation, diarrhea, nausea and vomiting.   Genitourinary:  Negative for difficulty urinating, pelvic pain, vaginal bleeding, vaginal discharge and vaginal pain.   Musculoskeletal:  Negative for back pain.   Skin: Negative.    Allergic/Immunologic: Negative.    Neurological:  Negative for weakness and headaches.   Hematological:  Negative for adenopathy.   Psychiatric/Behavioral:  Positive for sleep disturbance.          Objective:      /90 (BP Location: Left arm, Patient Position: Sitting, Cuff Size: Standard)   Wt 81.6 kg (180 lb)   LMP 02/22/2019   BMI 26.97 kg/m²          Physical Exam  Vitals and nursing note reviewed.   Constitutional:       Appearance: Normal appearance. She is well-developed.   Skin:     General: Skin is warm and dry.   Neurological:      Mental Status: She is alert and oriented to person, place, and time.   Psychiatric:         Mood and Affect: Mood normal.         Behavior: Behavior normal.       Exam otherwise deferred

## 2024-08-28 NOTE — PATIENT INSTRUCTIONS
Discussed the natural course of menopause and associated vasomotor symptoms. Average age of menopause in [place] is 52 yo but varies based on genetic factors and tobacco use.   Hot flushes are the most common symptoms experienced by women during the menopausal transition but other symptoms may occur including insomnia, vaginal dryness, and cognitive changes.   There are a wide array of treatment options and with differing efficacies, including lifestyle management strategies (dressing in layers, keeping room temperature low, using a fan, avoiding triggers, weight loss, exercise), CBT, acupuncture, evening primrose oil, hypnosis, vitamin E, OTC Estroven, hormone replacement therapy, and/or SSRI/SNRI treatment.   All questions answered and patient expressed understanding. Assessed for contraindications to HRT - including HTN and hypercholesterolemia.   Reviewed ACHES precautions.   Reviewed that goal is for lowest dose to control symptoms for shortest duration of time.  I discussed the long term health benefits of E2/PG, including: reduction of CVD risk, reduction of hyperlipidemia, reduction of DM and GLP-1 agonist activity with mild appetite suppression; reduction of colon CA, osteoporosis and cognitive decline, Alzheimer's disease.  Use of SSRI/ SNRI declined today.     The patient was counseled extensively about the risk/benefits of hormonal replacement therapy. Due to the presence of her uterus, we will proceed with estrogen/progesterone combined therapy at the lowest effective dose for the shortest duration. The patient was counseled on the very small increase in risk of coronary heart disease, breast cancer, stroke and pulmonary embolism based on the Women's Health Initiative trial. She was also counseled that the WHI showed a decrease in risk of fractures and colon cancer with HRT. The patient's medical history was reviewed and she was found to be an appropriate candidate for these medications.   We discussed the   controversies surrounding estrogen based HRT, including the fear based off the WHI trials concerning Prempro. I will not be prescribing Prempro. One should not assume all therapies confer the same risk or benefit.   Rx for transdermal estrogen and oral progesterone ordered. Use as directed.   You may be able to discontinue your vaginal estrogen. We can discuss this at your next appt.     Self care encouraged:  Exercise 150-300  minutes per week including weight bearing exercises for bone health.  This is also a mood elevator and stress reliever. Regular exercise may improve your sleep.   Sleep hygiene-keep your sleep schedule consistent. Use your bed for sleep and sex only. Avoid blue light stimulation 2-3 hours before bed. Try stress relieving activities.  Remain adequately hydrated.Attempt to drink 64 oz of water from waking until dinner time and drink for mouth comfort after that point.   Food log (ie.) www.[employer].com, sparkpeople.com, loseit.com, [employer]com, etc.  Recommend not eating 2 hours before bed. Discussed pausing before late night eating to determine if you are actually hungry or if it's just related to be thirsty, bored or just habitual.   Consider trying meditation.Free apps are available on your phone and will talk you through the process.   Return to office in one month as discussed for BP check and as needed.   Lipid panel to be done in 3 months.    No

## 2024-10-02 ENCOUNTER — NURSE TRIAGE (OUTPATIENT)
Age: 59
End: 2024-10-02

## 2024-10-02 NOTE — TELEPHONE ENCOUNTER
"Pt called in stating she started on the estradiol patch/oral progesterone a little over a month ago. Yesterday she started with vaginal bleeding. She is wearing pads and changing them about every 3-4 hours. Denies passing any clots. Reports mild intermittent cramping. States she has occasional lightheadedness but does not feel like she will lose consciousness. Advised pt this could be from starting on the hormonal medication but will review for any further advice/recommendation from provider. Pt aware to call back with any heavy bleeding, passing large clots, severe pain, worsening lightheadedness or other concerns/questions. Pt thankful.       Reason for Disposition   Using birth control patch (i.e., transdermal contraceptive system)    Answer Assessment - Initial Assessment Questions  1. AMOUNT: \"Describe the bleeding that you are having.\"     - SPOTTING: spotting, or pinkish / brownish mucous discharge; does not fill panti-liner or pad     - MILD:  less than 1 pad / hour; less than patient's usual menstrual bleeding    - MODERATE: 1-2 pads / hour; 1 menstrual cup every 6 hours; small-medium blood clots (e.g., pea, grape, small coin)    - SEVERE: soaking 2 or more pads/hour for 2 or more hours; 1 menstrual cup every 2 hours; bleeding not contained by pads or continuous red blood from vagina; large blood clots (e.g., golf ball, large coin)       spotting  2. ONSET: \"When did the bleeding begin?\" \"Is it continuing now?\"      yesterday  3. MENSTRUAL PERIOD: \"When was the last normal menstrual period?\" \"How is this different than your period?\"      6 years ago  4. REGULARITY: \"How regular are your periods?\"        5. ABDOMINAL PAIN: \"Do you have any pain?\" \"How bad is the pain?\"  (e.g., Scale 1-10; mild, moderate, or severe)    - MILD (1-3): doesn't interfere with normal activities, abdomen soft and not tender to touch     - MODERATE (4-7): interferes with normal activities or awakens from sleep, tender to touch     - " "SEVERE (8-10): excruciating pain, doubled over, unable to do any normal activities       Mild intermittent cramping  6. PREGNANCY: \"Could you be pregnant?\" \"Are you sexually active?\" \"Did you recently give birth?\"      denies  7. BREASTFEEDING: \"Are you breastfeeding?\"      denies  8. HORMONES: \"Are you taking any hormone medications, prescription or OTC?\" (e.g., birth control pills, estrogen)      Estradiol patch and progesterone  9. BLOOD THINNERS: \"Do you take any blood thinners?\" (e.g., Coumadin/warfarin, Pradaxa/dabigatran, aspirin)      denies  10. CAUSE: \"What do you think is causing the bleeding?\" (e.g., recent gyn surgery, recent gyn procedure; known bleeding disorder, cervical cancer, polycystic ovarian disease, fibroids)          unsure  11. HEMODYNAMIC STATUS: \"Are you weak or feeling lightheaded?\" If Yes, ask: \"Can you stand and walk normally?\"         Occasional lightheadedness, does not feel she will lose consciousness  12. OTHER SYMPTOMS: \"What other symptoms are you having with the bleeding?\" (e.g., passed tissue, vaginal discharge, fever, menstrual-type cramps)        Mild cramping    Protocols used: Vaginal Bleeding - Abnormal-ADULT-[place]    "

## 2024-10-03 ENCOUNTER — NURSE TRIAGE (OUTPATIENT)
Age: 59
End: 2024-10-03

## 2024-10-03 NOTE — TELEPHONE ENCOUNTER
Patient called back ( see triage notes from 10/2) and states she used 1 pad all day yesterday, nothing on pad today and no cramping today. She is still using patch. Patient has f/u appointment 10/17 asking if she needs appointment for follow up sooner? We reviewed bleeding precautions and when to call back- increased bleeding, passing clots, feeling symptomatic lightheaded or dizzy etc. Reviewed with patient will send question to provider, if sooner appointment is advised office will call her back. Patient verbalized understanding.

## 2024-10-16 NOTE — PROGRESS NOTES
Assessment/Plan:  Encouraged to complete lipid panel to be done late November.   Keep vaginal bleeding calendar.   Continue Climara patch and progesterone as ordered.   Any continuation of bleeding beyond 6 months needs to be evaluated. Low threshold for tolerance of post menopausal bleeding beyond this point. Declined pelvic ultrasound today. Will consider if bleeding extends beyond 6 months.   Follow up with PCP as planned in December for HTN management.  Return to office  for annual gyn exam.          1. Post-menopause on HRT (hormone replacement therapy)  2. Postmenopausal vaginal bleeding             Subjective:      Patient ID: Isabel Acosta is a 59 y.o. female.    HPI    Isabel Acosta is a 59 y.o.  female who is here today for a follow up visit.  No health concerns.   Last in office on 24 with hot flashes due to menopause. She was started on climara  patch and progesterone 100 mg po HS.    Hot flashes have resolved and her sleep is slightly improved. She started her HRT .   Here today with c/o vaginal bleeding. She bled 10/1-10/4. Light flow. No pelvic pain. Completely resolved now.   Her LMP was 6 years ago.   24 Normal TSH.     The following portions of the patient's history were reviewed and updated as appropriate: allergies, current medications, past family history, past medical history, past social history, past surgical history, and problem list.    Review of Systems   Constitutional: Negative.    Eyes:  Negative for visual disturbance.   Respiratory:  Negative for chest tightness.    Cardiovascular:  Negative for chest pain.   Gastrointestinal:  Negative for abdominal pain, constipation, diarrhea, nausea and vomiting.   Genitourinary:  Negative for dysuria, pelvic pain, vaginal bleeding, vaginal discharge and vaginal pain.   Skin: Negative.    Neurological:  Negative for headaches.   Psychiatric/Behavioral: Negative.     All other systems reviewed and are negative.         Objective:      /84 (BP Location: Left arm, Patient Position: Sitting, Cuff Size: Standard)   Wt 83.5 kg (184 lb)   LMP 02/22/2019   BMI 27.57 kg/m²          Physical Exam  Vitals and nursing note reviewed.   Constitutional:       Appearance: Normal appearance. She is well-developed.   Skin:     General: Skin is warm and dry.   Neurological:      Mental Status: She is alert and oriented to person, place, and time.   Psychiatric:         Mood and Affect: Mood normal.         Behavior: Behavior normal.       Exam otherwise deferred

## 2024-10-17 ENCOUNTER — OFFICE VISIT (OUTPATIENT)
Dept: OBGYN CLINIC | Facility: MEDICAL CENTER | Age: 59
End: 2024-10-17
Payer: COMMERCIAL

## 2024-10-17 VITALS — BODY MASS INDEX: 27.57 KG/M2 | SYSTOLIC BLOOD PRESSURE: 126 MMHG | DIASTOLIC BLOOD PRESSURE: 84 MMHG | WEIGHT: 184 LBS

## 2024-10-17 DIAGNOSIS — N95.0 POSTMENOPAUSAL VAGINAL BLEEDING: ICD-10-CM

## 2024-10-17 DIAGNOSIS — N95.1 HOT FLASHES DUE TO MENOPAUSE: ICD-10-CM

## 2024-10-17 DIAGNOSIS — Z79.890 POST-MENOPAUSE ON HRT (HORMONE REPLACEMENT THERAPY): Primary | ICD-10-CM

## 2024-10-17 PROCEDURE — 99213 OFFICE O/P EST LOW 20 MIN: CPT | Performed by: NURSE PRACTITIONER

## 2024-10-17 RX ORDER — ESTRADIOL 0.1 MG/D
PATCH TRANSDERMAL
Qty: 4 PATCH | Refills: 5 | Status: SHIPPED | OUTPATIENT
Start: 2024-10-17

## 2024-10-17 NOTE — PATIENT INSTRUCTIONS
Encouraged to complete lipid panel to be done late November.   Keep vaginal bleeding calendar.   Continue Climara patch and progesterone as ordered.   Any continuation of bleeding beyond 6 months needs to be evaluated. Low threshold for tolerance of post menopausal bleeding beyond this point. Declined pelvic ultrasound today. Will consider if bleeding extends beyond 6 months.   Follow up with PCP as planned in December for HTN management.  Return to office 4/25 for annual gyn exam.

## 2024-10-18 DIAGNOSIS — N95.1 HOT FLASHES DUE TO MENOPAUSE: ICD-10-CM

## 2024-10-18 RX ORDER — PROGESTERONE 100 MG/1
1 CAPSULE ORAL
Qty: 30 CAPSULE | Refills: 1 | Status: SHIPPED | OUTPATIENT
Start: 2024-10-18

## 2024-11-20 ENCOUNTER — OFFICE VISIT (OUTPATIENT)
Dept: INTERNAL MEDICINE CLINIC | Age: 59
End: 2024-11-20
Payer: COMMERCIAL

## 2024-11-20 VITALS
SYSTOLIC BLOOD PRESSURE: 130 MMHG | DIASTOLIC BLOOD PRESSURE: 78 MMHG | TEMPERATURE: 97.8 F | HEIGHT: 69 IN | BODY MASS INDEX: 26.96 KG/M2 | WEIGHT: 182 LBS | HEART RATE: 69 BPM | OXYGEN SATURATION: 99 %

## 2024-11-20 DIAGNOSIS — I10 ESSENTIAL HYPERTENSION: ICD-10-CM

## 2024-11-20 DIAGNOSIS — N95.9 POSTMENOPAUSAL SYMPTOMS: ICD-10-CM

## 2024-11-20 DIAGNOSIS — N95.2 VAGINAL ATROPHY: Primary | ICD-10-CM

## 2024-11-20 DIAGNOSIS — K21.9 GASTROESOPHAGEAL REFLUX DISEASE WITHOUT ESOPHAGITIS: ICD-10-CM

## 2024-11-20 PROCEDURE — 99214 OFFICE O/P EST MOD 30 MIN: CPT | Performed by: INTERNAL MEDICINE

## 2024-11-20 NOTE — ASSESSMENT & PLAN NOTE
Orders:    Comprehensive metabolic panel; Future    Lipid panel; Future    UA w Reflex to Microscopic w Reflex to Culture; Future    TSH, 3rd generation; Future

## 2024-11-20 NOTE — PROGRESS NOTES
Name: Isabel Acosta      : 1965      MRN: [id number]  Encounter Provider: Nixon Mccall MD  Encounter Date: 2024   Encounter department: [facility]  :  Assessment & Plan  Vaginal atrophy    Orders:    CBC and differential; Future    Gastroesophageal reflux disease without esophagitis    Orders:    CBC and differential; Future    Essential hypertension    Orders:    Comprehensive metabolic panel; Future    Lipid panel; Future    UA w Reflex to Microscopic w Reflex to Culture; Future    TSH, 3rd generation; Future    Postmenopausal symptoms                History of Present Illness     This is a very pleasant 59 years young lady who is here today for the regular follow-up review of system is essentially unremarkable history of hypertension blood pressure is reasonably good.  Patient is here today for the follow-up.   Hypertension. I reviewed antihypertensive medication, patient does not have any side effects of  medications, no signs or symptoms of hypertension ,hypotension or orthostatic hypotension.  Patient is compliant with medications.  Blood workup related to hypertensive diagnosis reviewed.  Plan is to continue with the present management.  We will follow-up as a scheduled and adjust the doses of the medication as indicated.    Until he was seen by the gynecologist in was started on vaginal estrogen cream and also progesterone for postmenopausal symptoms apparently she is doing good with that we will continue and follow-up with that    Mild hypercholesterolemia with no cardiovascular problem except for hypertension her 10 years cardiovascular risk is about 4.3 I will just tell her to do the diet exercise and in this way she may be able to control the cholesterol better and then we will continue to follow every year          Review of Systems   Constitutional:  Negative for chills and fatigue.   HENT:  Negative for congestion, ear pain, hearing loss, postnasal  "drip, sinus pressure, sore throat and voice change.    Eyes:  Negative for pain, discharge and visual disturbance.   Respiratory:  Negative for cough, chest tightness and shortness of breath.    Cardiovascular:  Negative for chest pain, palpitations and leg swelling.   Gastrointestinal:  Negative for abdominal pain, blood in stool, diarrhea, nausea and rectal pain.   Genitourinary:  Negative for difficulty urinating, dysuria and urgency.   Musculoskeletal:  Negative for arthralgias and joint swelling.   Skin:  Negative for rash.   Allergic/Immunologic: Negative for environmental allergies and food allergies.   Neurological:  Negative for dizziness, tremors, weakness, numbness and headaches.   Hematological:  Negative for adenopathy.   Psychiatric/Behavioral:  Negative for behavioral problems and hallucinations.           Objective   /78 (BP Location: Left arm, Patient Position: Sitting, Cuff Size: Standard)   Pulse 69   Temp 97.8 °F (36.6 °C) (Temporal)   Ht 5' 8.5\" (1.74 m)   Wt 82.6 kg (182 lb)   LMP 02/22/2019   SpO2 99%   BMI 27.27 kg/m²      Physical Exam  HENT:      Head: Normocephalic.   Eyes:      Pupils: Pupils are equal, round, and reactive to light.   Cardiovascular:      Rate and Rhythm: Normal rate and regular rhythm.      Heart sounds: No murmur heard.  Pulmonary:      Breath sounds: Normal breath sounds.   Abdominal:      General: Bowel sounds are normal.      Palpations: Abdomen is soft.   Musculoskeletal:      Cervical back: Normal range of motion.   Skin:     General: Skin is warm.   Neurological:      General: No focal deficit present.      Mental Status: She is alert and oriented to person, place, and time. Mental status is at baseline.   Psychiatric:         Behavior: Behavior normal.         Thought Content: Thought content normal.         "

## 2024-12-09 DIAGNOSIS — N95.1 HOT FLASHES DUE TO MENOPAUSE: ICD-10-CM

## 2024-12-10 RX ORDER — PROGESTERONE 100 MG/1
1 CAPSULE ORAL
Qty: 30 CAPSULE | Refills: 4 | Status: SHIPPED | OUTPATIENT
Start: 2024-12-10

## 2024-12-11 ENCOUNTER — TELEPHONE (OUTPATIENT)
Dept: OBGYN CLINIC | Facility: MEDICAL CENTER | Age: 59
End: 2024-12-11

## 2024-12-25 DIAGNOSIS — I10 ESSENTIAL HYPERTENSION: ICD-10-CM

## 2024-12-25 RX ORDER — AMLODIPINE BESYLATE 5 MG/1
5 TABLET ORAL DAILY
Qty: 90 TABLET | Refills: 3 | Status: SHIPPED | OUTPATIENT
Start: 2024-12-25

## 2025-01-15 ENCOUNTER — HOSPITAL ENCOUNTER (OUTPATIENT)
Dept: MAMMOGRAPHY | Facility: CLINIC | Age: 60
Discharge: HOME | End: 2025-01-15
Payer: COMMERCIAL

## 2025-01-15 ENCOUNTER — RESULTS FOLLOW-UP (OUTPATIENT)
Dept: OBGYN CLINIC | Facility: CLINIC | Age: 60
End: 2025-01-15

## 2025-01-15 DIAGNOSIS — R92.8 ABNORMAL MAMMOGRAM: ICD-10-CM

## 2025-01-15 PROCEDURE — G0279 TOMOSYNTHESIS, MAMMO: HCPCS

## 2025-01-15 PROCEDURE — 77065 DX MAMMO INCL CAD UNI: CPT

## 2025-01-15 NOTE — PROGRESS NOTES
Met with patient and   regarding recommendation for;    _____ RIGHT _X_____LEFT      _____Ultrasound guided  _X_____Stereotactic breast biopsy.      __X___Verbalized understanding.    Reviewed clip placement with patient, pt states understanding: Yes: __X__ No: ____  Comments:    Blood thinners:  No: __X___ Yes: ______ What:          Biopsy teaching sheet given:  Yes: ___X___ No: ________    Pt given contact information and adv to call with any questions/needs    Patient advised to arrive at 0830 for a 0900 appointment

## 2025-02-05 ENCOUNTER — OFFICE VISIT (OUTPATIENT)
Dept: DERMATOLOGY | Facility: CLINIC | Age: 60
End: 2025-02-05
Payer: COMMERCIAL

## 2025-02-05 VITALS — WEIGHT: 182 LBS | BODY MASS INDEX: 26.96 KG/M2 | HEIGHT: 69 IN

## 2025-02-05 DIAGNOSIS — D22.62 MULTIPLE BENIGN MELANOCYTIC NEVI OF UPPER AND LOWER EXTREMITIES AND TRUNK: ICD-10-CM

## 2025-02-05 DIAGNOSIS — D22.5 MULTIPLE BENIGN MELANOCYTIC NEVI OF UPPER AND LOWER EXTREMITIES AND TRUNK: ICD-10-CM

## 2025-02-05 DIAGNOSIS — D22.61 MULTIPLE BENIGN MELANOCYTIC NEVI OF UPPER AND LOWER EXTREMITIES AND TRUNK: ICD-10-CM

## 2025-02-05 DIAGNOSIS — D48.5 NEOPLASM OF UNCERTAIN BEHAVIOR OF SKIN: ICD-10-CM

## 2025-02-05 DIAGNOSIS — Z12.83 SKIN CANCER SCREENING: Primary | ICD-10-CM

## 2025-02-05 DIAGNOSIS — D18.01 CHERRY ANGIOMA: ICD-10-CM

## 2025-02-05 DIAGNOSIS — L85.3 XEROSIS OF SKIN: ICD-10-CM

## 2025-02-05 DIAGNOSIS — D22.71 MULTIPLE BENIGN MELANOCYTIC NEVI OF UPPER AND LOWER EXTREMITIES AND TRUNK: ICD-10-CM

## 2025-02-05 DIAGNOSIS — L82.1 SK (SEBORRHEIC KERATOSIS): ICD-10-CM

## 2025-02-05 DIAGNOSIS — L81.4 LENTIGINES: ICD-10-CM

## 2025-02-05 DIAGNOSIS — D22.72 MULTIPLE BENIGN MELANOCYTIC NEVI OF UPPER AND LOWER EXTREMITIES AND TRUNK: ICD-10-CM

## 2025-02-05 PROCEDURE — 99203 OFFICE O/P NEW LOW 30 MIN: CPT | Performed by: DERMATOLOGY

## 2025-02-05 NOTE — PROGRESS NOTES
"[facility] Note     Patient Name: Isabel Acosta  Encounter Date: 02/05/2025     Have you been cared for by a [facility] Dermatologist in the last 3 years and, if so, which description applies to you?    NO.   I am considered a \"new\" patient and must complete all patient intake questions. I am FEMALE/of child-bearing potential.    REVIEW OF SYSTEMS:  Have you recently had or currently have any of the following? Recent fever or chills? No  Any non-healing wound? No  Are you pregnant or planning to become pregnant? No  Are you currently or planning to be nursing or breast feeding? No   PAST MEDICAL HISTORY:  Have you personally ever had or currently have any of the following?  If \"YES,\" then please provide more detail. Skin cancer (such as Melanoma, Basal Cell Carcinoma, Squamous Cell Carcinoma?  No  Tuberculosis, HIV/AIDS, Hepatitis B or C: No  Radiation Treatment No   HISTORY OF IMMUNOSUPPRESSION:   Do you have a history of any of the following:  Systemic Immunosuppression such as Diabetes, Biologic or Immunotherapy, Chemotherapy, Organ Transplantation, Bone Marrow Transplantation or Prednsione?  No    Answering \"YES\" requires the addition of the dotphrase \"IMMUNOSUPPRESSED\" as the first diagnosis of the patient's visit.   FAMILY HISTORY:  Any \"first degree relatives\" (parent, brother, sister, or child) with the following?    Skin Cancer, Pancreatic or Other Cancer? YES, father and paternal aunt had BCC   PATIENT EXPERIENCE:    Do you want the Dermatologist to perform a COMPLETE skin exam today including a clinical examination under the \"bra and underwear\" areas?  Yes- did not look under bra or underwear   If necessary, do we have your permission to call and leave a detailed message on your Preferred Phone number that includes your specific medical information?  Yes      Allergies   Allergen Reactions    Lactose - Food Allergy Other (See Comments)     intolerance    Other      SEASONAL ALLERGIES " "     Current Outpatient Medications:     amLODIPine (NORVASC) 5 mg tablet, TAKE 1 TABLET BY MOUTH DAILY, Disp: 90 tablet, Rfl: 3    Calcium Carb-Cholecalciferol 600-800 MG-UNIT TABS, Take 1 tablet by mouth daily, Disp: , Rfl:     cholestyramine (QUESTRAN) 4 g packet, Take 1 packet (4 g total) by mouth 2 (two) times a day with meals, Disp: 60 packet, Rfl: 2    estradiol (CLIMARA) 0.1 mg/24 hr, apply patch every week, Disp: 4 patch, Rfl: 5    estradiol (ESTRACE) 0.1 mg/g vaginal cream, Apply a pea sized amount to external vaginal opening and urethra twice weekly, Disp: 42.5 g, Rfl: 0    famotidine (PEPCID) 20 mg tablet, Take 20 mg by mouth daily, Disp: , Rfl:     fluticasone (FLONASE) 50 mcg/act nasal spray, 1 spray into each nostril daily, Disp: 48 g, Rfl: 1    Multiple Vitamins-Minerals (ONE DAILY FOR WOMEN PO), daily , Disp: , Rfl:     Progesterone 100 MG CAPS, take 1 capsule by mouth at bedtime, Disp: 30 capsule, Rfl: 4          Whom besides the patient is providing clinical information about today's encounter?   NO ADDITIONAL HISTORIAN (patient alone provided history)    Physical Exam and Assessment/Plan by Diagnosis:    NEOPLASM OF UNCERTAIN BEHAVIOR OF SKIN    Physical Exam:  (Anatomic Location); (Size and Morphological Description); (Differential Diagnosis):  Left forearm; 0.3 cm pink papule with keratinous debris; likely benign growth  Pertinent Positives:  Pertinent Negatives:    Additional History of Present Condition:  patient noticed area for about a year, non bothersome     Assessment and Plan:  I have discussed with the patient that a sample of skin via a \"skin biopsy” would be potentially helpful to further make a specific diagnosis under the microscope.  Based on a thorough discussion of this condition and the management approach to it (including a comprehensive discussion of the known risks, side effects and potential benefits of treatment), the patient (family) agrees to implement the following " "specific plan:    Likely benign growth  Will monitor     MELANOCYTIC NEVI (\"Moles\")    Physical Exam:  Anatomic Location Affected:   Mostly on sun-exposed areas of the trunk and extremities  Morphological Description:  Scattered, 1-4mm round to ovoid, symmetrical-appearing, even bordered, skin colored to dark brown macules/papules, mostly in sun-exposed areas  Pertinent Positives:  Pertinent Negatives:    Additional History of Present Condition:      Assessment and Plan:  Based on a thorough discussion of this condition and the management approach to it (including a comprehensive discussion of the known risks, side effects and potential benefits of treatment), the patient (family) agrees to implement the following specific plan:  When outside we recommend using a wide brim hat, sunglasses, long sleeve and pants, sunscreen with SPF 30+ with reapplication every 2 hours, or SPF specific clothing   Benign, reassured  Annual skin check     Melanocytic Nevi  Melanocytic nevi (\"moles\") are tan or brown, raised or flat areas of the skin which have an increased number of melanocytes. Melanocytes are the cells in our body which make pigment and account for skin color.    Some moles are present at birth (I.e., \"congenital nevi\"), while others come up later in life (i.e., \"acquired nevi\").  The sun can stimulate the body to make more moles.  Sunburns are not the only thing that triggers more moles.  Chronic sun exposure can do it too.     Clinically distinguishing a healthy mole from melanoma may be difficult, even for experienced dermatologists. The \"ABCDE's\" of moles have been suggested as a means of helping to alert a person to a suspicious mole and the possible increased risk of melanoma.  The suggestions for raising alert are as follows:    Asymmetry: Healthy moles tend to be symmetric, while melanomas are often asymmetric.  Asymmetry means if you draw a line through the mole, the two halves do not match in color, size, " "shape, or surface texture. Asymmetry can be a result of rapid enlargement of a mole, the development of a raised area on a previously flat lesion, scaling, ulceration, bleeding or scabbing within the mole.  Any mole that starts to demonstrate \"asymmetry\" should be examined promptly by a board certified dermatologist.     Border: Healthy moles tend to have discrete, even borders.  The border of a melanoma often blends into the normal skin and does not sharply delineate the mole from normal skin.  Any mole that starts to demonstrate \"uneven borders\" should be examined promptly by a board certified dermatologist.     Color: Healthy moles tend to be one color throughout.  Melanomas tend to be made up of different colors ranging from dark black, blue, white, or red.  Any mole that demonstrates a color change should be examined promptly by a board certified dermatologist.     Diameter: Healthy moles tend to be smaller than 0.6 cm in size; an exception are \"congenital nevi\" that can be larger.  Melanomas tend to grow and can often be greater than 0.6 cm (1/4 of an inch, or the size of a pencil eraser). This is only a guideline, and many normal moles may be larger than 0.6 cm without being unhealthy.  Any mole that starts to change in size (small to bigger or bigger to smaller) should be examined promptly by a board certified dermatologist.     Evolving: Healthy moles tend to \"stay the same.\"  Melanomas may often show signs of change or evolution such as a change in size, shape, color, or elevation.  Any mole that starts to itch, bleed, crust, burn, hurt, or ulcerate or demonstrate a change or evolution should be examined promptly by a board certified dermatologist.        LENTIGO    Physical Exam:  Anatomic Location Affected:  trunk, arms  Morphological Description:  Light brown macules  Pertinent Positives:  Pertinent Negatives:    Additional History of Present Condition:      Assessment and Plan:  Based on a thorough " discussion of this condition and the management approach to it (including a comprehensive discussion of the known risks, side effects and potential benefits of treatment), the patient (family) agrees to implement the following specific plan:  When outside we recommend using a wide brim hat, sunglasses, long sleeve and pants, sunscreen with SPF 30+ with reapplication every 2 hours, or SPF specific clothing       What is a lentigo?  A lentigo is a pigmented flat or slightly raised lesion with a clearly defined edge. Unlike an ephelis (freckle), it does not fade in the winter months. There are several kinds of lentigo.  The name lentigo originally referred to its appearance resembling a small lentil. The plural of lentigo is lentigines, although “lentigos” is also in common use.    Who gets lentigines?  Lentigines can affect males and females of all ages and races. Solar lentigines are especially prevalent in fair skinned adults. Lentigines associated with syndromes are present at birth or arise during childhood.    What causes lentigines?  Common forms of lentigo are due to exposure to ultraviolet radiation:  Sun damage including sunburn   Indoor tanning   Phototherapy, especially photochemotherapy (PUVA)    Ionizing radiation, eg radiation therapy, can also cause lentigines.  Several familial syndromes associated with widespread lentigines originate from mutations in Tonny-MAP kinase, mTOR signaling and PTEN pathways.    What is the treatment for lentigines?  Most lentigines are left alone. Attempts to lighten them may not be successful. The following approaches are used:  SPF 50+ broad-spectrum sunscreen   Hydroquinone bleaching cream   Alpha hydroxy acids   Vitamin C   Retinoids   Azelaic acid   Chemical peels  Individual lesions can be permanently removed using:  Cryotherapy   Intense pulsed light   Pigment lasers    How can lentigines be prevented?  Lentigines associated with exposure ultraviolet radiation can be  "prevented by very careful sun protection. Clothing is more successful at preventing new lentigines than are sunscreens.    What is the outlook for lentigines?  Lentigines usually persist. They may increase in number with age and sun exposure. Some in sun-protected sites may fade and disappear.    FOUNTAIN ANGIOMAS    Physical Exam:  Anatomic Location Affected:  trunk  Morphological Description:  Scattered cherry red, 1-4 mm papules.  Pertinent Positives:  Pertinent Negatives:    Additional History of Present Condition:      Assessment and Plan:  Based on a thorough discussion of this condition and the management approach to it (including a comprehensive discussion of the known risks, side effects and potential benefits of treatment), the patient (family) agrees to implement the following specific plan:  Monitor for changes  Benign, reassured      Assessment and Plan:    Cherry angioma, also known as [place] de Fritz spots, are benign vascular skin lesions. A \"cherry angioma\" is a firm red, blue or purple papule, 0.1-1 cm in diameter. When thrombosed, they can appear black in colour until evaluated with a dermatoscope when the red or purple colour is more easily seen. Cherry angioma may develop on any part of the body but most often appear on the scalp, face, lips and trunk.  An angioma is due to proliferating endothelial cells; these are the cells that line the inside of a blood vessel.    Angiomas can arise in early life or later in life; the most common type of angioma is a cherry angioma.  Cherry angiomas are very common in males and females of any age or race. They are more noticeable in white skin than in skin of colour. They markedly increase in number from about the age of 40. There may be a family history of similar lesions. Eruptive cherry angiomas have been rarely reported to be associated with internal malignancy. The cause of angiomas is unknown. Genetic analysis of cherry angiomas has shown that they " "frequently carry specific somatic missense mutations in the GNAQ and GNA11 (Q209H) genes, which are involved in other vascular and melanocytic proliferations.      SEBORRHEIC KERATOSIS; NON-INFLAMED    Physical Exam:  Anatomic Location Affected:  trunk  Morphological Description:  Flat and raised, waxy, smooth to warty textured, yellow to brownish-grey to dark brown to blackish, discrete, \"stuck-on\" appearing papules.  Pertinent Positives:  Pertinent Negatives:    Additional History of Present Condition:      Assessment and Plan:  Based on a thorough discussion of this condition and the management approach to it (including a comprehensive discussion of the known risks, side effects and potential benefits of treatment), the patient (family) agrees to implement the following specific plan:  Monitor for changes  Benign, reassured      Seborrheic Keratosis  A seborrheic keratosis is a harmless warty spot that appears during adult life as a common sign of skin aging.  Seborrheic keratoses can arise on any area of skin, covered or uncovered, with the usual exception of the palms and soles. They do not arise from mucous membranes. Seborrheic keratoses can have highly variable appearance.      Seborrheic keratoses are extremely common. It has been estimated that over 90% of adults over the age of 60 years have one or more of them. They occur in males and females of all races, typically beginning to erupt in the 30s or 40s. They are uncommon under the age of 20 years.  The precise cause of seborrhoeic keratoses is not known.  Seborrhoeic keratoses are considered degenerative in nature. As time goes by, seborrheic keratoses tend to become more numerous. Some people inherit a tendency to develop a very large number of them; some people may have hundreds of them.      There is no easy way to remove multiple lesions on a single occasion.  Unless a specific lesion is \"inflamed\" and is causing pain or stinging/burning or is " "bleeding, most insurance companies do not authorize treatment.    XEROSIS (\"DRY SKIN\")    Physical Exam:  Anatomic Location Affected:  diffuse  Morphological Description:  xerosis  Pertinent Positives:  Pertinent Negatives:    Additional History of Present Condition:      Assessment and Plan:  Based on a thorough discussion of this condition and the management approach to it (including a comprehensive discussion of the known risks, side effects and potential benefits of treatment), the patient (family) agrees to implement the following specific plan:  Use moisturizer like Eucerin,Cerave or Aveeno Cream 3 times a day for the dry skin            Dry skin refers to skin that feels dry to touch. Dry skin has a dull surface with a rough, scaly quality. The skin is less pliable and cracked. When dryness is severe, the skin may become inflamed and fissured.  Although any body site can be dry, dry skin tends to affect the shins more than any other site.    Dry skin is lacking moisture in the outer horny cell layer (stratum corneum) and this results in cracks in the skin surface.  Dry skin is also called xerosis, xeroderma or asteatosis (lack of fat).  It can affect males and females of all ages. There is some racial variability in water and lipid content of the skin.  Dry skin that starts in early childhood may be one of about 20 types of ichthyosis (fish-scale skin). There is often a family history of dry skin.   Dry skin is commonly seen in people with atopic dermatitis.  Nearly everyone > 60 years has dry skin.    Dry skin that begins later may be seen in people with certain diseases and conditions.  Postmenopausal women  Hypothyroidism  Chronic renal disease   Malnutrition and weight loss   Subclinical dermatitis   Treatment with certain drugs such as oral retinoids, diuretics and epidermal growth factor receptor inhibitors      What is the treatment for dry skin?  The mainstay of treatment of dry skin and ichthyosis is " moisturisers/emollients. They should be applied liberally and often enough to:  Reduce itch   Improve the barrier function   Prevent entry of irritants, bacteria   Reduce transepidermal water loss.      How can dry skin be prevented?  Eliminate aggravating factors:  Reduce the frequency of bathing.   A humidifier in winter and air conditioner in summer   Compare having a short shower with a prolonged soak in a bath.   Use lukewarm, not hot, water.   Replace standard soap with a substitute such as a synthetic detergent cleanser, water-miscible emollient, bath oil, anti-pruritic tar oil, colloidal oatmeal etc.   Apply an emollient liberally and often, particularly shortly after bathing, and when itchy. The drier the skin, the thicker this should be, especially on the hands.    What is the outlook for dry skin?  A tendency to dry skin may persist life-long, or it may improve once contributing factors are controlled.     Scribe Attestation      I,:  Danuta Riggins MA am acting as a scribe while in the presence of the attending physician.:       I,:  Laura Butts MD personally performed the services described in this documentation    as scribed in my presence.:

## 2025-02-05 NOTE — PATIENT INSTRUCTIONS
"NEOPLASM OF UNCERTAIN BEHAVIOR OF SKIN    Physical Exam:  (Anatomic Location); (Size and Morphological Description); (Differential Diagnosis):  Left forearm; 0.3 cm pink papule with keratinous debris; likely benign growth    Assessment and Plan:  I have discussed with the patient that a sample of skin via a \"skin biopsy” would be potentially helpful to further make a specific diagnosis under the microscope.  Based on a thorough discussion of this condition and the management approach to it (including a comprehensive discussion of the known risks, side effects and potential benefits of treatment), the patient (family) agrees to implement the following specific plan:    Likely benign growth  Will monitor       MELANOCYTIC NEVI (\"Moles\")    Assessment and Plan:  Based on a thorough discussion of this condition and the management approach to it (including a comprehensive discussion of the known risks, side effects and potential benefits of treatment), the patient (family) agrees to implement the following specific plan:  When outside we recommend using a wide brim hat, sunglasses, long sleeve and pants, sunscreen with SPF 30+ with reapplication every 2 hours, or SPF specific clothing   Benign, reassured  Annual skin check     Melanocytic Nevi  Melanocytic nevi (\"moles\") are tan or brown, raised or flat areas of the skin which have an increased number of melanocytes. Melanocytes are the cells in our body which make pigment and account for skin color.    Some moles are present at birth (I.e., \"congenital nevi\"), while others come up later in life (i.e., \"acquired nevi\").  The sun can stimulate the body to make more moles.  Sunburns are not the only thing that triggers more moles.  Chronic sun exposure can do it too.     Clinically distinguishing a healthy mole from melanoma may be difficult, even for experienced dermatologists. The \"ABCDE's\" of moles have been suggested as a means of helping to alert a person to a " "suspicious mole and the possible increased risk of melanoma.  The suggestions for raising alert are as follows:    Asymmetry: Healthy moles tend to be symmetric, while melanomas are often asymmetric.  Asymmetry means if you draw a line through the mole, the two halves do not match in color, size, shape, or surface texture. Asymmetry can be a result of rapid enlargement of a mole, the development of a raised area on a previously flat lesion, scaling, ulceration, bleeding or scabbing within the mole.  Any mole that starts to demonstrate \"asymmetry\" should be examined promptly by a board certified dermatologist.     Border: Healthy moles tend to have discrete, even borders.  The border of a melanoma often blends into the normal skin and does not sharply delineate the mole from normal skin.  Any mole that starts to demonstrate \"uneven borders\" should be examined promptly by a board certified dermatologist.     Color: Healthy moles tend to be one color throughout.  Melanomas tend to be made up of different colors ranging from dark black, blue, white, or red.  Any mole that demonstrates a color change should be examined promptly by a board certified dermatologist.     Diameter: Healthy moles tend to be smaller than 0.6 cm in size; an exception are \"congenital nevi\" that can be larger.  Melanomas tend to grow and can often be greater than 0.6 cm (1/4 of an inch, or the size of a pencil eraser). This is only a guideline, and many normal moles may be larger than 0.6 cm without being unhealthy.  Any mole that starts to change in size (small to bigger or bigger to smaller) should be examined promptly by a board certified dermatologist.     Evolving: Healthy moles tend to \"stay the same.\"  Melanomas may often show signs of change or evolution such as a change in size, shape, color, or elevation.  Any mole that starts to itch, bleed, crust, burn, hurt, or ulcerate or demonstrate a change or evolution should be examined promptly " by a board certified dermatologist.        LENTIGO     Assessment and Plan:  Based on a thorough discussion of this condition and the management approach to it (including a comprehensive discussion of the known risks, side effects and potential benefits of treatment), the patient (family) agrees to implement the following specific plan:  When outside we recommend using a wide brim hat, sunglasses, long sleeve and pants, sunscreen with SPF 30+ with reapplication every 2 hours, or SPF specific clothing       What is a lentigo?  A lentigo is a pigmented flat or slightly raised lesion with a clearly defined edge. Unlike an ephelis (freckle), it does not fade in the winter months. There are several kinds of lentigo.  The name lentigo originally referred to its appearance resembling a small lentil. The plural of lentigo is lentigines, although “lentigos” is also in common use.    Who gets lentigines?  Lentigines can affect males and females of all ages and races. Solar lentigines are especially prevalent in fair skinned adults. Lentigines associated with syndromes are present at birth or arise during childhood.    What causes lentigines?  Common forms of lentigo are due to exposure to ultraviolet radiation:  Sun damage including sunburn   Indoor tanning   Phototherapy, especially photochemotherapy (PUVA)    Ionizing radiation, eg radiation therapy, can also cause lentigines.  Several familial syndromes associated with widespread lentigines originate from mutations in Tonny-MAP kinase, mTOR signaling and PTEN pathways.    What is the treatment for lentigines?  Most lentigines are left alone. Attempts to lighten them may not be successful. The following approaches are used:  SPF 50+ broad-spectrum sunscreen   Hydroquinone bleaching cream   Alpha hydroxy acids   Vitamin C   Retinoids   Azelaic acid   Chemical peels  Individual lesions can be permanently removed using:  Cryotherapy   Intense pulsed light   Pigment lasers    How  "can lentigines be prevented?  Lentigines associated with exposure ultraviolet radiation can be prevented by very careful sun protection. Clothing is more successful at preventing new lentigines than are sunscreens.    What is the outlook for lentigines?  Lentigines usually persist. They may increase in number with age and sun exposure. Some in sun-protected sites may fade and disappear.    FOUNTAIN ANGIOMAS    Assessment and Plan:  Based on a thorough discussion of this condition and the management approach to it (including a comprehensive discussion of the known risks, side effects and potential benefits of treatment), the patient (family) agrees to implement the following specific plan:  Monitor for changes  Benign, reassured      Assessment and Plan:    Cherry angioma, also known as [place] de Fritz spots, are benign vascular skin lesions. A \"cherry angioma\" is a firm red, blue or purple papule, 0.1-1 cm in diameter. When thrombosed, they can appear black in colour until evaluated with a dermatoscope when the red or purple colour is more easily seen. Cherry angioma may develop on any part of the body but most often appear on the scalp, face, lips and trunk.  An angioma is due to proliferating endothelial cells; these are the cells that line the inside of a blood vessel.    Angiomas can arise in early life or later in life; the most common type of angioma is a cherry angioma.  Cherry angiomas are very common in males and females of any age or race. They are more noticeable in white skin than in skin of colour. They markedly increase in number from about the age of 40. There may be a family history of similar lesions. Eruptive cherry angiomas have been rarely reported to be associated with internal malignancy. The cause of angiomas is unknown. Genetic analysis of cherry angiomas has shown that they frequently carry specific somatic missense mutations in the GNAQ and GNA11 (Q209H) genes, which are involved in other " "vascular and melanocytic proliferations.      SEBORRHEIC KERATOSIS; NON-INFLAMED    Assessment and Plan:  Based on a thorough discussion of this condition and the management approach to it (including a comprehensive discussion of the known risks, side effects and potential benefits of treatment), the patient (family) agrees to implement the following specific plan:  Monitor for changes  Benign, reassured      Seborrheic Keratosis  A seborrheic keratosis is a harmless warty spot that appears during adult life as a common sign of skin aging.  Seborrheic keratoses can arise on any area of skin, covered or uncovered, with the usual exception of the palms and soles. They do not arise from mucous membranes. Seborrheic keratoses can have highly variable appearance.      Seborrheic keratoses are extremely common. It has been estimated that over 90% of adults over the age of 60 years have one or more of them. They occur in males and females of all races, typically beginning to erupt in the 30s or 40s. They are uncommon under the age of 20 years.  The precise cause of seborrhoeic keratoses is not known.  Seborrhoeic keratoses are considered degenerative in nature. As time goes by, seborrheic keratoses tend to become more numerous. Some people inherit a tendency to develop a very large number of them; some people may have hundreds of them.      There is no easy way to remove multiple lesions on a single occasion.  Unless a specific lesion is \"inflamed\" and is causing pain or stinging/burning or is bleeding, most insurance companies do not authorize treatment.    XEROSIS (\"DRY SKIN\")     Assessment and Plan:  Based on a thorough discussion of this condition and the management approach to it (including a comprehensive discussion of the known risks, side effects and potential benefits of treatment), the patient (family) agrees to implement the following specific plan:  Use moisturizer like Eucerin,Cerave or Aveeno Cream 3 times a " day for the dry skin            Dry skin refers to skin that feels dry to touch. Dry skin has a dull surface with a rough, scaly quality. The skin is less pliable and cracked. When dryness is severe, the skin may become inflamed and fissured.  Although any body site can be dry, dry skin tends to affect the shins more than any other site.    Dry skin is lacking moisture in the outer horny cell layer (stratum corneum) and this results in cracks in the skin surface.  Dry skin is also called xerosis, xeroderma or asteatosis (lack of fat).  It can affect males and females of all ages. There is some racial variability in water and lipid content of the skin.  Dry skin that starts in early childhood may be one of about 20 types of ichthyosis (fish-scale skin). There is often a family history of dry skin.   Dry skin is commonly seen in people with atopic dermatitis.  Nearly everyone > 60 years has dry skin.    Dry skin that begins later may be seen in people with certain diseases and conditions.  Postmenopausal women  Hypothyroidism  Chronic renal disease   Malnutrition and weight loss   Subclinical dermatitis   Treatment with certain drugs such as oral retinoids, diuretics and epidermal growth factor receptor inhibitors      What is the treatment for dry skin?  The mainstay of treatment of dry skin and ichthyosis is moisturisers/emollients. They should be applied liberally and often enough to:  Reduce itch   Improve the barrier function   Prevent entry of irritants, bacteria   Reduce transepidermal water loss.      How can dry skin be prevented?  Eliminate aggravating factors:  Reduce the frequency of bathing.   A humidifier in winter and air conditioner in summer   Compare having a short shower with a prolonged soak in a bath.   Use lukewarm, not hot, water.   Replace standard soap with a substitute such as a synthetic detergent cleanser, water-miscible emollient, bath oil, anti-pruritic tar oil, colloidal oatmeal etc.    Apply an emollient liberally and often, particularly shortly after bathing, and when itchy. The drier the skin, the thicker this should be, especially on the hands.    What is the outlook for dry skin?  A tendency to dry skin may persist life-long, or it may improve once contributing factors are controlled.

## 2025-03-05 ENCOUNTER — HOSPITAL ENCOUNTER (OUTPATIENT)
Dept: MAMMOGRAPHY | Facility: CLINIC | Age: 60
Discharge: HOME | End: 2025-03-05
Payer: COMMERCIAL

## 2025-03-05 VITALS — SYSTOLIC BLOOD PRESSURE: 112 MMHG | DIASTOLIC BLOOD PRESSURE: 76 MMHG | HEART RATE: 65 BPM

## 2025-03-05 DIAGNOSIS — R92.8 ABNORMAL MAMMOGRAM: ICD-10-CM

## 2025-03-05 DIAGNOSIS — R92.8 MAMMOGRAM ABNORMAL: ICD-10-CM

## 2025-03-05 PROCEDURE — 88341 IMHCHEM/IMCYTCHM EA ADD ANTB: CPT | Performed by: PATHOLOGY

## 2025-03-05 PROCEDURE — 19082 BX BREAST ADD LESION STRTCTC: CPT

## 2025-03-05 PROCEDURE — 19081 BX BREAST 1ST LESION STRTCTC: CPT

## 2025-03-05 PROCEDURE — 88305 TISSUE EXAM BY PATHOLOGIST: CPT | Performed by: PATHOLOGY

## 2025-03-05 PROCEDURE — 88360 TUMOR IMMUNOHISTOCHEM/MANUAL: CPT | Performed by: PATHOLOGY

## 2025-03-05 PROCEDURE — A4648 IMPLANTABLE TISSUE MARKER: HCPCS

## 2025-03-05 PROCEDURE — 88342 IMHCHEM/IMCYTCHM 1ST ANTB: CPT | Performed by: PATHOLOGY

## 2025-03-05 RX ORDER — LIDOCAINE HYDROCHLORIDE 10 MG/ML
5 INJECTION, SOLUTION EPIDURAL; INFILTRATION; INTRACAUDAL; PERINEURAL ONCE
Status: COMPLETED | OUTPATIENT
Start: 2025-03-05 | End: 2025-03-05

## 2025-03-05 RX ORDER — LIDOCAINE HCL/EPINEPHRINE/PF 2%-1:200K
10 VIAL (ML) INJECTION ONCE
Status: COMPLETED | OUTPATIENT
Start: 2025-03-05 | End: 2025-03-05

## 2025-03-05 RX ADMIN — LIDOCAINE HYDROCHLORIDE 5 ML: 10 INJECTION, SOLUTION EPIDURAL; INFILTRATION; INTRACAUDAL; PERINEURAL at 10:27

## 2025-03-05 RX ADMIN — LIDOCAINE HYDROCHLORIDE AND EPINEPHRINE 10 ML: 20; 5 INJECTION, SOLUTION EPIDURAL; INFILTRATION; INTRACAUDAL; PERINEURAL at 10:27

## 2025-03-05 RX ADMIN — LIDOCAINE HYDROCHLORIDE 5 ML: 10 INJECTION, SOLUTION EPIDURAL; INFILTRATION; INTRACAUDAL; PERINEURAL at 09:30

## 2025-03-05 RX ADMIN — LIDOCAINE HYDROCHLORIDE AND EPINEPHRINE 10 ML: 20; 5 INJECTION, SOLUTION EPIDURAL; INFILTRATION; INTRACAUDAL; PERINEURAL at 09:30

## 2025-03-05 NOTE — PROGRESS NOTES
Procedure type: ~~~ Sites 2 & 3 ~~~    _____ultrasound guided __X___stereotactic    Breast:    __X___Left _____Right    Location: 3:00 medial    Needle: 8G mammotome revolve    # of passes: 4 - 3 with calcs, 1 without    Clip: Butterfly    Performed by: Avelino Vital    Pressure held for 5 minutes by: Colleen Coffman RN    Steri Strips:    ___X__yes _____no    Band aid:    __X___yes_____no    Tolerated procedure:    __X___yes _____no

## 2025-03-05 NOTE — PROGRESS NOTES
Procedure type: ~~~ Site 1 ~~~    _____ultrasound guided __X___stereotactic    Breast:    __X___Left _____Right    Location: left breast 3:00 lateral    Needle: 8G mammotome revolve    # of passes: 8 without calcs    Clip: Triple twist    Performed by: Avelino Vital    Pressure held for 5 minutes by: Colleen Coffman RN    Steri Strips:    ___X__yes _____no    Band aid:    __X___yes_____no    Tolerated procedure:    __X___yes _____no

## 2025-03-07 ENCOUNTER — RESULTS FOLLOW-UP (OUTPATIENT)
Dept: OBGYN CLINIC | Facility: CLINIC | Age: 60
End: 2025-03-07

## 2025-03-07 PROCEDURE — 88305 TISSUE EXAM BY PATHOLOGIST: CPT | Performed by: PATHOLOGY

## 2025-03-07 PROCEDURE — 88342 IMHCHEM/IMCYTCHM 1ST ANTB: CPT | Performed by: PATHOLOGY

## 2025-03-07 PROCEDURE — 88341 IMHCHEM/IMCYTCHM EA ADD ANTB: CPT | Performed by: PATHOLOGY

## 2025-03-07 PROCEDURE — 88360 TUMOR IMMUNOHISTOCHEM/MANUAL: CPT | Performed by: PATHOLOGY

## 2025-03-07 NOTE — TELEPHONE ENCOUNTER
Attempted to call pt to reviewed breast pathology prior to the upcoming weekend. No answer. Did not leave message. Will follow up when back in office on Tuesday if breast team has not already followed up

## 2025-03-07 NOTE — PROGRESS NOTES
Post procedure call completed    Bleeding: _____yes __X___no (pt denies)    Pain: _____yes ___X___no (pt denies, used ice, no need for OTC pain meds)    Redness/Swelling: ______yes ___X___no (pt denies)    Band aid removed: __X___yes _____no     Steri-Strips intact: ___X___yes _____no (discussed with patient to remove steri strips on Monday if they have not come off on their own)    Pt with no questions at this time, adv will call when results available, adv to call with any questions or concerns, has name/# for contact

## 2025-03-10 ENCOUNTER — TELEPHONE (OUTPATIENT)
Dept: MAMMOGRAPHY | Facility: CLINIC | Age: 60
End: 2025-03-10

## 2025-03-10 ENCOUNTER — DOCUMENTATION (OUTPATIENT)
Dept: HEMATOLOGY ONCOLOGY | Facility: CLINIC | Age: 60
End: 2025-03-10

## 2025-03-10 ENCOUNTER — TELEPHONE (OUTPATIENT)
Dept: OBGYN CLINIC | Facility: CLINIC | Age: 60
End: 2025-03-10

## 2025-03-10 DIAGNOSIS — C50.919 INVASIVE LOBULAR CARCINOMA OF BREAST IN FEMALE (HCC): ICD-10-CM

## 2025-03-10 DIAGNOSIS — R92.8 ABNORMAL FINDING ON BREAST IMAGING: Primary | ICD-10-CM

## 2025-03-10 DIAGNOSIS — C50.912 INVASIVE LOBULAR CARCINOMA OF LEFT BREAST IN FEMALE (HCC): Primary | ICD-10-CM

## 2025-03-10 NOTE — PROGRESS NOTES
All records needed are in patients chart. No records retrieval needed at this time.    Patient referred to Surgical Oncology and should be scheduled within 7 days.  Please notify me if not scheduled within time frame.    Referral received/ Chart reviewed for work up completed     [facility]   [] PET/CT   [] MRI   [] CT   [] US   [x] Mammo   [] Bone scan   [] N/A    Pathology completed:    Date: 3/5/25   Location: [facility]   []N/A    Additional records needed:    [] Genomic report   [] Genetic testing results   [] Office Note   [] Procedure/ Operative note   [] Lab results   [x] N/A      [] Radiation Oncology records retrieval needed (PN to route to rad/onc clerical pool once scheduled) n/a  Date:  Location:    Chart reviewed for prepping for initial outreach by nurse navigator.    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 no butterfly clip in malignant site    Is patient diabetic?  YES/NO: no    Is patient on a blood thinner?  no    Surgical Oncology: Requested Dr Cedillo    Medical Oncology: TBD    Radiation Oncology: TBD  Previous radiation treated noted?  None known    Genetic testing: TBD    Family history of cancer: none known at this time    Any further needs: Patient needs breast MRI and US left axilla      Information forwarded to Outbound [facility]      Nurse Navigator will call patient for initial outreach.      Will have Patient Navigator outreach patient after surgical oncology consultation.  Any clinical questions to be directed to ONN or office nurse.

## 2025-03-10 NOTE — PROGRESS NOTES
In basket message received from pathology.  Patient had recent breast biopsy.  Results came back positive for breast cancer.  Reflex testing to [facility] to be ordered by pathology based upon established criteria.          Information needed for [facility] Test Requisition Form:      Edu Status:  Was there a lymph node biopsied?  no  [x] NX-not submitted or found  [] N0-negative  [] N1-1-3 nodes  []N2 or N3-greater than or equal to 4 nodes     Was ultrasound/imaging of axilla performed? no       Tumor Information: there are two tumors in left breast  Tumor Size: (based upon breast ultrasound-largest tumor, if more than one)  [x] Less than or equal to 5 cm  [] Greater than or equal to 5 cm      Ordering Physician: Dr. Carlos Cedillo  Phone #: [phone]  NPI #: [id number]  Fax #: [phone]      Information sent to pathologist as well as referring physician and staff.

## 2025-03-10 NOTE — TELEPHONE ENCOUNTER
Call placed to patient after pt given biopsy results from radiologist, questions answered, support given, adv next step is to set patient up with surgeon, options discussed - Dr. Nadeem Cedillo, Dr. Cassandra Cardarelli or Dr. Alysha Larson and pt would like to have the weekend to research which provider she would like to see, advised I will reach back out on Monday to discuss. pt with no questions at this time, has name/# for any further needs  Also discussed with patient about genetic testing, pt has not had this done and is interested in completing, adv that someone from genetic counseling will reach out to her to discuss, pt states understanding

## 2025-03-10 NOTE — TELEPHONE ENCOUNTER
Spoke with Isabel and recommend current discontinuation of her HRT (estrogen patch and progesterone). THis will be discussed further with her oncologist. Also, let her know her breast MRI was ordered and can be scheduled.           ----- Message from Daphnie GONZALES sent at 3/8/2025 10:45 AM EST -----  Patient given results. She asked that we let you know of the results as well. She said she will be reaching out to the office on Monday as she as some questions about medications she is on.   Please let me know if you have questions!  Thanks,  Daphnie, RN, BSN  Breast Nurse Navigator

## 2025-03-10 NOTE — PROGRESS NOTES
[facility] - Newly Diagnosed Breast Cancer     ALL Newly Diagnosed Breast Cancer    Script for Genetics:    It is standard of care for individuals with breast cancer to undergo genetic testing.  Certain genetic test results may impact your breast surgeon's recommendations, so it is beneficial to initiate that testing before the first surgical appointment. We placed a referral to the genetics team. They will call you to discuss further.       Notes to Genetics Team (not required):               Route to 'Oncology Genetics Breast Pool' in [facility] [place]

## 2025-03-10 NOTE — PROGRESS NOTES
Pt did her research and would like to have appt made with Dr. Nadeem Cedillo, adv patient that  would call her back by tomorrow with appt, pt states understanding, pt with no questions at this time, has name/# for any further needs

## 2025-03-10 NOTE — TELEPHONE ENCOUNTER
Spoke with patient to review order and to call to schedule. Patient states she spoke with Lynnette CAMERON regarding same.

## 2025-03-11 ENCOUNTER — TELEPHONE (OUTPATIENT)
Dept: GENETICS | Facility: CLINIC | Age: 60
End: 2025-03-11

## 2025-03-11 ENCOUNTER — TELEPHONE (OUTPATIENT)
Dept: HEMATOLOGY ONCOLOGY | Facility: CLINIC | Age: 60
End: 2025-03-11

## 2025-03-11 DIAGNOSIS — C50.912 INVASIVE LOBULAR CARCINOMA OF LEFT BREAST IN FEMALE (HCC): Primary | ICD-10-CM

## 2025-03-11 NOTE — TELEPHONE ENCOUNTER
I introduced myself to Isabel and let her know that her nurse navigator reached out to the cancer risk and genetics program on her behalf to begin STAT genetic testing process.    I reviewed the following with Isabel:    While the majority of cancer occurs by chance, approximately 5-10% of breast cancer has an underlying genetic cause. Genetic testing is available which can determine if there is an underlying genetic cause to your cancer. Understanding if there is an underlying genetic cause can:  Provide your surgeon with additional information to help with surgical decisions (i.e. lumpectomy vs mastectomy), treatment decisions and eligibility for clinical trials.    It can determine if you have an increased risk for any additional cancers.  Help family members understand their cancer risk.       We work closely with the [facility] breast surgeons and are reaching out to see if you have interest in genetic testing. This test is not a requirement but can take 5-10 days to complete so we would like to start the process as soon as possible so the results are ready for your appointment with your surgeon.       If interested, family history will be collected to initiate STAT genetic testing process.        Patient elected to pursue testing     Diagnosis Details:  Invasive Lobular Carcinoma  Left  Hormone receptors pending    Personal History:  Do you have a personal history of any other cancer? No  If yes type/age of diagnosis:     Family history:   Do you have Ashkenazi Sabianism ancestry? No  If yes, maternal, paternal, or both?    Do you have any children? No    Do you have any brothers or sisters? Yes  How many brothers? 2  How many sisters? 0  Are they from the same parents? Yes  If no how maternal/paternal half-siblings: maternal half brother x 1  Do any of your brothers or sisters have a history of cancer? Yes  If yes who and the type/age of diagnosis:   Maternal half Brother - Lung Cancer age 50 ()          Do  you have nieces or nephews? Yes  Do any of them have a history of cancer? No  If yes type/age of diagnosis:    Does your mother have a history of cancer? Yes  If yes, cancer type and age of diagnosis: BCC age 76  Is your mother still living? Yes  Age/Age of death: 83    Thinking about your mother's family (aunts, uncles, cousins, grandparents) is there anyone with a history of cancer? No  If yes, list relationship, cancer type and age of diagnosis:    Does your father have a history of cancer? Yes  If yes, cancer type and age of diagnosis: BCC age 77; Bladder Cancer age 84  Is your father still living? Yes  Age/age of death: 84    Thinking about your father's family (aunts, uncles, cousins, grandparents) is there anyone with a history of cancer? Yes  If yes, list relationship, cancer type and age of diagnosis:   Paternal Aunt - BCC age 81 ()     Types of Results  Positive Result - May explain personal diagnosis/family history. Can give surgeon information on treatment plan, inform future screening/management or tell a person about other possible risks. Positive results can initiate testing for other family members who may be at risk (children, siblings, etc)  Negative Result - Does not give an explanation. Surgical/treatment plan will be based on clinical presentation and will be part of discussion with surgeon. Negative result cannot be passed down to children, but they are still at elevated risk.  Uncertain Result - Common, but treated like a negative result clinically. 90% are downgraded over time.     [facility] billing policy   Most individuals pay <$100 for hereditary cancer genetic testing. If insurance covers the cost of the testing, individuals may still pay out of pocket secondary to co-pays, co-insurance, or deductibles. If the cost of the testing exceeds $100, the lab will reach out to the patient via phone or e-mail. The patient will then have the option to proceed with the testing, cancel  the testing, or elect the self-pay option of $250. Isabel verbalized understanding.    We have genetic counselors available, if you have any additional questions or would like to speak with them we can schedule you a 15 minute appointment.      Plan:  An order was placed and the patient was instructed to go to a [facility] lab for a blood collection    Genetic Testing Preformed: [facility] BRCAplus STAT Panel (13 genes): DAREN, BARD1, BRCA1, BRCA2, CDH1, CHEK2, NF1, PALB2, PTEN, RAD51C, RAD51D, STK11, TP53 with reflex to [facility] CustomNext: Cancer+RNAinsight (59 genes): APC, DAREN, AXIN2, BAP1, BARD1, BMPR1A, BRCA1, BRCA2, BRIP1, CDH1, CDK4, CDKN1B, CDKN2A, CHEK2, CTNNA1, DICER1, EGLN1, EPCAM, FH, FLCN, GREM1, HOXB13, KIF1B, KIT, MAX, MEN1, MET, MITF, MLH1, MSH2, MSH3, MSH6, MUTYH, NF1, NTHL1, PALB2, PDGFRA PMS2, POLD1, POLE, POT1, PTEN, RAD51C, RAD51D, RB1, RET, SDHA, SDHAF2, SDHB, SDHC, SDHD, SMAD4, SMARCA4, STK11, JIUQ108, TP53, TSC1, TSC2, VHL     Initial results will take approximately 5-12 days to return     Additional results may take up to 2-3 weeks to complete once test is started    Result Call Information:    Patient agreeable to phone call only if results are positive/complex    In the event that we need to reach Isabel via telephone:  I confirmed the patient's mobile number on file as the best number to call with results  I confirmed with the patient that we can leave a voicemail on the provided numbers    Patient does not have any further questions, declined meeting with genetic counselor    When your results are ready, your results will be available in your my chart. If you would like, someone from the genetics team will call you with any type of result- positive, negative or uncertain. Otherwise our team will only call to review significant or complex result. We will make your care team aware of your result.

## 2025-03-11 NOTE — TELEPHONE ENCOUNTER
I received a in basket message asking me to assist with moving patient's   Breast MRI up that is currently scheduled for 4-1-2025. I was able to get patient moved up to 3- @ 8:15am at [place]. I spoke with patient and offered her this date and time, patient was agreeable. She also rescheduled her consult with Dr. Cedillo scheduled for 3- to 3- @ 8:00am.

## 2025-03-14 ENCOUNTER — PATIENT OUTREACH (OUTPATIENT)
Dept: HEMATOLOGY ONCOLOGY | Facility: CLINIC | Age: 60
End: 2025-03-14

## 2025-03-14 ENCOUNTER — APPOINTMENT (OUTPATIENT)
Dept: LAB | Facility: MEDICAL CENTER | Age: 60
End: 2025-03-14
Payer: COMMERCIAL

## 2025-03-14 DIAGNOSIS — N95.2 VAGINAL ATROPHY: ICD-10-CM

## 2025-03-14 DIAGNOSIS — I10 ESSENTIAL HYPERTENSION: ICD-10-CM

## 2025-03-14 DIAGNOSIS — Z79.899 MEDICATION MANAGEMENT: ICD-10-CM

## 2025-03-14 DIAGNOSIS — C50.912 INVASIVE LOBULAR CARCINOMA OF LEFT BREAST IN FEMALE (HCC): ICD-10-CM

## 2025-03-14 DIAGNOSIS — K21.9 GASTROESOPHAGEAL REFLUX DISEASE WITHOUT ESOPHAGITIS: ICD-10-CM

## 2025-03-14 PROCEDURE — 36415 COLL VENOUS BLD VENIPUNCTURE: CPT

## 2025-03-14 NOTE — PROGRESS NOTES
Breast Oncology Nurse Navigator    Called patient for initial outreach from nurse navigator.  Introduced myself and my role as well as members of the navigation team.  Oncology Nurse Navigator will follow patient until they are seen by surgical oncology, after which the patient navigator initiate outreach and follow the patient to survivorship.      Referral received from [facility]  Breast cancer diagnosis was made after pt had a dx mammogram for 6 month follow up of calcifications for left breast, due to calcifications increasing in size, a stereotactic biopsy x 2  was recommended. Left breast 3:00 pathology showed invasive lobular carcinoma, grade 1 receptors pending, left breast medial pathology showed invasive lobular carcinoma, grade 1 ER+ 95%, MT+ 90%, Her 2 negative  Patient states basic understanding/awareness of diagnosis    Does patient have a PCP?  yes  Nixno Mccall MD  If no, referral placed to family medicine.  Any other issues/diagnoses?  no.    Any implanted devices?  no  Is patient a current smoker: no    Confirmed upcoming appointment with Dr. Cedillo, including date, time and location.  Patient will be accompanied by her   Provided brief explanation of what to expect at this visit.  Patient has transportation to appointments.    Patient lives with her spouse  Support system includes: , brother, parents and extended family  Referral placed to oncology social worker: no    Cancer family history includes: father was recently diagnosed with bladder cancer  Referral to oncology genetics: yes labs drawn 3/14/2025    Do you have a history of cancer? no  Have you ever received Radiation Therapy? No  Where did you receive RT? no   When? N/A  Do you have any implantable devices?   [] Pacemaker  [] Pain stimulator  [] Defibrillator  [] Implanted sleep apnea device  [x] N/A      Was it placed at [facility]? No   If not, where was it placed?  N/A   When? N/A     Is patient pre/post menopausal?  post  menopausal   Is patient taking hormone replacement therapy? yes If so, how long?  6 months but has stopped  Birth control?  N/A  Fertility issues? N/A    Discussed the Cancer Support Community and some of the programs and services offered  Discussed Breast Cancer Support group that meets monthly at [facility].  Information sent via [employer].    Patient has my contact information and knows she can reach out with questions.  Office hours provided.  Patient provided with the phone number for [facility]-[phone].  General assessment completed.  Patient does have [employer] set up  [facility] message sent with our team's contact information.  I spent 30 minutes talking with the patient.

## 2025-03-20 PROBLEM — C50.112 MALIGNANT NEOPLASM OF CENTRAL PORTION OF LEFT BREAST IN FEMALE, ESTROGEN RECEPTOR POSITIVE (HCC): Status: ACTIVE | Noted: 2025-03-20

## 2025-03-20 PROBLEM — Z17.0 MALIGNANT NEOPLASM OF CENTRAL PORTION OF LEFT BREAST IN FEMALE, ESTROGEN RECEPTOR POSITIVE (HCC): Status: ACTIVE | Noted: 2025-03-20

## 2025-03-21 ENCOUNTER — HOSPITAL ENCOUNTER (OUTPATIENT)
Dept: MRI IMAGING | Facility: HOSPITAL | Age: 60
End: 2025-03-21
Payer: COMMERCIAL

## 2025-03-21 ENCOUNTER — RESULTS FOLLOW-UP (OUTPATIENT)
Dept: OBGYN CLINIC | Facility: MEDICAL CENTER | Age: 60
End: 2025-03-21

## 2025-03-21 DIAGNOSIS — R92.8 ABNORMAL FINDING ON BREAST IMAGING: ICD-10-CM

## 2025-03-21 DIAGNOSIS — R93.89 ABNORMAL FINDING ON IMAGING: ICD-10-CM

## 2025-03-21 DIAGNOSIS — C50.919 INVASIVE LOBULAR CARCINOMA OF BREAST IN FEMALE (HCC): ICD-10-CM

## 2025-03-21 DIAGNOSIS — R59.9 LYMPH NODE ENLARGEMENT: ICD-10-CM

## 2025-03-21 DIAGNOSIS — C50.919 INVASIVE LOBULAR CARCINOMA OF BREAST IN FEMALE (HCC): Primary | ICD-10-CM

## 2025-03-21 PROCEDURE — A9585 GADOBUTROL INJECTION: HCPCS | Performed by: NURSE PRACTITIONER

## 2025-03-21 PROCEDURE — C8908 MRI W/O FOL W/CONT, BREAST,: HCPCS

## 2025-03-21 PROCEDURE — C8937 CAD BREAST MRI: HCPCS

## 2025-03-21 RX ORDER — GADOBUTROL 604.72 MG/ML
8 INJECTION INTRAVENOUS
Status: COMPLETED | OUTPATIENT
Start: 2025-03-21 | End: 2025-03-21

## 2025-03-21 RX ADMIN — GADOBUTROL 8 ML: 604.72 INJECTION INTRAVENOUS at 10:18

## 2025-03-21 NOTE — RESULT ENCOUNTER NOTE
Breast MRI shows a possible small remnant of invasive lobular carcinoma.Follow up scheduled with Dr Cedillo 3/24/25.  .   Left axilla US recommend for 1 prominent lymph node. This has already been ordered.   No evidence of malignancy in right breast.

## 2025-03-24 ENCOUNTER — OFFICE VISIT (OUTPATIENT)
Dept: SURGICAL ONCOLOGY | Facility: CLINIC | Age: 60
End: 2025-03-24
Payer: COMMERCIAL

## 2025-03-24 VITALS
HEIGHT: 69 IN | SYSTOLIC BLOOD PRESSURE: 122 MMHG | DIASTOLIC BLOOD PRESSURE: 82 MMHG | BODY MASS INDEX: 26.59 KG/M2 | WEIGHT: 179.5 LBS | TEMPERATURE: 97.6 F | HEART RATE: 71 BPM | OXYGEN SATURATION: 99 %

## 2025-03-24 DIAGNOSIS — C50.912 INVASIVE LOBULAR CARCINOMA OF LEFT BREAST IN FEMALE (HCC): Primary | ICD-10-CM

## 2025-03-24 DIAGNOSIS — Z17.0 MALIGNANT NEOPLASM OF CENTRAL PORTION OF LEFT BREAST IN FEMALE, ESTROGEN RECEPTOR POSITIVE (HCC): ICD-10-CM

## 2025-03-24 DIAGNOSIS — C50.112 MALIGNANT NEOPLASM OF CENTRAL PORTION OF LEFT BREAST IN FEMALE, ESTROGEN RECEPTOR POSITIVE (HCC): ICD-10-CM

## 2025-03-24 PROCEDURE — 99205 OFFICE O/P NEW HI 60 MIN: CPT | Performed by: SURGERY

## 2025-03-24 RX ORDER — ACETAMINOPHEN AND CODEINE PHOSPHATE 300; 30 MG/1; MG/1
1 TABLET ORAL EVERY 4 HOURS PRN
Qty: 20 TABLET | Refills: 0 | Status: SHIPPED | OUTPATIENT
Start: 2025-03-24

## 2025-03-24 NOTE — PROGRESS NOTES
Name: Isabel Acosta      : 1965      MRN: [id number]  Encounter Provider: Carlos Cedillo MD  Encounter Date: 3/24/2025   Encounter department: [facility] [place]  :  Assessment & Plan  Malignant neoplasm of central portion of left breast in female, estrogen receptor positive (HCC)         Invasive lobular carcinoma of left breast in female (HCC)    Orders:    Ambulatory Referral to Surgical Oncology    59-year-old female with recent diagnosis of left breast invasive lobular carcinoma ER/SD positive HER2 negative grade 1 tumor left breast at 3:00.  Surgical consent was obtained for left breast conservation surgery with dual tracer technique sentinel lymph node biopsy after explaining the benefit procedure alternative and possible complications.  Pending genetic testing.  She also had a MammaPrint which is low risk.  We did discuss the cancer disease status cancer treatment plan and cancer treatment goals with the patient in detail.  She understands she will need adjuvant radiation, possibly endocrine therapy.  She understand based on final pathology further management may change.  All patient's and 's questions answered to their satisfaction.  We also discussed in detail breast cancer initiation promotion and progression.  Pending axillary ultrasound.  Survivorship  Discussed symptoms related to disease recurrence, Yes     Evaluated for late effects related to cancer treatment, Yes     Screening current for cervical cancer, Yes     Screening current for colon cancer, Yes     Cancer rehabilitation services addressed, Yes     Screening current for osteoporosis, No     Oncology Treatment Summary reviewed with patient and copy provided, Yes     Referral placed for psychosocial evaluation/screening to oncology social work  Yes    History of Present Illness   Isabel Acosta is a 59 y.o. year old female who presents with newly diagnosed left breast invasive  lobular carcinoma at 3:00.  She has no family history of breast cancer.  She has no biological children.  She is a non-smoker.  She went for routine screening mammogram this was read as abnormal followed by stereotactic biopsy of the left breast at 3:00.  This is consistent with invasive lobular carcinoma grade 1.  She also saw had a MRI of the breast which demonstrated unifocal 8 mm invasive lobular carcinoma 3:00.  She is here with her  to discuss further management..     Oncology History   Cancer Staging   No matching staging information was found for the patient.  Oncology History   Malignant neoplasm of central portion of left breast in female, estrogen receptor positive (HCC)   3/5/2025 Initial Diagnosis    Malignant neoplasm of central portion of left breast in female, estrogen receptor positive (HCC)     3/5/2025 Biopsy    LEFT breast stereotactic biopsy   A. 3 o'clock, lateral wo calcs (bowtie)  Benign breast parenchyma      B. 0300 o'clock medical with calcs  Invasive lobular carcinoma  Grade 1  ER 95, MD 90, HER2 0  C. 0300 o'clock medial wo calcs  Invasive lobular carcinoma  Grade 1  ER 95, MD 90, HER2 0    The focality of the malignancy is unknown.  Fibroglandular tissue in this area demonstrates no significant changes from multiple prior mammograms.  Bilateral breast MRI is recommended to determine extent of disease.  Ultrasound of the left axilla has not recently been performed.  No recent imaging of the contralateral right breast has been performed       3/5/2025 Genomic Testing    MammaPrint  LOW RISK  Luminal A            Review of Systems   Constitutional:  Negative for chills and fever.   HENT:  Negative for ear pain and sore throat.    Eyes:  Negative for pain and visual disturbance.   Respiratory:  Negative for cough and shortness of breath.    Cardiovascular:  Negative for chest pain and palpitations.   Gastrointestinal:  Negative for abdominal pain and vomiting.   Genitourinary:   "Negative for dysuria and hematuria.   Musculoskeletal:  Negative for arthralgias and back pain.   Skin:  Negative for color change and rash.   Neurological:  Negative for seizures and syncope.   All other systems reviewed and are negative.   A complete review of systems is negative other than that noted above in the HPI.    Medical History Reviewed by provider this encounter:     .       Objective   /82 (BP Location: Left arm, Patient Position: Sitting)   Pulse 71   Temp 97.6 °F (36.4 °C) (Temporal)   Ht 5' 8.5\" (1.74 m)   Wt 81.4 kg (179 lb 8 oz)   LMP 02/22/2019   SpO2 99%   BMI 26.90 kg/m²     Pain Screening:     ECOG    Physical Exam  Constitutional:       Appearance: Normal appearance.   HENT:      Head: Normocephalic and atraumatic.      Nose: Nose normal.      Mouth/Throat:      Mouth: Mucous membranes are moist.   Eyes:      Pupils: Pupils are equal, round, and reactive to light.   Cardiovascular:      Rate and Rhythm: Normal rate.      Pulses: Normal pulses.      Heart sounds: Normal heart sounds.   Pulmonary:      Effort: Pulmonary effort is normal.      Breath sounds: Normal breath sounds.   Chest:          Comments: Left breast is tender at 3:00 where the biopsy site.  Bilateral breast examination no palpable mass masses nipple discharge nipple retraction or skin changes.  Bilateral axillary and supraclavicular examination no palpable adenopathy.  Patient was examined seated as well as supine position  Abdominal:      General: Bowel sounds are normal.      Palpations: Abdomen is soft.   Musculoskeletal:         General: Normal range of motion.      Cervical back: Normal range of motion and neck supple.   Skin:     General: Skin is warm.   Neurological:      General: No focal deficit present.      Mental Status: She is alert and oriented to person, place, and time.   Psychiatric:         Mood and Affect: Mood normal.         Behavior: Behavior normal.         Thought Content: Thought content " "normal.         Judgment: Judgment normal.          Labs: I have reviewed pertinent labs. No results found for: \"WBC\", \"RBC\", \"HGB\", \"HCT\", \"MCV\", \"MCH\", \"RDW\", \"PLT\", \"NEUTOPHILPCT\", \"BANDSPCT\", \"LYMPHOPCT\", \"MONOPCT\", \"EOSPCT\", \"BASOPCT\", \"IMMGRANS\", \"NEUTROABS\"   No results found for: \"SODIUM\", \"K\", \"CL\", \"CO2\", \"AGAP\", \"BUN\", \"CREATININE\", \"GLUC\", \"GLUF\", \"CALCIUM\", \"CORRECTEDCA\", \"AST\", \"ALT\", \"ALKPHOS\", \"TP\", \"ALB\", \"TBILI\", \"EGFR\"   Hospital Outpatient Visit on 03/05/2025   Component Date Value Ref Range Status    Case Report 03/05/2025    Final                    Value:Surgical Pathology Report                         Case: [id number]                                  Authorizing Provider:  Angie Shafer PA-C         Collected:           03/05/2025 1021              Ordering Location:     [facility] Received:            03/05/2025 [address] [place] [address]                                                              Pathologist:           Avelino Coffey MD                                                          Specimens:   A) - Breast, Left, LT Stereo BX @3:00LAT 8 WITHOUT calcs 8G9CM                                      B) - Breast, Left, LT Stereo BX @3:00MED 3 WITH calcs 8G12CM                                        C) - Breast, Left, LT Stereo BX @3:00MED 1 core WITHOUT calcs 8G12CM                       Final Diagnosis 03/05/2025    Final                    Value:A.  Breast, left lateral 3:00 without calcifications, stereotactic biopsy:  -Benign breast parenchyma, negative for atypia or malignancy.  -Calcifications are not present.    B.  Breast, left medial at 3:00 with calcifications, stereotactic biopsy:  -Invasive lobular breast carcinoma.   * [place] grade 1 of 3 (total score: 5 of 9)    -- tubule formation < 10%, score 3    -- nuclear grade 1 of 3, score 1    -- mitoses < 3/mm2, (</= 7 mitoses/10HPF), score 1.   * Confirmed by tumor cell " immunophenotype:    -- negative:  p63 and calponin are negative for myoepithelial cells; E-cadherin and p120 show loss of membranous epithelial staining.     * Invasive carcinoma involves 1 of 2 submitted core biopsies, max. dimension = 2 millimeters.   * Estrogen, progesterone & HER2 receptor studies pending on Specimen C below.    * Ductal carcinoma in situ (DCIS): Not identified.    * Lymph-vascular invasion: Not identified.    * Microcalcifications: Present in benign tissues.  -Areas of benign                           fibrocystic changes including usual ductal hyperplasia, adenosis, and stromal fibrosis.    C.  Breast, left medial without calcifications, stereotactic biopsy:  -Invasive lobular breast carcinoma.   * Jewett grade 1 of 3 (total score: 5 of 9)    -- tubule formation < 10%, score 3    -- nuclear grade 1 of 3, score 1    -- mitoses < 3/mm2, (</= 7 mitoses/10HPF), score 1.   * Confirmed by tumor cell immunophenotype:    -- negative:  p63 and calponin are negative for myoepithelial cells; E-cadherin and p120 show loss of membranous epithelial staining.     * Invasive carcinoma involves 1 of 1 submitted core biopsies, max. dimension = 3 millimeters.   * Estrogen, progesterone & HER2 receptor studies pending, to be described in a separate receptor report.    * Ductal carcinoma in situ (DCIS): Not identified.    * Lymph-vascular invasion: Not identified.    * Microcalcifications: Absent.       Note 03/05/2025    Final                    Value:Intradepartmental consultation was obtained with diagnostic agreement.  The findings were communicated by epic secure chat message to the regional breast after working group on 3/7/2025 at 1015.      Additional Information 03/05/2025    Final                    Value:All reported additional testing was performed with appropriately reactive controls.  These tests were developed and their performance characteristics determined by [facility] or  appropriate performing facility, though some tests may be performed on tissues which have not been validated for performance characteristics (such as staining performed on alcohol exposed cell blocks and decalcified tissues).  Results should be interpreted with caution and in the context of the patients’ clinical condition. These tests may not be cleared or approved by the U.S. Food and Drug Administration, though the FDA has determined that such clearance or approval is not necessary. These tests are used for clinical purposes and they should not be regarded as investigational or for research. This laboratory has been approved by [facility], designated as a high-complexity laboratory and is qualified to perform these tests.  Interpretation performed at [facility], [address] [place] [address], [id number]        Synoptic Checklist 03/05/2025    Final                    Value:Breast Biomarker Reporting Template                            (Added in Addendum) BREAST BIOMARKER REPORTING TEMPLATE - C                            Protocol posted: 12/13/2023                                                           Test(s) Performed:                                     Estrogen Receptor (ER) Status:    Positive (greater than 10% of cells demonstrate nuclear positivity)                                    Percentage of Cells with Nuclear Positivity:    95 %                                   Average Intensity of Staining:    Moderate                                  Test Type:    Food and Drug Administration (FDA) cleared (test / vendor): CONFIRM anti-Estrogen Receptor (ER)/[employer]                                  Primary Antibody:    SP1                                Test(s) Performed:                                     Progesterone Receptor (PgR) Status:    Positive                                    Percentage of Cells with Nuclear Positivity:    90 %                               "     Average Intensity of Staining:    Strong                                  Test Type:    Food and Drug Administration (FDA) cleared (test / vendor): CONFIRM anti-Progesterone Receptor (CT)/[employer]                                  Primary Antibody:    1E2                                Test(s) Performed:                                     HER2 by Immunohistochemistry:    Negative (Score 0)                                  Test Type:    Food and Drug Administration (FDA) cleared (test / vendor): [employer] anti-HER-2/christine (4B5)/[employer]                                  Primary Antibody:    4B5                                Cold Ischemia and Fixation Times:    Meet requirements specified in latest version of the ASCO / CAP Guidelines                                Testing Performed on Block Number(s):    C1       Gross Description 03/05/2025    Final                    Value:A. The specimen is received in formalin, labeled with the patient's name and hospital number, and is designated \" left stereo breast biopsy at 3:00 lateral 8 without calcs 8 gauge 9 cm\".  The specimen consists of 8 tan-yellow fatty and friable tissue cores measuring less than 0.1-0.7 cm in diameter and ranging from 2.2-2.6 cm in length.  The specimen is entirely submitted between sponges, 4 cassettes.  B. The specimen is received in formalin, labeled with the patient's name and hospital number, and is designated \" left breast stereo biopsy at 3:00 medial, 3 with calcs 8 gauge 12 cm\".  The specimen consists of 3 yellow fatty and friable tissue cores measuring less than 0.1-0.5 cm in diameter and ranging from 1.7-3.4 cm in length.  The specimen is entirely submitted between sponges, 2 cassettes.  C. The specimen is received in formalin, labeled with the patient's name and hospital number, and is designated \" left breast stereo biopsy at 3:00 medial 1 core without calcs, 8 gauge 12 cm\".                            The specimen consists of " a single yellow-brown rubbery and fatty tissue core measuring 0.3-0.6 cm in diameter and 2.4 cm in length.  The specimen is entirely submitted between sponges, 1 cassette.    Note: The estimated total formalin fixation time based upon information provided by the submitting clinician and the standard processing schedule is 13 hours.  The cold ischemia time based upon information provided by the submitting clinician and receiving staff in the laboratory is within 1 minute.       -Katie Pierre       Pathology: I have reviewed pathology reports described above.    Radiology Results Review: I personally reviewed the following image studies in PACS and associated radiology reports: Mammogram, breast MRI and Ultrasound(s). My interpretation of the radiology images/reports is: March 2025.  Concordance: yes  MRI breast bilateral w and wo contrast w cad: Result Notes             Breast MRI shows a possible small remnant of invasive lobular carcinoma.Follow up scheduled with Dr Cedillo 3/24/25.  .  Left axilla US recommend for 1 prominent lymph node. This has already been ordered.  No evidence of malignancy in right breast.            Study Result    Narrative & Impression   DIAGNOSIS: Abnormal finding on breast imaging; Invasive lobular carcinoma of breast in female (HCC)      TECHNIQUE:  MRI of both breasts was performed in axial planes utilizing a combination of localizer, axial T2 STIR, Axial T1 FSPGR in phase, axial dynamic 3D fat suppressed gradient-echo T1 sequences (VIBRANT) before and after contrast administration at multiple time points, and sagittal 3D fat suppressed gradient-echo T1 sequences (VIBRANT) post-contrast.     This exam was read in conjunction with Gl[employer] software.     MEDICATIONS ADMINISTERED:  Gadobutrol injection (SINGLE-DOSE) SOLN 8 mL, Total Given: 8 mL (1 dose)  Intravenous contrast was administered at 2cc/sec, without documented contrast reaction.     COMPARISONS: No prior breast MRI is  available.  Prior mammograms were reviewed.     RELEVANT HISTORY:   Family Breast Cancer History: History of breast cancer in Neg Hx.  Family Medical History: No known relevant family medical history.   Personal History: Hormone history includes other. Surgical history includes breast biopsy. No known relevant medical history.     TISSUE DENSITY:  FGT: The breasts have scattered fibroglandular tissue.  BPE: The background parenchymal enhancement is minimal.     INDICATION: Isabel Acosta is a 59 y.o. female presenting for breast MRI for extent of disease.  Recent diagnosis left breast 3:00 invasive lobular carcinoma.      FINDINGS:   1) CALCIFICATIONS [A]:  Previously biopsied left breast 3:00 calcifications are identified as non-mass enhancement on MRI.  Left breast 3:00 posterior depth focal non-mass enhancement measuring 9 x 9 x 8 mm located immediately inferior to biopsy clip susceptibility artifact likely represents small area of invasive lobular carcinoma (series 601 image 126).  Enhancement is well  from the skin surface, chest wall, pectoral muscle and nipple.     Second biopsy clip located closer to the skin surface benign biopsy site is noted (series 601 image 115).     Elsewhere in both breasts on MR, there are no suspicious enhancing masses or suspicious areas of non-mass enhancement.     2.) LYMPH NODE [B]:  Left axillary level 1 lymph node has cortical thickness measuring 4 to 5 mm (series 601 image 81 and series 7 image 33).        IMPRESSION:   1.  Left breast 3:00 non-mass enhancement measuring up to 9 mm likely represents a small remnant of invasive lobular carcinoma.  This is immediately adjacent to biopsy clip susceptibility artifact.  2.  Left axilla 1 lymph node is prominent on MRI.  Recommend evaluation with ultrasound.  3.  No MR evidence of malignancy in the contralateral right breast.     ASSESSMENT/BI-RADS CATEGORY:  Left: 4 - Suspicious  Right: 1 - Negative  Overall: 4 -  Suspicious     RECOMMENDATION:       - Ultrasound at the current time for the left breast.       - Diagnostic mammogram in 1 year for the right breast.

## 2025-03-24 NOTE — H&P (VIEW-ONLY)
Name: Isabel Acosta      : 1965      MRN: [id number]  Encounter Provider: Carlos Cedillo MD  Encounter Date: 3/24/2025   Encounter department: [facility] [place]  :  Assessment & Plan  Malignant neoplasm of central portion of left breast in female, estrogen receptor positive (HCC)         Invasive lobular carcinoma of left breast in female (HCC)    Orders:    Ambulatory Referral to Surgical Oncology    59-year-old female with recent diagnosis of left breast invasive lobular carcinoma ER/AK positive HER2 negative grade 1 tumor left breast at 3:00.  Surgical consent was obtained for left breast conservation surgery with dual tracer technique sentinel lymph node biopsy after explaining the benefit procedure alternative and possible complications.  Pending genetic testing.  She also had a MammaPrint which is low risk.  We did discuss the cancer disease status cancer treatment plan and cancer treatment goals with the patient in detail.  She understands she will need adjuvant radiation, possibly endocrine therapy.  She understand based on final pathology further management may change.  All patient's and 's questions answered to their satisfaction.  We also discussed in detail breast cancer initiation promotion and progression.  Pending axillary ultrasound.  Survivorship  Discussed symptoms related to disease recurrence, Yes     Evaluated for late effects related to cancer treatment, Yes     Screening current for cervical cancer, Yes     Screening current for colon cancer, Yes     Cancer rehabilitation services addressed, Yes     Screening current for osteoporosis, No     Oncology Treatment Summary reviewed with patient and copy provided, Yes     Referral placed for psychosocial evaluation/screening to oncology social work  Yes    History of Present Illness   Isabel Acosta is a 59 y.o. year old female who presents with newly diagnosed left breast invasive  lobular carcinoma at 3:00.  She has no family history of breast cancer.  She has no biological children.  She is a non-smoker.  She went for routine screening mammogram this was read as abnormal followed by stereotactic biopsy of the left breast at 3:00.  This is consistent with invasive lobular carcinoma grade 1.  She also saw had a MRI of the breast which demonstrated unifocal 8 mm invasive lobular carcinoma 3:00.  She is here with her  to discuss further management..     Oncology History   Cancer Staging   No matching staging information was found for the patient.  Oncology History   Malignant neoplasm of central portion of left breast in female, estrogen receptor positive (HCC)   3/5/2025 Initial Diagnosis    Malignant neoplasm of central portion of left breast in female, estrogen receptor positive (HCC)     3/5/2025 Biopsy    LEFT breast stereotactic biopsy   A. 3 o'clock, lateral wo calcs (bowtie)  Benign breast parenchyma      B. 0300 o'clock medical with calcs  Invasive lobular carcinoma  Grade 1  ER 95, CO 90, HER2 0  C. 0300 o'clock medial wo calcs  Invasive lobular carcinoma  Grade 1  ER 95, CO 90, HER2 0    The focality of the malignancy is unknown.  Fibroglandular tissue in this area demonstrates no significant changes from multiple prior mammograms.  Bilateral breast MRI is recommended to determine extent of disease.  Ultrasound of the left axilla has not recently been performed.  No recent imaging of the contralateral right breast has been performed       3/5/2025 Genomic Testing    MammaPrint  LOW RISK  Luminal A            Review of Systems   Constitutional:  Negative for chills and fever.   HENT:  Negative for ear pain and sore throat.    Eyes:  Negative for pain and visual disturbance.   Respiratory:  Negative for cough and shortness of breath.    Cardiovascular:  Negative for chest pain and palpitations.   Gastrointestinal:  Negative for abdominal pain and vomiting.   Genitourinary:   "Negative for dysuria and hematuria.   Musculoskeletal:  Negative for arthralgias and back pain.   Skin:  Negative for color change and rash.   Neurological:  Negative for seizures and syncope.   All other systems reviewed and are negative.   A complete review of systems is negative other than that noted above in the HPI.    Medical History Reviewed by provider this encounter:     .       Objective   /82 (BP Location: Left arm, Patient Position: Sitting)   Pulse 71   Temp 97.6 °F (36.4 °C) (Temporal)   Ht 5' 8.5\" (1.74 m)   Wt 81.4 kg (179 lb 8 oz)   LMP 02/22/2019   SpO2 99%   BMI 26.90 kg/m²     Pain Screening:     ECOG    Physical Exam  Constitutional:       Appearance: Normal appearance.   HENT:      Head: Normocephalic and atraumatic.      Nose: Nose normal.      Mouth/Throat:      Mouth: Mucous membranes are moist.   Eyes:      Pupils: Pupils are equal, round, and reactive to light.   Cardiovascular:      Rate and Rhythm: Normal rate.      Pulses: Normal pulses.      Heart sounds: Normal heart sounds.   Pulmonary:      Effort: Pulmonary effort is normal.      Breath sounds: Normal breath sounds.   Chest:          Comments: Left breast is tender at 3:00 where the biopsy site.  Bilateral breast examination no palpable mass masses nipple discharge nipple retraction or skin changes.  Bilateral axillary and supraclavicular examination no palpable adenopathy.  Patient was examined seated as well as supine position  Abdominal:      General: Bowel sounds are normal.      Palpations: Abdomen is soft.   Musculoskeletal:         General: Normal range of motion.      Cervical back: Normal range of motion and neck supple.   Skin:     General: Skin is warm.   Neurological:      General: No focal deficit present.      Mental Status: She is alert and oriented to person, place, and time.   Psychiatric:         Mood and Affect: Mood normal.         Behavior: Behavior normal.         Thought Content: Thought content " "normal.         Judgment: Judgment normal.          Labs: I have reviewed pertinent labs. No results found for: \"WBC\", \"RBC\", \"HGB\", \"HCT\", \"MCV\", \"MCH\", \"RDW\", \"PLT\", \"NEUTOPHILPCT\", \"BANDSPCT\", \"LYMPHOPCT\", \"MONOPCT\", \"EOSPCT\", \"BASOPCT\", \"IMMGRANS\", \"NEUTROABS\"   No results found for: \"SODIUM\", \"K\", \"CL\", \"CO2\", \"AGAP\", \"BUN\", \"CREATININE\", \"GLUC\", \"GLUF\", \"CALCIUM\", \"CORRECTEDCA\", \"AST\", \"ALT\", \"ALKPHOS\", \"TP\", \"ALB\", \"TBILI\", \"EGFR\"   Hospital Outpatient Visit on 03/05/2025   Component Date Value Ref Range Status    Case Report 03/05/2025    Final                    Value:Surgical Pathology Report                         Case: [id number]                                  Authorizing Provider:  Angie Shafer PA-C         Collected:           03/05/2025 1021              Ordering Location:     [facility] Received:            03/05/2025 [address] [place] [address]                                                              Pathologist:           Avelino Coffey MD                                                          Specimens:   A) - Breast, Left, LT Stereo BX @3:00LAT 8 WITHOUT calcs 8G9CM                                      B) - Breast, Left, LT Stereo BX @3:00MED 3 WITH calcs 8G12CM                                        C) - Breast, Left, LT Stereo BX @3:00MED 1 core WITHOUT calcs 8G12CM                       Final Diagnosis 03/05/2025    Final                    Value:A.  Breast, left lateral 3:00 without calcifications, stereotactic biopsy:  -Benign breast parenchyma, negative for atypia or malignancy.  -Calcifications are not present.    B.  Breast, left medial at 3:00 with calcifications, stereotactic biopsy:  -Invasive lobular breast carcinoma.   * [place] grade 1 of 3 (total score: 5 of 9)    -- tubule formation < 10%, score 3    -- nuclear grade 1 of 3, score 1    -- mitoses < 3/mm2, (</= 7 mitoses/10HPF), score 1.   * Confirmed by tumor cell " immunophenotype:    -- negative:  p63 and calponin are negative for myoepithelial cells; E-cadherin and p120 show loss of membranous epithelial staining.     * Invasive carcinoma involves 1 of 2 submitted core biopsies, max. dimension = 2 millimeters.   * Estrogen, progesterone & HER2 receptor studies pending on Specimen C below.    * Ductal carcinoma in situ (DCIS): Not identified.    * Lymph-vascular invasion: Not identified.    * Microcalcifications: Present in benign tissues.  -Areas of benign                           fibrocystic changes including usual ductal hyperplasia, adenosis, and stromal fibrosis.    C.  Breast, left medial without calcifications, stereotactic biopsy:  -Invasive lobular breast carcinoma.   * [place] grade 1 of 3 (total score: 5 of 9)    -- tubule formation < 10%, score 3    -- nuclear grade 1 of 3, score 1    -- mitoses < 3/mm2, (</= 7 mitoses/10HPF), score 1.   * Confirmed by tumor cell immunophenotype:    -- negative:  p63 and calponin are negative for myoepithelial cells; E-cadherin and p120 show loss of membranous epithelial staining.     * Invasive carcinoma involves 1 of 1 submitted core biopsies, max. dimension = 3 millimeters.   * Estrogen, progesterone & HER2 receptor studies pending, to be described in a separate receptor report.    * Ductal carcinoma in situ (DCIS): Not identified.    * Lymph-vascular invasion: Not identified.    * Microcalcifications: Absent.       Note 03/05/2025    Final                    Value:Intradepartmental consultation was obtained with diagnostic agreement.  The findings were communicated by epic secure chat message to the regional breast after working group on 3/7/2025 at 1015.      Additional Information 03/05/2025    Final                    Value:All reported additional testing was performed with appropriately reactive controls.  These tests were developed and their performance characteristics determined by [facility] or  appropriate performing facility, though some tests may be performed on tissues which have not been validated for performance characteristics (such as staining performed on alcohol exposed cell blocks and decalcified tissues).  Results should be interpreted with caution and in the context of the patients’ clinical condition. These tests may not be cleared or approved by the U.S. Food and Drug Administration, though the FDA has determined that such clearance or approval is not necessary. These tests are used for clinical purposes and they should not be regarded as investigational or for research. This laboratory has been approved by [facility], designated as a high-complexity laboratory and is qualified to perform these tests.  Interpretation performed at [facility], [address] [place] [address], [id number]        Synoptic Checklist 03/05/2025    Final                    Value:Breast Biomarker Reporting Template                            (Added in Addendum) BREAST BIOMARKER REPORTING TEMPLATE - C                            Protocol posted: 12/13/2023                                                           Test(s) Performed:                                     Estrogen Receptor (ER) Status:    Positive (greater than 10% of cells demonstrate nuclear positivity)                                    Percentage of Cells with Nuclear Positivity:    95 %                                   Average Intensity of Staining:    Moderate                                  Test Type:    Food and Drug Administration (FDA) cleared (test / vendor): CONFIRM anti-Estrogen Receptor (ER)/[place] [employer]                                  Primary Antibody:    SP1                                Test(s) Performed:                                     Progesterone Receptor (PgR) Status:    Positive                                    Percentage of Cells with Nuclear Positivity:    90 %                               "     Average Intensity of Staining:    Strong                                  Test Type:    Food and Drug Administration (FDA) cleared (test / vendor): CONFIRM anti-Progesterone Receptor (UT)/[employer]                                  Primary Antibody:    1E2                                Test(s) Performed:                                     HER2 by Immunohistochemistry:    Negative (Score 0)                                  Test Type:    Food and Drug Administration (FDA) cleared (test / vendor): [employer]WAY anti-HER-2/christine (4B5)/[employer]                                  Primary Antibody:    4B5                                Cold Ischemia and Fixation Times:    Meet requirements specified in latest version of the ASCO / CAP Guidelines                                Testing Performed on Block Number(s):    C1       Gross Description 03/05/2025    Final                    Value:A. The specimen is received in formalin, labeled with the patient's name and hospital number, and is designated \" left stereo breast biopsy at 3:00 lateral 8 without calcs 8 gauge 9 cm\".  The specimen consists of 8 tan-yellow fatty and friable tissue cores measuring less than 0.1-0.7 cm in diameter and ranging from 2.2-2.6 cm in length.  The specimen is entirely submitted between sponges, 4 cassettes.  B. The specimen is received in formalin, labeled with the patient's name and hospital number, and is designated \" left breast stereo biopsy at 3:00 medial, 3 with calcs 8 gauge 12 cm\".  The specimen consists of 3 yellow fatty and friable tissue cores measuring less than 0.1-0.5 cm in diameter and ranging from 1.7-3.4 cm in length.  The specimen is entirely submitted between sponges, 2 cassettes.  C. The specimen is received in formalin, labeled with the patient's name and hospital number, and is designated \" left breast stereo biopsy at 3:00 medial 1 core without calcs, 8 gauge 12 cm\".                            The specimen consists of " a single yellow-brown rubbery and fatty tissue core measuring 0.3-0.6 cm in diameter and 2.4 cm in length.  The specimen is entirely submitted between sponges, 1 cassette.    Note: The estimated total formalin fixation time based upon information provided by the submitting clinician and the standard processing schedule is 13 hours.  The cold ischemia time based upon information provided by the submitting clinician and receiving staff in the laboratory is within 1 minute.       -Katie Pierre       Pathology: I have reviewed pathology reports described above.    Radiology Results Review: I personally reviewed the following image studies in PACS and associated radiology reports: Mammogram, breast MRI and Ultrasound(s). My interpretation of the radiology images/reports is: March 2025.  Concordance: yes  MRI breast bilateral w and wo contrast w cad: Result Notes             Breast MRI shows a possible small remnant of invasive lobular carcinoma.Follow up scheduled with Dr Cedillo 3/24/25.  .  Left axilla US recommend for 1 prominent lymph node. This has already been ordered.  No evidence of malignancy in right breast.            Study Result    Narrative & Impression   DIAGNOSIS: Abnormal finding on breast imaging; Invasive lobular carcinoma of breast in female (HCC)      TECHNIQUE:  MRI of both breasts was performed in axial planes utilizing a combination of localizer, axial T2 STIR, Axial T1 FSPGR in phase, axial dynamic 3D fat suppressed gradient-echo T1 sequences (VIBRANT) before and after contrast administration at multiple time points, and sagittal 3D fat suppressed gradient-echo T1 sequences (VIBRANT) post-contrast.     This exam was read in conjunction with [employer] software.     MEDICATIONS ADMINISTERED:  Gadobutrol injection (SINGLE-DOSE) SOLN 8 mL, Total Given: 8 mL (1 dose)  Intravenous contrast was administered at 2cc/sec, without documented contrast reaction.     COMPARISONS: No prior breast MRI is  available.  Prior mammograms were reviewed.     RELEVANT HISTORY:   Family Breast Cancer History: History of breast cancer in Neg Hx.  Family Medical History: No known relevant family medical history.   Personal History: Hormone history includes other. Surgical history includes breast biopsy. No known relevant medical history.     TISSUE DENSITY:  FGT: The breasts have scattered fibroglandular tissue.  BPE: The background parenchymal enhancement is minimal.     INDICATION: Isabel Acosta is a 59 y.o. female presenting for breast MRI for extent of disease.  Recent diagnosis left breast 3:00 invasive lobular carcinoma.      FINDINGS:   1) CALCIFICATIONS [A]:  Previously biopsied left breast 3:00 calcifications are identified as non-mass enhancement on MRI.  Left breast 3:00 posterior depth focal non-mass enhancement measuring 9 x 9 x 8 mm located immediately inferior to biopsy clip susceptibility artifact likely represents small area of invasive lobular carcinoma (series 601 image 126).  Enhancement is well  from the skin surface, chest wall, pectoral muscle and nipple.     Second biopsy clip located closer to the skin surface benign biopsy site is noted (series 601 image 115).     Elsewhere in both breasts on MR, there are no suspicious enhancing masses or suspicious areas of non-mass enhancement.     2.) LYMPH NODE [B]:  Left axillary level 1 lymph node has cortical thickness measuring 4 to 5 mm (series 601 image 81 and series 7 image 33).        IMPRESSION:   1.  Left breast 3:00 non-mass enhancement measuring up to 9 mm likely represents a small remnant of invasive lobular carcinoma.  This is immediately adjacent to biopsy clip susceptibility artifact.  2.  Left axilla 1 lymph node is prominent on MRI.  Recommend evaluation with ultrasound.  3.  No MR evidence of malignancy in the contralateral right breast.     ASSESSMENT/BI-RADS CATEGORY:  Left: 4 - Suspicious  Right: 1 - Negative  Overall: 4 -  Suspicious     RECOMMENDATION:       - Ultrasound at the current time for the left breast.       - Diagnostic mammogram in 1 year for the right breast.

## 2025-03-25 ENCOUNTER — PATIENT OUTREACH (OUTPATIENT)
Dept: HEMATOLOGY ONCOLOGY | Facility: CLINIC | Age: 60
End: 2025-03-25

## 2025-03-25 ENCOUNTER — RESULTS FOLLOW-UP (OUTPATIENT)
Dept: GENETICS | Facility: CLINIC | Age: 60
End: 2025-03-25

## 2025-03-25 ENCOUNTER — TELEPHONE (OUTPATIENT)
Age: 60
End: 2025-03-25

## 2025-03-25 DIAGNOSIS — C50.112 MALIGNANT NEOPLASM OF CENTRAL PORTION OF LEFT BREAST IN FEMALE, ESTROGEN RECEPTOR POSITIVE (HCC): Primary | ICD-10-CM

## 2025-03-25 DIAGNOSIS — Z17.0 MALIGNANT NEOPLASM OF CENTRAL PORTION OF LEFT BREAST IN FEMALE, ESTROGEN RECEPTOR POSITIVE (HCC): Primary | ICD-10-CM

## 2025-03-25 LAB
GENE DIS ANL INTERP-IMP: NORMAL
GENES NOT REPORTED: NORMAL
INTERPRETATION: NORMAL

## 2025-03-25 NOTE — TELEPHONE ENCOUNTER
Spoke with Isabel. Her 's employer could not give him off for her surgery on 4/15. Rescheduled to next available on 4/22 in [place]. Post Op remains on 5/9 in [place].   Isabel is also aware that she may [facility], and that the associated time is subject to change up until day of surgery, as case arrangement does not finalize until the afternoon before.

## 2025-03-25 NOTE — TELEPHONE ENCOUNTER
Pt would like to reschedule her surgery that's on 4/15/25 with Dr. Cedillo. Pt can be reached at [phone].

## 2025-03-25 NOTE — PROGRESS NOTES
I reached out and spoke with Isabel, now that consults have been completed with the oncology teams. I introduced myself and explained my role as their Patient Navigator. I reviewed for any barriers to care and offered referrals to supportive services as needed. I reviewed and updated the members assigned to the care team in [facility]. She knows the members of the care team as well as how and when to contact them with any needs.     Distress Thermometer completed at this time. Patient scored 5/10. Referral to  placed..     She is currently able to drive and denies any transportation needs.      She denies any uncontrolled symptoms. Discussed role of Palliative Care in symptom and side effect management. Declined referral at this time.    She states that she is eating and drinking as per usual with no unintentional weight loss.   Completed MST and patient Does not meet criteria for referral to Oncology Dietician Services    Patient does not smoke.     She states she is well supported by family and friends.      She feels she has adequate insurance coverage and denies any financial concerns at this time.     She verbalizes managing the schedules well.   Future Appointments   Date Time Provider Department Center   4/11/2025 10:00 AM MO STEREO RBC 1 MO RBC Mammo MO RBC   4/11/2025 10:30 AM MO US RBC 1 MO RBC US MO RBC   4/15/2025  1:30 PM MO NM 1 MO NM MO HOSP   5/9/2025  2:30 PM Carlos Cedillo MD SURG ONC [facility] Practice-Onc   5/21/2025  9:15 AM Nixon Mccall MD [facility] BATH Practice-[place]   7/1/2025  4:30 PM NISHA Peter [facility] Practice-Wom   7/17/2025  7:00 AM BE MAMMO SLN 1 BE SLN Mammo [facility]   2/25/2026  8:10 AM Laura Butts MD DERM [facility] DERM        Based on individual needs I will follow up in about 4-6 weeks. I have provided my direct contact information and welcome them to contact me if needs as discussed above change. She was appreciative for the call.

## 2025-03-26 NOTE — PROGRESS NOTES
Message rec'd from Dr. Nadeem Fall's office regarding recommendation for;    _____ RIGHT ___X___LEFT      __X___SAVI  placement.    Procedure explained to patient by Dr. Cedillo, additional questions answered at that time    __X___Verbalized understanding.      Blood thinners:  _____yes __X___no    Date stopped: ___N/A____    All teaching points discussed during office visit, pt with no questions at that time, pt adv to arrive at 0945 for 1000 insertion    Pt given name/# for any further questions/needs

## 2025-03-27 LAB
GENE DIS ANL INTERP-IMP: NORMAL
INTERPRETATION: NORMAL

## 2025-04-01 ENCOUNTER — PATIENT OUTREACH (OUTPATIENT)
Dept: CASE MANAGEMENT | Facility: HOSPITAL | Age: 60
End: 2025-04-01

## 2025-04-01 NOTE — PROGRESS NOTES
"Biopsychosocial and Barriers Assessment    Type of Cancer: breast  Treatment plan: lumpectomy 4/22, Med Onc/Rad onc TBD with final pathology   Noted barriers to care: none noted  Cultural/Presybeterian concerns: none noted  Hair Loss/ Wig resources needed: not discussed today    DT completed: 3/25  DT score: 5  Issues noted: none noted    Marital status/Lives with: , lives with her   Pt's support system: pt reports a strong support system of family and friends  Mental Health history: none noted  Substance Abuse: none noted    Employment/income source: works full time  Concerns with bills (treatment vs household): none noted  Noted issues with home: none noted    Narrative note:      MSW s/w pt by phone this afternoon to introduce myself as a resource to her and assess for needs.  Pt's DT was completed last week, which she scored a 5/10 but answered \"no\" to all listed problem areas.  Pt tells me that she is doing well overall and feels like the diagnosis \"has not really sunk in yet\".  She notes very strong support in her life from family and friends.  Her  will be able to drive her home from her surgery and she has no concerns about aftercare.  She did ask about seeing an oncologist as part of her follow up, and says that in her appts with Dr. Cedillo he was not totally sure if she would need radiation or not.  We discussed that he will make his recommendations at her post op appt based on her pathology results.  I explained that if she has consults with Med Onc or Rad Onc that it would be like her initial appt with Dr. Cedillo, basically learning what either office can do for her and what their recommendations are for treatment.    At this point pt denies any needs or concerns but thanked me for the call and the introduction.  I have encouraged her to reach out as needed moving forward and will remain available to assist or support her should her needs change.  "

## 2025-04-03 ENCOUNTER — APPOINTMENT (OUTPATIENT)
Dept: RADIOLOGY | Facility: HOSPITAL | Age: 60
End: 2025-04-03
Payer: COMMERCIAL

## 2025-04-03 ENCOUNTER — HOSPITAL ENCOUNTER (OUTPATIENT)
Dept: RADIOLOGY | Facility: HOSPITAL | Age: 60
End: 2025-04-03
Payer: COMMERCIAL

## 2025-04-03 ENCOUNTER — OFFICE VISIT (OUTPATIENT)
Dept: LAB | Facility: HOSPITAL | Age: 60
End: 2025-04-03
Payer: COMMERCIAL

## 2025-04-03 DIAGNOSIS — C50.912 INVASIVE LOBULAR CARCINOMA OF LEFT BREAST IN FEMALE (HCC): ICD-10-CM

## 2025-04-03 LAB
ATRIAL RATE: 63 BPM
P AXIS: 20 DEGREES
PR INTERVAL: 152 MS
QRS AXIS: 35 DEGREES
QRSD INTERVAL: 68 MS
QT INTERVAL: 400 MS
QTC INTERVAL: 410 MS
T WAVE AXIS: 20 DEGREES
VENTRICULAR RATE: 63 BPM

## 2025-04-03 PROCEDURE — 71046 X-RAY EXAM CHEST 2 VIEWS: CPT

## 2025-04-03 PROCEDURE — 93005 ELECTROCARDIOGRAM TRACING: CPT

## 2025-04-03 PROCEDURE — 93010 ELECTROCARDIOGRAM REPORT: CPT | Performed by: INTERNAL MEDICINE

## 2025-04-11 ENCOUNTER — TELEPHONE (OUTPATIENT)
Dept: HEMATOLOGY ONCOLOGY | Facility: CLINIC | Age: 60
End: 2025-04-11

## 2025-04-11 ENCOUNTER — HOSPITAL ENCOUNTER (OUTPATIENT)
Dept: ULTRASOUND IMAGING | Facility: CLINIC | Age: 60
End: 2025-04-11
Payer: COMMERCIAL

## 2025-04-11 ENCOUNTER — HOSPITAL ENCOUNTER (OUTPATIENT)
Dept: MAMMOGRAPHY | Facility: CLINIC | Age: 60
End: 2025-04-11
Payer: COMMERCIAL

## 2025-04-11 ENCOUNTER — RESULTS FOLLOW-UP (OUTPATIENT)
Dept: OBGYN CLINIC | Facility: MEDICAL CENTER | Age: 60
End: 2025-04-11

## 2025-04-11 VITALS — DIASTOLIC BLOOD PRESSURE: 78 MMHG | HEART RATE: 77 BPM | SYSTOLIC BLOOD PRESSURE: 123 MMHG

## 2025-04-11 DIAGNOSIS — C50.112 MALIGNANT NEOPLASM OF CENTRAL PORTION OF LEFT BREAST IN FEMALE, ESTROGEN RECEPTOR POSITIVE (HCC): ICD-10-CM

## 2025-04-11 DIAGNOSIS — R93.89 ABNORMAL FINDING ON IMAGING: ICD-10-CM

## 2025-04-11 DIAGNOSIS — C50.912 INVASIVE LOBULAR CARCINOMA OF LEFT BREAST IN FEMALE (HCC): ICD-10-CM

## 2025-04-11 DIAGNOSIS — R92.8 ABNORMAL MAMMOGRAM: ICD-10-CM

## 2025-04-11 DIAGNOSIS — Z17.0 MALIGNANT NEOPLASM OF CENTRAL PORTION OF LEFT BREAST IN FEMALE, ESTROGEN RECEPTOR POSITIVE (HCC): ICD-10-CM

## 2025-04-11 PROCEDURE — 19281 PERQ DEVICE BREAST 1ST IMAG: CPT

## 2025-04-11 PROCEDURE — 76642 ULTRASOUND BREAST LIMITED: CPT

## 2025-04-11 RX ORDER — LIDOCAINE HYDROCHLORIDE 10 MG/ML
5 INJECTION, SOLUTION EPIDURAL; INFILTRATION; INTRACAUDAL; PERINEURAL ONCE
Status: COMPLETED | OUTPATIENT
Start: 2025-04-11 | End: 2025-04-11

## 2025-04-11 RX ORDER — CETIRIZINE HYDROCHLORIDE 10 MG/1
10 TABLET, CHEWABLE ORAL DAILY
COMMUNITY

## 2025-04-11 RX ADMIN — LIDOCAINE HYDROCHLORIDE 5 ML: 10 INJECTION, SOLUTION EPIDURAL; INFILTRATION; INTRACAUDAL; PERINEURAL at 10:18

## 2025-04-11 NOTE — PROGRESS NOTES
Procedure type:    _____ultrasound guided __X___stereotactic    Breast:    ___X__Left _____Right    Location: left breast 3:00    Clip: ADALBERTO - 5cm    Performed by: Yaron Correia    Pressure held for 5 minutes by: Colleen Coffman RN    Sterjeri Strips:    _____yes __X___no    Band aid:    __X___yes_____no    Tolerated procedure:    __X___yes _____no

## 2025-04-11 NOTE — TELEPHONE ENCOUNTER
Received Paperwork   What type of form FMLA   Scanned blank form into patient's Epic chart Yes   Method received form  In Person drop off   Provider responsible for form    Informed patient our office turn around time for completing patient forms is 5-7 business days. Yes, informed patient of turn around time     Comments

## 2025-04-11 NOTE — PRE-PROCEDURE INSTRUCTIONS
Pre-Surgery Instructions:   Medication Instructions    amLODIPine (NORVASC) 5 mg tablet Take day of surgery.    Calcium Carb-Cholecalciferol 600-800 MG-UNIT TABS Hold for 7 days before surgery    cetirizine (ZyrTEC) 10 MG chewable tablet Hold day of surgery.    cholestyramine (QUESTRAN) 4 g packet Hold day of surgery.    estradiol (ESTRACE) 0.1 mg/g vaginal cream Hold day of surgery.    famotidine (PEPCID) 20 mg tablet Take day of surgery.    fluticasone (FLONASE) 50 mcg/act nasal spray Take day of surgery.    Multiple Vitamins-Minerals (ONE DAILY FOR WOMEN PO) Stop taking 7 days prior to surgery.    Medication instructions for day of surgery reviewed. Please take all instructed medications with only a sip of water.       You will receive a call one business day prior to surgery with an arrival time and hospital directions. If your surgery is scheduled on a Monday, the hospital will be calling you on the Friday prior to your surgery. If you have not heard from anyone by 8pm, please call the hospital supervisor through the hospital  at [phone]. ([place] [phone] or [place] [phone]).    Do not eat or drink anything after midnight the night before your surgery, including candy, mints, lifesavers, or chewing gum. Do not drink alcohol 24hrs before your surgery. Try not to smoke at least 24hrs before your surgery.       Follow the pre surgery showering instructions as listed in the “My Surgical Experience Booklet” or otherwise provided by your surgeon's office. Do not use a blade to shave the surgical area 1 week before surgery. It is okay to use a clean electric clippers up to 24 hours before surgery. Do not apply any lotions, creams, including makeup, cologne, deodorant, or perfumes after showering on the day of your surgery. Do not use dry shampoo, hair spray, hair gel, or any type of hair products.     No contact lenses, eye make-up, or artificial eyelashes. Remove nail polish, including gel  polish, and any artificial, gel, or acrylic nails if possible. Remove all jewelry including rings and body piercing jewelry.     Wear causal clothing that is easy to take on and off. Consider your type of surgery.    Keep any valuables, jewelry, piercings at home. Please bring any specially ordered equipment (sling, braces) if indicated.    Arrange for a responsible person to drive you to and from the hospital on the day of your surgery. Please confirm the visitor policy for the day of your procedure when you receive your phone call with an arrival time.     Call the surgeon's office with any new illnesses, exposures, or additional questions prior to surgery.    Please reference your “My Surgical Experience Booklet” for additional information to prepare for your upcoming surgery.

## 2025-04-22 ENCOUNTER — HOSPITAL ENCOUNTER (OUTPATIENT)
Facility: HOSPITAL | Age: 60
Setting detail: OUTPATIENT SURGERY
Discharge: HOME | End: 2025-04-22
Attending: SURGERY | Admitting: SURGERY
Payer: COMMERCIAL

## 2025-04-22 ENCOUNTER — APPOINTMENT (OUTPATIENT)
Dept: RADIOLOGY | Facility: HOSPITAL | Age: 60
End: 2025-04-22
Payer: COMMERCIAL

## 2025-04-22 ENCOUNTER — ANESTHESIA (OUTPATIENT)
Dept: PERIOP | Facility: HOSPITAL | Age: 60
End: 2025-04-22
Payer: COMMERCIAL

## 2025-04-22 ENCOUNTER — HOSPITAL ENCOUNTER (OUTPATIENT)
Dept: NUCLEAR MEDICINE | Facility: HOSPITAL | Age: 60
Discharge: HOME | End: 2025-04-22
Attending: SURGERY
Payer: COMMERCIAL

## 2025-04-22 ENCOUNTER — ANESTHESIA EVENT (OUTPATIENT)
Dept: PERIOP | Facility: HOSPITAL | Age: 60
End: 2025-04-22
Payer: COMMERCIAL

## 2025-04-22 VITALS
HEART RATE: 67 BPM | DIASTOLIC BLOOD PRESSURE: 77 MMHG | SYSTOLIC BLOOD PRESSURE: 133 MMHG | TEMPERATURE: 97.9 F | WEIGHT: 177.47 LBS | RESPIRATION RATE: 16 BRPM | OXYGEN SATURATION: 99 % | BODY MASS INDEX: 26.9 KG/M2 | HEIGHT: 68 IN

## 2025-04-22 DIAGNOSIS — C50.112 MALIGNANT NEOPLASM OF CENTRAL PORTION OF LEFT BREAST IN FEMALE, ESTROGEN RECEPTOR POSITIVE (HCC): ICD-10-CM

## 2025-04-22 DIAGNOSIS — Z17.0 MALIGNANT NEOPLASM OF CENTRAL PORTION OF LEFT BREAST IN FEMALE, ESTROGEN RECEPTOR POSITIVE (HCC): ICD-10-CM

## 2025-04-22 DIAGNOSIS — C50.912 INVASIVE LOBULAR CARCINOMA OF LEFT BREAST IN FEMALE (HCC): ICD-10-CM

## 2025-04-22 PROCEDURE — 38900 IO MAP OF SENT LYMPH NODE: CPT | Performed by: SURGERY

## 2025-04-22 PROCEDURE — 38900 IO MAP OF SENT LYMPH NODE: CPT

## 2025-04-22 PROCEDURE — A9541 TC99M SULFUR COLLOID: HCPCS

## 2025-04-22 PROCEDURE — 19301 PARTIAL MASTECTOMY: CPT

## 2025-04-22 PROCEDURE — 38525 BIOPSY/REMOVAL LYMPH NODES: CPT

## 2025-04-22 PROCEDURE — 38792 RA TRACER ID OF SENTINL NODE: CPT

## 2025-04-22 PROCEDURE — 88342 IMHCHEM/IMCYTCHM 1ST ANTB: CPT | Performed by: PATHOLOGY

## 2025-04-22 PROCEDURE — 88341 IMHCHEM/IMCYTCHM EA ADD ANTB: CPT | Performed by: PATHOLOGY

## 2025-04-22 PROCEDURE — 19301 PARTIAL MASTECTOMY: CPT | Performed by: SURGERY

## 2025-04-22 PROCEDURE — 38792 RA TRACER ID OF SENTINL NODE: CPT | Performed by: SURGERY

## 2025-04-22 PROCEDURE — 88307 TISSUE EXAM BY PATHOLOGIST: CPT | Performed by: PATHOLOGY

## 2025-04-22 PROCEDURE — 38525 BIOPSY/REMOVAL LYMPH NODES: CPT | Performed by: SURGERY

## 2025-04-22 RX ORDER — SODIUM CHLORIDE, SODIUM LACTATE, POTASSIUM CHLORIDE, CALCIUM CHLORIDE 600; 310; 30; 20 MG/100ML; MG/100ML; MG/100ML; MG/100ML
INJECTION, SOLUTION INTRAVENOUS CONTINUOUS PRN
Status: DISCONTINUED | OUTPATIENT
Start: 2025-04-22 | End: 2025-04-22

## 2025-04-22 RX ORDER — HYDROMORPHONE HCL/PF 1 MG/ML
0.5 SYRINGE (ML) INJECTION
Status: DISCONTINUED | OUTPATIENT
Start: 2025-04-22 | End: 2025-04-22 | Stop reason: HOSPADM

## 2025-04-22 RX ORDER — ISOSULFAN BLUE 50 MG/5ML
INJECTION, SOLUTION SUBCUTANEOUS AS NEEDED
Status: DISCONTINUED | OUTPATIENT
Start: 2025-04-22 | End: 2025-04-22 | Stop reason: HOSPADM

## 2025-04-22 RX ORDER — ONDANSETRON 2 MG/ML
4 INJECTION INTRAMUSCULAR; INTRAVENOUS ONCE AS NEEDED
Status: DISCONTINUED | OUTPATIENT
Start: 2025-04-22 | End: 2025-04-22 | Stop reason: HOSPADM

## 2025-04-22 RX ORDER — MIDAZOLAM HYDROCHLORIDE 2 MG/2ML
INJECTION, SOLUTION INTRAMUSCULAR; INTRAVENOUS AS NEEDED
Status: DISCONTINUED | OUTPATIENT
Start: 2025-04-22 | End: 2025-04-22

## 2025-04-22 RX ORDER — ONDANSETRON 2 MG/ML
INJECTION INTRAMUSCULAR; INTRAVENOUS AS NEEDED
Status: DISCONTINUED | OUTPATIENT
Start: 2025-04-22 | End: 2025-04-22

## 2025-04-22 RX ORDER — OXYCODONE AND ACETAMINOPHEN 5; 325 MG/1; MG/1
1 TABLET ORAL EVERY 4 HOURS PRN
Status: DISCONTINUED | OUTPATIENT
Start: 2025-04-22 | End: 2025-04-22 | Stop reason: HOSPADM

## 2025-04-22 RX ORDER — HYDROMORPHONE HYDROCHLORIDE 2 MG/ML
INJECTION, SOLUTION INTRAMUSCULAR; INTRAVENOUS; SUBCUTANEOUS AS NEEDED
Status: DISCONTINUED | OUTPATIENT
Start: 2025-04-22 | End: 2025-04-22

## 2025-04-22 RX ORDER — PROPOFOL 10 MG/ML
INJECTION, EMULSION INTRAVENOUS CONTINUOUS PRN
Status: DISCONTINUED | OUTPATIENT
Start: 2025-04-22 | End: 2025-04-22

## 2025-04-22 RX ORDER — LIDOCAINE HYDROCHLORIDE 10 MG/ML
INJECTION, SOLUTION EPIDURAL; INFILTRATION; INTRACAUDAL; PERINEURAL AS NEEDED
Status: DISCONTINUED | OUTPATIENT
Start: 2025-04-22 | End: 2025-04-22

## 2025-04-22 RX ORDER — FENTANYL CITRATE 50 UG/ML
INJECTION, SOLUTION INTRAMUSCULAR; INTRAVENOUS AS NEEDED
Status: DISCONTINUED | OUTPATIENT
Start: 2025-04-22 | End: 2025-04-22

## 2025-04-22 RX ORDER — PROMETHAZINE HYDROCHLORIDE 25 MG/ML
12.5 INJECTION, SOLUTION INTRAMUSCULAR; INTRAVENOUS
Status: DISCONTINUED | OUTPATIENT
Start: 2025-04-22 | End: 2025-04-22 | Stop reason: HOSPADM

## 2025-04-22 RX ORDER — DEXAMETHASONE SODIUM PHOSPHATE 10 MG/ML
INJECTION, SOLUTION INTRAMUSCULAR; INTRAVENOUS AS NEEDED
Status: DISCONTINUED | OUTPATIENT
Start: 2025-04-22 | End: 2025-04-22

## 2025-04-22 RX ORDER — CEFAZOLIN SODIUM 2 G/50ML
2000 SOLUTION INTRAVENOUS ONCE
Status: COMPLETED | OUTPATIENT
Start: 2025-04-22 | End: 2025-04-22

## 2025-04-22 RX ORDER — PROPOFOL 10 MG/ML
INJECTION, EMULSION INTRAVENOUS AS NEEDED
Status: DISCONTINUED | OUTPATIENT
Start: 2025-04-22 | End: 2025-04-22

## 2025-04-22 RX ORDER — FENTANYL CITRATE/PF 50 MCG/ML
50 SYRINGE (ML) INJECTION
Status: DISCONTINUED | OUTPATIENT
Start: 2025-04-22 | End: 2025-04-22 | Stop reason: HOSPADM

## 2025-04-22 RX ADMIN — PROPOFOL 40 MG: 10 INJECTION, EMULSION INTRAVENOUS at 12:21

## 2025-04-22 RX ADMIN — CEFAZOLIN SODIUM 2000 MG: 2 SOLUTION INTRAVENOUS at 12:23

## 2025-04-22 RX ADMIN — PROPOFOL 200 MG: 10 INJECTION, EMULSION INTRAVENOUS at 12:19

## 2025-04-22 RX ADMIN — SODIUM CHLORIDE, SODIUM LACTATE, POTASSIUM CHLORIDE, AND CALCIUM CHLORIDE: .6; .31; .03; .02 INJECTION, SOLUTION INTRAVENOUS at 12:16

## 2025-04-22 RX ADMIN — FENTANYL CITRATE 25 MCG: 50 INJECTION, SOLUTION INTRAMUSCULAR; INTRAVENOUS at 12:27

## 2025-04-22 RX ADMIN — FENTANYL CITRATE 50 MCG: 50 INJECTION, SOLUTION INTRAMUSCULAR; INTRAVENOUS at 13:40

## 2025-04-22 RX ADMIN — FENTANYL CITRATE 50 MCG: 0.05 INJECTION, SOLUTION INTRAMUSCULAR; INTRAVENOUS at 14:00

## 2025-04-22 RX ADMIN — OXYCODONE HYDROCHLORIDE AND ACETAMINOPHEN 1 TABLET: 5; 325 TABLET ORAL at 14:39

## 2025-04-22 RX ADMIN — MIDAZOLAM HYDROCHLORIDE 2 MG: 1 INJECTION, SOLUTION INTRAMUSCULAR; INTRAVENOUS at 12:16

## 2025-04-22 RX ADMIN — ONDANSETRON 4 MG: 2 INJECTION INTRAMUSCULAR; INTRAVENOUS at 13:32

## 2025-04-22 RX ADMIN — HYDROMORPHONE HYDROCHLORIDE 0.25 MG: 2 INJECTION, SOLUTION INTRAMUSCULAR; INTRAVENOUS; SUBCUTANEOUS at 13:45

## 2025-04-22 RX ADMIN — FENTANYL CITRATE 25 MCG: 50 INJECTION, SOLUTION INTRAMUSCULAR; INTRAVENOUS at 12:19

## 2025-04-22 RX ADMIN — HYDROMORPHONE HYDROCHLORIDE 0.25 MG: 2 INJECTION, SOLUTION INTRAMUSCULAR; INTRAVENOUS; SUBCUTANEOUS at 13:11

## 2025-04-22 RX ADMIN — LIDOCAINE HYDROCHLORIDE 50 MG: 10 INJECTION, SOLUTION EPIDURAL; INFILTRATION; INTRACAUDAL; PERINEURAL at 12:18

## 2025-04-22 RX ADMIN — DEXAMETHASONE SODIUM PHOSPHATE 10 MG: 10 INJECTION INTRAMUSCULAR; INTRAVENOUS at 12:19

## 2025-04-22 RX ADMIN — PROPOFOL 60 MCG/KG/MIN: 10 INJECTION, EMULSION INTRAVENOUS at 12:22

## 2025-04-22 NOTE — ANESTHESIA POSTPROCEDURE EVALUATION
Post-Op Assessment Note    CV Status:  Stable  Pain Score: 3    Pain management: adequate       Mental Status:  Alert and awake   Hydration Status:  Euvolemic   PONV Controlled:  Controlled   Airway Patency:  Patent     Post Op Vitals Reviewed: Yes    No anethesia notable event occurred.    Staff: CRNA           Last Filed PACU Vitals:  Vitals Value Taken Time   Temp 97.8 °F (36.6 °C) 04/22/25 1348   Pulse 69 04/22/25 1350   /78 04/22/25 1348   Resp 10 04/22/25 1350   SpO2 100 % 04/22/25 1350   Vitals shown include unfiled device data.    Modified Alberto:     Vitals Value Taken Time   Activity 2 04/22/25 1348   Respiration 2 04/22/25 1348   Circulation 2 04/22/25 1348   Consciousness 1 04/22/25 1348   Oxygen Saturation 1 04/22/25 1348     Modified Alberto Score: 8

## 2025-04-22 NOTE — INTERVAL H&P NOTE
H&P reviewed. After examining the patient I find no changes in the patients condition since the H&P had been written.    Vitals:    04/22/25 1048   BP: 144/79   Pulse: 77   Resp: 18   Temp: (!) 97.2 °F (36.2 °C)   SpO2: 100%

## 2025-04-22 NOTE — ANESTHESIA PREPROCEDURE EVALUATION
Procedure:  LEFT BREAST ADALBERTO  DIRECTED LUMPECTOMY (Left: Breast)  LYMPHATIC MAPPING WITH BLUE AND RADIOACTIVE DYES, AXILLARY SENTINEL LYMPH NODE BIOPSY, POSSIBLE AXILLARY DISSECTION, INJECTION AT 1230 IN OR BY DR OBREGON (Left: Axilla)    Relevant Problems   CARDIO   (+) Essential hypertension      GI/HEPATIC   (+) Gastroesophageal reflux disease      GYN   (+) Malignant neoplasm of central portion of left breast in female, estrogen receptor positive (HCC)      TTE 2024:  Left Ventricle: Left ventricular cavity size is normal. Wall thickness is normal. The left ventricular ejection fraction is 65%. Systolic function is normal. Wall motion is normal. Diastolic function is normal.       Physical Exam    Airway    Mallampati score: III  TM Distance: >3 FB  Neck ROM: full     Dental   No notable dental hx     Cardiovascular  Cardiovascular exam normal    Pulmonary  Pulmonary exam normal     Other Findings  post-pubertal.      Anesthesia Plan  ASA Score- 2     Anesthesia Type- general with ASA Monitors.         Additional Monitors:     Airway Plan:            Plan Factors-Exercise tolerance (METS): >4 METS.    Chart reviewed. EKG reviewed.  Existing labs reviewed. Patient summary reviewed.    Patient is not a current smoker.              Induction- intravenous.    Postoperative Plan-         Informed Consent- Anesthetic plan and risks discussed with patient.  I personally reviewed this patient with the CRNA. Discussed and agreed on the Anesthesia Plan with the CRNA..      NPO Status:  No vitals data found for the desired time range.

## 2025-04-22 NOTE — OP NOTE
OPERATIVE REPORT  PATIENT NAME: Isabel Acosta    :  1965  MRN: [id number]  Pt Location: [facility] OR ROOM 01    SURGERY DATE: 2025    Surgeons and Role:     * Carlos Cedillo MD - Primary    Preop Diagnosis:  Malignant neoplasm of central portion of left breast in female, estrogen receptor positive (HCC) [C50.112, Z17.0]    Post-Op Diagnosis Codes:     * Malignant neoplasm of central portion of left breast in female, estrogen receptor positive (HCC) [C50.112, Z17.0]    Procedure(s):  Left - LEFT BREAST BEREKET  DIRECTED LUMPECTOMY  Left - LYMPHATIC MAPPING WITH BLUE AND RADIOACTIVE DYES. AXILLARY SENTINEL LYMPH NODE BIOPSY. INJECTION AT 1230 IN OR BY DR CEDILLO    Specimen(s):  ID Type Source Tests Collected by Time Destination   1 : LEFT axillary sentinel lymph node #1 hot and blue Tissue Lymph Node, [place] TISSUE EXAM Carlos Cedillo MD 2025 1250    2 : LEFT axillary sentinel lymph node #2 hot and blue Tissue Lymph Node, [place] TISSUE EXAM Carlos Cedillo MD 2025 1251    3 : LEFT breast bereket directed lumpectomy marked per margin protocol Tissue Breast, Left TISSUE EXAM Carlos Cedillo MD 2025 1309    4 : Additonal ANTERIOR margin inked most distal margin GREEN Tissue Breast, Left TISSUE EXAM Carlos Cedillo MD 2025 1309    5 : Additonal INFERIOR margin inked most distal margin BLUE Tissue Breast, Left TISSUE EXAM Carlos Cedillo MD 2025 1312    6 : Additonal LATERAL margin inked most distal margin ORANGE Tissue Breast, Left TISSUE EXAM Carlos Cedillo MD 2025 1312    7 : Additonal MEDIAL  margin inked most distal margin YELLOW Tissue Breast, Left TISSUE EXAM Carlos Cedillo MD 2025 1313    8 : Additonal POSTERIOR margin inked most distal margin BLACK Tissue Breast, Left TISSUE EXAM Carlos Cedillo MD 2025 1313    9 : Additonal POSTERIOR margin inked most distal margin BLACK  Tissue Breast, Left TISSUE EXAM Carlos Cedillo MD 4/22/2025 1314    10 : Additonal SUPERIOR margin inked most distal margin RED Tissue Breast, Left TISSUE EXAM Carlos Cedillo MD 4/22/2025 1315        Estimated Blood Loss:   Minimal    Drains:  * No LDAs found *    Anesthesia Type:   General    Operative Indications:  Malignant neoplasm of central portion of left breast in female, estrogen receptor positive (HCC) [C50.112, Z17.0]      Operative Findings:  ADALBERTO clip and biopsy clips are in the specimen      Complications:   None    Procedure and Technique:    The prior post biopsy images were reviewed prior to the procedure.    Lumpectomy  The patient was brought to the operation room and placed under general anesthesia.  Attention was paid to ensure appropriate padding and correct positioning.  The ADALBERTO  detector was then used to localize the ADALBERTO deflector.  The breast skin was marked in this area.  The left breast was injected intradermally with technetium sulfur colloid in the ana-areolar region and 0.3cc of methylene blue was injected intradermally over the tumor.  These areas were vigorously massaged to facilitate identification of the sentinel lymph node (SLN).  The breast and axillary region were then prepped and draped in a sterile fashion.  I initiated a time-out, identifying the patient, the correct side and the above procedure.  All parties agreed with the time out.     The planned incision was then injected with 0.25% Marcaine for local anesthesia.  The incision was sharply incised encompassing the blue dye.  The ADALBERTO detector was used to guide the dissection.  Superior, inferior, medial and lateral planes were developed around the ADALBERTO deflector and the specimen truncated beyond the ADALBERTO deflector.  The specimen was then inked for orientation purposes.  A specimen radiograph was taken in two dimensions. 6 Additional margins were removed to optimize complete removal of the  tumor.  The additional margins were inked on the most distal area.  All specimens were sent to pathology for permanent analysis.  Superficial bleeding controlled with electrocautery and the wound was extensively irrigated.  The wound was closed with two layers, an inner layer of 3-0 vicryl and a subcuticular layer of 4-0 monocryl.  Exofin were applied.    SLN Biopsy  The previously marked location of the sentinel lymph node was injected with 0.25% Marcaine.  A curivilinear incision was made and dissection was taken down into the axillar proper with electrocautery.   The janae counter was used to help localize the SLN.  The sentinel lymph node was identified and removed with electrocautery.       SLN Findings  The SLN was blue and radioactive.  The lymph node was grossly normal and sent for permanent pathologic analysis  This wound also was irrigated extensively: superficial bleeding was controlled with electrocautery and then closed in 2 layers - an inner layer of 3-0 Vicryl and a subcuticular layer of 4-0 Monocryl. Exofin were applied. Counts correct x2   I was present for the entire procedure. and A physician assistant was required during the procedure for retraction, tissue handling, dissection and suturing.    Patient Disposition:  PACU     [place] Node Biopsy for Breast Cancer - Left  Operation performed with curative intent. Yes   Tracer(s) used to identify sentinel nodes in the upfront surgery (non-neoadjuvant) setting (select all that apply). Dye and Radioactive tracer   Tracer(s) used to identify sentinel nodes in the neoadjuvant setting (select all that apply). N/A   All nodes (colored or non-colored) present at the end of a dye-filled lymphatic channel were removed. Yes   All significantly radioactive nodes were removed. Yes   All palpably suspicious nodes were removed. Yes   Biopsy-proven positive nodes marked with clips prior to chemotherapy were identified and removed. N/A                 SIGNATURE:  Carlos Cedillo MD  DATE: April 22, 2025  TIME: 1:25 PM

## 2025-04-23 NOTE — ANESTHESIA POSTPROCEDURE EVALUATION
Post-Op Assessment Note    CV Status:  Stable  Pain Score: 6    Pain management: satisfactory to patient       Mental Status:  Alert and awake   Hydration Status:  Euvolemic   PONV Controlled:  Controlled   Airway Patency:  Patent     Post Op Vitals Reviewed: Yes    No anethesia notable event occurred.    Staff: CRNA           Last Filed PACU Vitals:  Vitals Value Taken Time   Temp 97.7 °F (36.5 °C) 04/22/25 1415   Pulse 63 04/22/25 1417   /79 04/22/25 1415   Resp 14 04/22/25 1417   SpO2 98 % 04/22/25 1417   Vitals shown include unfiled device data.    Modified Alberto:     Vitals Value Taken Time   Activity 2 04/22/25 1415   Respiration 2 04/22/25 1415   Circulation 2 04/22/25 1415   Consciousness 2 04/22/25 1415   Oxygen Saturation 2 04/22/25 1415     Modified Alberto Score: 10

## 2025-04-25 ENCOUNTER — TELEPHONE (OUTPATIENT)
Age: 60
End: 2025-04-25

## 2025-04-25 ENCOUNTER — NURSE TRIAGE (OUTPATIENT)
Age: 60
End: 2025-04-25

## 2025-04-25 NOTE — TELEPHONE ENCOUNTER
How does the incision look? WNL    Do you have fever or chills? No    Are you having any pain? No    Do you have a drain(s)? No    Verify post-op appointment date and time  [x]5/9 @230    Do you have any other questions or concerns? See triage    **NOTE TO TRIAGER: If patient requires further triage, based upon the answers above, move to appropriate triage protocol.

## 2025-04-25 NOTE — TELEPHONE ENCOUNTER
"Placed post op follow up call to patient and she reported intermittent nausea and dizziness upon standing since yesterday. She experienced mild nausea the day of surgery, but that resolved. She has been taking dramamine with relief. She denies any other acute symptoms, and is hydrating sufficiently with water, Gatorade and tea. She is able to eat 3 meals and snacks throughout the day, and having regular bowel and bladder movements.    Advised to continue antiemetic as needed and take caution when standing due to dizziness. Also advised her to continue hydration, small frequent meals as tolerated, and call back should her symptoms persist or worsen.    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 NAUSEA SEVERITY: \"How bad is the nausea?\" (e.g., mild, moderate, severe; dehydration, weight loss)      Mild intermittent    2. ONSET: \"When did the nausea begin?\"      Yesterday    3. VOMITING: \"Any vomiting?\" If Yes, ask: \"How many times today?\"      Denies     4. RECURRENT SYMPTOM: \"Have you had nausea before?\" If Yes, ask: \"When was the last time?\" \"What happened that time?\"      Day of surgery. Takes Dramamine with relief    5. CAUSE: \"What do you think is causing the nausea?\"      Anesthesia    6. PREGNANCY: \"Is there any chance you are pregnant?\" (e.g., unprotected intercourse, missed birth control pill, broken condom)      N/A    Protocols used: Nausea-Adult-[place]    "

## 2025-04-28 PROCEDURE — 88341 IMHCHEM/IMCYTCHM EA ADD ANTB: CPT | Performed by: PATHOLOGY

## 2025-04-28 PROCEDURE — 88307 TISSUE EXAM BY PATHOLOGIST: CPT | Performed by: PATHOLOGY

## 2025-04-28 PROCEDURE — 88342 IMHCHEM/IMCYTCHM 1ST ANTB: CPT | Performed by: PATHOLOGY

## 2025-04-30 ENCOUNTER — TELEPHONE (OUTPATIENT)
Age: 60
End: 2025-04-30

## 2025-04-30 NOTE — TELEPHONE ENCOUNTER
Isabel calling in requesting a return to work letter dated for today 4/30/25.     Please state she is clear to return to work with no restrictions and fax to [phone] Attn: Martha.    Thank you!

## 2025-04-30 NOTE — TELEPHONE ENCOUNTER
Called and spoke with patient. Letter is completed and faxed.    Patient was told that she would not be cleared of restrictions until her post-op visit.

## 2025-05-01 NOTE — TELEPHONE ENCOUNTER
Pt called in and stated that the fax was never received by her work.  She asked that it be re faxed to:    Fax# [phone] / Attn: Izabela

## 2025-05-05 PROBLEM — Z85.3 ENCOUNTER FOR FOLLOW-UP SURVEILLANCE OF BREAST CANCER: Status: ACTIVE | Noted: 2025-05-05

## 2025-05-05 PROBLEM — Z98.890 S/P LUMPECTOMY OF BREAST: Status: ACTIVE | Noted: 2025-05-05

## 2025-05-05 PROBLEM — C50.912 INVASIVE LOBULAR CARCINOMA OF LEFT BREAST IN FEMALE (HCC): Status: ACTIVE | Noted: 2025-05-05

## 2025-05-05 PROBLEM — Z08 ENCOUNTER FOR FOLLOW-UP SURVEILLANCE OF BREAST CANCER: Status: ACTIVE | Noted: 2025-05-05

## 2025-05-06 ENCOUNTER — PATIENT OUTREACH (OUTPATIENT)
Dept: HEMATOLOGY ONCOLOGY | Facility: CLINIC | Age: 60
End: 2025-05-06

## 2025-05-06 NOTE — PROGRESS NOTES
Message received from pt asking about doing stretching exercises for her left arm. I advised to to talk with Dr. Cedillo at her upcoming post op visit on Friday. Patient states she feels a mild burning sensation in axilla, pt denies any redness, swelling or fever, no drainage at site. Pt advised to call the office should it get worse, I will forward the message to Dr. Cedillo.

## 2025-05-07 ENCOUNTER — TELEPHONE (OUTPATIENT)
Dept: SURGICAL ONCOLOGY | Facility: CLINIC | Age: 60
End: 2025-05-07

## 2025-05-07 ENCOUNTER — OFFICE VISIT (OUTPATIENT)
Dept: SURGICAL ONCOLOGY | Facility: CLINIC | Age: 60
End: 2025-05-07
Payer: COMMERCIAL

## 2025-05-07 ENCOUNTER — DOCUMENTATION (OUTPATIENT)
Dept: HEMATOLOGY ONCOLOGY | Facility: CLINIC | Age: 60
End: 2025-05-07

## 2025-05-07 VITALS
HEIGHT: 68 IN | TEMPERATURE: 97.9 F | WEIGHT: 177 LBS | SYSTOLIC BLOOD PRESSURE: 134 MMHG | BODY MASS INDEX: 26.83 KG/M2 | OXYGEN SATURATION: 98 % | DIASTOLIC BLOOD PRESSURE: 92 MMHG | HEART RATE: 74 BPM

## 2025-05-07 DIAGNOSIS — Z17.0 MALIGNANT NEOPLASM OF CENTRAL PORTION OF LEFT BREAST IN FEMALE, ESTROGEN RECEPTOR POSITIVE (HCC): ICD-10-CM

## 2025-05-07 DIAGNOSIS — Z85.3 ENCOUNTER FOR FOLLOW-UP SURVEILLANCE OF BREAST CANCER: Primary | ICD-10-CM

## 2025-05-07 DIAGNOSIS — Z98.890 S/P LUMPECTOMY OF BREAST: ICD-10-CM

## 2025-05-07 DIAGNOSIS — Z08 ENCOUNTER FOR FOLLOW-UP SURVEILLANCE OF BREAST CANCER: Primary | ICD-10-CM

## 2025-05-07 DIAGNOSIS — C50.912 INVASIVE LOBULAR CARCINOMA OF LEFT BREAST IN FEMALE (HCC): ICD-10-CM

## 2025-05-07 DIAGNOSIS — C50.112 MALIGNANT NEOPLASM OF CENTRAL PORTION OF LEFT BREAST IN FEMALE, ESTROGEN RECEPTOR POSITIVE (HCC): ICD-10-CM

## 2025-05-07 PROCEDURE — 99214 OFFICE O/P EST MOD 30 MIN: CPT | Performed by: SURGERY

## 2025-05-07 NOTE — PROGRESS NOTES
Name: Isabel Acosta      : 1965      MRN: [id number]  Encounter Provider: Carlos Cedillo MD  Encounter Date: 2025   Encounter department: [facility] [place]  :  Assessment & Plan  Encounter for follow-up surveillance of breast cancer         Malignant neoplasm of central portion of left breast in female, estrogen receptor positive (HCC)    Orders:    Ambulatory referral to Hematology / Oncology; Future    Ambulatory referral to PT/OT lymphedema therapy; Future    Ambulatory referral to Radiation Oncology; Future    Invasive lobular carcinoma of left breast in female (HCC)         S/P lumpectomy of breast         59-year-old female status post breast conservation surgery grade 1 ER/PA positive HER2 negative tumor she is overall doing well.  No evidence of surgical site infection incisions are healed.  Pathology was reviewed and discussed in detail.  Further management of early-stage breast cancer also discussed.  She will be referred to medical, radiation oncologist as well as PT OT for lymphedema prevention.  I will see her in 3 months.  She was told to call us with any questions or concerns in the interim she understand and agreed to do so.  Her MammaPrint is ultralow risk.        History of Present Illness   Isabel Acosta is a 59 y.o. year old female who presents s/p left breast partial mastectomy with sentinel lymph.     Oncology History   Cancer Staging   No matching staging information was found for the patient.  Oncology History   Malignant neoplasm of central portion of left breast in female, estrogen receptor positive (HCC)   3/5/2025 Initial Diagnosis    Malignant neoplasm of central portion of left breast in female, estrogen receptor positive (HCC)     3/5/2025 Biopsy    LEFT breast stereotactic biopsy   A. 3 o'clock, lateral wo calcs (bowtie)  Benign breast parenchyma      B. 0300 o'clock medical with calcs  Invasive lobular carcinoma  Grade  "1  ER 95, LA 90, HER2 0  C. 0300 o'clock medial wo calcs  Invasive lobular carcinoma  Grade 1  ER 95, LA 90, HER2 0    The focality of the malignancy is unknown.  Fibroglandular tissue in this area demonstrates no significant changes from multiple prior mammograms.  Bilateral breast MRI is recommended to determine extent of disease.  Ultrasound of the left axilla has not recently been performed.  No recent imaging of the contralateral right breast has been performed       3/5/2025 Genomic Testing    MammaPrint  LOW RISK  Luminal A         3/14/2025 Genetic Testing    [employer]  NEGATIVE     4/22/2025 Surgery    LEFT breast ADALBERTO  lumpectomy with SLN biopsy (Dr. Cedillo)  Invasive lobular carcinoma  Grade 1  10 mm  Margins NEG  0/3 lymph nodes         Review of Systems A complete review of systems is negative other than that noted above in the HPI.    Medical History Reviewed by provider this encounter:  Tobacco  Allergies  Meds  Problems  Med Hx  Surg Hx  Fam Hx     .       Objective   /92 (BP Location: Right arm, Patient Position: Sitting)   Pulse 74   Temp 97.9 °F (36.6 °C) (Temporal)   Ht 5' 8\" (1.727 m)   Wt 80.3 kg (177 lb)   LMP 02/22/2019   SpO2 98%   BMI 26.91 kg/m²     Pain Screening:     ECOG    Physical Exam  Constitutional:       Appearance: Normal appearance.   HENT:      Head: Normocephalic and atraumatic.      Nose: Nose normal.      Mouth/Throat:      Mouth: Mucous membranes are moist.   Eyes:      Pupils: Pupils are equal, round, and reactive to light.   Cardiovascular:      Rate and Rhythm: Normal rate.      Pulses: Normal pulses.      Heart sounds: Normal heart sounds.   Pulmonary:      Effort: Pulmonary effort is normal.      Breath sounds: Normal breath sounds.   Chest:          Comments: Well-healed left breast and left axillary surgical scar.  No evidence of surgical site infection.  Surgical sites are healing well.  Abdominal:      General: Bowel sounds are normal. " "     Palpations: Abdomen is soft.   Musculoskeletal:         General: Normal range of motion.      Cervical back: Normal range of motion and neck supple.   Skin:     General: Skin is warm.   Neurological:      General: No focal deficit present.      Mental Status: She is alert and oriented to person, place, and time.   Psychiatric:         Mood and Affect: Mood normal.         Behavior: Behavior normal.         Thought Content: Thought content normal.         Judgment: Judgment normal.          Labs: I have reviewed pertinent labs. No results found for: \"WBC\", \"RBC\", \"HGB\", \"HCT\", \"MCV\", \"MCH\", \"RDW\", \"PLT\", \"NEUTOPHILPCT\", \"BANDSPCT\", \"LYMPHOPCT\", \"MONOPCT\", \"EOSPCT\", \"BASOPCT\", \"IMMGRANS\", \"NEUTROABS\"   No results found for: \"SODIUM\", \"K\", \"CL\", \"CO2\", \"AGAP\", \"BUN\", \"CREATININE\", \"GLUC\", \"GLUF\", \"CALCIUM\", \"CORRECTEDCA\", \"AST\", \"ALT\", \"ALKPHOS\", \"TP\", \"ALB\", \"TBILI\", \"EGFR\"   Admission on 04/22/2025, Discharged on 04/22/2025   Component Date Value Ref Range Status    Case Report 04/22/2025    Final                    Value:Surgical Pathology Report                         Case: [id number]                                  Authorizing Provider:  Carlos Cedillo,  Collected:           04/22/2025 1250                                     MD                                                                           Ordering Location:     [facility] Received:            04/22/2025 1430                                     Operating Room                                                               Pathologist:           Adriane Briseno MD                                                         Specimens:   A) - Lymph Node, [place], LEFT axillary sentinel lymph node #1 hot and blue                        B) - Lymph Node, [place], LEFT axillary sentinel lymph node #2 hot and blue                        C) - Breast, Left, LEFT breast bereket directed lumpectomy marked per margin protocol             "      D) - Breast, Left, Additional ANTERIOR margin inked most distal margin GREEN                                                  E) - Breast, Left, Additional INFERIOR margin inked most distal margin BLUE                         F) - Breast, Left, Additional LATERAL margin inked most distal margin ORANGE                        G) - Breast, Left, Additional MEDIAL  margin inked most distal margin YELLOW                        H) - Breast, Left, Additional POSTERIOR margin inked most distal margin BLACK                       I) - Breast, Left, Additonal SUPERIOR margin inked most distal margin RED                  Final Diagnosis 04/22/2025    Final                    Value:A. Lymph Node, [place], LEFT axillary sentinel lymph node #1 hot and blue, lymphadenectomy:  - One of two lymph nodes positive for isolated tumor cell (0/2 i+).    -- Pankeratin (CKAE1/3) immunostains confirm rare positive isolated tumor cell.     B. Lymph Node, [place], LEFT axillary sentinel lymph node #2 hot and blue, lymphadenectomy:  - One benign lymph node (0/1); negative for malignancy on multiple examined levels.       -- Pankeratin (CKAE1/3) immunostains confirm no metastatic carcinoma.     C. Breast, Left, LEFT breast bereket directed lumpectomy marked per margin protocol:  - Invasive lobular carcinoma, classic pattern, 10 mm in greatest dimension.    -- Yazan histologic grade 1 of 3 (total score: 5 of 9)        * Glandular (acinar) Tubular Differentiation < 10%, score 3.        * Nuclear Pleomorphism 1 of 3, score 1.        * Mitotic Rate < 3/mm2, (</= 7 mitoses/10HPF; 1 mitosis/10 HPF), score 1.    -- Confirmed by tumor cell immunophenotype:        * Positive:                           Pankeratin (CKAE1/3), GATA3, p120 (cytoplasmic).        * Negative: E-cadherin, Calponin-B.    -- Associated prior biopsy site changes with butterfly-shaped biopsy clip and        BEREKET  marker. Microscopic biopsy clip medial anterior to  invasive carcinoma        associated with biopsy site changes.   - Ductal carcinoma in-situ (DCIS): Not identified.   - Benign fibrocystic changes with atypia:    -- Non-invasive lobular neoplasia (lobular carcinoma in-situ/LCIS and atypical lobular        hyperplasia/ALH with pagetoid ductal spread) associated with and adjacent to invasive        carcinoma.     -- Usual ductal hyperplasia, columnar cell change and hyperplasia, apocrine adenosis.   - Margins uninvolved by invasive and in-situ carcinoma:    -- Invasive carcinoma 12 mm from nearest posterior margin.    -- DCIS: N/A.    -- LCIS not present at examined margins.   - Benign skin.    -- No dermal lymphatic and/or vascular invasion.   - Lymphatic and/or vascular invasion: Not                           identified.  - Microcalcifications: Present, associated with non-neoplastic breast tissue and     atypical lobular hyperplasia (focal).     D. Breast, Left, Additional ANTERIOR margin inked most distal margin GREEN, oriented excision:  - Negative for malignancy, in-situ carcinoma and atypical hyperplasia.     -- Entire specimen with new anterior margin histologically examined.     E. Breast, Left, Additional INFERIOR margin inked most distal margin BLUE, oriented excision:  - Negative for malignancy, in-situ carcinoma and atypical hyperplasia.     -- Entire specimen with new inferior margin histologically examined.      F. Breast, Left, Additional LATERAL margin inked most distal margin ORANGE, oriented excision:  - Negative for malignancy, in-situ carcinoma and atypical hyperplasia.     -- Entire specimen with new lateral margin histologically examined.      G. Breast, Left, Additional MEDIAL  margin inked most distal margin YELLOW, oriented excision:  - Negative for malignancy,                           in-situ carcinoma and atypical hyperplasia.     -- Entire specimen with new medial margin histologically examined.     H. Breast, Left, Additional POSTERIOR  margin inked most distal margin BLACK, oriented excision:  - Negative for malignancy, in-situ carcinoma and atypical hyperplasia.     -- Entire specimen with new posterior margin histologically examined.      I. Breast, Left, Additional SUPERIOR margin inked most distal margin RED, oriented excision:  - Negative for malignancy, in-situ carcinoma and atypical hyperplasia.     -- Entire specimen with new superior margin histologically examined.        Note 04/22/2025    Final                    Value:Intradepartmental consultation (WT) agrees with the diagnoses for specimens A & C. A copy of the final report is sent to Dr. VERONICA Cedillo on 4/28/25 @ 1800 hours.  1.- Repeat HER2 testing (Current ASCO/CAP Recommendations): Not Indicated      - Best representative tumor block: C6.       -- Sufficient tumor present for          Agendia Mammaprint/Blueprint (1 cm2 of invasive tumor in aggregate): No.           MI Profile/Foundation One (at least 5 x 5 mm of tumor): Yes.    2.  Pathologic Stage Classification (pTNM, AJCC 9th Edition):       9th ed, AJCC Anatomic Stage:  at least Stage IA - pT1b, pN0(i+)(sn), cM0, G1.    3.  9th ed. AJCC Pathologic Prognostic Stage: IA.      Additional Information 04/22/2025    Final                    Value:All reported additional testing was performed with appropriately reactive controls.  These tests were developed and their performance characteristics determined by [facility] or appropriate performing facility, though some tests may be performed on tissues which have not been validated for performance characteristics (such as staining performed on alcohol exposed cell blocks and decalcified tissues).  Results should be interpreted with caution and in the context of the patients’ clinical condition. These tests may not be cleared or approved by the U.S. Food and Drug Administration, though the FDA has determined that such clearance or approval is not necessary. These  tests are used for clinical purposes and they should not be regarded as investigational or for research. This laboratory has been approved by CLIA 88, designated as a high-complexity laboratory and is qualified to perform these tests.  Interpretation performed at [facility], [address] [address].      Synoptic Checklist 04/22/2025    Final                    Value:INVASIVE CARCINOMA OF THE BREAST: Resection                            INVASIVE CARCINOMA OF THE BREAST: RESECTION - All Specimens                            8th Edition - Protocol posted: 6/19/2024                                                        SPECIMEN                               Procedure:    Excision (less than total mastectomy)                                Specimen Laterality:    Left                                                         TUMOR                               Tumor Site:    Central                                Tumor Site:    Clock position                                :    3 o'clock                              Histologic Type:    Invasive lobular carcinoma                                Histologic Type Comment:    classic pattern.                              Histologic Grade ([place] Histologic Score):                                   Glandular (Acinar) / Tubular Differentiation:    Score 3                                Nuclear Pleomorphism:    Score 1                                Mitotic Rate:    Score 1                                Overall Grade:    Grade 1 (scores of 3, 4 or 5)                              Tumor Size:    Greatest dimension of largest invasive focus (Millimeters): 10 mm                             Tumor Focality:    Single focus of invasive carcinoma                              Ductal Carcinoma In Situ (DCIS):    Not identified                              Lobular Carcinoma In Situ (LCIS):    Present                              Lymphatic and /  or Vascular Invasion:    Not identified                              Dermal Lymphatic and / or Vascular Invasion:    Not identified                              Microcalcifications:    Present in non-neoplastic tissue                              Microcalcifications:    Present in ALH.                              Treatment Effect in the Breast:    No known presurgical therapy                                                         MARGINS                             Margin Status for Invasive Carcinoma:    All margins negative for invasive carcinoma                                Distance from Invasive Carcinoma to Closest Margin:    Greater than: 12 mm                               Closest Margin(s) to Invasive Carcinoma:    Posterior                                                         REGIONAL LYMPH NODES                             Regional Lymph Node Status:                                   :    Tumor present in regional lymph node(s)                                  Number of Lymph Nodes with Macrometastases:    0                                  Number of Lymph Nodes with Micrometastases:    0                                  Number of Lymph Nodes with Isolated Tumor Cells:    1                                  Size of Largest Edu Metastatic Deposit:    single tumor cell ( < 0.1 mm)                                  Extranodal Extension:    Not identified                                Total Number of Lymph Nodes Examined (sentinel and non-sentinel):    3                                Number of [place] Nodes Examined:    3                                                         pTNM CLASSIFICATION (AJCC 8th Edition)                               Reporting of pT, pN, and (when applicable) pM categories is based on information available to the pathologist at the time the report is issued. As per the AJCC (Chapter 1, 8th Ed.) it is the managing physician's responsibility to establish the final pathologic  "stage based upon all pertinent information, including but potentially not limited to this pathology report.                             pT Category:    pT1b                              pN Category:    pN0 (i+)                              N Suffix:    (sn)                                                         ADDITIONAL FINDINGS                             Additional Findings:    Atypical lobular hyperplasia with pagetoid ductal spread.                                                         SPECIAL STUDIES                               Estrogen Receptor (ER) Status:    Positive (greater than 10% of cells demonstrate nuclear positivity)                                  Percentage of Cells with Nuclear Positivity:    95 %                               Progesterone Receptor (PgR) Status:    Positive                                  Percentage of Cells with Nuclear Positivity:    90 %                               HER2 (by immunohistochemistry):    Negative (Score 0)                                Testing Performed on Case Number:    [id number].       Gross Description 04/22/2025    Final                    Value:A. The specimen is received in formalin, labeled with the patient's name and hospital number, and is designated \"left axillary sentinel lymph node #1 hot and blue\".  The specimen consists of a single portion of yellow-tan soft tissue measuring 4.0 x 3.3 x 2.1 cm.  Sectioning through the fat reveals 2 tan-pink lymph nodes measuring 2.5 x 1.6 x 1.0 cm and 0.6 x 0.6 x 0.3 cm.  The larger lymph node is partially dyed blue.  The lymph nodes are entirely submitted as follows:    1-2: Larger lymph node, serially sectioned along the short axis  3: Smaller lymph node  B. The specimen is received in formalin, labeled with the patient's name and hospital number, and is designated \"left axillary sentinel lymph node #2 hot and blue\".  The specimen consists of a single portion of yellow-tan soft tissue measuring 2.8 x " "1.8 x 1.6 cm.  Sectioning through the fat reveals a 2.1 x 0.7 x 0.5 cm tan-pink lymph node.  The lymph node is serially sectioned along the short axis and entirely submitted                           sequentially in 2 cassettes.  C. The specimen is received in formalin, labeled with the patient's name and hospital number, and is designated \"left breast LUCRECIA directed lumpectomy marked per margin protocol (inferior is painted red and Superior is painted blue) per Dr. Cedillo\".  The specimen consists of a 93.1 g left breast lumpectomy specimen measuring 8.6 cm medial-lateral, 7.6 cm superior-inferior and 4.0 cm anterior-posterior.  There is an ellipse of tan/previously dyed green skin attached anteriorly measuring 4.0 x 1.6 cm.  The specimen is previously inked by the surgeon as follows: Anterior-green, posterior-black, superior-blue, inferior-red, medial-yellow and lateral-orange.  The specimen is serially sectioned sagittally from medial to lateral revealing a tan-white firm spiculated mass measuring 1.3 cm medial-lateral, 1.0 cm superior-inferior and 1.0 cm anterior-posterior.  Within the mass, a 0.5 cm biopsy cavity is found that is filled with clear gelatinous material containing                           a butterfly clip.  Also, a Lucrecia  reflector is found within the mass.  The mass is 0.5 cm from the posterior margin, 1.8 cm from the superior margin and is greater than 2.0 cm from the remaining margins and skin.  The uninvolved breast parenchyma is yellow, lobulated and predominantly comprised of fat.  No other lesions are identified.  A breast map is attached to the requisition.  Representative sections are submitted as follows:     1: Medial end of resection, perpendicular, representative  2: Section medial to mass  3-6: Mass entirely submitted sequentially from medial to lateral, includes nearest posterior margin, cassette 4 Lucrecia  and cassette 5 butterfly clip  7: Section lateral to mass  8: Skin  9: " "Superior margin  10: Anterior margin  11: Inferior margin  12: Lateral end of resection, perpendicular, representative  D. The specimen is received in formalin, labeled with the patient's name and hospital number, and is designated \"additional anterior margin inked most distal                           margin green\".  The specimen consists of a single portion of yellow-tan soft tissue measuring 2.8 x 2.0 x 1.3 cm.  A portion of the specimen is previously dyed green designating the new anterior margin and the remainder of the specimen is dyed black.  The specimen is serially sectioned perpendicular to the margin revealing yellow fibrofatty cut surfaces.  The specimen is entirely submitted in 3 cassettes.  E. The specimen is received in formalin, labeled with the patient's name and hospital number, and is designated \"additional inferior margin inked most distal margin blue\".  The specimen consists of a single portion of yellow-tan soft tissue measuring 2.0 x 1.6 x 1.3 cm.  A portion of the specimen is previously dyed blue designating the new inferior margin and the remainder of the specimen is dyed black.  The specimen is serially sectioned perpendicular to the margin revealing yellow fibrofatty cut surfaces.  The specimen is entirely submitted sequentially in 3 cassettes.  F. The                           specimen is received in formalin, labeled with the patient's name and hospital number, and is designated \"additional lateral margin inked most distal margin orange\".  The specimen consists of a single portion of yellow-tan soft tissue measuring 2.1 x 1.6 x 1.0 cm.  A portion of the specimen is previously dyed orange designating the new lateral margin and the remainder of the specimen is dyed black.  The specimen is serially sectioned perpendicular to the margin revealing yellow fibrofatty cut surfaces.  The specimen is entirely submitted sequentially in 2 cassettes.  G. The specimen is received in formalin, labeled " "with the patient's name and hospital number, and is designated \"additional medial margin inked most distal margin yellow\".  The specimen consists of a single portion of yellow-tan soft tissue measuring 2.3 x 1.5 x 1.0 cm.  A portion of the specimen is previously dyed yellow designating the new medial margin and the remainder of the specimen is dyed black.  The specimen is                           serially sectioned perpendicular to the margin revealing yellow fibrofatty cut surfaces.  The specimen is entirely submitted sequentially in 3 cassettes.  H. The specimen is received in formalin, labeled with the patient's name and hospital number, and is designated \"additional posterior margin inked with distal margin black\".  The specimen consists of a single portion of yellow-tan soft tissue measuring 2.4 x 1.7 x 1.0 cm.  A portion of the specimen is previously dyed black designating the new posterior margin and the remainder of the specimen is dyed blue.  The specimen is serially sectioned perpendicular to the margin revealing yellow fibrofatty cut surfaces.  The specimen is entirely submitted sequentially in 3 cassettes  I. The specimen is received in formalin, labeled with the patient's name and hospital number, and is designated \"additional superior margin inked most distal margin red\".  The specimen consists of a single portion of yellow-tan soft tissue measuring 1.7 x 1.5 x                           1.1 cm.  A portion of the specimen is previously dyed red designating the new superior margin and the remainder of the specimen is dyed blue.  The specimen is serially sectioned perpendicular to the margin revealing yellow fibrofatty cut surfaces.  The specimen is entirely submitted sequentially in 2 cassettes.    Note: The estimated total formalin fixation time based upon information provided by the submitting clinician and the standard processing schedule is 30.5 hours.  The cold ischemia time based upon information " provided by the submitting clinician and receiving staff in the laboratory is within 1 minute.  RRavotti      Clinical Information 04/22/2025    Final                    Value:LEFT axillary sentinel lymph node #1 hot and blue  Malignant neoplasm of central portion of left breast in female, estrogen receptor positive (HCC) [C50.112, Z17.0]        Operative Findings:  BEREKET clip and biopsy clips are in the specimen     Pathology: I have reviewed pathology reports described above.  . Lymph Node, [place], LEFT axillary sentinel lymph node #1 hot and blue, lymphadenectomy: - One of two lymph nodes positive for isolated tumor cell (0/2 i+). -- Pankeratin (CKAE1/3) immunostains confirm rare positive isolated tumor cell.   B. Lymph Node, [place], LEFT axillary sentinel lymph node #2 hot and blue, lymphadenectomy: - One benign lymph node (0/1); negative for malignancy on multiple examined levels. -- Pankeratin (CKAE1/3) immunostains confirm no metastatic carcinoma.   C. Breast, Left, LEFT breast bereket directed lumpectomy marked per margin protocol: - Invasive lobular carcinoma, classic pattern, 10 mm in greatest dimension. Radiology Results Review : No pertinent imaging studies reviewed.  Concordance: yes    Administrative Statements   I have spent a total time of 30 minutes in caring for this patient on the day of the visit/encounter including Diagnostic results, Prognosis, Risks and benefits of tx options, Instructions for management, Patient and family education, Importance of tx compliance, Risk factor reductions, Impressions, Counseling / Coordination of care, Documenting in the medical record, Reviewing/placing orders in the medical record (including tests, medications, and/or procedures), and Obtaining or reviewing history  .

## 2025-05-07 NOTE — TELEPHONE ENCOUNTER
Spoke with patient regarding post op appointment. Patient will reach out to  and call back with a time for an appointment. Left hopeline number for return call.    Call can be transferred to Lindsay Santana.

## 2025-05-07 NOTE — PROGRESS NOTES
STAT referral to radiation oncology received from Dr. Cedillo.  Chart reviewed by oncology nurse navigator at this time.      Diagnosis: Left breast cancer.  Underwent surgery 4/22/25.  Seen post-operatively today.  No known prior RT or implanted devices.  After review of chart, instructions for scheduling added to referral and sent to be scheduled as advised.

## 2025-05-07 NOTE — ASSESSMENT & PLAN NOTE
Orders:    Ambulatory referral to Hematology / Oncology; Future    Ambulatory referral to PT/OT lymphedema therapy; Future    Ambulatory referral to Radiation Oncology; Future

## 2025-05-15 ENCOUNTER — APPOINTMENT (OUTPATIENT)
Dept: LAB | Facility: MEDICAL CENTER | Age: 60
End: 2025-05-15

## 2025-05-15 ENCOUNTER — CONSULT (OUTPATIENT)
Dept: RADIATION ONCOLOGY | Facility: CLINIC | Age: 60
End: 2025-05-15
Attending: SURGERY
Payer: COMMERCIAL

## 2025-05-15 ENCOUNTER — RESULTS FOLLOW-UP (OUTPATIENT)
Dept: OBGYN CLINIC | Facility: MEDICAL CENTER | Age: 60
End: 2025-05-15

## 2025-05-15 VITALS
RESPIRATION RATE: 16 BRPM | OXYGEN SATURATION: 99 % | WEIGHT: 181 LBS | DIASTOLIC BLOOD PRESSURE: 78 MMHG | BODY MASS INDEX: 27.52 KG/M2 | HEART RATE: 77 BPM | SYSTOLIC BLOOD PRESSURE: 138 MMHG | TEMPERATURE: 98.2 F

## 2025-05-15 DIAGNOSIS — C50.112 MALIGNANT NEOPLASM OF CENTRAL PORTION OF LEFT BREAST IN FEMALE, ESTROGEN RECEPTOR POSITIVE (HCC): Primary | ICD-10-CM

## 2025-05-15 DIAGNOSIS — Z17.0 MALIGNANT NEOPLASM OF CENTRAL PORTION OF LEFT BREAST IN FEMALE, ESTROGEN RECEPTOR POSITIVE (HCC): Primary | ICD-10-CM

## 2025-05-15 LAB
ALBUMIN SERPL BCG-MCNC: 4.1 G/DL (ref 3.5–5)
ALP SERPL-CCNC: 92 U/L (ref 34–104)
ALT SERPL W P-5'-P-CCNC: 12 U/L (ref 7–52)
ANION GAP SERPL CALCULATED.3IONS-SCNC: 8 MMOL/L (ref 4–13)
AST SERPL W P-5'-P-CCNC: 16 U/L (ref 13–39)
BACTERIA UR QL AUTO: ABNORMAL /HPF
BASOPHILS # BLD AUTO: 0.02 THOUSANDS/ÂΜL (ref 0–0.1)
BASOPHILS NFR BLD AUTO: 0 % (ref 0–1)
BILIRUB SERPL-MCNC: 0.33 MG/DL (ref 0.2–1)
BILIRUB UR QL STRIP: NEGATIVE
BUN SERPL-MCNC: 12 MG/DL (ref 5–25)
CALCIUM SERPL-MCNC: 9.2 MG/DL (ref 8.4–10.2)
CHLORIDE SERPL-SCNC: 104 MMOL/L (ref 96–108)
CHOLEST SERPL-MCNC: 166 MG/DL (ref ?–200)
CLARITY UR: CLEAR
CO2 SERPL-SCNC: 28 MMOL/L (ref 21–32)
COLOR UR: ABNORMAL
CREAT SERPL-MCNC: 0.84 MG/DL (ref 0.6–1.3)
EOSINOPHIL # BLD AUTO: 0.06 THOUSAND/ÂΜL (ref 0–0.61)
EOSINOPHIL NFR BLD AUTO: 1 % (ref 0–6)
ERYTHROCYTE [DISTWIDTH] IN BLOOD BY AUTOMATED COUNT: 11.9 % (ref 11.6–15.1)
GFR SERPL CREATININE-BSD FRML MDRD: 76 ML/MIN/1.73SQ M
GLUCOSE P FAST SERPL-MCNC: 81 MG/DL (ref 65–99)
GLUCOSE UR STRIP-MCNC: NEGATIVE MG/DL
HCT VFR BLD AUTO: 38.7 % (ref 34.8–46.1)
HDLC SERPL-MCNC: 36 MG/DL
HGB BLD-MCNC: 12.4 G/DL (ref 11.5–15.4)
HGB UR QL STRIP.AUTO: NEGATIVE
IMM GRANULOCYTES # BLD AUTO: 0.01 THOUSAND/UL (ref 0–0.2)
IMM GRANULOCYTES NFR BLD AUTO: 0 % (ref 0–2)
KETONES UR STRIP-MCNC: NEGATIVE MG/DL
LDLC SERPL CALC-MCNC: 100 MG/DL (ref 0–100)
LEUKOCYTE ESTERASE UR QL STRIP: ABNORMAL
LYMPHOCYTES # BLD AUTO: 1.47 THOUSANDS/ÂΜL (ref 0.6–4.47)
LYMPHOCYTES NFR BLD AUTO: 29 % (ref 14–44)
MCH RBC QN AUTO: 31.9 PG (ref 26.8–34.3)
MCHC RBC AUTO-ENTMCNC: 32 G/DL (ref 31.4–37.4)
MCV RBC AUTO: 100 FL (ref 82–98)
MONOCYTES # BLD AUTO: 0.3 THOUSAND/ÂΜL (ref 0.17–1.22)
MONOCYTES NFR BLD AUTO: 6 % (ref 4–12)
MUCOUS THREADS UR QL AUTO: ABNORMAL
NEUTROPHILS # BLD AUTO: 3.16 THOUSANDS/ÂΜL (ref 1.85–7.62)
NEUTS SEG NFR BLD AUTO: 64 % (ref 43–75)
NITRITE UR QL STRIP: NEGATIVE
NON-SQ EPI CELLS URNS QL MICRO: ABNORMAL /HPF
NONHDLC SERPL-MCNC: 130 MG/DL
NRBC BLD AUTO-RTO: 0 /100 WBCS
PH UR STRIP.AUTO: 5.5 [PH]
PLATELET # BLD AUTO: 320 THOUSANDS/UL (ref 149–390)
PMV BLD AUTO: 11.1 FL (ref 8.9–12.7)
POTASSIUM SERPL-SCNC: 4.5 MMOL/L (ref 3.5–5.3)
PROT SERPL-MCNC: 7.2 G/DL (ref 6.4–8.4)
PROT UR STRIP-MCNC: ABNORMAL MG/DL
RBC # BLD AUTO: 3.89 MILLION/UL (ref 3.81–5.12)
RBC #/AREA URNS AUTO: ABNORMAL /HPF
SODIUM SERPL-SCNC: 140 MMOL/L (ref 135–147)
SP GR UR STRIP.AUTO: 1.02 (ref 1–1.03)
TRIGL SERPL-MCNC: 151 MG/DL (ref ?–150)
TSH SERPL DL<=0.05 MIU/L-ACNC: 1.52 UIU/ML (ref 0.45–4.5)
UROBILINOGEN UR STRIP-ACNC: <2 MG/DL
WBC # BLD AUTO: 5.02 THOUSAND/UL (ref 4.31–10.16)
WBC #/AREA URNS AUTO: ABNORMAL /HPF

## 2025-05-15 PROCEDURE — 99245 OFF/OP CONSLTJ NEW/EST HI 55: CPT | Performed by: STUDENT IN AN ORGANIZED HEALTH CARE EDUCATION/TRAINING PROGRAM

## 2025-05-15 PROCEDURE — 99211 OFF/OP EST MAY X REQ PHY/QHP: CPT | Performed by: STUDENT IN AN ORGANIZED HEALTH CARE EDUCATION/TRAINING PROGRAM

## 2025-05-15 PROCEDURE — G0463 HOSPITAL OUTPT CLINIC VISIT: HCPCS | Performed by: STUDENT IN AN ORGANIZED HEALTH CARE EDUCATION/TRAINING PROGRAM

## 2025-05-15 NOTE — PROGRESS NOTES
Isabel Acosta 1965 is a 59 y.o. female who was diagnosed with Invasive lobular carcinoma of the left breast, ER/GA positive, HER2 negative. She had abnormal screening mammogram in July 2024 with recommendation for diagnostic mammogram in 6 months. She underwent left breast biopsy due to diagnostic mammogram results in January 2025. She is S/P left breast lumpectomy with sentinel lymph node biopsy on 4/22/25. She is being referred by Dr. Cedillo and presents today for consult.      7/2/24 B/L screening mammogram  LEFT  1) CALCIFICATIONS [A]: There are calcifications in a grouped distribution seen in the outer region of the left breast on the CC view.   Right  There are no suspicious masses, grouped microcalcifications or areas of unexplained architectural distortion. The skin and nipple areolar complex are unremarkable.    RECOMMENDATION:       - Diagnostic mammogram at the current time for the left breast.       - Routine screening mammogram in 1 year for the right breast.       7/10/24 Diagnostic left mammogram  LEFT  1) CALCIFICATIONS [A]: There are punctate calcifications in a loosely grouped distribution seen in the outer region of the left breast.   RECOMMENDATION:       - Diagnostic mammogram in 6 months for the left breast.      1/15/25 Diagnostic left mammogram   LEFT  1) CALCIFICATIONS [A]: There are punctate calcifications in a grouped distribution seen in the outer region of the left breast at 3 o'clock. Compared to the previous study, the calcifications increased in number.   RECOMMENDATION:       - Stereotactic breast biopsy for the left breast.      3/5/25 Stereotactic left breast biopsy      3/21/25 MRI B/L breasts  1) CALCIFICATIONS [A]:  Previously biopsied left breast 3:00 calcifications are identified as non-mass enhancement on MRI.  Left breast 3:00 posterior depth focal non-mass enhancement measuring 9 x 9 x 8 mm located immediately inferior to biopsy clip susceptibility artifact likely  represents small area of invasive lobular carcinoma (series 601 image 126).  Enhancement is well  from the skin surface, chest wall, pectoral muscle and nipple.  Second biopsy clip located closer to the skin surface benign biopsy site is noted (series 601 image 115).  Elsewhere in both breasts on MR, there are no suspicious enhancing masses or suspicious areas of non-mass enhancement.  2.) LYMPH NODE [B]:  Left axillary level 1 lymph node has cortical thickness measuring 4 to 5 mm (series 601 image 81 and series 7 image 33).   RECOMMENDATION:       - Ultrasound at the current time for the left breast.       - Diagnostic mammogram in 1 year for the right breast.        3/24/25 Dr. Cedillo  Surgical consent was obtained for left breast conservation surgery with dual tracer technique sentinel lymph node biopsy after explaining the benefit procedure alternative and possible complications. Pending genetic testing. She also had a MammaPrint which is low risk.   She understands she will need adjuvant radiation, possibly endocrine therapy.       4/11/25 US left axilla  Evaluation of the left axilla confirms no pathologic lymphadenopathy with several normal morphology nodes identified.  There are no suspicious masses or areas of architectural distortion.   RECOMMENDATION:       - Surgical consultation for the left breast.      4/22/25 LEFT BREAST ADALBERTO  DIRECTED LUMPECTOMY (Left: Breast)       LYMPHATIC MAPPING WITH BLUE AND RADIOACTIVE DYES, AXILLARY SENTINEL LYMPH NODE BIOPSY, INJECTION AT 1230 IN OR BY DR CEDILLO (Left: Axilla)     Procedure  Excision (less than total mastectomy)   Specimen Laterality  Left   TUMOR   Tumor Site  Central     Clock position     3 o'clock   Histologic Type  Invasive lobular carcinoma   Histologic Type Comment  classic pattern.   Histologic Grade (Terrell Histologic Score)     Glandular (Acinar) / Tubular Differentiation  Score 3   Nuclear Pleomorphism  Score 1   Mitotic Rate   Score 1   Overall Grade  Grade 1 (scores of 3, 4 or 5)   Tumor Size  Greatest dimension of largest invasive focus (Millimeters): 10 mm   Tumor Focality  Single focus of invasive carcinoma   Ductal Carcinoma In Situ (DCIS)  Not identified   Lobular Carcinoma In Situ (LCIS)  Present   Lymphatic and / or Vascular Invasion  Not identified   Dermal Lymphatic and / or Vascular Invasion  Not identified   Microcalcifications  Present in non-neoplastic tissue     Present in ALH.   Treatment Effect in the Breast  No known presurgical therapy   MARGINS   Margin Status for Invasive Carcinoma  All margins negative for invasive carcinoma   Distance from Invasive Carcinoma to Closest Margin  Greater than: 12 mm   Closest Margin(s) to Invasive Carcinoma  Posterior   REGIONAL LYMPH NODES   Regional Lymph Node Status  Tumor present in regional lymph node(s)   Number of Lymph Nodes with Macrometastases  0   Number of Lymph Nodes with Micrometastases  0   Number of Lymph Nodes with Isolated Tumor Cells  1   Size of Largest Edu Metastatic Deposit  single tumor cell ( < 0.1 mm)   Extranodal Extension  Not identified   Total Number of Lymph Nodes Examined (sentinel and non-sentinel)  3   Number of [place] Nodes Examined  3   pTNM CLASSIFICATION (AJCC 8th Edition)   Reporting of pT, pN, and (when applicable) pM categories is based on information available to the pathologist at the time the report is issued. As per the AJCC (Chapter 1, 8th Ed.) it is the managing physician's responsibility to establish the final pathologic stage based upon all pertinent information, including but potentially not limited to this pathology report.   pT Category  pT1b   pN Category  pN0 (i+)   N Suffix  (sn)   ADDITIONAL FINDINGS   Additional Findings  Atypical lobular hyperplasia with pagetoid ductal spread.   SPECIAL STUDIES        Estrogen Receptor (ER) Status  Positive (greater than 10% of cells demonstrate nuclear positivity)   Percentage of Cells with  Nuclear Positivity  95 %        Progesterone Receptor (PgR) Status  Positive   Percentage of Cells with Nuclear Positivity  90 %        HER2 (by immunohistochemistry)  Negative (Score 0)   Testing Performed on Case Number  [id number].   .       25 Dr. Cedillo  status post breast conservation surgery grade 1 ER/FL positive HER2 negative tumor she is overall doing well.  No evidence of surgical site infection incisions are healed.  Pathology was reviewed   referred to medical, radiation oncologist as well as PT OT for lymphedema prevention.   Follow up 3 months        Upcomin25 Dr. Bridges  25 Dr. Cedillo    Oncology History   Malignant neoplasm of central portion of left breast in female, estrogen receptor positive (HCC)   3/5/2025 Initial Diagnosis    Malignant neoplasm of central portion of left breast in female, estrogen receptor positive (HCC)     3/5/2025 Biopsy    LEFT breast stereotactic biopsy   A. 3 o'clock, lateral wo calcs (bowtie)  Benign breast parenchyma      B. 0300 o'clock medical with calcs  Invasive lobular carcinoma  Grade 1  ER 95, FL 90, HER2 0  C. 0300 o'clock medial wo calcs  Invasive lobular carcinoma  Grade 1  ER 95, FL 90, HER2 0    The focality of the malignancy is unknown.  Fibroglandular tissue in this area demonstrates no significant changes from multiple prior mammograms.  Bilateral breast MRI is recommended to determine extent of disease.  Ultrasound of the left axilla has not recently been performed.  No recent imaging of the contralateral right breast has been performed       3/5/2025 Genomic Testing    MammaPrint  LOW RISK  Luminal A         3/14/2025 [facility]     2025 Surgery    LEFT breast ADALBERTO  lumpectomy with SLN biopsy (Dr. Cedillo)  Invasive lobular carcinoma  Grade 1  10 mm  Margins NEG  0/3 lymph nodes          Review of Systems:  Review of Systems   Constitutional: Negative.    HENT:  Positive for congestion,  hearing loss and tinnitus.    Eyes:         Wears glasses   Respiratory:  Positive for cough (clear phlegm).    Cardiovascular: Negative.    Gastrointestinal: Negative.    Genitourinary: Negative.    Musculoskeletal: Negative.    Skin:  Positive for wound (left breast and left axilla surgical incisions healing).   Allergic/Immunologic: Positive for environmental allergies and food allergies.   Neurological: Negative.    Hematological: Negative.    Psychiatric/Behavioral:  Positive for sleep disturbance.        Clinical Trial: no    OB/GYN History:  The patient underwent menarche at 15 years  Menopause Status Post  Patient's last menstrual period was 2019.  Menopause at 52} years.  Menopause Reason: Natural   Hormone replacement therapy: yes.  Years used< 1 year   0   Para 0   Age at first delivery being N/A years.   Nursing: not applicable.   Birth control pills: yes.  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, side effects, SIM    MST: completed    Implantable Devices (Port, Pacemaker, pain stimulator): no    Hip Replacement: no      [unfilled]  Health Maintenance   Topic Date Due    Annual Physical  Never done    Pneumococcal Vaccine: Pediatrics (0 to 5 Years) and At-Risk Patients (6 to 64 Years) (1 of 2 - PCV) Never done    COVID-19 Vaccine (3 - Pfizer risk series) 10/24/2021    BMI: Followup Plan  2023    Breast Cancer Screening: Mammogram  2025    Depression Screening  2025    DTaP,Tdap,and Td Vaccines (2 - Td or Tdap) 2026    BMI: Adult  2026    Cervical Cancer Screening  2027    Colorectal Cancer Screening  2033    HIV Screening  Completed    Hepatitis C Screening  Completed    Zoster Vaccine  Completed    Influenza Vaccine  Completed    Meningococcal B Vaccine  Aged Out    RSV Vaccine age 0-20 Months  Aged Out    HIB  Vaccine  Aged Out    IPV Vaccine  Aged Out    Hepatitis A Vaccine  Aged Out    Meningococcal ACWY Vaccine  Aged Out    HPV Vaccine  Aged Out     Past Medical History:   Diagnosis Date    Acne 1978    Allergic     Breast cancer (HCC) 3/8/2025    Just diagnosed    Cancer (HCC)     GERD (gastroesophageal reflux disease)     Hypertension     Irritable bowel syndrome     Lactose intolerance     Skin tag      Past Surgical History:   Procedure Laterality Date    BREAST BIOPSY Left 2006    core    BREAST LUMPECTOMY Left 4/22/2025    Procedure: LEFT BREAST ADALBERTO  DIRECTED LUMPECTOMY;  Surgeon: Carlos Cedillo MD;  Location: [facility];  Service: Surgical Oncology    CHOLECYSTECTOMY      CHOLECYSTECTOMY LAPAROSCOPIC N/A 12/04/2020    Procedure: CHOLECYSTECTOMY LAPAROSCOPIC, possible open;  Surgeon: Wyatt Schneider MD;  Location: [facility];  Service: General    COLONOSCOPY      ERCP W/ PLASTIC STENT PLACEMENT  2021    IR PICC PLACEMENT DOUBLE LUMEN  12/24/2020    LYMPH NODE BIOPSY Left 4/22/2025    Procedure: LYMPHATIC MAPPING WITH BLUE AND RADIOACTIVE DYES, AXILLARY SENTINEL LYMPH NODE BIOPSY, INJECTION AT 1230 IN OR BY DR CEDILLO;  Surgeon: Carlos Cedillo MD;  Location: [facility];  Service: Surgical Oncology    MAMMO NEEDLE LOCALIZATION LEFT (ALL INC) Left 4/11/2025    MAMMO STEREOTACTIC BREAST BIOPSY LEFT (ALL INC) Left 03/05/2025    MAMMO STEREOTACTIC BREAST BIOPSY LEFT (ALL INC) EACH ADD Left 03/05/2025    UPPER GASTROINTESTINAL ENDOSCOPY       Family History   Problem Relation Age of Onset    Arthritis Mother     Skin cancer Mother     Hypertension Mother     Basal cell carcinoma Father     Lung cancer Brother 50    Cancer Brother     No Known Problems Maternal Aunt     Basal cell carcinoma Paternal Aunt     Arthritis Maternal Grandmother     Hiatal hernia Maternal Grandfather     No Known Problems Paternal Grandmother     No Known Problems Paternal Grandfather     Arthritis Family     Hiatal  hernia Family     Skin cancer Family     Varicose Veins Family         Of lower extremities    Breast cancer Neg Hx     Colon cancer Neg Hx      Social History[1]   Current Medications[2]  Allergies[3]   There were no vitals filed for this visit.            [1]   Social History  Tobacco Use    Smoking status: Never    Smokeless tobacco: Never   Vaping Use    Vaping status: Never Used   Substance Use Topics    Alcohol use: Yes     Alcohol/week: 4.0 standard drinks of alcohol     Types: 2 Glasses of wine, 2 Cans of beer per week     Comment: Social drinker    Drug use: No   [2]   Current Outpatient Medications:     acetaminophen-codeine (TYLENOL with CODEINE #3) 300-30 MG per tablet, Take 1 tablet by mouth every 4 (four) hours as needed for moderate pain, Disp: 20 tablet, Rfl: 0    amLODIPine (NORVASC) 5 mg tablet, TAKE 1 TABLET BY MOUTH DAILY, Disp: 90 tablet, Rfl: 3    Calcium Carb-Cholecalciferol 600-800 MG-UNIT TABS, Take 1 tablet by mouth daily, Disp: , Rfl:     cetirizine (ZyrTEC) 10 MG chewable tablet, Chew 10 mg daily, Disp: , Rfl:     cholestyramine (QUESTRAN) 4 g packet, Take 1 packet (4 g total) by mouth 2 (two) times a day with meals (Patient taking differently: Take 1 packet by mouth if needed), Disp: 60 packet, Rfl: 2    estradiol (ESTRACE) 0.1 mg/g vaginal cream, Apply a pea sized amount to external vaginal opening and urethra twice weekly, Disp: 42.5 g, Rfl: 0    famotidine (PEPCID) 20 mg tablet, Take 20 mg by mouth daily, Disp: , Rfl:     fluticasone (FLONASE) 50 mcg/act nasal spray, 1 spray into each nostril daily, Disp: 48 g, Rfl: 1    Multiple Vitamins-Minerals (ONE DAILY FOR WOMEN PO), daily , Disp: , Rfl:   [3]   Allergies  Allergen Reactions    Lactose - Food Allergy Other (See Comments)     intolerance    Other      SEASONAL ALLERGIES

## 2025-05-16 NOTE — PROGRESS NOTES
Consultation Visit   Name: Isabel Acosta      : 1965      MRN: [id number]  Encounter Provider: Roberth Santana MD  Encounter Date: 5/15/2025   Encounter department: [facility] RADIATION ONCOLOGY  :  Assessment & Plan  Malignant neoplasm of central portion of left breast in female, estrogen receptor positive (HCC)  Pt is a 59 year old woman with recently diagnosed Stage IA (pT1bN0(i+) cM0) ILC of the left breast. She is s/p lumpectomy/SLNBx with pathology demonstrating ILC measuring 1.0cm, G1, margins-, LVSI-, 0/3 SLNs involved with 1 LN showing ITCs (N0 i+), ER+/IN+/Her2-. She is pending medical oncology evaluation for presumed oncotype/mammaprint and is seen today for discussion of adjuvant RT.     We reviewed options for adjuvant RT following lumpectomy/SLNBx. Given her ILC histology and ITC on SLNB I have recommended whole breast RT over the course of ~3 weeks. We discussed the acute and late toxicities associated with RT in this setting. Acute side effects include, but are not limited to, fatigue, erythema/desquamation of the irradiated skin, pain, swelling, and arm stiffness. Late effects of RT include, but are not limited to, fibrosis of the skin/musculature, pneumonitis, increased risk for cardiovascular disease, and lymphedema.     The patient and her  were given the opportunity to ask multiple questions, all of which were answered to their satisfaction. She will meet with medical oncology next week; they are aware that if she needs chemotherapy this would happen prior to RT. I will plan for CT simulation in the meantime but we will hold off on start of RT until after a decision on chemotherapy is made.     Summary:   - Plan for hypofractionated RT to whole left breast over 15 fractions  - Follow-up medical oncology consultation             History of Present Illness   Chief Complaint   Patient presents with    Breast Cancer    Consult   Pertinent Medical History    Ms. Isabel Acosta is a 59 year old woman who was in her usual state of health until left breast mammogram (1/15/25) demonstrated increased grouped calcifications in the outer region of the left breast; biopsy demonstrated ILC, G1, ER+ (95%)/CA+ (90%)/Her2- (0). She ultimately underwent left breast lumpectomy/SLNBx with pathology demonstrating a 1.0cm G1 ILC, margins-, LVSI-, 0/3 SLNs involved though 1 with ILCs (0/3 i+); pT1bN0(i+). She was referred to our office for consideration of adjuvant RT. She is scheduled to see medical oncology on 5/22/25.     Currently she is feeling well overall. She has minimal post-operative pain or irritation. She lives in [place] and is accompanied by her . She works in [employer] in customer service. She has no prior history of cancer or RT treatment.       Oncology History   Cancer Staging   No matching staging information was found for the patient.  Oncology History   Malignant neoplasm of central portion of left breast in female, estrogen receptor positive (HCC)   3/5/2025 Initial Diagnosis    Malignant neoplasm of central portion of left breast in female, estrogen receptor positive (HCC)     3/5/2025 Biopsy    LEFT breast stereotactic biopsy   A. 3 o'clock, lateral wo calcs (bowtie)  Benign breast parenchyma      B. 0300 o'clock medical with calcs  Invasive lobular carcinoma  Grade 1  ER 95, CA 90, HER2 0  C. 0300 o'clock medial wo calcs  Invasive lobular carcinoma  Grade 1  ER 95, CA 90, HER2 0    The focality of the malignancy is unknown.  Fibroglandular tissue in this area demonstrates no significant changes from multiple prior mammograms.  Bilateral breast MRI is recommended to determine extent of disease.  Ultrasound of the left axilla has not recently been performed.  No recent imaging of the contralateral right breast has been performed       3/5/2025 Genomic Testing    MammaPrint  LOW RISK  Luminal A         3/14/2025 Genetic Testing    [facility]  NEGATIVE     4/22/2025 Surgery    LEFT breast ADALBERTO  lumpectomy with SLN biopsy (Dr. Cedillo)  Invasive lobular carcinoma  Grade 1  10 mm  Margins NEG  0/3 lymph nodes         Review of Systems Refer to nursing note.         Objective   /78   Pulse 77   Temp 98.2 °F (36.8 °C)   Resp 16   Wt 82.1 kg (181 lb)   LMP 02/22/2019   SpO2 99%   BMI 27.52 kg/m²     Pain Screening:  Pain Score:   5  ECOG ECOG Performance Status: 0 - Fully active, able to carry on all pre-disease performance without restriction  Physical Exam   Well appearing. NAD.   No increased work of breathing.  Extremities warm and well perfused.  Breast exam deferred to time of CT simulation.     Radiology Results: I have reviewed radiology images/reports described above.       Administrative Statements   I have spent a total time of 66 minutes in caring for this patient on the day of the visit/encounter including Diagnostic results, Prognosis, Risks and benefits of tx options, Reviewing/placing orders in the medical record (including tests, medications, and/or procedures), Obtaining or reviewing history  , and Communicating with other healthcare professionals .

## 2025-05-16 NOTE — TELEPHONE ENCOUNTER
Results and recommendations reviewed and patient verbalized understanding. Has PCP appointment 5/21.

## 2025-05-16 NOTE — ASSESSMENT & PLAN NOTE
Pt is a 59 year old woman with recently diagnosed Stage IA (pT1bN0(i+) cM0) ILC of the left breast. She is s/p lumpectomy/SLNBx with pathology demonstrating ILC measuring 1.0cm, G1, margins-, LVSI-, 0/3 SLNs involved with 1 LN showing ITCs (N0 i+), ER+/MS+/Her2-. She is pending medical oncology evaluation for presumed oncotype/mammaprint and is seen today for discussion of adjuvant RT.     We reviewed options for adjuvant RT following lumpectomy/SLNBx. Given her ILC histology and ITC on SLNB I have recommended whole breast RT over the course of ~3 weeks. We discussed the acute and late toxicities associated with RT in this setting. Acute side effects include, but are not limited to, fatigue, erythema/desquamation of the irradiated skin, pain, swelling, and arm stiffness. Late effects of RT include, but are not limited to, fibrosis of the skin/musculature, pneumonitis, increased risk for cardiovascular disease, and lymphedema.     The patient and her  were given the opportunity to ask multiple questions, all of which were answered to their satisfaction. She will meet with medical oncology next week; they are aware that if she needs chemotherapy this would happen prior to RT. I will plan for CT simulation in the meantime but we will hold off on start of RT until after a decision on chemotherapy is made.     Summary:   - Plan for hypofractionated RT to whole left breast over 15 fractions  - Follow-up medical oncology consultation

## 2025-05-21 ENCOUNTER — OFFICE VISIT (OUTPATIENT)
Dept: INTERNAL MEDICINE CLINIC | Age: 60
End: 2025-05-21

## 2025-05-21 VITALS
SYSTOLIC BLOOD PRESSURE: 124 MMHG | WEIGHT: 178 LBS | BODY MASS INDEX: 26.98 KG/M2 | HEART RATE: 77 BPM | HEIGHT: 68 IN | DIASTOLIC BLOOD PRESSURE: 80 MMHG | OXYGEN SATURATION: 97 % | TEMPERATURE: 97.5 F

## 2025-05-21 DIAGNOSIS — E78.00 HYPERCHOLESTEROLEMIA: ICD-10-CM

## 2025-05-21 DIAGNOSIS — I10 ESSENTIAL HYPERTENSION: ICD-10-CM

## 2025-05-21 DIAGNOSIS — C50.112 MALIGNANT NEOPLASM OF CENTRAL PORTION OF LEFT BREAST IN FEMALE, ESTROGEN RECEPTOR POSITIVE (HCC): Primary | ICD-10-CM

## 2025-05-21 DIAGNOSIS — Z17.0 MALIGNANT NEOPLASM OF CENTRAL PORTION OF LEFT BREAST IN FEMALE, ESTROGEN RECEPTOR POSITIVE (HCC): Primary | ICD-10-CM

## 2025-05-21 DIAGNOSIS — Z98.890 S/P LUMPECTOMY OF BREAST: ICD-10-CM

## 2025-05-21 PROBLEM — N95.2 VAGINAL ATROPHY: Status: RESOLVED | Noted: 2021-04-08 | Resolved: 2025-05-21

## 2025-05-21 NOTE — PROGRESS NOTES
"Name: Isabel Acosta      : 1965      MRN: [id number]  Encounter Provider: Nixon Mccall MD  Encounter Date: 2025   Encounter department: [facility]  :  Assessment & Plan  Malignant neoplasm of central portion of left breast in female, estrogen receptor positive (HCC)  S/p lumpectomy of the left breast       S/P lumpectomy of breast  Doing well       Essential hypertension  Hypertension is very well-controlled.  Panel shows slightly increased triglyceride and LDL cholesterol but her 10 years cardiovascular risk is 4.9 she will continue to do the diet we may need to put her on some medication later on but at this point I think I will just observe       Hypercholesterolemia    Orders:    Lipid panel; Future           History of Present Illness   HPI  Review of Systems   Constitutional:  Negative for chills and fatigue.   HENT:  Negative for congestion, ear pain, hearing loss, postnasal drip, sinus pressure, sore throat and voice change.    Eyes:  Negative for pain, discharge and visual disturbance.   Respiratory:  Negative for cough, chest tightness and shortness of breath.    Cardiovascular:  Negative for chest pain, palpitations and leg swelling.   Gastrointestinal:  Negative for abdominal pain, blood in stool, diarrhea, nausea and rectal pain.   Genitourinary:  Negative for difficulty urinating, dysuria and urgency.   Musculoskeletal:  Negative for arthralgias and joint swelling.   Skin:  Negative for rash.   Allergic/Immunologic: Negative for environmental allergies and food allergies.   Neurological:  Negative for dizziness, tremors, weakness, numbness and headaches.   Hematological:  Negative for adenopathy.   Psychiatric/Behavioral:  Negative for behavioral problems and hallucinations.        Objective   /80 (BP Location: Right arm, Patient Position: Sitting, Cuff Size: Standard)   Pulse 77   Temp 97.5 °F (36.4 °C) (Temporal)   Ht 5' 8\" (1.727 m)   Wt " 80.7 kg (178 lb)   LMP 02/22/2019   SpO2 97%   BMI 27.06 kg/m²      Physical Exam  HENT:      Head: Normocephalic.     Eyes:      Pupils: Pupils are equal, round, and reactive to light.       Cardiovascular:      Rate and Rhythm: Normal rate and regular rhythm.      Heart sounds: No murmur heard.  Pulmonary:      Breath sounds: Normal breath sounds.   Abdominal:      General: Bowel sounds are normal.      Palpations: Abdomen is soft.     Musculoskeletal:         General: Normal range of motion.      Cervical back: Normal range of motion.     Skin:     General: Skin is warm.     Neurological:      General: No focal deficit present.      Mental Status: She is alert and oriented to person, place, and time.     Psychiatric:         Behavior: Behavior normal.         Thought Content: Thought content normal.

## 2025-05-21 NOTE — ASSESSMENT & PLAN NOTE
Hypertension is very well-controlled.  Panel shows slightly increased triglyceride and LDL cholesterol but her 10 years cardiovascular risk is 4.9 she will continue to do the diet we may need to put her on some medication later on but at this point I think I will just observe

## 2025-05-22 ENCOUNTER — CONSULT (OUTPATIENT)
Dept: HEMATOLOGY ONCOLOGY | Facility: CLINIC | Age: 60
End: 2025-05-22
Attending: SURGERY
Payer: COMMERCIAL

## 2025-05-22 VITALS
DIASTOLIC BLOOD PRESSURE: 90 MMHG | SYSTOLIC BLOOD PRESSURE: 128 MMHG | TEMPERATURE: 97.1 F | HEART RATE: 73 BPM | RESPIRATION RATE: 16 BRPM | OXYGEN SATURATION: 99 % | WEIGHT: 179.5 LBS | HEIGHT: 68 IN | BODY MASS INDEX: 27.2 KG/M2

## 2025-05-22 DIAGNOSIS — C50.112 MALIGNANT NEOPLASM OF CENTRAL PORTION OF LEFT BREAST IN FEMALE, ESTROGEN RECEPTOR POSITIVE (HCC): Primary | ICD-10-CM

## 2025-05-22 DIAGNOSIS — Z17.0 MALIGNANT NEOPLASM OF CENTRAL PORTION OF LEFT BREAST IN FEMALE, ESTROGEN RECEPTOR POSITIVE (HCC): Primary | ICD-10-CM

## 2025-05-22 PROCEDURE — 99244 OFF/OP CNSLTJ NEW/EST MOD 40: CPT | Performed by: INTERNAL MEDICINE

## 2025-05-22 RX ORDER — EXEMESTANE 25 MG/1
25 TABLET ORAL DAILY
Qty: 90 TABLET | Refills: 3 | Status: SHIPPED | OUTPATIENT
Start: 2025-05-22

## 2025-05-22 NOTE — PROGRESS NOTES
"Oncology Consult Note  Isabel Acosta 59 y.o. female MRN: [id number]  Unit/Bed#:  Encounter: [id number]      Presenting Complaint: Stage I left side invasive lobular carcinoma of the breast    History of Presenting Illness: Isabel Acosta is seen for initial consultation 2025 at the referral of Carlos Cedillo Has*   59 year old postmenopausal woman who was in her usual state of health until left breast mammogram (1/15/25) demonstrated increased grouped calcifications in the outer region of the left breast; biopsy demonstrated ILC, G1, ER+ (95%)/NC+ (90%)/Her2- (0). She ultimately underwent left breast lumpectomy/SLNBx with pathology demonstrating a 1.0cm G1 ILC, margins-, LVSI-, 0/3 SLNs involved though 1 with ILCs (0/3 i+); pT1bN0(i+).    MammaPrint showed luminal a, low risk without benefit from adjuvant chemotherapy    She had a history of pancreatitis secondary to cholecystitis, seasonal allergy, hypertension  Review of Systems - As stated in the HPI otherwise the fourteen point review of systems was negative.    Past Medical History[1]    Social History[2]      Family History[3]  Father with bladder cancer, brother  with lung cancer  Allergies[4]    Current Medications[5]      /90 (Patient Position: Sitting, Cuff Size: Standard)   Pulse 73   Temp (!) 97.1 °F (36.2 °C) (Temporal)   Resp 16   Ht 5' 8\" (1.727 m)   Wt 81.4 kg (179 lb 8 oz)   LMP 2019   SpO2 99%   BMI 27.29 kg/m²       General Appearance:    Alert, oriented        Eyes:     Ears:     Nose:    Throat:    Neck:   Supple       Lungs:     Clear to auscultation bilaterally   Chest Wall:    No tenderness or deformity    Heart:    Regular rate and rhythm       Abdomen:     Soft, non-tender, bowel sounds +, no organomegaly           Extremities:   Extremities no cyanosis or edema       Skin:   no rash or icterus.    Lymph nodes:   Cervical, supraclavicular, and axillary nodes normal   Neurologic:   CNII-XII intact, normal " strength, sensation and reflexes     Throughout               No results found for this or any previous visit (from the past 48 hours).      Mammo clip breast specimen (No Charge)  Result Date: 4/23/2025  Narrative: Imaging of the left breast specimen confirms successful excision of the Lucrecia  reflector and adjacent biopsy clip markers.     NM sentinal node inj only  Result Date: 4/22/2025  Narrative: SENTINEL NODE LYMPHOSCINTIGRAPHY INDICATION:  C50.112: Malignant neoplasm of central portion of left female breast Z17.0: Estrogen receptor positive status (ER+) C50.912: Malignant neoplasm of unspecified site of left female breast Localize sentinel node FINDINGS: 0.523 mCi Tc-99m filtered sulfur colloid (0.6 cc volume) was administered intradermally into the left breast by Dr. SHAI OBREGON in the OR. No imaging was performed.     Impression: Injection of  0.523 mCi Tc-99m filtered sulfur colloid  into the left breast in the OR. Workstation performed: [id number]     ECOG PS:0      Assessment and plan:  She is a 59-year-old postmenopausal female diagnosed with stage Ia left-sided invasive lobular carcinoma of the breast (pT1b, N0 (i+), C M0) status post lumpectomy with sentinel lymph node biopsy, primary measuring 1 cm, negative margin, grade 1, 0/3 sentinel lymph nodes 1 of these showing isolated tumor cells microscopic ER 90% MD 90% HER2 -0    MammaPrint showed low risk consistent with luminal a    Multiple gene study came back negative for deleterious mutations    No need for adjuvant chemotherapy with benefit less than 1% according to MammaPrint    Patient to start radiation therapy to the left breast area and after finishing will start on Aromasin 25 mg p.o. daily for 5 years    Aromasin was chosen because of less musculoskeletal pain than anastrozole or letrozole    Side effects such as arthralgia, osteopenia/osteoporosis, hot flashes, bloating etc. told    Will follow-up in 4 months with CBC,  [facility] and will obtain DEXA scan as a baseline                 [1]   Past Medical History:  Diagnosis Date    Acne 1978    Allergic     Breast cancer (HCC) 3/8/2025    Just diagnosed    Cancer (HCC)     GERD (gastroesophageal reflux disease)     Hypertension     Irritable bowel syndrome     Lactose intolerance     Skin tag    [2]   Social History  Socioeconomic History    Marital status: /Civil Union   Tobacco Use    Smoking status: Never    Smokeless tobacco: Never   Vaping Use    Vaping status: Never Used   Substance and Sexual Activity    Alcohol use: Yes     Alcohol/week: 4.0 standard drinks of alcohol     Types: 2 Glasses of wine, 2 Cans of beer per week     Comment: Social drinker < weekly    Drug use: No    Sexual activity: Yes     Partners: Male     Birth control/protection: None     Comment: with    Social History Narrative    Daily coffee consumption (1 cups/day)    Exercising regularly   [3]   Family History  Problem Relation Name Age of Onset    Arthritis Mother Betzy     Skin cancer Mother Betzy     Hypertension Mother Betzy     Basal cell carcinoma Father      Lung cancer Brother Jarrell 50    Cancer Brother Jarrell     No Known Problems Maternal Aunt      Basal cell carcinoma Paternal Aunt Aunt Clotilde     Arthritis Maternal Grandmother Gramma     Hiatal hernia Maternal Grandfather      No Known Problems Paternal Grandmother      No Known Problems Paternal Grandfather      Arthritis Family      Hiatal hernia Family      Skin cancer Family      Varicose Veins Family          Of lower extremities    Breast cancer Neg Hx      Colon cancer Neg Hx     [4]   Allergies  Allergen Reactions    Lactose - Food Allergy Other (See Comments)     intolerance    Other      SEASONAL ALLERGIES   [5]   Current Outpatient Medications:     amLODIPine (NORVASC) 5 mg tablet, TAKE 1 TABLET BY MOUTH DAILY, Disp: 90 tablet, Rfl: 3    Calcium Carb-Cholecalciferol 600-800 MG-UNIT TABS, Take 1 tablet by mouth in the  morning., Disp: , Rfl:     cetirizine (ZyrTEC) 10 MG chewable tablet, Chew 10 mg in the morning., Disp: , Rfl:     cholestyramine (QUESTRAN) 4 g packet, Take 1 packet (4 g total) by mouth 2 (two) times a day with meals, Disp: 60 packet, Rfl: 2    exemestane (AROMASIN) 25 MG tablet, Take 1 tablet (25 mg total) by mouth daily, Disp: 90 tablet, Rfl: 3    famotidine (PEPCID) 20 mg tablet, Take 20 mg by mouth in the morning., Disp: , Rfl:     fluticasone (FLONASE) 50 mcg/act nasal spray, 1 spray into each nostril daily, Disp: 48 g, Rfl: 1    Multiple Vitamins-Minerals (ONE DAILY FOR WOMEN PO), in the morning., Disp: , Rfl:

## 2025-06-10 ENCOUNTER — TELEPHONE (OUTPATIENT)
Age: 60
End: 2025-06-10

## 2025-06-10 NOTE — TELEPHONE ENCOUNTER
Patient called in and would a update on the status of her authorization for the CT Simulation    Please call her back to advise

## 2025-06-12 ENCOUNTER — DOCUMENTATION (OUTPATIENT)
Dept: RADIATION ONCOLOGY | Facility: HOSPITAL | Age: 60
End: 2025-06-12

## 2025-06-12 NOTE — PROGRESS NOTES
I left a vm for Isabel to call the office to make an appt for her planning session as her insurance authorization was approved.   
GOAL: pt will stand for ADLs with good balance in 4 weeks.

## 2025-06-13 ENCOUNTER — APPOINTMENT (OUTPATIENT)
Dept: RADIATION ONCOLOGY | Facility: HOSPITAL | Age: 60
End: 2025-06-13
Attending: STUDENT IN AN ORGANIZED HEALTH CARE EDUCATION/TRAINING PROGRAM
Payer: COMMERCIAL

## 2025-06-13 PROCEDURE — 77263 THER RADIOLOGY TX PLNG CPLX: CPT | Performed by: STUDENT IN AN ORGANIZED HEALTH CARE EDUCATION/TRAINING PROGRAM

## 2025-06-13 PROCEDURE — 77290 THER RAD SIMULAJ FIELD CPLX: CPT | Performed by: RADIOLOGY

## 2025-06-13 PROCEDURE — 77332 RADIATION TREATMENT AID(S): CPT | Performed by: RADIOLOGY

## 2025-06-23 PROCEDURE — 77295 3-D RADIOTHERAPY PLAN: CPT | Performed by: STUDENT IN AN ORGANIZED HEALTH CARE EDUCATION/TRAINING PROGRAM

## 2025-06-23 PROCEDURE — 77334 RADIATION TREATMENT AID(S): CPT | Performed by: STUDENT IN AN ORGANIZED HEALTH CARE EDUCATION/TRAINING PROGRAM

## 2025-06-23 PROCEDURE — 77300 RADIATION THERAPY DOSE PLAN: CPT | Performed by: STUDENT IN AN ORGANIZED HEALTH CARE EDUCATION/TRAINING PROGRAM

## 2025-07-01 ENCOUNTER — ANNUAL EXAM (OUTPATIENT)
Dept: OBGYN CLINIC | Facility: MEDICAL CENTER | Age: 60
End: 2025-07-01
Payer: COMMERCIAL

## 2025-07-01 ENCOUNTER — APPOINTMENT (OUTPATIENT)
Dept: RADIATION ONCOLOGY | Facility: HOSPITAL | Age: 60
End: 2025-07-01
Attending: STUDENT IN AN ORGANIZED HEALTH CARE EDUCATION/TRAINING PROGRAM
Payer: COMMERCIAL

## 2025-07-01 VITALS
SYSTOLIC BLOOD PRESSURE: 130 MMHG | WEIGHT: 179.6 LBS | BODY MASS INDEX: 28.19 KG/M2 | HEIGHT: 67 IN | DIASTOLIC BLOOD PRESSURE: 92 MMHG

## 2025-07-01 DIAGNOSIS — C50.112 MALIGNANT NEOPLASM OF CENTRAL PORTION OF LEFT BREAST IN FEMALE, ESTROGEN RECEPTOR POSITIVE (HCC): ICD-10-CM

## 2025-07-01 DIAGNOSIS — Z17.0 MALIGNANT NEOPLASM OF CENTRAL PORTION OF LEFT BREAST IN FEMALE, ESTROGEN RECEPTOR POSITIVE (HCC): ICD-10-CM

## 2025-07-01 DIAGNOSIS — Z01.411 ENCNTR FOR GYN EXAM (GENERAL) (ROUTINE) W ABNORMAL FINDINGS: Primary | ICD-10-CM

## 2025-07-01 DIAGNOSIS — N95.8 GENITOURINARY SYNDROME OF MENOPAUSE: ICD-10-CM

## 2025-07-01 LAB
RAD ONC ARIA COURSE FIRST TREATMENT DATE: NORMAL
RAD ONC ARIA COURSE ID: NORMAL
RAD ONC ARIA COURSE INTENT: NORMAL
RAD ONC ARIA COURSE LAST TREATMENT DATE: NORMAL
RAD ONC ARIA COURSE SESSION NUMBER: 1
RAD ONC ARIA COURSE START DATE: NORMAL
RAD ONC ARIA COURSE TREATMENT ELAPSED DAYS: 0
RAD ONC ARIA PLAN FRACTIONS TREATED TO DATE: 1
RAD ONC ARIA PLAN ID: NORMAL
RAD ONC ARIA PLAN NAME: NORMAL
RAD ONC ARIA PLAN PRESCRIBED DOSE PER FRACTION: 2.67 GY
RAD ONC ARIA PLAN PRIMARY REFERENCE POINT: NORMAL
RAD ONC ARIA PLAN TOTAL FRACTIONS PRESCRIBED: 15
RAD ONC ARIA PLAN TOTAL PRESCRIBED DOSE: 4005 CGY
RAD ONC ARIA REFERENCE POINT DOSAGE GIVEN TO DATE: 2.67 GY
RAD ONC ARIA REFERENCE POINT ID: NORMAL
RAD ONC ARIA REFERENCE POINT SESSION DOSAGE GIVEN: 2.67 GY

## 2025-07-01 PROCEDURE — 77387 GUIDANCE FOR RADJ TX DLVR: CPT | Performed by: STUDENT IN AN ORGANIZED HEALTH CARE EDUCATION/TRAINING PROGRAM

## 2025-07-01 PROCEDURE — 77280 THER RAD SIMULAJ FIELD SMPL: CPT | Performed by: STUDENT IN AN ORGANIZED HEALTH CARE EDUCATION/TRAINING PROGRAM

## 2025-07-01 PROCEDURE — 77427 RADIATION TX MANAGEMENT X5: CPT | Performed by: RADIOLOGY

## 2025-07-01 PROCEDURE — S0612 ANNUAL GYNECOLOGICAL EXAMINA: HCPCS | Performed by: NURSE PRACTITIONER

## 2025-07-01 PROCEDURE — 77412 RADIATION TX DELIVERY LVL 3: CPT | Performed by: STUDENT IN AN ORGANIZED HEALTH CARE EDUCATION/TRAINING PROGRAM

## 2025-07-01 RX ORDER — ESTRADIOL 0.1 MG/G
CREAM VAGINAL
Qty: 42.5 G | Refills: 2 | Status: SHIPPED | OUTPATIENT
Start: 2025-07-01

## 2025-07-01 NOTE — PROGRESS NOTES
Name: Isabel Acosta      : 1965      MRN: [id number]  Encounter Provider: NISHA Willett  Encounter Date: 2025   Encounter department: [facility] [place]  :  Assessment & Plan  Encntr for gyn exam (general) (routine) w abnormal findings  Calcium 8564-9277 mg (in divided doses-max 600 mg at one time) + 600-1000 IU Vit D daily.   DEXA is scheduled.   Exercise 150-300 minutes per week minimum including weight bearing exercises.   Pap with high risk HPV Q 5 years, if normal.  Due   Call your insurance company to verify coverage prior to completing any ordered tests.   Diagnostic mammogram ordered and  schedule.   Monthly breast self exam recommended.    Colonoscopy-  Due         Kegels 20 times twice daily.   Silicone based lubricant with sex. (Use water based lubricant with condoms or sexual toys.)    Vaginal moisturizers twice weekly as needed.   Estrace Rx sent to pharmacy on file. Use as directed. Aware of benefits, risks and alternatives. May take 6 months to be fully effective when initiating therapy.   Return to office in one year or sooner, if needed.        Genitourinary syndrome of menopause  Estrace Rx sent to pharmacy on file. Use as directed. Aware of benefits, risks and alternatives. May take 6 months to be fully effective when initiating therapy.   She has already discussed this therapy with her medical oncologist and states he approved this therapy.   Orders:    estradiol (ESTRACE) 0.1 mg/g vaginal cream; Use 0.5 gm intravaginally daily x 2 weeks the 0.5 gm twice weekly    Malignant neoplasm of central portion of left breast in female, estrogen receptor positive (HCC)  Briefly discussed use of paxil or bridelle for hot flashes.            History of Present Illness   HPI  Isabel Acosta is a 59 y.o.   female who is here today for her annual visit. No gynecologic health concerns.   BP of 130/92.. Asymptomatic. One other  elevated BP noted in her chart. Follow up with PCP.   Hx of left breast  invasive cancer. Lumpectomy. Just started radiation therapy today and has 14 more treatments to go. Then will start 5 years of aromasin. Follows with Dr Cedillo.   She discontinued her HRT with positive breast cancer results.   She states he said vaginal estrogen topical treatment was acceptable.   Menopausal with no vaginal bleeding.   Exercise- walks as able  Works FT at [employer], [place].    Isabel Acosta is sexually active with male partner/  of 22 years. Monogamous and feels safe in this relationship. Admits to vaginal pain and dryness.  No vaginal bleeding.   She is not interested in STD screening today.   She denies vaginal discharge, itching or pelvic pain.   She has no urinary concerns, does not have incontinence.  No bowel concerns.  No breast concerns.     Last pap: 04/14/2022 normal with negative HR HPV   Mammogram: 07/02/2024  left breast calcifications. Normal right breast findings.   7/10/24 left diagnostic mammogram->left breast calcifications.   1/15/25 left breast diagnostic mammogram-> left breast calcifications.   3/5/25 stereotactic breast biopsy-> invasive lobular breast carcinoma.   3/21/25 breast MRI->Breast MRI shows a possible small remnant of invasive lobular carcinoma.Follow up scheduled with Dr Cedillo 3/24/25.  .   Left axilla US recommend for 1 prominent lymph node. This has already been ordered.   Colonoscopy: 12/28/2023 recall 10 years  DEXA scan: Not on file; already schedule.       Family history of cancer:   Cancer-related family history includes Cancer in her brother; Lung cancer (age of onset: 50) in her brother; Skin cancer in her family and mother. There is no history of Breast cancer or Colon cancer.    History obtained from: patient    Review of Systems   Constitutional: Negative.  Negative for activity change, appetite change, chills, diaphoresis, fatigue, fever and unexpected weight change.   HENT:   "Negative for congestion, dental problem, sneezing, sore throat and trouble swallowing.    Eyes:  Negative for visual disturbance.   Respiratory:  Negative for chest tightness and shortness of breath.    Cardiovascular:  Negative for chest pain and leg swelling.   Gastrointestinal:  Positive for diarrhea (intermittent). Negative for abdominal pain, constipation, nausea and vomiting.   Genitourinary:  Positive for dyspareunia. Negative for difficulty urinating, dysuria, frequency, hematuria, pelvic pain, urgency, vaginal bleeding, vaginal discharge and vaginal pain.        Vaginal dryness   Musculoskeletal:  Negative for back pain and neck pain.   Skin: Negative.    Allergic/Immunologic: Negative.    Neurological:  Negative for weakness and headaches.   Hematological:  Negative for adenopathy.   Psychiatric/Behavioral: Negative.       Medical History Reviewed by provider this encounter:  Tobacco  Allergies  Meds  Problems  Med Hx  Surg Hx  Fam Hx     .  Medications Ordered Prior to Encounter[1]   Social History[2]     Objective   /92 (BP Location: Right arm, Patient Position: Sitting, Cuff Size: Standard)   Ht 5' 7\" (1.702 m)   Wt 81.5 kg (179 lb 9.6 oz)   LMP 02/22/2019   BMI 28.13 kg/m²      Physical Exam  Vitals and nursing note reviewed.   Constitutional:       Appearance: Normal appearance. She is well-developed.      Comments: BMI 28   HENT:      Head: Normocephalic and atraumatic.     Eyes:      General:         Right eye: No discharge.         Left eye: No discharge.     Neck:      Thyroid: No thyromegaly.      Trachea: Trachea normal.     Cardiovascular:      Rate and Rhythm: Normal rate and regular rhythm.      Heart sounds: Normal heart sounds.   Pulmonary:      Effort: Pulmonary effort is normal.      Breath sounds: Normal breath sounds.   Chest:   Breasts:     Breasts are symmetrical.      Right: Normal. No inverted nipple, mass, nipple discharge, skin change or tenderness.      Left: " Tenderness (around incision sites) present. No inverted nipple, mass, nipple discharge or skin change.        Comments: Left breast scars noted.   Abdominal:      Palpations: Abdomen is soft.   Genitourinary:     General: Normal vulva.      Exam position: Lithotomy position.      Labia:         Right: No rash, tenderness, lesion or injury.         Left: No rash, tenderness, lesion or injury.       Urethra: No prolapse, urethral pain, urethral swelling or urethral lesion.      Vagina: Normal. No signs of injury and foreign body. No vaginal discharge, erythema, tenderness or bleeding.      Cervix: Normal.      Uterus: Normal.       Adnexa:         Right: No mass, tenderness or fullness.          Left: No mass, tenderness or fullness.        Rectum: No external hemorrhoid.      Comments: Vulvovaginal atrophy    Musculoskeletal:         General: Normal range of motion.      Cervical back: Normal range of motion and neck supple.   Lymphadenopathy:      Head:      Right side of head: No submental, submandibular or tonsillar adenopathy.      Left side of head: No submental, submandibular or tonsillar adenopathy.      Cervical: No cervical adenopathy.      Upper Body:      Right upper body: No supraclavicular or axillary adenopathy.      Left upper body: No supraclavicular or axillary adenopathy.      Lower Body: No right inguinal adenopathy. No left inguinal adenopathy.     Skin:     General: Skin is warm and dry.     Neurological:      Mental Status: She is alert and oriented to person, place, and time.     Psychiatric:         Mood and Affect: Mood normal.         Behavior: Behavior normal.                [1]   Current Outpatient Medications on File Prior to Visit   Medication Sig Dispense Refill    amLODIPine (NORVASC) 5 mg tablet TAKE 1 TABLET BY MOUTH DAILY 90 tablet 3    Calcium Carb-Cholecalciferol 600-800 MG-UNIT TABS Take 1 tablet by mouth in the morning.      cetirizine (ZyrTEC) 10 MG chewable tablet Chew 10 mg  in the morning.      cholestyramine (QUESTRAN) 4 g packet Take 1 packet (4 g total) by mouth 2 (two) times a day with meals 60 packet 2    famotidine (PEPCID) 20 mg tablet Take 20 mg by mouth in the morning.      fluticasone (FLONASE) 50 mcg/act nasal spray 1 spray into each nostril daily 48 g 1    Multiple Vitamins-Minerals (ONE DAILY FOR WOMEN PO) in the morning.      exemestane (AROMASIN) 25 MG tablet Take 1 tablet (25 mg total) by mouth daily (Patient not taking: Reported on 7/1/2025) 90 tablet 3     No current facility-administered medications on file prior to visit.   [2]   Social History  Tobacco Use    Smoking status: Never    Smokeless tobacco: Never   Vaping Use    Vaping status: Never Used   Substance and Sexual Activity    Alcohol use: Yes     Alcohol/week: 4.0 standard drinks of alcohol     Types: 2 Glasses of wine, 2 Cans of beer per week     Comment: Social drinker < weekly    Drug use: No    Sexual activity: Yes     Partners: Male     Birth control/protection: None     Comment: with

## 2025-07-01 NOTE — PATIENT INSTRUCTIONS
Calcium 7338-9862 mg (in divided doses-max 600 mg at one time) + 600-1000 IU Vit D daily.   DEXA is scheduled.   Exercise 150-300 minutes per week minimum including weight bearing exercises.   Pap with high risk HPV Q 5 years, if normal.  Due 2027  Call your insurance company to verify coverage prior to completing any ordered tests.   Diagnostic mammogram ordered and  schedule.   Monthly breast self exam recommended.    Colonoscopy-  Due 2033        Kegels 20 times twice daily.   Silicone based lubricant with sex. (Use water based lubricant with condoms or sexual toys.)    Vaginal moisturizers twice weekly as needed.   Estrace Rx sent to pharmacy on file. Use as directed. Aware of benefits, risks and alternatives. May take 6 months to be fully effective when initiating therapy. She has already discussed this therapy with her medical oncologist and states he approved this therapy.   Return to office in one year or sooner, if needed.

## 2025-07-02 LAB
RAD ONC ARIA COURSE FIRST TREATMENT DATE: NORMAL
RAD ONC ARIA COURSE ID: NORMAL
RAD ONC ARIA COURSE INTENT: NORMAL
RAD ONC ARIA COURSE LAST TREATMENT DATE: NORMAL
RAD ONC ARIA COURSE SESSION NUMBER: 2
RAD ONC ARIA COURSE START DATE: NORMAL
RAD ONC ARIA COURSE TREATMENT ELAPSED DAYS: 1
RAD ONC ARIA PLAN FRACTIONS TREATED TO DATE: 2
RAD ONC ARIA PLAN ID: NORMAL
RAD ONC ARIA PLAN NAME: NORMAL
RAD ONC ARIA PLAN PRESCRIBED DOSE PER FRACTION: 2.67 GY
RAD ONC ARIA PLAN PRIMARY REFERENCE POINT: NORMAL
RAD ONC ARIA PLAN TOTAL FRACTIONS PRESCRIBED: 15
RAD ONC ARIA PLAN TOTAL PRESCRIBED DOSE: 4005 CGY
RAD ONC ARIA REFERENCE POINT DOSAGE GIVEN TO DATE: 5.34 GY
RAD ONC ARIA REFERENCE POINT ID: NORMAL
RAD ONC ARIA REFERENCE POINT SESSION DOSAGE GIVEN: 2.67 GY

## 2025-07-02 PROCEDURE — 77412 RADIATION TX DELIVERY LVL 3: CPT | Performed by: RADIOLOGY

## 2025-07-02 PROCEDURE — G6017 INTRAFRACTION TRACK MOTION: HCPCS | Performed by: RADIOLOGY

## 2025-07-02 PROCEDURE — 77387 GUIDANCE FOR RADJ TX DLVR: CPT | Performed by: RADIOLOGY

## 2025-07-03 LAB
RAD ONC ARIA COURSE FIRST TREATMENT DATE: NORMAL
RAD ONC ARIA COURSE ID: NORMAL
RAD ONC ARIA COURSE INTENT: NORMAL
RAD ONC ARIA COURSE LAST TREATMENT DATE: NORMAL
RAD ONC ARIA COURSE SESSION NUMBER: 3
RAD ONC ARIA COURSE START DATE: NORMAL
RAD ONC ARIA COURSE TREATMENT ELAPSED DAYS: 2
RAD ONC ARIA PLAN FRACTIONS TREATED TO DATE: 3
RAD ONC ARIA PLAN ID: NORMAL
RAD ONC ARIA PLAN NAME: NORMAL
RAD ONC ARIA PLAN PRESCRIBED DOSE PER FRACTION: 2.67 GY
RAD ONC ARIA PLAN PRIMARY REFERENCE POINT: NORMAL
RAD ONC ARIA PLAN TOTAL FRACTIONS PRESCRIBED: 15
RAD ONC ARIA PLAN TOTAL PRESCRIBED DOSE: 4005 CGY
RAD ONC ARIA REFERENCE POINT DOSAGE GIVEN TO DATE: 8.01 GY
RAD ONC ARIA REFERENCE POINT ID: NORMAL
RAD ONC ARIA REFERENCE POINT SESSION DOSAGE GIVEN: 2.67 GY

## 2025-07-03 PROCEDURE — 77387 GUIDANCE FOR RADJ TX DLVR: CPT | Performed by: RADIOLOGY

## 2025-07-03 PROCEDURE — 77412 RADIATION TX DELIVERY LVL 3: CPT | Performed by: RADIOLOGY

## 2025-07-07 LAB
RAD ONC ARIA COURSE FIRST TREATMENT DATE: NORMAL
RAD ONC ARIA COURSE ID: NORMAL
RAD ONC ARIA COURSE INTENT: NORMAL
RAD ONC ARIA COURSE LAST TREATMENT DATE: NORMAL
RAD ONC ARIA COURSE SESSION NUMBER: 4
RAD ONC ARIA COURSE START DATE: NORMAL
RAD ONC ARIA COURSE TREATMENT ELAPSED DAYS: 6
RAD ONC ARIA PLAN FRACTIONS TREATED TO DATE: 4
RAD ONC ARIA PLAN ID: NORMAL
RAD ONC ARIA PLAN NAME: NORMAL
RAD ONC ARIA PLAN PRESCRIBED DOSE PER FRACTION: 2.67 GY
RAD ONC ARIA PLAN PRIMARY REFERENCE POINT: NORMAL
RAD ONC ARIA PLAN TOTAL FRACTIONS PRESCRIBED: 15
RAD ONC ARIA PLAN TOTAL PRESCRIBED DOSE: 4005 CGY
RAD ONC ARIA REFERENCE POINT DOSAGE GIVEN TO DATE: 10.68 GY
RAD ONC ARIA REFERENCE POINT ID: NORMAL
RAD ONC ARIA REFERENCE POINT SESSION DOSAGE GIVEN: 2.67 GY

## 2025-07-07 PROCEDURE — 77331 SPECIAL RADIATION DOSIMETRY: CPT | Performed by: STUDENT IN AN ORGANIZED HEALTH CARE EDUCATION/TRAINING PROGRAM

## 2025-07-07 PROCEDURE — 77387 GUIDANCE FOR RADJ TX DLVR: CPT | Performed by: RADIOLOGY

## 2025-07-07 PROCEDURE — 77412 RADIATION TX DELIVERY LVL 3: CPT | Performed by: RADIOLOGY

## 2025-07-07 PROCEDURE — G6017 INTRAFRACTION TRACK MOTION: HCPCS | Performed by: RADIOLOGY

## 2025-07-08 LAB
RAD ONC ARIA COURSE FIRST TREATMENT DATE: NORMAL
RAD ONC ARIA COURSE ID: NORMAL
RAD ONC ARIA COURSE INTENT: NORMAL
RAD ONC ARIA COURSE LAST TREATMENT DATE: NORMAL
RAD ONC ARIA COURSE SESSION NUMBER: 5
RAD ONC ARIA COURSE START DATE: NORMAL
RAD ONC ARIA COURSE TREATMENT ELAPSED DAYS: 7
RAD ONC ARIA PLAN FRACTIONS TREATED TO DATE: 5
RAD ONC ARIA PLAN ID: NORMAL
RAD ONC ARIA PLAN NAME: NORMAL
RAD ONC ARIA PLAN PRESCRIBED DOSE PER FRACTION: 2.67 GY
RAD ONC ARIA PLAN PRIMARY REFERENCE POINT: NORMAL
RAD ONC ARIA PLAN TOTAL FRACTIONS PRESCRIBED: 15
RAD ONC ARIA PLAN TOTAL PRESCRIBED DOSE: 4005 CGY
RAD ONC ARIA REFERENCE POINT DOSAGE GIVEN TO DATE: 13.35 GY
RAD ONC ARIA REFERENCE POINT ID: NORMAL
RAD ONC ARIA REFERENCE POINT SESSION DOSAGE GIVEN: 2.67 GY

## 2025-07-08 PROCEDURE — G6017 INTRAFRACTION TRACK MOTION: HCPCS | Performed by: INTERNAL MEDICINE

## 2025-07-08 PROCEDURE — 77336 RADIATION PHYSICS CONSULT: CPT | Performed by: RADIOLOGY

## 2025-07-08 PROCEDURE — 77412 RADIATION TX DELIVERY LVL 3: CPT | Performed by: INTERNAL MEDICINE

## 2025-07-08 PROCEDURE — 77387 GUIDANCE FOR RADJ TX DLVR: CPT | Performed by: INTERNAL MEDICINE

## 2025-07-09 LAB
RAD ONC ARIA COURSE FIRST TREATMENT DATE: NORMAL
RAD ONC ARIA COURSE ID: NORMAL
RAD ONC ARIA COURSE INTENT: NORMAL
RAD ONC ARIA COURSE LAST TREATMENT DATE: NORMAL
RAD ONC ARIA COURSE SESSION NUMBER: 6
RAD ONC ARIA COURSE START DATE: NORMAL
RAD ONC ARIA COURSE TREATMENT ELAPSED DAYS: 8
RAD ONC ARIA PLAN FRACTIONS TREATED TO DATE: 6
RAD ONC ARIA PLAN ID: NORMAL
RAD ONC ARIA PLAN NAME: NORMAL
RAD ONC ARIA PLAN PRESCRIBED DOSE PER FRACTION: 2.67 GY
RAD ONC ARIA PLAN PRIMARY REFERENCE POINT: NORMAL
RAD ONC ARIA PLAN TOTAL FRACTIONS PRESCRIBED: 15
RAD ONC ARIA PLAN TOTAL PRESCRIBED DOSE: 4005 CGY
RAD ONC ARIA REFERENCE POINT DOSAGE GIVEN TO DATE: 16.02 GY
RAD ONC ARIA REFERENCE POINT ID: NORMAL
RAD ONC ARIA REFERENCE POINT SESSION DOSAGE GIVEN: 2.67 GY

## 2025-07-09 PROCEDURE — G6017 INTRAFRACTION TRACK MOTION: HCPCS | Performed by: RADIOLOGY

## 2025-07-09 PROCEDURE — 77412 RADIATION TX DELIVERY LVL 3: CPT | Performed by: RADIOLOGY

## 2025-07-09 PROCEDURE — 77387 GUIDANCE FOR RADJ TX DLVR: CPT | Performed by: RADIOLOGY

## 2025-07-09 PROCEDURE — 77427 RADIATION TX MANAGEMENT X5: CPT | Performed by: STUDENT IN AN ORGANIZED HEALTH CARE EDUCATION/TRAINING PROGRAM

## 2025-07-10 LAB
RAD ONC ARIA COURSE FIRST TREATMENT DATE: NORMAL
RAD ONC ARIA COURSE ID: NORMAL
RAD ONC ARIA COURSE INTENT: NORMAL
RAD ONC ARIA COURSE LAST TREATMENT DATE: NORMAL
RAD ONC ARIA COURSE SESSION NUMBER: 7
RAD ONC ARIA COURSE START DATE: NORMAL
RAD ONC ARIA COURSE TREATMENT ELAPSED DAYS: 9
RAD ONC ARIA PLAN FRACTIONS TREATED TO DATE: 7
RAD ONC ARIA PLAN ID: NORMAL
RAD ONC ARIA PLAN NAME: NORMAL
RAD ONC ARIA PLAN PRESCRIBED DOSE PER FRACTION: 2.67 GY
RAD ONC ARIA PLAN PRIMARY REFERENCE POINT: NORMAL
RAD ONC ARIA PLAN TOTAL FRACTIONS PRESCRIBED: 15
RAD ONC ARIA PLAN TOTAL PRESCRIBED DOSE: 4005 CGY
RAD ONC ARIA REFERENCE POINT DOSAGE GIVEN TO DATE: 18.69 GY
RAD ONC ARIA REFERENCE POINT ID: NORMAL
RAD ONC ARIA REFERENCE POINT SESSION DOSAGE GIVEN: 2.67 GY

## 2025-07-10 PROCEDURE — G6017 INTRAFRACTION TRACK MOTION: HCPCS | Performed by: RADIOLOGY

## 2025-07-10 PROCEDURE — 77387 GUIDANCE FOR RADJ TX DLVR: CPT | Performed by: RADIOLOGY

## 2025-07-10 PROCEDURE — 77412 RADIATION TX DELIVERY LVL 3: CPT | Performed by: RADIOLOGY

## 2025-07-11 LAB
RAD ONC ARIA COURSE FIRST TREATMENT DATE: NORMAL
RAD ONC ARIA COURSE ID: NORMAL
RAD ONC ARIA COURSE INTENT: NORMAL
RAD ONC ARIA COURSE LAST TREATMENT DATE: NORMAL
RAD ONC ARIA COURSE SESSION NUMBER: 8
RAD ONC ARIA COURSE START DATE: NORMAL
RAD ONC ARIA COURSE TREATMENT ELAPSED DAYS: 10
RAD ONC ARIA PLAN FRACTIONS TREATED TO DATE: 8
RAD ONC ARIA PLAN ID: NORMAL
RAD ONC ARIA PLAN NAME: NORMAL
RAD ONC ARIA PLAN PRESCRIBED DOSE PER FRACTION: 2.67 GY
RAD ONC ARIA PLAN PRIMARY REFERENCE POINT: NORMAL
RAD ONC ARIA PLAN TOTAL FRACTIONS PRESCRIBED: 15
RAD ONC ARIA PLAN TOTAL PRESCRIBED DOSE: 4005 CGY
RAD ONC ARIA REFERENCE POINT DOSAGE GIVEN TO DATE: 21.36 GY
RAD ONC ARIA REFERENCE POINT ID: NORMAL
RAD ONC ARIA REFERENCE POINT SESSION DOSAGE GIVEN: 2.67 GY

## 2025-07-11 PROCEDURE — 77412 RADIATION TX DELIVERY LVL 3: CPT | Performed by: STUDENT IN AN ORGANIZED HEALTH CARE EDUCATION/TRAINING PROGRAM

## 2025-07-11 PROCEDURE — 77387 GUIDANCE FOR RADJ TX DLVR: CPT | Performed by: STUDENT IN AN ORGANIZED HEALTH CARE EDUCATION/TRAINING PROGRAM

## 2025-07-14 ENCOUNTER — PATIENT OUTREACH (OUTPATIENT)
Dept: HEMATOLOGY ONCOLOGY | Facility: CLINIC | Age: 60
End: 2025-07-14

## 2025-07-14 PROCEDURE — 77387 GUIDANCE FOR RADJ TX DLVR: CPT | Performed by: RADIOLOGY

## 2025-07-14 PROCEDURE — 77412 RADIATION TX DELIVERY LVL 3: CPT | Performed by: RADIOLOGY

## 2025-07-14 PROCEDURE — G6017 INTRAFRACTION TRACK MOTION: HCPCS | Performed by: RADIOLOGY

## 2025-07-14 NOTE — PROGRESS NOTES
I reached out and spoke with Isabel to follow up and to review for any new changes in barriers to care and offer supportive services as needed.     Barriers noted previously and outcome of interventions;  Co-morbidities    Reviewed for any new barriers as noted below.    Are you having any side effects from your treatment?No  Will start Exemestane after Radiation treatment.    Are you eating and drinking normally? yes    Have you been experiencing any uncontrolled pain related to your cancer diagnosis? No    Do you have a good support system? Yes     How are you doing with transportation to your appointments? No  issues    Do you have any questions or concerns regarding your treatment plan? No    Any new financial concerns for your household or medical bills? No    Do you know when your upcoming appointments are? yes  Future Appointments   Date Time Provider Department Center   7/14/2025  2:30 PM AN RADONC RESOURCE AN Rad Onc AN [facility]   7/15/2025  8:15 AM AN RADONC RESOURCE AN Rad Onc [facility]   7/16/2025  8:15 AM AN RADONC RESOURCE AN Rad Onc [facility]   7/17/2025  8:15 AM AN RADONC RESOURCE AN Rad Onc [facility]   7/18/2025  8:15 AM Roberth Santana MD AN Rad Onc [facility]   7/18/2025  8:15 AM AN RADONC RESOURCE AN Rad Onc [facility]   7/21/2025  8:15 AM AN RADONC RESOURCE AN Rad Onc [facility]   7/22/2025  8:15 AM AN RADONC RESOURCE AN Rad Onc AN HOS[facility] CC   8/15/2025  8:30 AM MO STEREO RBC 1 MO RBC Mammo MO RBC   8/21/2025  8:45 AM Carlos Cedillo MD [facility] ONC [facility]   9/15/2025  7:30 AM AN DEXA WG 1 AN WG DEXA AN [place]   9/22/2025  2:30 PM Kecia Ackerman PA-C RAUL ONC [facility] Practice-Onc   11/24/2025  4:30 PM Nixon Mccall MD [facility]   2/25/2026  8:10 AM Laura Butts MD DERM WG DERM   7/10/2026  7:30 AM NISHA [facility]          Based on individual needs I will follow up with them in 4-6 weeks. I have provided my direct  contact information and welcome them to contact me if their needs as discussed above change. They were appreciative for the call.

## 2025-07-15 PROCEDURE — G6017 INTRAFRACTION TRACK MOTION: HCPCS | Performed by: INTERNAL MEDICINE

## 2025-07-15 PROCEDURE — 77336 RADIATION PHYSICS CONSULT: CPT | Performed by: STUDENT IN AN ORGANIZED HEALTH CARE EDUCATION/TRAINING PROGRAM

## 2025-07-15 PROCEDURE — 77412 RADIATION TX DELIVERY LVL 3: CPT | Performed by: INTERNAL MEDICINE

## 2025-07-15 PROCEDURE — 77387 GUIDANCE FOR RADJ TX DLVR: CPT | Performed by: INTERNAL MEDICINE

## 2025-07-16 PROCEDURE — 77387 GUIDANCE FOR RADJ TX DLVR: CPT | Performed by: RADIOLOGY

## 2025-07-16 PROCEDURE — 77412 RADIATION TX DELIVERY LVL 3: CPT | Performed by: RADIOLOGY

## 2025-07-16 PROCEDURE — G6017 INTRAFRACTION TRACK MOTION: HCPCS | Performed by: RADIOLOGY

## 2025-07-16 PROCEDURE — 77427 RADIATION TX MANAGEMENT X5: CPT | Performed by: STUDENT IN AN ORGANIZED HEALTH CARE EDUCATION/TRAINING PROGRAM

## 2025-07-17 PROCEDURE — 77412 RADIATION TX DELIVERY LVL 3: CPT | Performed by: INTERNAL MEDICINE

## 2025-07-17 PROCEDURE — G6017 INTRAFRACTION TRACK MOTION: HCPCS | Performed by: INTERNAL MEDICINE

## 2025-07-17 PROCEDURE — 77387 GUIDANCE FOR RADJ TX DLVR: CPT | Performed by: INTERNAL MEDICINE

## 2025-07-18 PROCEDURE — 77412 RADIATION TX DELIVERY LVL 3: CPT | Performed by: STUDENT IN AN ORGANIZED HEALTH CARE EDUCATION/TRAINING PROGRAM

## 2025-07-18 PROCEDURE — 77387 GUIDANCE FOR RADJ TX DLVR: CPT | Performed by: STUDENT IN AN ORGANIZED HEALTH CARE EDUCATION/TRAINING PROGRAM

## 2025-07-21 LAB
RAD ONC ARIA COURSE FIRST TREATMENT DATE: NORMAL
RAD ONC ARIA COURSE ID: NORMAL
RAD ONC ARIA COURSE INTENT: NORMAL
RAD ONC ARIA COURSE LAST TREATMENT DATE: NORMAL
RAD ONC ARIA COURSE SESSION NUMBER: 10
RAD ONC ARIA COURSE SESSION NUMBER: 11
RAD ONC ARIA COURSE SESSION NUMBER: 12
RAD ONC ARIA COURSE SESSION NUMBER: 13
RAD ONC ARIA COURSE SESSION NUMBER: 14
RAD ONC ARIA COURSE SESSION NUMBER: 9
RAD ONC ARIA COURSE START DATE: NORMAL
RAD ONC ARIA COURSE TREATMENT ELAPSED DAYS: 17
RAD ONC ARIA COURSE TREATMENT ELAPSED DAYS: 20
RAD ONC ARIA PLAN FRACTIONS TREATED TO DATE: 13
RAD ONC ARIA PLAN FRACTIONS TREATED TO DATE: 14
RAD ONC ARIA PLAN ID: NORMAL
RAD ONC ARIA PLAN NAME: NORMAL
RAD ONC ARIA PLAN PRESCRIBED DOSE PER FRACTION: 2.67 GY
RAD ONC ARIA PLAN PRIMARY REFERENCE POINT: NORMAL
RAD ONC ARIA PLAN TOTAL FRACTIONS PRESCRIBED: 15
RAD ONC ARIA PLAN TOTAL PRESCRIBED DOSE: 4005 CGY
RAD ONC ARIA REFERENCE POINT DOSAGE GIVEN TO DATE: 34.71 GY
RAD ONC ARIA REFERENCE POINT DOSAGE GIVEN TO DATE: 37.38 GY
RAD ONC ARIA REFERENCE POINT ID: NORMAL
RAD ONC ARIA REFERENCE POINT SESSION DOSAGE GIVEN: 2.67 GY

## 2025-07-21 PROCEDURE — 77412 RADIATION TX DELIVERY LVL 3: CPT | Performed by: STUDENT IN AN ORGANIZED HEALTH CARE EDUCATION/TRAINING PROGRAM

## 2025-07-21 PROCEDURE — 77387 GUIDANCE FOR RADJ TX DLVR: CPT | Performed by: STUDENT IN AN ORGANIZED HEALTH CARE EDUCATION/TRAINING PROGRAM

## 2025-07-22 LAB
RAD ONC ARIA COURSE FIRST TREATMENT DATE: NORMAL
RAD ONC ARIA COURSE ID: NORMAL
RAD ONC ARIA COURSE INTENT: NORMAL
RAD ONC ARIA COURSE LAST TREATMENT DATE: NORMAL
RAD ONC ARIA COURSE SESSION NUMBER: 15
RAD ONC ARIA COURSE START DATE: NORMAL
RAD ONC ARIA COURSE TREATMENT ELAPSED DAYS: 21
RAD ONC ARIA PLAN FRACTIONS TREATED TO DATE: 15
RAD ONC ARIA PLAN ID: NORMAL
RAD ONC ARIA PLAN NAME: NORMAL
RAD ONC ARIA PLAN PRESCRIBED DOSE PER FRACTION: 2.67 GY
RAD ONC ARIA PLAN PRIMARY REFERENCE POINT: NORMAL
RAD ONC ARIA PLAN TOTAL FRACTIONS PRESCRIBED: 15
RAD ONC ARIA PLAN TOTAL PRESCRIBED DOSE: 4005 CGY
RAD ONC ARIA REFERENCE POINT DOSAGE GIVEN TO DATE: 40.05 GY
RAD ONC ARIA REFERENCE POINT ID: NORMAL
RAD ONC ARIA REFERENCE POINT SESSION DOSAGE GIVEN: 2.67 GY

## 2025-07-22 PROCEDURE — G6017 INTRAFRACTION TRACK MOTION: HCPCS | Performed by: RADIOLOGY

## 2025-07-22 PROCEDURE — 77387 GUIDANCE FOR RADJ TX DLVR: CPT | Performed by: RADIOLOGY

## 2025-07-22 PROCEDURE — 77412 RADIATION TX DELIVERY LVL 3: CPT | Performed by: RADIOLOGY

## 2025-07-22 PROCEDURE — 77336 RADIATION PHYSICS CONSULT: CPT | Performed by: STUDENT IN AN ORGANIZED HEALTH CARE EDUCATION/TRAINING PROGRAM

## 2025-08-12 ENCOUNTER — PATIENT OUTREACH (OUTPATIENT)
Dept: HEMATOLOGY ONCOLOGY | Facility: CLINIC | Age: 60
End: 2025-08-12

## 2025-08-15 ENCOUNTER — HOSPITAL ENCOUNTER (OUTPATIENT)
Dept: MAMMOGRAPHY | Facility: CLINIC | Age: 60
End: 2025-08-15
Attending: NURSE PRACTITIONER
Payer: COMMERCIAL

## 2025-08-21 ENCOUNTER — OFFICE VISIT (OUTPATIENT)
Dept: SURGICAL ONCOLOGY | Facility: CLINIC | Age: 60
End: 2025-08-21
Payer: COMMERCIAL

## 2025-08-21 VITALS
BODY MASS INDEX: 28.25 KG/M2 | HEIGHT: 67 IN | HEART RATE: 83 BPM | SYSTOLIC BLOOD PRESSURE: 116 MMHG | WEIGHT: 180 LBS | DIASTOLIC BLOOD PRESSURE: 74 MMHG | OXYGEN SATURATION: 100 % | TEMPERATURE: 97.5 F

## 2025-08-21 DIAGNOSIS — Z98.890 S/P LUMPECTOMY OF BREAST: ICD-10-CM

## 2025-08-21 DIAGNOSIS — Z85.3 ENCOUNTER FOR FOLLOW-UP SURVEILLANCE OF BREAST CANCER: Primary | ICD-10-CM

## 2025-08-21 DIAGNOSIS — Z79.811 USE OF EXEMESTANE (AROMASIN): ICD-10-CM

## 2025-08-21 DIAGNOSIS — C50.912 INVASIVE LOBULAR CARCINOMA OF LEFT BREAST IN FEMALE (HCC): ICD-10-CM

## 2025-08-21 DIAGNOSIS — C50.112 MALIGNANT NEOPLASM OF CENTRAL PORTION OF LEFT BREAST IN FEMALE, ESTROGEN RECEPTOR POSITIVE (HCC): ICD-10-CM

## 2025-08-21 DIAGNOSIS — Z08 ENCOUNTER FOR FOLLOW-UP SURVEILLANCE OF BREAST CANCER: Primary | ICD-10-CM

## 2025-08-21 DIAGNOSIS — Z17.0 MALIGNANT NEOPLASM OF CENTRAL PORTION OF LEFT BREAST IN FEMALE, ESTROGEN RECEPTOR POSITIVE (HCC): ICD-10-CM

## 2025-08-21 PROCEDURE — 99215 OFFICE O/P EST HI 40 MIN: CPT | Performed by: SURGERY

## (undated) DEVICE — TUBING SMOKE EVAC W/FILTRATION DEVICE PLUMEPORT ACTIV

## (undated) DEVICE — PACK UNIVERSAL DRAPES SUB-Q ICD

## (undated) DEVICE — ALLENTOWN LAP CHOLE APP PACK: Brand: CARDINAL HEALTH

## (undated) DEVICE — COTTON TIP APPLICTOR 2 PK

## (undated) DEVICE — ADHESIVE SKIN CLOSURE SYS EXOFIN FUSION 22CM

## (undated) DEVICE — SHEATH, GUIDE, SAVI SCOUT®: Brand: SAVI SCOUT®

## (undated) DEVICE — GLOVE SRG BIOGEL ORTHOPEDIC 7.5

## (undated) DEVICE — ELECTRODE LAP L WIRE E-Z CLEAN 33CM -0100

## (undated) DEVICE — SUT VICRYL 2-0 SH 27 IN UNDYED J417H

## (undated) DEVICE — LIGAMAX 5 MM ENDOSCOPIC MULTIPLE CLIP APPLIER: Brand: LIGAMAX

## (undated) DEVICE — ENDOPATH PNEUMONEEDLE INSUFFLATION NEEDLES WITH LUER LOCK CONNECTORS 120MM: Brand: ENDOPATH

## (undated) DEVICE — GAUZE SPONGES,16 PLY: Brand: CURITY

## (undated) DEVICE — HYDROPHILIC WOUND DRESSING WITH ZINC PLUS VITAMINS A AND B6.: Brand: DERMAGRAN®-B

## (undated) DEVICE — SYRINGE 10ML LL

## (undated) DEVICE — MARGIN MARKER SET

## (undated) DEVICE — [HIGH FLOW INSUFFLATOR,  DO NOT USE IF PACKAGE IS DAMAGED,  KEEP DRY,  KEEP AWAY FROM SUNLIGHT,  PROTECT FROM HEAT AND RADIOACTIVE SOURCES.]: Brand: PNEUMOSURE

## (undated) DEVICE — SUT VICRYL 4-0 PS-2 27 IN J426H

## (undated) DEVICE — SUT SILK 2-0 SH 30 IN K833H

## (undated) DEVICE — IRRIG ENDO FLO TUBING

## (undated) DEVICE — SCD SEQUENTIAL COMPRESSION COMFORT SLEEVE MEDIUM KNEE LENGTH: Brand: KENDALL SCD

## (undated) DEVICE — 4-PORT MANIFOLD: Brand: NEPTUNE 2

## (undated) DEVICE — DRAPE EQUIPMENT RF WAND

## (undated) DEVICE — LIGACLIP 12 MM ENDOSCOPIC ROTATING MULTIPLE CLIP APPLIER (LARGE): Brand: LIGACLIP

## (undated) DEVICE — PENCIL SMOKE EVAC TELESCOPING W/TUBING

## (undated) DEVICE — TROCAR: Brand: KII FIOS FIRST ENTRY

## (undated) DEVICE — ENDOPATH 5MM CURVED SCISSORS WITH MONOPOLAR CAUTERY: Brand: ENDOPATH

## (undated) DEVICE — ANTIBACTERIAL UNDYED BRAIDED (POLYGLACTIN 910), SYNTHETIC ABSORBABLE SUTURE: Brand: COATED VICRYL

## (undated) DEVICE — CHOLE CATH KIT ARROW

## (undated) DEVICE — SUT MONOCRYL 3-0 PS-2 18 IN Y497G

## (undated) DEVICE — DRAPE SHEET THREE QUARTER

## (undated) DEVICE — GLOVE INDICATOR PI UNDERGLOVE SZ 7.5 BLUE

## (undated) DEVICE — ENDOPOUCH RETRIEVER SPECIMEN RETRIEVAL BAGS: Brand: ENDOPOUCH RETRIEVER

## (undated) DEVICE — 3M™ STERI-STRIP™ REINFORCED ADHESIVE SKIN CLOSURES, R1546, 1/4 IN X 4 IN (6 MM X 100 MM), 10 STRIPS/ENVELOPE: Brand: 3M™ STERI-STRIP™

## (undated) DEVICE — GAUZE SPONGES,8 PLY: Brand: CURITY

## (undated) DEVICE — INSULATED BLADE ELECTRODE: Brand: EDGE

## (undated) DEVICE — BETHLEHEM UNIVERSAL MINOR GEN: Brand: CARDINAL HEALTH

## (undated) DEVICE — CHLORAPREP HI-LITE 26ML ORANGE

## (undated) DEVICE — TUBING SUCTION 5MM X 12 FT

## (undated) DEVICE — INTENDED FOR TISSUE SEPARATION, AND OTHER PROCEDURES THAT REQUIRE A SHARP SURGICAL BLADE TO PUNCTURE OR CUT.: Brand: BARD-PARKER SAFETY BLADES SIZE 15, STERILE

## (undated) DEVICE — PAD GROUNDING ADULT

## (undated) DEVICE — 5 MM CURVED DISSECTORS WITH MONOPOLAR CAUTERY: Brand: ENDOPATH

## (undated) DEVICE — IV CATH 14 G X 1.75

## (undated) DEVICE — 3M™ TEGADERM™ TRANSPARENT FILM DRESSING FRAME STYLE, 1624W, 2-3/8 IN X 2-3/4 IN (6 CM X 7 CM), 100/CT 4CT/CASE: Brand: 3M™ TEGADERM™

## (undated) DEVICE — LIGHT HANDLE COVER SLEEVE DISP BLUE STELLAR

## (undated) DEVICE — PENCIL ELECTROSURG E-Z CLEAN -0035H

## (undated) DEVICE — GLOVE SRG BIOGEL ECLIPSE 7.5

## (undated) DEVICE — TROCAR: Brand: KII® SLEEVE

## (undated) DEVICE — CAUTERY TIP POLISHER: Brand: DEVON

## (undated) DEVICE — 3M™ STERI-STRIP™ REINFORCED ADHESIVE SKIN CLOSURES, R1541, 1/4 IN X 3 IN (6 MM X 75 MM), 3 STRIPS/ENVELOPE: Brand: 3M™ STERI-STRIP™

## (undated) DEVICE — POV-IOD SOLUTION 4OZ BT